# Patient Record
Sex: FEMALE | Race: WHITE | NOT HISPANIC OR LATINO
[De-identification: names, ages, dates, MRNs, and addresses within clinical notes are randomized per-mention and may not be internally consistent; named-entity substitution may affect disease eponyms.]

---

## 2017-05-24 ENCOUNTER — APPOINTMENT (OUTPATIENT)
Dept: CARDIOLOGY | Facility: CLINIC | Age: 82
End: 2017-05-24

## 2017-05-24 VITALS — WEIGHT: 130 LBS | BODY MASS INDEX: 25.52 KG/M2 | HEIGHT: 60 IN

## 2017-05-24 VITALS — HEART RATE: 70 BPM | SYSTOLIC BLOOD PRESSURE: 140 MMHG | DIASTOLIC BLOOD PRESSURE: 80 MMHG

## 2017-06-05 ENCOUNTER — APPOINTMENT (OUTPATIENT)
Dept: CARDIOLOGY | Facility: CLINIC | Age: 82
End: 2017-06-05

## 2017-06-09 ENCOUNTER — APPOINTMENT (OUTPATIENT)
Dept: CARDIOLOGY | Facility: CLINIC | Age: 82
End: 2017-06-09

## 2017-09-14 ENCOUNTER — MEDICATION RENEWAL (OUTPATIENT)
Age: 82
End: 2017-09-14

## 2017-10-12 ENCOUNTER — APPOINTMENT (OUTPATIENT)
Dept: CARDIOLOGY | Facility: CLINIC | Age: 82
End: 2017-10-12

## 2017-10-12 VITALS — HEART RATE: 68 BPM | DIASTOLIC BLOOD PRESSURE: 70 MMHG | SYSTOLIC BLOOD PRESSURE: 124 MMHG

## 2017-10-12 VITALS — BODY MASS INDEX: 25.32 KG/M2 | WEIGHT: 129 LBS | HEIGHT: 60 IN

## 2017-10-12 RX ORDER — PEN NEEDLE, DIABETIC 29 G X1/2"
32G X 4 MM NEEDLE, DISPOSABLE MISCELLANEOUS
Qty: 100 | Refills: 0 | Status: ACTIVE | COMMUNITY
Start: 2017-04-24

## 2017-10-27 ENCOUNTER — OUTPATIENT (OUTPATIENT)
Dept: OUTPATIENT SERVICES | Facility: HOSPITAL | Age: 82
LOS: 1 days | Discharge: HOME | End: 2017-10-27

## 2017-10-27 DIAGNOSIS — E11.9 TYPE 2 DIABETES MELLITUS WITHOUT COMPLICATIONS: ICD-10-CM

## 2017-10-27 DIAGNOSIS — I48.91 UNSPECIFIED ATRIAL FIBRILLATION: ICD-10-CM

## 2017-10-27 DIAGNOSIS — K92.2 GASTROINTESTINAL HEMORRHAGE, UNSPECIFIED: ICD-10-CM

## 2017-10-27 DIAGNOSIS — E78.00 PURE HYPERCHOLESTEROLEMIA, UNSPECIFIED: ICD-10-CM

## 2017-10-27 DIAGNOSIS — J18.9 PNEUMONIA, UNSPECIFIED ORGANISM: ICD-10-CM

## 2017-10-27 DIAGNOSIS — I35.0 NONRHEUMATIC AORTIC (VALVE) STENOSIS: ICD-10-CM

## 2017-10-27 DIAGNOSIS — E78.5 HYPERLIPIDEMIA, UNSPECIFIED: ICD-10-CM

## 2017-10-27 DIAGNOSIS — I50.9 HEART FAILURE, UNSPECIFIED: ICD-10-CM

## 2017-10-27 DIAGNOSIS — I60.9 NONTRAUMATIC SUBARACHNOID HEMORRHAGE, UNSPECIFIED: ICD-10-CM

## 2018-02-22 ENCOUNTER — APPOINTMENT (OUTPATIENT)
Dept: CARDIOLOGY | Facility: CLINIC | Age: 83
End: 2018-02-22

## 2018-02-22 VITALS — DIASTOLIC BLOOD PRESSURE: 80 MMHG | HEART RATE: 76 BPM | SYSTOLIC BLOOD PRESSURE: 130 MMHG

## 2018-02-22 VITALS — HEIGHT: 60 IN | WEIGHT: 130 LBS | BODY MASS INDEX: 25.52 KG/M2

## 2018-07-19 ENCOUNTER — APPOINTMENT (OUTPATIENT)
Dept: CARDIOLOGY | Facility: CLINIC | Age: 83
End: 2018-07-19

## 2018-07-20 ENCOUNTER — APPOINTMENT (OUTPATIENT)
Dept: CARDIOLOGY | Facility: CLINIC | Age: 83
End: 2018-07-20

## 2018-07-20 VITALS
HEIGHT: 60 IN | SYSTOLIC BLOOD PRESSURE: 160 MMHG | HEART RATE: 75 BPM | BODY MASS INDEX: 25.91 KG/M2 | WEIGHT: 132 LBS | DIASTOLIC BLOOD PRESSURE: 70 MMHG

## 2018-07-20 RX ORDER — FUROSEMIDE 20 MG/1
20 TABLET ORAL
Refills: 0 | Status: DISCONTINUED | COMMUNITY
End: 2018-07-20

## 2018-08-17 ENCOUNTER — EMERGENCY (EMERGENCY)
Facility: HOSPITAL | Age: 83
LOS: 0 days | Discharge: HOME | End: 2018-08-17
Attending: EMERGENCY MEDICINE | Admitting: EMERGENCY MEDICINE

## 2018-08-17 ENCOUNTER — APPOINTMENT (OUTPATIENT)
Dept: CARDIOLOGY | Facility: CLINIC | Age: 83
End: 2018-08-17

## 2018-08-17 VITALS
SYSTOLIC BLOOD PRESSURE: 161 MMHG | TEMPERATURE: 98 F | RESPIRATION RATE: 17 BRPM | HEART RATE: 64 BPM | DIASTOLIC BLOOD PRESSURE: 75 MMHG | OXYGEN SATURATION: 97 %

## 2018-08-17 VITALS
HEART RATE: 73 BPM | SYSTOLIC BLOOD PRESSURE: 201 MMHG | DIASTOLIC BLOOD PRESSURE: 81 MMHG | OXYGEN SATURATION: 97 % | RESPIRATION RATE: 16 BRPM | TEMPERATURE: 97 F

## 2018-08-17 DIAGNOSIS — S09.90XA UNSPECIFIED INJURY OF HEAD, INITIAL ENCOUNTER: ICD-10-CM
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
## 2018-08-17 DIAGNOSIS — S00.83XA CONTUSION OF OTHER PART OF HEAD, INITIAL ENCOUNTER: ICD-10-CM

## 2018-08-17 DIAGNOSIS — S80.02XA CONTUSION OF LEFT KNEE, INITIAL ENCOUNTER: ICD-10-CM

## 2018-08-17 DIAGNOSIS — S52.602A UNSPECIFIED FRACTURE OF LOWER END OF LEFT ULNA, INITIAL ENCOUNTER FOR CLOSED FRACTURE: ICD-10-CM

## 2018-08-17 DIAGNOSIS — S60.221A CONTUSION OF RIGHT HAND, INITIAL ENCOUNTER: ICD-10-CM
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
## 2018-08-17 DIAGNOSIS — R07.89 OTHER CHEST PAIN: ICD-10-CM

## 2018-08-17 DIAGNOSIS — Z95.2 PRESENCE OF PROSTHETIC HEART VALVE: Chronic | ICD-10-CM

## 2018-08-17 DIAGNOSIS — S52.592A OTHER FRACTURES OF LOWER END OF LEFT RADIUS, INITIAL ENCOUNTER FOR CLOSED FRACTURE: ICD-10-CM
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
## 2018-08-17 DIAGNOSIS — E11.9 TYPE 2 DIABETES MELLITUS WITHOUT COMPLICATIONS: ICD-10-CM

## 2018-08-17 LAB
ALBUMIN SERPL ELPH-MCNC: 4.3 G/DL — SIGNIFICANT CHANGE UP (ref 3.5–5.2)
ALP SERPL-CCNC: 83 U/L — SIGNIFICANT CHANGE UP (ref 30–115)
ALT FLD-CCNC: 9 U/L — SIGNIFICANT CHANGE UP (ref 0–41)
ANION GAP SERPL CALC-SCNC: 19 MMOL/L — HIGH (ref 7–14)
APTT BLD: 31.9 SEC — SIGNIFICANT CHANGE UP (ref 27–39.2)
AST SERPL-CCNC: 18 U/L — SIGNIFICANT CHANGE UP (ref 0–41)
BASOPHILS # BLD AUTO: 0.04 K/UL — SIGNIFICANT CHANGE UP (ref 0–0.2)
BASOPHILS NFR BLD AUTO: 0.5 % — SIGNIFICANT CHANGE UP (ref 0–1)
BILIRUB SERPL-MCNC: 0.4 MG/DL — SIGNIFICANT CHANGE UP (ref 0.2–1.2)
BUN SERPL-MCNC: 29 MG/DL — HIGH (ref 10–20)
CALCIUM SERPL-MCNC: 9.7 MG/DL — SIGNIFICANT CHANGE UP (ref 8.5–10.1)
CHLORIDE SERPL-SCNC: 98 MMOL/L — SIGNIFICANT CHANGE UP (ref 98–110)
CO2 SERPL-SCNC: 24 MMOL/L — SIGNIFICANT CHANGE UP (ref 17–32)
CREAT SERPL-MCNC: 0.8 MG/DL — SIGNIFICANT CHANGE UP (ref 0.7–1.5)
EOSINOPHIL # BLD AUTO: 0.11 K/UL — SIGNIFICANT CHANGE UP (ref 0–0.7)
EOSINOPHIL NFR BLD AUTO: 1.4 % — SIGNIFICANT CHANGE UP (ref 0–8)
ETHANOL SERPL-MCNC: <10 MG/DL — HIGH
GLUCOSE SERPL-MCNC: 200 MG/DL — HIGH (ref 70–99)
HCT VFR BLD CALC: 37.6 % — SIGNIFICANT CHANGE UP (ref 37–47)
HGB BLD-MCNC: 12.5 G/DL — SIGNIFICANT CHANGE UP (ref 12–16)
IMM GRANULOCYTES NFR BLD AUTO: 0.5 % — HIGH (ref 0.1–0.3)
INR BLD: 1.13 RATIO — SIGNIFICANT CHANGE UP (ref 0.65–1.3)
LIDOCAIN IGE QN: 38 U/L — SIGNIFICANT CHANGE UP (ref 7–60)
LYMPHOCYTES # BLD AUTO: 0.87 K/UL — LOW (ref 1.2–3.4)
LYMPHOCYTES # BLD AUTO: 11.3 % — LOW (ref 20.5–51.1)
MCHC RBC-ENTMCNC: 29.1 PG — SIGNIFICANT CHANGE UP (ref 27–31)
MCHC RBC-ENTMCNC: 33.2 G/DL — SIGNIFICANT CHANGE UP (ref 32–37)
MCV RBC AUTO: 87.6 FL — SIGNIFICANT CHANGE UP (ref 81–99)
MONOCYTES # BLD AUTO: 0.5 K/UL — SIGNIFICANT CHANGE UP (ref 0.1–0.6)
MONOCYTES NFR BLD AUTO: 6.5 % — SIGNIFICANT CHANGE UP (ref 1.7–9.3)
NEUTROPHILS # BLD AUTO: 6.14 K/UL — SIGNIFICANT CHANGE UP (ref 1.4–6.5)
NEUTROPHILS NFR BLD AUTO: 79.8 % — HIGH (ref 42.2–75.2)
PLATELET # BLD AUTO: 220 K/UL — SIGNIFICANT CHANGE UP (ref 130–400)
POTASSIUM SERPL-MCNC: 4.1 MMOL/L — SIGNIFICANT CHANGE UP (ref 3.5–5)
POTASSIUM SERPL-SCNC: 4.1 MMOL/L — SIGNIFICANT CHANGE UP (ref 3.5–5)
PROT SERPL-MCNC: 7.3 G/DL — SIGNIFICANT CHANGE UP (ref 6–8)
PROTHROM AB SERPL-ACNC: 12.2 SEC — SIGNIFICANT CHANGE UP (ref 9.95–12.87)
RBC # BLD: 4.29 M/UL — SIGNIFICANT CHANGE UP (ref 4.2–5.4)
RBC # FLD: 13.4 % — SIGNIFICANT CHANGE UP (ref 11.5–14.5)
SODIUM SERPL-SCNC: 141 MMOL/L — SIGNIFICANT CHANGE UP (ref 135–146)
TROPONIN T SERPL-MCNC: <0.01 NG/ML — SIGNIFICANT CHANGE UP
TYPE + AB SCN PNL BLD: SIGNIFICANT CHANGE UP
WBC # BLD: 7.7 K/UL — SIGNIFICANT CHANGE UP (ref 4.8–10.8)
WBC # FLD AUTO: 7.7 K/UL — SIGNIFICANT CHANGE UP (ref 4.8–10.8)

## 2018-08-17 NOTE — ED PROVIDER NOTE - OBJECTIVE STATEMENT
88yF with PMH CAD s/p stent X 1, TAVR on plavix, HTN, p/w mechanical fall after slipping in shoes at home, hitting L face, wrist, and ribcage. Patient now has pain with deep breathing and with wrist movement, as well as abrasion to L periorbital region. no neck pain, scalp pain, cp, sob, palpitations, ams, vision changes, ha, n/v, difficulty ambulating, urinary sx.

## 2018-08-17 NOTE — ED PROVIDER NOTE - CONSTITUTIONAL, MLM
normal... Well appearing elderly female, awake, alert, oriented to person, place, time/situation and in no apparent distress.

## 2018-08-17 NOTE — ED ADULT NURSE NOTE - NSFALLRSKHRMRISKTYPE_ED_ALL_ED
bones(Osteoporosis,prev fx,steroid use,metastatic bone ca)/age(85 years old or older)/coagulation(Bleeding disorder R/T clinical cond/anti-coags)

## 2018-08-17 NOTE — ED PROVIDER NOTE - PROGRESS NOTE DETAILS
I personally evaluated the patient. I reviewed the Resident’s or Physician Assistant’s note (as assigned above), and agree with the findings and plan except as documented in my note.  87 Y/O F HTN, DYSLIPIDEMIA, DM, CAD, S/P CARDIAC STENT, S/P TAVR, ON PLAVIX S/P FALL FORM STANDING THIS AM. PT REPORTS MECHANICAL FALL. + HEAD TRAUMA. NO LOC. PT C/O L WRIST PAIN AND L CHEST WALL PAIN-PLEURITIC. NO NECK OR BACK PAIN. + R KNEE PAIN. NO SOB. NO ABD PAIN. ALERT OX3 NAD GCS-15. NCAT. + 3 CM HEMATOMA TO L FOREHEAD. PERRL, EOMI. NO MIDLINE C SPINE TENDERNESS. LUNGS CLEAR B/L. L ANTEROLATERAL CHEST WALL TENDERNESS. NO CREPITUS. RRR. ABD- SOFT NONTENDER. PELVIS STABLE NONTENDER. BACK NONTENDER. NO SPINE TENDERNESS. NEURO EXAM NONFOCAL. L WRIST WITH TENDERNESS. + FROM. NO DEFORMITY. L HAND AND L ELBOW NONTENDER, FROM. LUE NVI. 2+ RADIAL PULSES B/L. R HAND WITH ECCHYMOSES OVER DORAL HAND. FORM ALL DIGITS. NONTENDER. L KNEE WITH ECCHYMOSES. NONTENDER. NO EFFUSION. + FROM. B/L HIPS NONTENDER, FROM. 2+ PEDAL PULSES B/L.

## 2018-08-17 NOTE — ED ADULT NURSE NOTE - OBJECTIVE STATEMENT
pt s/p trip and fall on plavix no loc. pt with abrasion to left forehead. c/o left wrsit and left rib pain .

## 2018-08-17 NOTE — ED ADULT NURSE NOTE - PMH
Arthritis    Diabetes    GERD (gastroesophageal reflux disease)    Hypertension Arthritis    CAD (coronary artery disease)    Diabetes    GERD (gastroesophageal reflux disease)    Hypertension

## 2018-08-17 NOTE — ED PROVIDER NOTE - CARE PLAN
Principal Discharge DX:	Other closed fracture of distal end of left radius, initial encounter  Secondary Diagnosis:	Fall, initial encounter  Secondary Diagnosis:	Ulna distal fracture Principal Discharge DX:	Other closed fracture of distal end of left radius, initial encounter  Secondary Diagnosis:	Fall, initial encounter  Secondary Diagnosis:	Traumatic injury of head, initial encounter

## 2018-08-17 NOTE — ED PROVIDER NOTE - MUSCULOSKELETAL, MLM
Spine appears normal, range of motion is not limited, no muscle or joint tenderness. from all extremities. tender with rom of L wrist

## 2018-08-17 NOTE — ED ADULT NURSE NOTE - NSIMPLEMENTINTERV_GEN_ALL_ED
Implemented All Fall with Harm Risk Interventions:  [place] to call system. Call bell, personal items and telephone within reach. Instruct patient to call for assistance. Room bathroom lighting operational. Non-slip footwear when patient is off stretcher. Physically safe environment: no spills, clutter or unnecessary equipment. Stretcher in lowest position, wheels locked, appropriate side rails in place. Provide visual cue, wrist band, yellow gown, etc. Monitor gait and stability. Monitor for mental status changes and reorient to person, place, and time. Review medications for side effects contributing to fall risk. Reinforce activity limits and safety measures with patient and family. Provide visual clues: red socks.

## 2018-08-17 NOTE — ED ADULT NURSE NOTE - CHIEF COMPLAINT QUOTE
Patient has mechanical fall - hitting left side of head on floor no loc no nausea no vomiting on plavix. bruising to right fore head with surrounding abrasions

## 2018-09-06 PROBLEM — K21.9 GASTRO-ESOPHAGEAL REFLUX DISEASE WITHOUT ESOPHAGITIS: Chronic | Status: ACTIVE | Noted: 2018-08-17

## 2018-09-06 PROBLEM — E11.9 TYPE 2 DIABETES MELLITUS WITHOUT COMPLICATIONS: Chronic | Status: ACTIVE | Noted: 2018-08-17

## 2018-09-06 PROBLEM — M19.90 UNSPECIFIED OSTEOARTHRITIS, UNSPECIFIED SITE: Chronic | Status: ACTIVE | Noted: 2018-08-17

## 2018-09-06 PROBLEM — I10 ESSENTIAL (PRIMARY) HYPERTENSION: Chronic | Status: ACTIVE | Noted: 2018-08-17

## 2018-09-06 PROBLEM — I25.10 ATHEROSCLEROTIC HEART DISEASE OF NATIVE CORONARY ARTERY WITHOUT ANGINA PECTORIS: Chronic | Status: ACTIVE | Noted: 2018-08-17

## 2018-09-11 ENCOUNTER — APPOINTMENT (OUTPATIENT)
Dept: CARDIOLOGY | Facility: CLINIC | Age: 83
End: 2018-09-11

## 2018-09-13 ENCOUNTER — INPATIENT (INPATIENT)
Facility: HOSPITAL | Age: 83
LOS: 10 days | Discharge: NURSING, REHAB OR LONG-TERM CARE | End: 2018-09-24
Attending: INTERNAL MEDICINE | Admitting: INTERNAL MEDICINE

## 2018-09-13 ENCOUNTER — APPOINTMENT (OUTPATIENT)
Dept: CARDIOLOGY | Facility: CLINIC | Age: 83
End: 2018-09-13

## 2018-09-13 VITALS
HEART RATE: 75 BPM | WEIGHT: 120 LBS | HEIGHT: 60 IN | DIASTOLIC BLOOD PRESSURE: 56 MMHG | BODY MASS INDEX: 23.56 KG/M2 | SYSTOLIC BLOOD PRESSURE: 110 MMHG

## 2018-09-13 VITALS
SYSTOLIC BLOOD PRESSURE: 134 MMHG | DIASTOLIC BLOOD PRESSURE: 80 MMHG | OXYGEN SATURATION: 99 % | HEART RATE: 78 BPM | TEMPERATURE: 97 F | RESPIRATION RATE: 20 BRPM

## 2018-09-13 DIAGNOSIS — Z95.2 PRESENCE OF PROSTHETIC HEART VALVE: Chronic | ICD-10-CM

## 2018-09-13 LAB
ALBUMIN SERPL ELPH-MCNC: 3.4 G/DL — LOW (ref 3.5–5.2)
ALP SERPL-CCNC: 157 U/L — HIGH (ref 30–115)
ALT FLD-CCNC: 9 U/L — SIGNIFICANT CHANGE UP (ref 0–41)
ANION GAP SERPL CALC-SCNC: 17 MMOL/L — HIGH (ref 7–14)
APPEARANCE UR: CLEAR — SIGNIFICANT CHANGE UP
APTT BLD: 29.9 SEC — SIGNIFICANT CHANGE UP (ref 27–39.2)
AST SERPL-CCNC: 30 U/L — SIGNIFICANT CHANGE UP (ref 0–41)
BASOPHILS # BLD AUTO: 0.04 K/UL — SIGNIFICANT CHANGE UP (ref 0–0.2)
BASOPHILS NFR BLD AUTO: 0.4 % — SIGNIFICANT CHANGE UP (ref 0–1)
BILIRUB SERPL-MCNC: 0.5 MG/DL — SIGNIFICANT CHANGE UP (ref 0.2–1.2)
BILIRUB UR-MCNC: NEGATIVE — SIGNIFICANT CHANGE UP
BUN SERPL-MCNC: 32 MG/DL — HIGH (ref 10–20)
CALCIUM SERPL-MCNC: 9 MG/DL — SIGNIFICANT CHANGE UP (ref 8.5–10.1)
CHLORIDE SERPL-SCNC: 99 MMOL/L — SIGNIFICANT CHANGE UP (ref 98–110)
CO2 SERPL-SCNC: 23 MMOL/L — SIGNIFICANT CHANGE UP (ref 17–32)
COLOR SPEC: YELLOW — SIGNIFICANT CHANGE UP
CREAT SERPL-MCNC: 1 MG/DL — SIGNIFICANT CHANGE UP (ref 0.7–1.5)
DIFF PNL FLD: NEGATIVE — SIGNIFICANT CHANGE UP
EOSINOPHIL # BLD AUTO: 0.22 K/UL — SIGNIFICANT CHANGE UP (ref 0–0.7)
EOSINOPHIL NFR BLD AUTO: 1.9 % — SIGNIFICANT CHANGE UP (ref 0–8)
GLUCOSE SERPL-MCNC: 146 MG/DL — HIGH (ref 70–99)
GLUCOSE UR QL: NEGATIVE MG/DL — SIGNIFICANT CHANGE UP
HCT VFR BLD CALC: 36 % — LOW (ref 37–47)
HGB BLD-MCNC: 11.4 G/DL — LOW (ref 12–16)
IMM GRANULOCYTES NFR BLD AUTO: 1.1 % — HIGH (ref 0.1–0.3)
INR BLD: 1.36 RATIO — HIGH (ref 0.65–1.3)
KETONES UR-MCNC: NEGATIVE — SIGNIFICANT CHANGE UP
LACTATE SERPL-SCNC: 1.8 MMOL/L — SIGNIFICANT CHANGE UP (ref 0.5–2.2)
LEUKOCYTE ESTERASE UR-ACNC: ABNORMAL
LIDOCAIN IGE QN: 41 U/L — SIGNIFICANT CHANGE UP (ref 7–60)
LYMPHOCYTES # BLD AUTO: 1.03 K/UL — LOW (ref 1.2–3.4)
LYMPHOCYTES # BLD AUTO: 9.1 % — LOW (ref 20.5–51.1)
MCHC RBC-ENTMCNC: 28.4 PG — SIGNIFICANT CHANGE UP (ref 27–31)
MCHC RBC-ENTMCNC: 31.7 G/DL — LOW (ref 32–37)
MCV RBC AUTO: 89.6 FL — SIGNIFICANT CHANGE UP (ref 81–99)
MONOCYTES # BLD AUTO: 0.93 K/UL — HIGH (ref 0.1–0.6)
MONOCYTES NFR BLD AUTO: 8.2 % — SIGNIFICANT CHANGE UP (ref 1.7–9.3)
NEUTROPHILS # BLD AUTO: 9 K/UL — HIGH (ref 1.4–6.5)
NEUTROPHILS NFR BLD AUTO: 79.3 % — HIGH (ref 42.2–75.2)
NITRITE UR-MCNC: NEGATIVE — SIGNIFICANT CHANGE UP
NT-PROBNP SERPL-SCNC: 4692 PG/ML — HIGH (ref 0–300)
PH UR: 5.5 — SIGNIFICANT CHANGE UP (ref 5–8)
PLATELET # BLD AUTO: 324 K/UL — SIGNIFICANT CHANGE UP (ref 130–400)
POTASSIUM SERPL-MCNC: 5.1 MMOL/L — HIGH (ref 3.5–5)
POTASSIUM SERPL-SCNC: 5.1 MMOL/L — HIGH (ref 3.5–5)
PROT SERPL-MCNC: 7 G/DL — SIGNIFICANT CHANGE UP (ref 6–8)
PROT UR-MCNC: 30 MG/DL
PROTHROM AB SERPL-ACNC: 14.6 SEC — HIGH (ref 9.95–12.87)
RBC # BLD: 4.02 M/UL — LOW (ref 4.2–5.4)
RBC # FLD: 13.9 % — SIGNIFICANT CHANGE UP (ref 11.5–14.5)
SODIUM SERPL-SCNC: 139 MMOL/L — SIGNIFICANT CHANGE UP (ref 135–146)
SP GR SPEC: 1.02 — SIGNIFICANT CHANGE UP (ref 1.01–1.03)
TROPONIN T SERPL-MCNC: <0.01 NG/ML — SIGNIFICANT CHANGE UP
UROBILINOGEN FLD QL: 0.2 MG/DL — SIGNIFICANT CHANGE UP (ref 0.2–0.2)
WBC # BLD: 11.34 K/UL — HIGH (ref 4.8–10.8)
WBC # FLD AUTO: 11.34 K/UL — HIGH (ref 4.8–10.8)

## 2018-09-13 RX ORDER — FUROSEMIDE 40 MG
40 TABLET ORAL
Qty: 0 | Refills: 0 | Status: DISCONTINUED | OUTPATIENT
Start: 2018-09-13 | End: 2018-09-16

## 2018-09-13 RX ORDER — CLOPIDOGREL BISULFATE 75 MG/1
75 TABLET, FILM COATED ORAL DAILY
Qty: 0 | Refills: 0 | Status: DISCONTINUED | OUTPATIENT
Start: 2018-09-13 | End: 2018-09-24

## 2018-09-13 RX ORDER — FAMOTIDINE 10 MG/ML
0 INJECTION INTRAVENOUS
Qty: 0 | Refills: 0 | COMMUNITY

## 2018-09-13 RX ORDER — HEPARIN SODIUM 5000 [USP'U]/ML
5000 INJECTION INTRAVENOUS; SUBCUTANEOUS EVERY 8 HOURS
Qty: 0 | Refills: 0 | Status: DISCONTINUED | OUTPATIENT
Start: 2018-09-13 | End: 2018-09-24

## 2018-09-13 RX ORDER — CHLORHEXIDINE GLUCONATE 213 G/1000ML
1 SOLUTION TOPICAL
Qty: 0 | Refills: 0 | Status: DISCONTINUED | OUTPATIENT
Start: 2018-09-13 | End: 2018-09-24

## 2018-09-13 RX ORDER — ACETAMINOPHEN 500 MG
650 TABLET ORAL EVERY 6 HOURS
Qty: 0 | Refills: 0 | Status: DISCONTINUED | OUTPATIENT
Start: 2018-09-13 | End: 2018-09-24

## 2018-09-13 RX ORDER — FUROSEMIDE 40 MG
1 TABLET ORAL
Qty: 0 | Refills: 0 | COMMUNITY

## 2018-09-13 RX ORDER — PANTOPRAZOLE SODIUM 20 MG/1
40 TABLET, DELAYED RELEASE ORAL
Qty: 0 | Refills: 0 | Status: DISCONTINUED | OUTPATIENT
Start: 2018-09-13 | End: 2018-09-24

## 2018-09-13 RX ORDER — METOPROLOL TARTRATE 50 MG
25 TABLET ORAL
Qty: 0 | Refills: 0 | Status: DISCONTINUED | OUTPATIENT
Start: 2018-09-13 | End: 2018-09-24

## 2018-09-13 RX ORDER — SIMVASTATIN 20 MG/1
10 TABLET, FILM COATED ORAL AT BEDTIME
Qty: 0 | Refills: 0 | Status: DISCONTINUED | OUTPATIENT
Start: 2018-09-13 | End: 2018-09-24

## 2018-09-13 RX ADMIN — Medication 40 MILLIGRAM(S): at 21:43

## 2018-09-13 RX ADMIN — SIMVASTATIN 10 MILLIGRAM(S): 20 TABLET, FILM COATED ORAL at 21:43

## 2018-09-13 RX ADMIN — HEPARIN SODIUM 5000 UNIT(S): 5000 INJECTION INTRAVENOUS; SUBCUTANEOUS at 22:08

## 2018-09-13 RX ADMIN — Medication 25 MILLIGRAM(S): at 21:43

## 2018-09-13 NOTE — H&P ADULT - NSHPLABSRESULTS_GEN_ALL_CORE
11.4   11.34 )-----------( 324      ( 13 Sep 2018 13:18 )             36.0     PT/INR - ( 13 Sep 2018 13:18 )   PT: 14.60 sec;   INR: 1.36 ratio      PTT - ( 13 Sep 2018 13:18 )  PTT:29.9 sec        139  |  99  |  32<H>  ----------------------------<  146<H>  5.1<H>   |  23  |  1.0    Ca    9.0      13 Sep 2018 13:18    TPro  7.0  /  Alb  3.4<L>  /  TBili  0.5  /  DBili  x   /  AST  30  /  ALT  9   /  AlkPhos  157<H>      Urinalysis Basic - ( 13 Sep 2018 13:19 )    Color: Yellow / Appearance: Clear / S.025 / pH: x  Gluc: x / Ketone: Negative  / Bili: Negative / Urobili: 0.2 mg/dL   Blood: x / Protein: 30 mg/dL / Nitrite: Negative   Leuk Esterase: Small / RBC: x / WBC 6-10 /HPF   Sq Epi: x / Non Sq Epi: x / Bacteria: x    < from: CT Chest w/ IV Cont (18 @ 16:40) >    MPRESSION:   No evidence of pulmonary embolism.    Bilateral pleural effusions, right greater than left, associated with   compressive atelectasis.    Indeterminate bilateral renal lesions measuring 1.6 cm in greatest   dimension. Consider additional evaluation with ultrasound and/or renal   protocol MRI.    < end of copied text >      < from: CT Abdomen and Pelvis w/ IV Cont (18 @ 16:40) >    IMPRESSION:   No evidence of pulmonary embolism.    Bilateral pleural effusions, right greater than left, associated with   compressive atelectasis.    Indeterminate bilateral renal lesions measuring 1.6 cm in greatest   dimension. Consider additional evaluation with ultrasound and/or renal   protocol MRI.    These findings were discussed with Dr. CHUY AGEE at 2018 6:07 PM   by Dr. Lynn with read back confirmation.    < end of copied text >

## 2018-09-13 NOTE — ED ADULT TRIAGE NOTE - CHIEF COMPLAINT QUOTE
"im feeling gas in my stomach, I don't have an appetite, and I feel very tired" Pt also reports SOB. No CP

## 2018-09-13 NOTE — ED PROVIDER NOTE - ATTENDING CONTRIBUTION TO CARE
I personally evaluated the patient. I reviewed the Resident’s or Physician Assistant’s note (as assigned above), and agree with the findings and plan except as documented in my note.  87 yo woman with SOB and recurrent orthopnea.  She was sent in by cardiology for r/o PE.  Workup reveals likely CHF.  IV diuresis and admission.

## 2018-09-13 NOTE — ED PROVIDER NOTE - NS ED ROS FT
Eyes:  No visual changes, eye pain or discharge.  ENMT:  no sore throat or runny nose  Cardiac:  No chest pain, + SOB worsening over the last week. worse with exertion.   Respiratory:  No cough or respiratory distress.   GI:  No nausea, vomiting, diarrhea or abdominal pain.  :  No dysuria, frequency or burning.  MS:  No joint pain or back pain.  Neuro:  No headache or weakness.   Skin:  No skin rash.   Endocrine: No history of thyroid disease or diabetes.

## 2018-09-13 NOTE — ED PROVIDER NOTE - PHYSICAL EXAMINATION
CONSTITUTIONAL: Well-developed; well-nourished; in no acute distress.   SKIN: warm, dry  HEAD: Normocephalic; atraumatic.  EYES: PERRL, no conjunctival erythema  ENT: No nasal discharge; airway clear.  NECK: Supple; non tender.  CARD: S1, S2 normal;  Regular rate and rhythm.   RESP: No wheezes good air movement bilaterally. bl crackles. speaking full sentences.   ABD: soft ntnd  EXT: Normal ROM.  No pedal edema.   LYMPH: No acute cervical adenopathy.  NEURO: Alert, oriented, grossly unremarkable

## 2018-09-13 NOTE — H&P ADULT - RS GEN PE MLT RESP DETAILS PC
no rhonchi/no rales/respirations non-labored/breath sounds equal/good air movement/normal/airway patent

## 2018-09-13 NOTE — ED ADULT NURSE NOTE - NSIMPLEMENTINTERV_GEN_ALL_ED
Implemented All Fall with Harm Risk Interventions:  [place] to call system. Call bell, personal items and telephone within reach. Instruct patient to call for assistance. Room bathroom lighting operational. Non-slip footwear when patient is off stretcher. Physically safe environment: no spills, clutter or unnecessary equipment. Stretcher in lowest position, wheels locked, appropriate side rails in place. Provide visual cue, wrist band, yellow gown, etc. Monitor gait and stability. Monitor for mental status changes and reorient to person, place, and time. Review medications for side effects contributing to fall risk. Reinforce activity limits and safety measures with patient and family. Provide visual clues: red socks.

## 2018-09-13 NOTE — ED ADULT NURSE NOTE - PMH
Arthritis    CAD (coronary artery disease)    Diabetes    GERD (gastroesophageal reflux disease)    Hypertension

## 2018-09-13 NOTE — H&P ADULT - HISTORY OF PRESENT ILLNESS
87 yo F with PMH of Atrial Fibrillation ( not on AC due to previous GI Bleed), HTN, CAD with stent placement, CHF, s/p TAVR, and HLD presented to the hospital for the evaluation of sob x 10days. Patient states that she first noticed SOB ten days ago and it has been progressively worsening. She has RAIN after about 25 ft ambulation. She used to sleep with one pillow and now is using two for symptomatic relief. Patients son is at bedside and notes that these symptoms are similar to symtpoms she had prior to her TAVR. 89 yo F with PMH of Atrial Fibrillation ( not on AC due to previous GI Bleed ), HTN, CAD with stent placement, CHF, s/p TAVR, GERD, DM, and HLD presented to the hospital for the evaluation of sob x 10 days. Patient states that she first noticed SOB ten days ago and it has been progressively worsening. She has RAIN after about 25 ft ambulation. She used to sleep with one pillow and now is using two for symptomatic relief. Patients son is at bedside and notes that these symptoms are similar to symptoms she had prior to her TAVR. Patient also notes increased swelling of bilaterally lower extremities. She recently saw Cardiologist who completed ECHO and increased dosage of Furosemide. She has had improvement of LE edema but not of SOB to baseline. Patient also notes recent feelings of nausea, chills, fatigue and 5 pound weight loss in the last month too. She denies and fevers, vomiting chest pain, palpitations, abdominal pain or back pain. Patient also notes loose stools increased in frequency ( as per son this is normal for her). Patient notes she has had pleural effusions for three years which have been monitored with CXR.

## 2018-09-13 NOTE — H&P ADULT - ASSESSMENT
87 yo F with PMH of Atrial Fibrillation ( not on AC due to previous GI Bleed ), HTN, CAD with stent placement, CHF, s/p TAVR, GERD, DM, and HLD presented to the hospital for the evaluation of sob x 10 days. Found to have b/l Pleural effusions Right is moderate and left is small.       # SOB due to b/l Pleural Effusions: Pleural effusion likely to be secondary to CHF exacerbation   - BNP 4692  - CT scan completed today shows worsening pleural effusions when compared to study completed recently.   - Keep i < 0  - Trend daily weights  - Daily BMP  - Repeat CXR in am   - IV DIuresis: Patient Furosemide recently increased to 40mg PO BID within some improvement. Will begin patient on Furosemide 40mg IVP BID  - Cardiology consulted: Dr. Joyce service, f/u recommendations  - ECHO recently completed, please have Dr. Joyce office fax over a copy of the official ECHO report     # Atrial Fibrillation:  - AC discontinued due to GI Bleed  - Continue clopidegrol  - Continue Metoprolol  - Currently rate controlled on monitor ; HR ~ 80 bpm    # GERD: Patient takes Famotidine at home. Will continue patient here in hospital with Protonix    # HLD: Continue simvastatin    # DM:   - Patient with poor appetite and has held Metformin for about 2 days now. Sugar roughly 140's. Will hold on insulin for now. Will monitor FSG if PO intake improves and patient becomes hyperglycemic would begin weight based insulin regimen with low dose correctional sliding scale.     # dvt ppx: Heparin sc 5000 u q8h  # gi ppx: Protonix 40mg PO qam 89 yo F with PMH of Atrial Fibrillation ( not on AC due to previous GI Bleed ), HTN, CAD with stent placement, CHF, s/p TAVR, GERD, DM, and HLD presented to the hospital for the evaluation of sob x 10 days. Found to have b/l Pleural effusions Right is moderate and left is small.     # SOB due to b/l Pleural Effusions: Pleural effusion likely to be secondary to CHF exacerbation   - BNP 4692  - CT scan completed today shows worsening pleural effusions when compared to study completed recently.   - Keep i < 0  - Trend daily weights  - Daily BMP  - Repeat CXR in am   - IV Diuresis Patient Furosemide recently increased to 40mg PO BID within some improvement. Will begin patient on Furosemide 40mg IVP BID  - Cardiology consulted: Dr. Reeves's service, f/u recommendations  - ECHO recently completed, please have Dr. Reeves's office fax over a copy of the official ECHO report   - Patient with significant PMH of smoking would consider Pulmonary consult for diagnostic thoracentesis.   - WBC elevated but possible reactive. Less likely to infectious. Will hold on ABX for now and continue to monitor.     # Atrial Fibrillation:  - AC discontinued due to GI Bleed  - Continue clopidegrol  - Continue Metoprolol  - Currently rate controlled on monitor ; HR ~ 80 bpm    # GERD: Patient takes Famotidine at home. Will continue patient here in hospital with Protonix    # HLD: Continue simvastatin    # Kidney abnormalities noted on CT Scan: f/u kidney US     # DM:   - Patient with poor appetite and has held Metformin for about 2 days now. Sugar roughly 140's. Will hold on insulin for now. Will monitor FSG if PO intake improves and patient becomes hyperglycemic would begin weight based insulin regimen with low dose correctional sliding scale.     # dvt ppx: Heparin sc 5000 u q8h  # gi ppx: Protonix 40mg PO qam

## 2018-09-13 NOTE — ED PROVIDER NOTE - OBJECTIVE STATEMENT
89 yo F pmh of TAVR, a fib no AC (previous GI bleed), HTN, cad with stent, chf, hld presents with worsening sob for a few days. Was sent by Dr. Reeves's office for rule out PE because her pulmonary pressures were elvated on echo in office. no chest pain. + exertional sob. no cp, no n/v/d, no abdominal pain. Also having some worsened weakness this week. no dizziness, no headache. pmd is Dr. Bone.

## 2018-09-13 NOTE — H&P ADULT - ATTENDING COMMENTS
Patient seen and examined independently. Agree with resident note/ history / physical exam and plan of care with following exceptions. Case discussed with house-staff, nursing and patient    pt is feeling better today    Vital Signs Last 24 Hrs  T(C): 36.6 (14 Sep 2018 11:50), Max: 36.9 (14 Sep 2018 00:26)  T(F): 97.8 (14 Sep 2018 11:50), Max: 98.4 (14 Sep 2018 00:26)  HR: 88 (14 Sep 2018 11:50) (88 - 104)  BP: 147/64 (14 Sep 2018 11:50) (147/64 - 184/80)  BP(mean): --  RR: 18 (14 Sep 2018 11:50) (18 - 19)  SpO2: 94% (14 Sep 2018 11:50) (94% - 98%)                          11.1   10.70 )-----------( 345      ( 14 Sep 2018 06:52 )             34.7     09-14    142  |  97<L>  |  27<H>  ----------------------------<  153<H>  4.0   |  24  |  0.8    Ca    9.0      14 Sep 2018 06:52  Mg     1.6     09-14    TPro  7.0  /  Alb  3.4<L>  /  TBili  0.5  /  DBili  x   /  AST  30  /  ALT  9   /  AlkPhos  157<H>  09-13    Cardio notes appreciated.

## 2018-09-14 LAB
ANION GAP SERPL CALC-SCNC: 21 MMOL/L — HIGH (ref 7–14)
BUN SERPL-MCNC: 27 MG/DL — HIGH (ref 10–20)
CALCIUM SERPL-MCNC: 9 MG/DL — SIGNIFICANT CHANGE UP (ref 8.5–10.1)
CHLORIDE SERPL-SCNC: 97 MMOL/L — LOW (ref 98–110)
CO2 SERPL-SCNC: 24 MMOL/L — SIGNIFICANT CHANGE UP (ref 17–32)
CREAT SERPL-MCNC: 0.8 MG/DL — SIGNIFICANT CHANGE UP (ref 0.7–1.5)
CULTURE RESULTS: SIGNIFICANT CHANGE UP
GLUCOSE BLDC GLUCOMTR-MCNC: 154 MG/DL — HIGH (ref 70–99)
GLUCOSE BLDC GLUCOMTR-MCNC: 212 MG/DL — HIGH (ref 70–99)
GLUCOSE BLDC GLUCOMTR-MCNC: 281 MG/DL — HIGH (ref 70–99)
GLUCOSE SERPL-MCNC: 153 MG/DL — HIGH (ref 70–99)
HCT VFR BLD CALC: 34.7 % — LOW (ref 37–47)
HGB BLD-MCNC: 11.1 G/DL — LOW (ref 12–16)
MAGNESIUM SERPL-MCNC: 1.6 MG/DL — LOW (ref 1.8–2.4)
MCHC RBC-ENTMCNC: 28.5 PG — SIGNIFICANT CHANGE UP (ref 27–31)
MCHC RBC-ENTMCNC: 32 G/DL — SIGNIFICANT CHANGE UP (ref 32–37)
MCV RBC AUTO: 89 FL — SIGNIFICANT CHANGE UP (ref 81–99)
NRBC # BLD: 0 /100 WBCS — SIGNIFICANT CHANGE UP (ref 0–0)
PLATELET # BLD AUTO: 345 K/UL — SIGNIFICANT CHANGE UP (ref 130–400)
POTASSIUM SERPL-MCNC: 4 MMOL/L — SIGNIFICANT CHANGE UP (ref 3.5–5)
POTASSIUM SERPL-SCNC: 4 MMOL/L — SIGNIFICANT CHANGE UP (ref 3.5–5)
RBC # BLD: 3.9 M/UL — LOW (ref 4.2–5.4)
RBC # FLD: 14 % — SIGNIFICANT CHANGE UP (ref 11.5–14.5)
SODIUM SERPL-SCNC: 142 MMOL/L — SIGNIFICANT CHANGE UP (ref 135–146)
SPECIMEN SOURCE: SIGNIFICANT CHANGE UP
WBC # BLD: 10.7 K/UL — SIGNIFICANT CHANGE UP (ref 4.8–10.8)
WBC # FLD AUTO: 10.7 K/UL — SIGNIFICANT CHANGE UP (ref 4.8–10.8)

## 2018-09-14 RX ORDER — MAGNESIUM SULFATE 500 MG/ML
1 VIAL (ML) INJECTION ONCE
Qty: 0 | Refills: 0 | Status: COMPLETED | OUTPATIENT
Start: 2018-09-14 | End: 2018-09-14

## 2018-09-14 RX ADMIN — Medication 100 GRAM(S): at 14:05

## 2018-09-14 RX ADMIN — HEPARIN SODIUM 5000 UNIT(S): 5000 INJECTION INTRAVENOUS; SUBCUTANEOUS at 14:05

## 2018-09-14 RX ADMIN — Medication 25 MILLIGRAM(S): at 06:17

## 2018-09-14 RX ADMIN — CHLORHEXIDINE GLUCONATE 1 APPLICATION(S): 213 SOLUTION TOPICAL at 06:17

## 2018-09-14 RX ADMIN — SIMVASTATIN 10 MILLIGRAM(S): 20 TABLET, FILM COATED ORAL at 21:45

## 2018-09-14 RX ADMIN — Medication 40 MILLIGRAM(S): at 17:14

## 2018-09-14 RX ADMIN — HEPARIN SODIUM 5000 UNIT(S): 5000 INJECTION INTRAVENOUS; SUBCUTANEOUS at 06:17

## 2018-09-14 RX ADMIN — Medication 25 MILLIGRAM(S): at 17:14

## 2018-09-14 RX ADMIN — CLOPIDOGREL BISULFATE 75 MILLIGRAM(S): 75 TABLET, FILM COATED ORAL at 11:15

## 2018-09-14 RX ADMIN — HEPARIN SODIUM 5000 UNIT(S): 5000 INJECTION INTRAVENOUS; SUBCUTANEOUS at 21:44

## 2018-09-14 RX ADMIN — Medication 40 MILLIGRAM(S): at 06:17

## 2018-09-14 RX ADMIN — PANTOPRAZOLE SODIUM 40 MILLIGRAM(S): 20 TABLET, DELAYED RELEASE ORAL at 06:17

## 2018-09-14 NOTE — PROGRESS NOTE ADULT - ASSESSMENT
89 yo F with PMH of Atrial Fibrillation ( not on AC due to previous GI Bleed ), HTN, CAD with stent placement, CHF, s/p TAVR, GERD, DM, and HLD presented to the hospital for the evaluation of sob x 10 days. Found to have b/l Pleural effusions Right is moderate and left is small.     # SOB due to b/l Pleural Effusions: Pleural effusion likely to be secondary to CHF exacerbation   - BNP 4692  - CT scan shows worsening pleural effusions when compared to study completed recently.   - Trend daily weights  - furosemide 40mg bid iv for now   -cardiology: IV LAsix for now, continue Metoprolol, suggest pulmonary evaluation for pulmonary HTN out of proportion to her  HF, suggest repeat CT scan of the head, work up for renal lesion as per PCP, monitor electrolytes and renal function closely      # Atrial Fibrillation:  - AC discontinued due to GI Bleed  - Continue clopidegrol  - Continue Metoprolol    # GERD: Patient takes Famotidine at home. Will continue patient here in hospital with Protonix    # HLD: Continue simvastatin    # Kidney abnormalities noted on CT Scan: f/u kidney US     # DM:   - monitor FSG, if PO intake improves and patient becomes hyperglycemic would begin weight based insulin regimen with low dose correctional sliding scale.     # dvt ppx: Heparin sc 5000 u q8h  # gi ppx: Protonix 40mg PO qam

## 2018-09-15 LAB
ALBUMIN SERPL ELPH-MCNC: 3.1 G/DL — LOW (ref 3.5–5.2)
ALP SERPL-CCNC: 150 U/L — HIGH (ref 30–115)
ALT FLD-CCNC: 7 U/L — SIGNIFICANT CHANGE UP (ref 0–41)
ANION GAP SERPL CALC-SCNC: 16 MMOL/L — HIGH (ref 7–14)
AST SERPL-CCNC: 13 U/L — SIGNIFICANT CHANGE UP (ref 0–41)
BILIRUB SERPL-MCNC: 0.6 MG/DL — SIGNIFICANT CHANGE UP (ref 0.2–1.2)
BUN SERPL-MCNC: 27 MG/DL — HIGH (ref 10–20)
CALCIUM SERPL-MCNC: 8.8 MG/DL — SIGNIFICANT CHANGE UP (ref 8.5–10.1)
CHLORIDE SERPL-SCNC: 98 MMOL/L — SIGNIFICANT CHANGE UP (ref 98–110)
CO2 SERPL-SCNC: 24 MMOL/L — SIGNIFICANT CHANGE UP (ref 17–32)
CREAT SERPL-MCNC: 0.8 MG/DL — SIGNIFICANT CHANGE UP (ref 0.7–1.5)
GLUCOSE BLDC GLUCOMTR-MCNC: 195 MG/DL — HIGH (ref 70–99)
GLUCOSE BLDC GLUCOMTR-MCNC: 246 MG/DL — HIGH (ref 70–99)
GLUCOSE BLDC GLUCOMTR-MCNC: 260 MG/DL — HIGH (ref 70–99)
GLUCOSE BLDC GLUCOMTR-MCNC: 313 MG/DL — HIGH (ref 70–99)
GLUCOSE SERPL-MCNC: 213 MG/DL — HIGH (ref 70–99)
HCT VFR BLD CALC: 31.5 % — LOW (ref 37–47)
HGB BLD-MCNC: 10.1 G/DL — LOW (ref 12–16)
MCHC RBC-ENTMCNC: 28.4 PG — SIGNIFICANT CHANGE UP (ref 27–31)
MCHC RBC-ENTMCNC: 32.1 G/DL — SIGNIFICANT CHANGE UP (ref 32–37)
MCV RBC AUTO: 88.5 FL — SIGNIFICANT CHANGE UP (ref 81–99)
NRBC # BLD: 0 /100 WBCS — SIGNIFICANT CHANGE UP (ref 0–0)
PLATELET # BLD AUTO: 340 K/UL — SIGNIFICANT CHANGE UP (ref 130–400)
POTASSIUM SERPL-MCNC: 3.8 MMOL/L — SIGNIFICANT CHANGE UP (ref 3.5–5)
POTASSIUM SERPL-SCNC: 3.8 MMOL/L — SIGNIFICANT CHANGE UP (ref 3.5–5)
PROT SERPL-MCNC: 6.1 G/DL — SIGNIFICANT CHANGE UP (ref 6–8)
RBC # BLD: 3.56 M/UL — LOW (ref 4.2–5.4)
RBC # FLD: 13.9 % — SIGNIFICANT CHANGE UP (ref 11.5–14.5)
SODIUM SERPL-SCNC: 138 MMOL/L — SIGNIFICANT CHANGE UP (ref 135–146)
WBC # BLD: 11.21 K/UL — HIGH (ref 4.8–10.8)
WBC # FLD AUTO: 11.21 K/UL — HIGH (ref 4.8–10.8)

## 2018-09-15 RX ORDER — INFLUENZA VIRUS VACCINE 15; 15; 15; 15 UG/.5ML; UG/.5ML; UG/.5ML; UG/.5ML
0.5 SUSPENSION INTRAMUSCULAR ONCE
Qty: 0 | Refills: 0 | Status: COMPLETED | OUTPATIENT
Start: 2018-09-15 | End: 2018-09-15

## 2018-09-15 RX ORDER — AMLODIPINE BESYLATE 2.5 MG/1
5 TABLET ORAL ONCE
Qty: 0 | Refills: 0 | Status: COMPLETED | OUTPATIENT
Start: 2018-09-15 | End: 2018-09-15

## 2018-09-15 RX ADMIN — Medication 40 MILLIGRAM(S): at 17:19

## 2018-09-15 RX ADMIN — PANTOPRAZOLE SODIUM 40 MILLIGRAM(S): 20 TABLET, DELAYED RELEASE ORAL at 07:23

## 2018-09-15 RX ADMIN — AMLODIPINE BESYLATE 5 MILLIGRAM(S): 2.5 TABLET ORAL at 04:14

## 2018-09-15 RX ADMIN — Medication 25 MILLIGRAM(S): at 17:18

## 2018-09-15 RX ADMIN — HEPARIN SODIUM 5000 UNIT(S): 5000 INJECTION INTRAVENOUS; SUBCUTANEOUS at 13:14

## 2018-09-15 RX ADMIN — CLOPIDOGREL BISULFATE 75 MILLIGRAM(S): 75 TABLET, FILM COATED ORAL at 11:11

## 2018-09-15 RX ADMIN — HEPARIN SODIUM 5000 UNIT(S): 5000 INJECTION INTRAVENOUS; SUBCUTANEOUS at 21:05

## 2018-09-15 RX ADMIN — SIMVASTATIN 10 MILLIGRAM(S): 20 TABLET, FILM COATED ORAL at 21:07

## 2018-09-15 RX ADMIN — Medication 650 MILLIGRAM(S): at 11:14

## 2018-09-15 RX ADMIN — Medication 40 MILLIGRAM(S): at 05:22

## 2018-09-15 RX ADMIN — Medication 25 MILLIGRAM(S): at 05:22

## 2018-09-15 RX ADMIN — HEPARIN SODIUM 5000 UNIT(S): 5000 INJECTION INTRAVENOUS; SUBCUTANEOUS at 05:22

## 2018-09-15 NOTE — PROGRESS NOTE ADULT - ASSESSMENT
C/w Lasix. Switch to PO in AM.  Repeat Labs in AM, CXR.  pulmonary evaluation  Not on anticoagulation due to high risk of bleeding.

## 2018-09-15 NOTE — PROGRESS NOTE ADULT - ASSESSMENT
89 yo F with PMH of Atrial Fibrillation ( not on AC due to previous GI Bleed ), HTN, CAD with stent placement, CHF, s/p TAVR, GERD, DM, and HLD presented to the hospital for the evaluation of sob x 10 days. Found to have b/l Pleural effusions Right is moderate and left is small.     # SOB due to b/l Pleural Effusions: Pleural effusion likely to be secondary to CHF exacerbation   - BNP 4692  - CT scan shows worsening pleural effusions when compared to study completed recently.   - Trend daily weights  - furosemide 40mg bid iv for now   -cardiology: IV LAsix for now, continue Metoprolol, suggest pulmonary evaluation for pulmonary HTN out of proportion to her  HF, suggest repeat CT scan of the head, work up for renal lesion as per PCP, monitor electrolytes and renal function closely    repeat CXR in AM     # Atrial Fibrillation:  - AC discontinued due to hx of GI Bleed  - Continue clopidegrol  - Continue Metoprolol    # GERD: Patient takes Famotidine at home. Will continue patient here in hospital with Protonix    # HLD: Continue simvastatin      # DM:   - monitor FSG, if PO intake improves and patient becomes hyperglycemic would begin weight based insulin regimen with low dose correctional sliding scale.       # dvt ppx: Heparin sc 5000 u q8h  # gi ppx: Protonix 40mg PO qam      discussed with family at bedside

## 2018-09-16 LAB
ALBUMIN SERPL ELPH-MCNC: 3.1 G/DL — LOW (ref 3.5–5.2)
ALP SERPL-CCNC: 141 U/L — HIGH (ref 30–115)
ALT FLD-CCNC: 6 U/L — SIGNIFICANT CHANGE UP (ref 0–41)
ANION GAP SERPL CALC-SCNC: 16 MMOL/L — HIGH (ref 7–14)
AST SERPL-CCNC: 12 U/L — SIGNIFICANT CHANGE UP (ref 0–41)
BASOPHILS # BLD AUTO: 0.04 K/UL — SIGNIFICANT CHANGE UP (ref 0–0.2)
BASOPHILS NFR BLD AUTO: 0.4 % — SIGNIFICANT CHANGE UP (ref 0–1)
BILIRUB SERPL-MCNC: 0.5 MG/DL — SIGNIFICANT CHANGE UP (ref 0.2–1.2)
BUN SERPL-MCNC: 22 MG/DL — HIGH (ref 10–20)
CALCIUM SERPL-MCNC: 8.9 MG/DL — SIGNIFICANT CHANGE UP (ref 8.5–10.1)
CHLORIDE SERPL-SCNC: 100 MMOL/L — SIGNIFICANT CHANGE UP (ref 98–110)
CO2 SERPL-SCNC: 25 MMOL/L — SIGNIFICANT CHANGE UP (ref 17–32)
CREAT SERPL-MCNC: 0.8 MG/DL — SIGNIFICANT CHANGE UP (ref 0.7–1.5)
EOSINOPHIL # BLD AUTO: 0.22 K/UL — SIGNIFICANT CHANGE UP (ref 0–0.7)
EOSINOPHIL NFR BLD AUTO: 1.9 % — SIGNIFICANT CHANGE UP (ref 0–8)
GLUCOSE BLDC GLUCOMTR-MCNC: 190 MG/DL — HIGH (ref 70–99)
GLUCOSE BLDC GLUCOMTR-MCNC: 257 MG/DL — HIGH (ref 70–99)
GLUCOSE BLDC GLUCOMTR-MCNC: 258 MG/DL — HIGH (ref 70–99)
GLUCOSE BLDC GLUCOMTR-MCNC: 275 MG/DL — HIGH (ref 70–99)
GLUCOSE BLDC GLUCOMTR-MCNC: 303 MG/DL — HIGH (ref 70–99)
GLUCOSE SERPL-MCNC: 194 MG/DL — HIGH (ref 70–99)
HCT VFR BLD CALC: 30.8 % — LOW (ref 37–47)
HGB BLD-MCNC: 9.7 G/DL — LOW (ref 12–16)
IMM GRANULOCYTES NFR BLD AUTO: 1.5 % — HIGH (ref 0.1–0.3)
LYMPHOCYTES # BLD AUTO: 0.65 K/UL — LOW (ref 1.2–3.4)
LYMPHOCYTES # BLD AUTO: 5.8 % — LOW (ref 20.5–51.1)
MAGNESIUM SERPL-MCNC: 1.7 MG/DL — LOW (ref 1.8–2.4)
MCHC RBC-ENTMCNC: 28.2 PG — SIGNIFICANT CHANGE UP (ref 27–31)
MCHC RBC-ENTMCNC: 31.5 G/DL — LOW (ref 32–37)
MCV RBC AUTO: 89.5 FL — SIGNIFICANT CHANGE UP (ref 81–99)
MONOCYTES # BLD AUTO: 0.78 K/UL — HIGH (ref 0.1–0.6)
MONOCYTES NFR BLD AUTO: 6.9 % — SIGNIFICANT CHANGE UP (ref 1.7–9.3)
NEUTROPHILS # BLD AUTO: 9.43 K/UL — HIGH (ref 1.4–6.5)
NEUTROPHILS NFR BLD AUTO: 83.5 % — HIGH (ref 42.2–75.2)
NRBC # BLD: 0 /100 WBCS — SIGNIFICANT CHANGE UP (ref 0–0)
PLATELET # BLD AUTO: 333 K/UL — SIGNIFICANT CHANGE UP (ref 130–400)
POTASSIUM SERPL-MCNC: 3.9 MMOL/L — SIGNIFICANT CHANGE UP (ref 3.5–5)
POTASSIUM SERPL-SCNC: 3.9 MMOL/L — SIGNIFICANT CHANGE UP (ref 3.5–5)
PROT SERPL-MCNC: 6.1 G/DL — SIGNIFICANT CHANGE UP (ref 6–8)
RBC # BLD: 3.44 M/UL — LOW (ref 4.2–5.4)
RBC # FLD: 13.9 % — SIGNIFICANT CHANGE UP (ref 11.5–14.5)
SODIUM SERPL-SCNC: 141 MMOL/L — SIGNIFICANT CHANGE UP (ref 135–146)
WBC # BLD: 11.29 K/UL — HIGH (ref 4.8–10.8)
WBC # FLD AUTO: 11.29 K/UL — HIGH (ref 4.8–10.8)

## 2018-09-16 RX ORDER — FUROSEMIDE 40 MG
40 TABLET ORAL
Qty: 0 | Refills: 0 | Status: DISCONTINUED | OUTPATIENT
Start: 2018-09-16 | End: 2018-09-21

## 2018-09-16 RX ORDER — MAGNESIUM OXIDE 400 MG ORAL TABLET 241.3 MG
400 TABLET ORAL
Qty: 0 | Refills: 0 | Status: COMPLETED | OUTPATIENT
Start: 2018-09-16 | End: 2018-09-18

## 2018-09-16 RX ORDER — BUDESONIDE AND FORMOTEROL FUMARATE DIHYDRATE 160; 4.5 UG/1; UG/1
2 AEROSOL RESPIRATORY (INHALATION)
Qty: 0 | Refills: 0 | Status: DISCONTINUED | OUTPATIENT
Start: 2018-09-16 | End: 2018-09-24

## 2018-09-16 RX ORDER — MAGNESIUM SULFATE 500 MG/ML
2 VIAL (ML) INJECTION ONCE
Qty: 0 | Refills: 0 | Status: COMPLETED | OUTPATIENT
Start: 2018-09-16 | End: 2018-09-16

## 2018-09-16 RX ADMIN — Medication 50 GRAM(S): at 13:51

## 2018-09-16 RX ADMIN — MAGNESIUM OXIDE 400 MG ORAL TABLET 400 MILLIGRAM(S): 241.3 TABLET ORAL at 11:35

## 2018-09-16 RX ADMIN — Medication 25 MILLIGRAM(S): at 17:16

## 2018-09-16 RX ADMIN — Medication 40 MILLIGRAM(S): at 05:30

## 2018-09-16 RX ADMIN — Medication 25 MILLIGRAM(S): at 05:27

## 2018-09-16 RX ADMIN — Medication 40 MILLIGRAM(S): at 17:16

## 2018-09-16 RX ADMIN — HEPARIN SODIUM 5000 UNIT(S): 5000 INJECTION INTRAVENOUS; SUBCUTANEOUS at 13:42

## 2018-09-16 RX ADMIN — SIMVASTATIN 10 MILLIGRAM(S): 20 TABLET, FILM COATED ORAL at 21:13

## 2018-09-16 RX ADMIN — HEPARIN SODIUM 5000 UNIT(S): 5000 INJECTION INTRAVENOUS; SUBCUTANEOUS at 05:30

## 2018-09-16 RX ADMIN — CLOPIDOGREL BISULFATE 75 MILLIGRAM(S): 75 TABLET, FILM COATED ORAL at 11:30

## 2018-09-16 RX ADMIN — CHLORHEXIDINE GLUCONATE 1 APPLICATION(S): 213 SOLUTION TOPICAL at 05:28

## 2018-09-16 RX ADMIN — PANTOPRAZOLE SODIUM 40 MILLIGRAM(S): 20 TABLET, DELAYED RELEASE ORAL at 08:00

## 2018-09-16 RX ADMIN — HEPARIN SODIUM 5000 UNIT(S): 5000 INJECTION INTRAVENOUS; SUBCUTANEOUS at 21:13

## 2018-09-16 RX ADMIN — MAGNESIUM OXIDE 400 MG ORAL TABLET 400 MILLIGRAM(S): 241.3 TABLET ORAL at 17:16

## 2018-09-16 NOTE — PROGRESS NOTE ADULT - ASSESSMENT
87 yo F with PMH of Atrial Fibrillation ( not on AC due to previous GI Bleed ), HTN, CAD with stent placement, CHF, s/p TAVR, GERD, DM, and HLD presented to the hospital for the evaluation of sob x 10 days. Found to have b/l Pleural effusions Right is moderate and left is small.     # SOB due to b/l Pleural Effusions: Pleural effusion likely to be secondary to CHF exacerbation   - BNP 4692  - CT scan shows worsening pleural effusions when compared to study completed recently.   - Trend daily weights  - furosemide 40mg bid iv for now   -cardiology: IV LAsix for now, continue Metoprolol, suggest pulmonary evaluation for pulmonary HTN out of proportion to her  HF, suggest repeat CT scan of the head, work up for renal lesion as per PCP, monitor electrolytes and renal function closely    seen by Pulm this am - bedside US to assess need for thoracentesis     # Atrial Fibrillation:  - AC discontinued due to hx of GI Bleed  - Continue clopidegrol  - Continue Metoprolol    # GERD: Patient takes Famotidine at home. Will continue patient here in hospital with Protonix    # HLD: Continue simvastatin      # DM:   - monitor FSG, if PO intake improves and patient becomes hyperglycemic would begin weight based insulin regimen with low dose correctional sliding scale.       # dvt ppx: Heparin sc 5000 u q8h  # gi ppx: Protonix 40mg PO qam      discussed with family at bedside

## 2018-09-17 ENCOUNTER — RESULT REVIEW (OUTPATIENT)
Age: 83
End: 2018-09-17

## 2018-09-17 LAB
ALBUMIN SERPL ELPH-MCNC: 2.7 G/DL — LOW (ref 3.5–5.2)
ALBUMIN SERPL ELPH-MCNC: 3.2 G/DL — LOW (ref 3.5–5.2)
ALP SERPL-CCNC: 136 U/L — HIGH (ref 30–115)
ALP SERPL-CCNC: 154 U/L — HIGH (ref 30–115)
ALT FLD-CCNC: 7 U/L — SIGNIFICANT CHANGE UP (ref 0–41)
ALT FLD-CCNC: 7 U/L — SIGNIFICANT CHANGE UP (ref 0–41)
ANION GAP SERPL CALC-SCNC: 10 MMOL/L — SIGNIFICANT CHANGE UP (ref 7–14)
ANION GAP SERPL CALC-SCNC: 15 MMOL/L — HIGH (ref 7–14)
AST SERPL-CCNC: 13 U/L — SIGNIFICANT CHANGE UP (ref 0–41)
AST SERPL-CCNC: 14 U/L — SIGNIFICANT CHANGE UP (ref 0–41)
B PERT IGG+IGM PNL SER: ABNORMAL
BILIRUB SERPL-MCNC: 0.3 MG/DL — SIGNIFICANT CHANGE UP (ref 0.2–1.2)
BILIRUB SERPL-MCNC: 0.4 MG/DL — SIGNIFICANT CHANGE UP (ref 0.2–1.2)
BUN SERPL-MCNC: 23 MG/DL — HIGH (ref 10–20)
BUN SERPL-MCNC: 25 MG/DL — HIGH (ref 10–20)
CALCIUM SERPL-MCNC: 8.5 MG/DL — SIGNIFICANT CHANGE UP (ref 8.5–10.1)
CALCIUM SERPL-MCNC: 9 MG/DL — SIGNIFICANT CHANGE UP (ref 8.5–10.1)
CHLORIDE SERPL-SCNC: 97 MMOL/L — LOW (ref 98–110)
CHLORIDE SERPL-SCNC: 99 MMOL/L — SIGNIFICANT CHANGE UP (ref 98–110)
CO2 SERPL-SCNC: 25 MMOL/L — SIGNIFICANT CHANGE UP (ref 17–32)
CO2 SERPL-SCNC: 29 MMOL/L — SIGNIFICANT CHANGE UP (ref 17–32)
COLOR FLD: YELLOW — SIGNIFICANT CHANGE UP
CREAT SERPL-MCNC: 0.7 MG/DL — SIGNIFICANT CHANGE UP (ref 0.7–1.5)
CREAT SERPL-MCNC: 0.7 MG/DL — SIGNIFICANT CHANGE UP (ref 0.7–1.5)
FLUID INTAKE SUBSTANCE CLASS: SIGNIFICANT CHANGE UP
FLUID SEGMENTED GRANULOCYTES: 96 % — SIGNIFICANT CHANGE UP
GLUCOSE BLDC GLUCOMTR-MCNC: 174 MG/DL — HIGH (ref 70–99)
GLUCOSE BLDC GLUCOMTR-MCNC: 214 MG/DL — HIGH (ref 70–99)
GLUCOSE BLDC GLUCOMTR-MCNC: 214 MG/DL — HIGH (ref 70–99)
GLUCOSE BLDC GLUCOMTR-MCNC: 243 MG/DL — HIGH (ref 70–99)
GLUCOSE BLDC GLUCOMTR-MCNC: 250 MG/DL — HIGH (ref 70–99)
GLUCOSE SERPL-MCNC: 205 MG/DL — HIGH (ref 70–99)
GLUCOSE SERPL-MCNC: 242 MG/DL — HIGH (ref 70–99)
GRAM STN FLD: SIGNIFICANT CHANGE UP
HCT VFR BLD CALC: 29.3 % — LOW (ref 37–47)
HCT VFR BLD CALC: 32.4 % — LOW (ref 37–47)
HGB BLD-MCNC: 10.3 G/DL — LOW (ref 12–16)
HGB BLD-MCNC: 9.3 G/DL — LOW (ref 12–16)
LDH SERPL L TO P-CCNC: 198 — SIGNIFICANT CHANGE UP (ref 50–242)
LYMPHOCYTES # FLD: 4 — SIGNIFICANT CHANGE UP
MAGNESIUM SERPL-MCNC: 1.9 MG/DL — SIGNIFICANT CHANGE UP (ref 1.8–2.4)
MCHC RBC-ENTMCNC: 28.4 PG — SIGNIFICANT CHANGE UP (ref 27–31)
MCHC RBC-ENTMCNC: 28.5 PG — SIGNIFICANT CHANGE UP (ref 27–31)
MCHC RBC-ENTMCNC: 31.7 G/DL — LOW (ref 32–37)
MCHC RBC-ENTMCNC: 31.8 G/DL — LOW (ref 32–37)
MCV RBC AUTO: 89.6 FL — SIGNIFICANT CHANGE UP (ref 81–99)
MCV RBC AUTO: 89.8 FL — SIGNIFICANT CHANGE UP (ref 81–99)
NRBC # BLD: 0 /100 WBCS — SIGNIFICANT CHANGE UP (ref 0–0)
NRBC # BLD: 0 /100 WBCS — SIGNIFICANT CHANGE UP (ref 0–0)
PLATELET # BLD AUTO: 332 K/UL — SIGNIFICANT CHANGE UP (ref 130–400)
PLATELET # BLD AUTO: 350 K/UL — SIGNIFICANT CHANGE UP (ref 130–400)
POTASSIUM SERPL-MCNC: 3.8 MMOL/L — SIGNIFICANT CHANGE UP (ref 3.5–5)
POTASSIUM SERPL-MCNC: 4.4 MMOL/L — SIGNIFICANT CHANGE UP (ref 3.5–5)
POTASSIUM SERPL-SCNC: 3.8 MMOL/L — SIGNIFICANT CHANGE UP (ref 3.5–5)
POTASSIUM SERPL-SCNC: 4.4 MMOL/L — SIGNIFICANT CHANGE UP (ref 3.5–5)
PROT SERPL-MCNC: 5.8 G/DL — LOW (ref 6–8)
PROT SERPL-MCNC: 6.5 G/DL — SIGNIFICANT CHANGE UP (ref 6–8)
RBC # BLD: 3.27 M/UL — LOW (ref 4.2–5.4)
RBC # BLD: 3.61 M/UL — LOW (ref 4.2–5.4)
RBC # FLD: 14 % — SIGNIFICANT CHANGE UP (ref 11.5–14.5)
RBC # FLD: 14 % — SIGNIFICANT CHANGE UP (ref 11.5–14.5)
RCV VOL RI: 3000 /UL — HIGH (ref 0–5)
SODIUM SERPL-SCNC: 137 MMOL/L — SIGNIFICANT CHANGE UP (ref 135–146)
SODIUM SERPL-SCNC: 138 MMOL/L — SIGNIFICANT CHANGE UP (ref 135–146)
SPECIMEN SOURCE: SIGNIFICANT CHANGE UP
TOTAL NUCLEATED CELL COUNT, BODY FLUID: 961 /UL — HIGH (ref 0–5)
TUBE TYPE: SIGNIFICANT CHANGE UP
WBC # BLD: 12.92 K/UL — HIGH (ref 4.8–10.8)
WBC # BLD: 13.83 K/UL — HIGH (ref 4.8–10.8)
WBC # FLD AUTO: 12.92 K/UL — HIGH (ref 4.8–10.8)
WBC # FLD AUTO: 13.83 K/UL — HIGH (ref 4.8–10.8)

## 2018-09-17 RX ORDER — INSULIN LISPRO 100/ML
3 VIAL (ML) SUBCUTANEOUS
Qty: 0 | Refills: 0 | Status: DISCONTINUED | OUTPATIENT
Start: 2018-09-17 | End: 2018-09-24

## 2018-09-17 RX ORDER — DEXTROSE 50 % IN WATER 50 %
12.5 SYRINGE (ML) INTRAVENOUS ONCE
Qty: 0 | Refills: 0 | Status: DISCONTINUED | OUTPATIENT
Start: 2018-09-17 | End: 2018-09-24

## 2018-09-17 RX ORDER — DEXTROSE 50 % IN WATER 50 %
25 SYRINGE (ML) INTRAVENOUS ONCE
Qty: 0 | Refills: 0 | Status: DISCONTINUED | OUTPATIENT
Start: 2018-09-17 | End: 2018-09-24

## 2018-09-17 RX ORDER — GLUCAGON INJECTION, SOLUTION 0.5 MG/.1ML
1 INJECTION, SOLUTION SUBCUTANEOUS ONCE
Qty: 0 | Refills: 0 | Status: DISCONTINUED | OUTPATIENT
Start: 2018-09-17 | End: 2018-09-24

## 2018-09-17 RX ORDER — SENNA PLUS 8.6 MG/1
2 TABLET ORAL AT BEDTIME
Qty: 0 | Refills: 0 | Status: DISCONTINUED | OUTPATIENT
Start: 2018-09-17 | End: 2018-09-24

## 2018-09-17 RX ORDER — METRONIDAZOLE 500 MG
500 TABLET ORAL ONCE
Qty: 0 | Refills: 0 | Status: COMPLETED | OUTPATIENT
Start: 2018-09-17 | End: 2018-09-17

## 2018-09-17 RX ORDER — VANCOMYCIN HCL 1 G
750 VIAL (EA) INTRAVENOUS EVERY 12 HOURS
Qty: 0 | Refills: 0 | Status: DISCONTINUED | OUTPATIENT
Start: 2018-09-18 | End: 2018-09-19

## 2018-09-17 RX ORDER — INSULIN GLARGINE 100 [IU]/ML
4 INJECTION, SOLUTION SUBCUTANEOUS AT BEDTIME
Qty: 0 | Refills: 0 | Status: DISCONTINUED | OUTPATIENT
Start: 2018-09-17 | End: 2018-09-24

## 2018-09-17 RX ORDER — VANCOMYCIN HCL 1 G
750 VIAL (EA) INTRAVENOUS ONCE
Qty: 0 | Refills: 0 | Status: COMPLETED | OUTPATIENT
Start: 2018-09-17 | End: 2018-09-17

## 2018-09-17 RX ORDER — CEFEPIME 1 G/1
INJECTION, POWDER, FOR SOLUTION INTRAMUSCULAR; INTRAVENOUS
Qty: 0 | Refills: 0 | Status: DISCONTINUED | OUTPATIENT
Start: 2018-09-17 | End: 2018-09-17

## 2018-09-17 RX ORDER — VANCOMYCIN HCL 1 G
VIAL (EA) INTRAVENOUS
Qty: 0 | Refills: 0 | Status: DISCONTINUED | OUTPATIENT
Start: 2018-09-17 | End: 2018-09-19

## 2018-09-17 RX ORDER — METRONIDAZOLE 500 MG
TABLET ORAL
Qty: 0 | Refills: 0 | Status: DISCONTINUED | OUTPATIENT
Start: 2018-09-17 | End: 2018-09-19

## 2018-09-17 RX ORDER — DOCUSATE SODIUM 100 MG
100 CAPSULE ORAL
Qty: 0 | Refills: 0 | Status: DISCONTINUED | OUTPATIENT
Start: 2018-09-17 | End: 2018-09-24

## 2018-09-17 RX ORDER — INSULIN LISPRO 100/ML
VIAL (ML) SUBCUTANEOUS
Qty: 0 | Refills: 0 | Status: DISCONTINUED | OUTPATIENT
Start: 2018-09-17 | End: 2018-09-24

## 2018-09-17 RX ORDER — DEXTROSE 50 % IN WATER 50 %
15 SYRINGE (ML) INTRAVENOUS ONCE
Qty: 0 | Refills: 0 | Status: DISCONTINUED | OUTPATIENT
Start: 2018-09-17 | End: 2018-09-24

## 2018-09-17 RX ORDER — METRONIDAZOLE 500 MG
500 TABLET ORAL EVERY 8 HOURS
Qty: 0 | Refills: 0 | Status: DISCONTINUED | OUTPATIENT
Start: 2018-09-17 | End: 2018-09-19

## 2018-09-17 RX ORDER — LISINOPRIL 2.5 MG/1
10 TABLET ORAL DAILY
Qty: 0 | Refills: 0 | Status: DISCONTINUED | OUTPATIENT
Start: 2018-09-17 | End: 2018-09-24

## 2018-09-17 RX ADMIN — Medication 100 MILLIGRAM(S): at 15:59

## 2018-09-17 RX ADMIN — Medication 40 MILLIGRAM(S): at 04:55

## 2018-09-17 RX ADMIN — MAGNESIUM OXIDE 400 MG ORAL TABLET 400 MILLIGRAM(S): 241.3 TABLET ORAL at 17:25

## 2018-09-17 RX ADMIN — CLOPIDOGREL BISULFATE 75 MILLIGRAM(S): 75 TABLET, FILM COATED ORAL at 11:58

## 2018-09-17 RX ADMIN — Medication 25 MILLIGRAM(S): at 05:00

## 2018-09-17 RX ADMIN — Medication 40 MILLIGRAM(S): at 17:25

## 2018-09-17 RX ADMIN — SENNA PLUS 2 TABLET(S): 8.6 TABLET ORAL at 21:37

## 2018-09-17 RX ADMIN — Medication 250 MILLIGRAM(S): at 15:59

## 2018-09-17 RX ADMIN — HEPARIN SODIUM 5000 UNIT(S): 5000 INJECTION INTRAVENOUS; SUBCUTANEOUS at 16:00

## 2018-09-17 RX ADMIN — Medication 100 MILLIGRAM(S): at 21:36

## 2018-09-17 RX ADMIN — Medication 100 MILLIGRAM(S): at 21:40

## 2018-09-17 RX ADMIN — Medication 650 MILLIGRAM(S): at 21:40

## 2018-09-17 RX ADMIN — Medication 2: at 17:29

## 2018-09-17 RX ADMIN — LISINOPRIL 10 MILLIGRAM(S): 2.5 TABLET ORAL at 13:14

## 2018-09-17 RX ADMIN — HEPARIN SODIUM 5000 UNIT(S): 5000 INJECTION INTRAVENOUS; SUBCUTANEOUS at 21:36

## 2018-09-17 RX ADMIN — PANTOPRAZOLE SODIUM 40 MILLIGRAM(S): 20 TABLET, DELAYED RELEASE ORAL at 08:14

## 2018-09-17 RX ADMIN — BUDESONIDE AND FORMOTEROL FUMARATE DIHYDRATE 2 PUFF(S): 160; 4.5 AEROSOL RESPIRATORY (INHALATION) at 21:35

## 2018-09-17 RX ADMIN — Medication 3 UNIT(S): at 17:24

## 2018-09-17 RX ADMIN — MAGNESIUM OXIDE 400 MG ORAL TABLET 400 MILLIGRAM(S): 241.3 TABLET ORAL at 11:57

## 2018-09-17 RX ADMIN — Medication 40 MILLIGRAM(S): at 05:01

## 2018-09-17 RX ADMIN — Medication 3 UNIT(S): at 11:57

## 2018-09-17 RX ADMIN — HEPARIN SODIUM 5000 UNIT(S): 5000 INJECTION INTRAVENOUS; SUBCUTANEOUS at 05:01

## 2018-09-17 RX ADMIN — BUDESONIDE AND FORMOTEROL FUMARATE DIHYDRATE 2 PUFF(S): 160; 4.5 AEROSOL RESPIRATORY (INHALATION) at 13:13

## 2018-09-17 RX ADMIN — SIMVASTATIN 10 MILLIGRAM(S): 20 TABLET, FILM COATED ORAL at 21:37

## 2018-09-17 RX ADMIN — INSULIN GLARGINE 4 UNIT(S): 100 INJECTION, SOLUTION SUBCUTANEOUS at 22:54

## 2018-09-17 RX ADMIN — Medication 2: at 11:58

## 2018-09-17 RX ADMIN — Medication 25 MILLIGRAM(S): at 17:25

## 2018-09-17 NOTE — PROGRESS NOTE ADULT - ASSESSMENT
87 yo F with PMH of Atrial Fibrillation ( not on AC due to previous GI Bleed ), HTN, CAD with stent placement, CHF, s/p TAVR, GERD, DM, and HLD presented to the hospital for the evaluation of sob x 10 days. Patient states that she first noticed SOB ten days ago and it has been progressively worsening. She has RAIN after about 25 ft ambulation. She used to sleep with one pillow and now is using two for symptomatic relief. Patients son is at bedside and notes that these symptoms are similar to symptoms she had prior to her TAVR. Patient also notes increased swelling of bilaterally lower extremities. She recently saw Cardiologist who completed ECHO and increased dosage of Furosemide. She has had improvement of LE edema but not of SOB to baseline. Patient also notes recent feelings of nausea, chills, fatigue and 5 pound weight loss in the last month too.    # Acute on chronic diastolic CHF, H/o TAVR  - BNP 4692  - CT scan shows worsening pleural effusions when compared to study completed recently.   -  daily weights, I/o, restrict fluids, low Na diet  - c/w lasix , metoprolol, lisinopril      #B/l pleural effussions  - s/p right thoracentesis- 400 ml drained  - F/u cytology    # COPD  -c/w  Budesonide    # H/o CAD  - c/w Plavix, statin, metoprolol.    # Atrial Fibrillation  - c/w  clopidogrel, Metoprolol  - not on anticoagulants due to H/o GI bleed    # GERD  - c/w Protonix     # Dyslipidemia  - c/w simvastatin    # Kidney abnormalities noted on CT Scan:   - Renal US negative     # DM type 2   - monitor finger sticks   - start lantus, lispro    #B/l renal lesions  - outpt f/u with urology.    #GI/ DVT prophylaxis 87 yo F with PMH of Atrial Fibrillation ( not on AC due to previous GI Bleed ), HTN, CAD with stent placement, CHF, s/p TAVR, GERD, DM, and HLD presented to the hospital for the evaluation of sob x 10 days. Patient states that she first noticed SOB ten days ago and it has been progressively worsening. She has RAIN after about 25 ft ambulation. She used to sleep with one pillow and now is using two for symptomatic relief. Patients son is at bedside and notes that these symptoms are similar to symptoms she had prior to her TAVR. Patient also notes increased swelling of bilaterally lower extremities. She recently saw Cardiologist who completed ECHO and increased dosage of Furosemide. She has had improvement of LE edema but not of SOB to baseline. Patient also notes recent feelings of nausea, chills, fatigue and 5 pound weight loss in the last month too.    # Acute on chronic diastolic CHF, H/o TAVR  - BNP 4692  - CT scan shows worsening pleural effusions when compared to study completed recently.   -  daily weights, I/o, restrict fluids, low Na diet  - c/w lasix , metoprolol, lisinopril      #B/l pleural effussions  - s/p right thoracentesis- 400 ml drained  - F/u cytology    # COPD  -c/w  Budesonide    # H/o CAD  - c/w Plavix, statin, metoprolol.    # Atrial Fibrillation  - c/w  clopidogrel, Metoprolol  - not on anticoagulants due to H/o GI bleed    # GERD  - c/w Protonix     # Dyslipidemia  - c/w simvastatin    # Kidney abnormalities noted on CT Scan:   - Renal US negative     # DM type 2   - monitor finger sticks   - start lantus, lispro    #B/l renal lesions  - outpt f/u with urology.    #GI/ DVT prophylaxis    PT eval

## 2018-09-17 NOTE — PROGRESS NOTE ADULT - ASSESSMENT
SUBJECTIVE:    Patient is a 88y old Female who presents with a chief complaint of SOB -Pleural Effusions b/l (17 Sep 2018 17:11)    Currently admitted to medicine with the primary diagnosis of Shortness of breath     Today is hospital day 4d. This morning she is resting comfortably in bed and reports no new issues or overnight events.     PAST MEDICAL & SURGICAL HISTORY  CAD (coronary artery disease)  Hypertension  GERD (gastroesophageal reflux disease)  Diabetes  Arthritis  S/P TAVR (transcatheter aortic valve replacement)    SOCIAL HISTORY:  Negative for smoking/alcohol/drug use.     ALLERGIES:  penicillins (Other)    MEDICATIONS:  STANDING MEDICATIONS  buDESOnide  80 MICROgram(s)/formoterol 4.5 MICROgram(s) Inhaler 2 Puff(s) Inhalation two times a day  chlorhexidine 4% Liquid 1 Application(s) Topical <User Schedule>  clopidogrel Tablet 75 milliGRAM(s) Oral daily  dextrose 50% Injectable 12.5 Gram(s) IV Push once  dextrose 50% Injectable 25 Gram(s) IV Push once  dextrose 50% Injectable 25 Gram(s) IV Push once  furosemide    Tablet 40 milliGRAM(s) Oral two times a day  heparin  Injectable 5000 Unit(s) SubCutaneous every 8 hours  influenza   Vaccine 0.5 milliLiter(s) IntraMuscular once  insulin glargine Injectable (LANTUS) 4 Unit(s) SubCutaneous at bedtime  insulin lispro (HumaLOG) corrective regimen sliding scale   SubCutaneous three times a day before meals  insulin lispro Injectable (HumaLOG) 3 Unit(s) SubCutaneous three times a day before meals  levoFLOXacin IVPB 750 milliGRAM(s) IV Intermittent every 24 hours  lisinopril 10 milliGRAM(s) Oral daily  magnesium oxide 400 milliGRAM(s) Oral three times a day with meals  metoprolol tartrate 25 milliGRAM(s) Oral two times a day  metroNIDAZOLE  IVPB      metroNIDAZOLE  IVPB 500 milliGRAM(s) IV Intermittent every 8 hours  pantoprazole    Tablet 40 milliGRAM(s) Oral before breakfast  simvastatin 10 milliGRAM(s) Oral at bedtime  vancomycin  IVPB        PRN MEDICATIONS  acetaminophen   Tablet .. 650 milliGRAM(s) Oral every 6 hours PRN  dextrose 40% Gel 15 Gram(s) Oral once PRN  glucagon  Injectable 1 milliGRAM(s) IntraMuscular once PRN    VITALS:   T(F): 97  HR: 114  BP: 141/63  RR: 20  SpO2: 99%    LABS:                        9.3    12.92 )-----------( 332      ( 17 Sep 2018 17:20 )             29.3     09-17    137  |  97<L>  |  25<H>  ----------------------------<  242<H>  3.8   |  25  |  0.7    Ca    8.5      17 Sep 2018 17:20  Mg     1.9     09-17    TPro  5.8<L>  /  Alb  2.7<L>  /  TBili  0.3  /  DBili  x   /  AST  13  /  ALT  7   /  AlkPhos  136<H>  09-17        Cell Count, Body Fluid (09.17.18 @ 11:30)    Fluid Segmented Granulocytes: 96 %    Fluid Type: Pleural    Body Fluid Appearance: Cloudy    BF Lymphocytes: 4    Tube Type: SCREWTOP    Color - Body Fluid: Yellow    Total Nucleated Cell Count, Body Fluid: 961 /uL    Total RBC Count: 3000 /uL            RADIOLOGY:  < from: Xray Chest 1 View- PORTABLE-Urgent (09.17.18 @ 12:51) >    Impression:      Status post right thoracentesis with decrease in size in the previously   identified right-sided pleural effusion with likely hydropneumothoraxon   the current study.    Trace left pleural effusion.    < end of copied text >  < from: US Chest (09.16.18 @ 19:44) >  Findings/  Impression:    There is a moderate size complex right pleural effusion, with echogenic   debris, seen above the diaphragm.      < end of copied text >    < from: US Renal (09.13.18 @ 22:38) >  The right kidney measures 11 cm x 5.4 cm x 5.1 cm. It is normal in size   and in echogenicity. There is no renal mass.    Left kidney measures 10.3 cm x 5.2 cm x 5.3 cm. A prominent. It is seen   without discrete mass. No hydronephrosis is noted.    The urinary bladder is empty.    Impression:    No discrete renal mass identified.    < end of copied text >    PHYSICAL EXAM:  GEN: NAD, sitting comfortably in bed.   Pulmonary: No increased WOB, decreased breath sounds on R, mild bibasilar crackles.   CV: Regular rate and rhythm  GI: BS+, hard to palpation in suprapubic and midepigastric region. non-tender  MSK: Full ROM bilaterally, DP 2+ bilaterally  Neuro: AAOx3  Skin: no rashes or lesions    89 yo F with PMH of Atrial Fibrillation ( not on AC due to previous GI Bleed ), HTN, CAD with stent placement, CHF, s/p TAVR, GERD, DM, and HLD presented to the hospital for the evaluation of sob x 10 days. Found to have b/l Pleural effusions Right is moderate and left is small.     # SOB due to b/l Pleural Effusions likely  secondary to HFpEF vs empyema  - leukocytosis increased to 12.92   -US showed complex loculated effusion R>L. S/p 400 mL thoracentesis. pH 4.0  -f/u cytology, fluid analysis.   -f/u ldh, protein effusion  - BNP 4692 from 1600   -history of severe pulmonary HTN, not in proportion to SOB per cardiology  -Day 1 vancomycin 750 q 12 IV, levofloxacin 750 IV q 24, metronidazole 500 IV q8  -c/w symbicort  -f/u pulm   -f/u ABG  -f/u PFTs,   - Keep i < 0  - Trend daily weights 54.4kg 9/15  -AS S/P TAVR ,  - Daily weight, fluid restriction  - c/wFurosemide 40mg PO BID   -f/u echo. Echo 9/1/2018 showed EF 59%, results in chart      # Atrial Fibrillation:  - AC discontinued due to GI Bleed  - Continue clopidogrel  - Continue Metoprolol  - Currently rate controlled on monitor ; HR ~ 80 bpm    # GERD: Patient takes Famotidine at home. Will continue patient here in hospital with Protonix    # HLD: Continue simvastatin    # DM  - -006, elevated  -pt not onhome insulin.4 lantus, 3/3/3 lispro low correctional scale    # dvt ppx: Heparin sc 5000 u q8h  # gi ppx: Protonix 40mg PO qam  DISPO: Home with family and assistance. SUBJECTIVE:    Patient is a 88y old Female who presents with a chief complaint of SOB -Pleural Effusions b/l (17 Sep 2018 17:11)    Currently admitted to medicine with the primary diagnosis of Shortness of breath     Today is hospital day 4d. This morning she is resting comfortably in bed and reports no new issues or overnight events.     PAST MEDICAL & SURGICAL HISTORY  CAD (coronary artery disease)  Hypertension  GERD (gastroesophageal reflux disease)  Diabetes  Arthritis  S/P TAVR (transcatheter aortic valve replacement)    SOCIAL HISTORY:  Negative for smoking/alcohol/drug use.     ALLERGIES:  penicillins (Other)    MEDICATIONS:  STANDING MEDICATIONS  buDESOnide  80 MICROgram(s)/formoterol 4.5 MICROgram(s) Inhaler 2 Puff(s) Inhalation two times a day  chlorhexidine 4% Liquid 1 Application(s) Topical <User Schedule>  clopidogrel Tablet 75 milliGRAM(s) Oral daily  dextrose 50% Injectable 12.5 Gram(s) IV Push once  dextrose 50% Injectable 25 Gram(s) IV Push once  dextrose 50% Injectable 25 Gram(s) IV Push once  furosemide    Tablet 40 milliGRAM(s) Oral two times a day  heparin  Injectable 5000 Unit(s) SubCutaneous every 8 hours  influenza   Vaccine 0.5 milliLiter(s) IntraMuscular once  insulin glargine Injectable (LANTUS) 4 Unit(s) SubCutaneous at bedtime  insulin lispro (HumaLOG) corrective regimen sliding scale   SubCutaneous three times a day before meals  insulin lispro Injectable (HumaLOG) 3 Unit(s) SubCutaneous three times a day before meals  levoFLOXacin IVPB 750 milliGRAM(s) IV Intermittent every 24 hours  lisinopril 10 milliGRAM(s) Oral daily  magnesium oxide 400 milliGRAM(s) Oral three times a day with meals  metoprolol tartrate 25 milliGRAM(s) Oral two times a day  metroNIDAZOLE  IVPB      metroNIDAZOLE  IVPB 500 milliGRAM(s) IV Intermittent every 8 hours  pantoprazole    Tablet 40 milliGRAM(s) Oral before breakfast  simvastatin 10 milliGRAM(s) Oral at bedtime  vancomycin  IVPB        PRN MEDICATIONS  acetaminophen   Tablet .. 650 milliGRAM(s) Oral every 6 hours PRN  dextrose 40% Gel 15 Gram(s) Oral once PRN  glucagon  Injectable 1 milliGRAM(s) IntraMuscular once PRN    VITALS:   T(F): 97  HR: 114  BP: 141/63  RR: 20  SpO2: 99%    LABS:                        9.3    12.92 )-----------( 332      ( 17 Sep 2018 17:20 )             29.3     09-17    137  |  97<L>  |  25<H>  ----------------------------<  242<H>  3.8   |  25  |  0.7    Ca    8.5      17 Sep 2018 17:20  Mg     1.9     09-17    TPro  5.8<L>  /  Alb  2.7<L>  /  TBili  0.3  /  DBili  x   /  AST  13  /  ALT  7   /  AlkPhos  136<H>  09-17        Cell Count, Body Fluid (09.17.18 @ 11:30)    Fluid Segmented Granulocytes: 96 %    Fluid Type: Pleural    Body Fluid Appearance: Cloudy    BF Lymphocytes: 4    Tube Type: SCREWTOP    Color - Body Fluid: Yellow    Total Nucleated Cell Count, Body Fluid: 961 /uL    Total RBC Count: 3000 /uL            RADIOLOGY:  < from: Xray Chest 1 View- PORTABLE-Urgent (09.17.18 @ 12:51) >    Impression:      Status post right thoracentesis with decrease in size in the previously   identified right-sided pleural effusion with likely hydropneumothoraxon   the current study.    Trace left pleural effusion.    < end of copied text >  < from: US Chest (09.16.18 @ 19:44) >  Findings/  Impression:    There is a moderate size complex right pleural effusion, with echogenic   debris, seen above the diaphragm.      < end of copied text >    < from: US Renal (09.13.18 @ 22:38) >  The right kidney measures 11 cm x 5.4 cm x 5.1 cm. It is normal in size   and in echogenicity. There is no renal mass.    Left kidney measures 10.3 cm x 5.2 cm x 5.3 cm. A prominent. It is seen   without discrete mass. No hydronephrosis is noted.    The urinary bladder is empty.    Impression:    No discrete renal mass identified.    < end of copied text >    PHYSICAL EXAM:  GEN: NAD, sitting comfortably in bed.   Pulmonary: No increased WOB, decreased breath sounds on R, mild bibasilar crackles.   CV: Regular rate and rhythm  GI: BS+, hard to palpation in suprapubic and midepigastric region. non-tender  MSK: Full ROM bilaterally, DP 2+ bilaterally  Neuro: AAOx3  Skin: no rashes or lesions    89 yo F with PMH of Atrial Fibrillation ( not on AC due to previous GI Bleed ), HTN, CAD with stent placement, CHF, s/p TAVR, GERD, DM, and HLD presented to the hospital for the evaluation of sob x 10 days. Found to have b/l Pleural effusions Right is moderate and left is small.     # SOB due to b/l Pleural Effusions likely  secondary to HFpEF vs empyema  - leukocytosis increased to 12.92   -US showed complex loculated effusion R>L. S/p 400 mL thoracentesis. pH 7.0  -f/u cytology, fluid analysis.   -f/u ldh, protein effusion  - BNP 4692 from 1600   -history of severe pulmonary HTN, not in proportion to SOB per cardiology  -Day 1 vancomycin 750 q 12 IV, levofloxacin 750 IV q 24, metronidazole 500 IV q8  -c/w symbicort  -f/u pulm   -f/u ABG  -f/u PFTs,   - Keep i < 0  - Trend daily weights 54.4kg 9/15  -AS S/P TAVR ,  - Daily weight, fluid restriction  - c/wFurosemide 40mg PO BID   -f/u echo. Echo 9/1/2018 showed EF 59%, results in chart      # Atrial Fibrillation:  - AC discontinued due to GI Bleed  - Continue clopidogrel  - Continue Metoprolol  - Currently rate controlled on monitor ; HR ~ 80 bpm    # GERD: Patient takes Famotidine at home. Will continue patient here in hospital with Protonix    # HLD: Continue simvastatin    # DM  - -532, elevated  -pt not onhome insulin.4 lantus, 3/3/3 lispro low correctional scale    # dvt ppx: Heparin sc 5000 u q8h  # gi ppx: Protonix 40mg PO qam  DISPO: Home with family and assistance.

## 2018-09-17 NOTE — PROGRESS NOTE ADULT - ASSESSMENT
SOB/ PUL HTN/ S/P TAVR/ A FIB / HIGHLY COMPONENT OF COPD (SEEN ALSO ON CHEST CT 8/18), PUL HTN MULTIFACTORIAL     - KEEP LASIX  - BED SIDE US EVALUATE R EFFUSION, NEED THERAPEUTIC THORA  - MIGHT NEED HOME OXYGEN/ NEEDS PFT  - SYMBICORT 2 PUFFS Q 12H  - POOR PROGNOSIS

## 2018-09-17 NOTE — PROGRESS NOTE ADULT - ASSESSMENT
-CHF suspect secondary to diastolic dysfunction.  -Pulmonary HTN -thought to be out of proportion to her HF . Has component on COPD . Agree with pulmonary thought to be multifactorial.  Has bilateral pleural effusions right >left .   -AS S/P TAVR with adequate function on recent echo  -AF permanent   -CAD stable No angina or increase in cardiac biomarkers   -DM  -Kidney lesion Needs work up eventually , work up as per PCP     Plan:  Continue po Lasix and Metoprolol  Pulmonary follow up for possible therapeutic Thoracentesis

## 2018-09-18 LAB
ALBUMIN FLD-MCNC: 2 G/DL — SIGNIFICANT CHANGE UP
ALBUMIN SERPL ELPH-MCNC: 2.9 G/DL — LOW (ref 3.5–5.2)
ALP SERPL-CCNC: 135 U/L — HIGH (ref 30–115)
ALT FLD-CCNC: 7 U/L — SIGNIFICANT CHANGE UP (ref 0–41)
ANION GAP SERPL CALC-SCNC: 14 MMOL/L — SIGNIFICANT CHANGE UP (ref 7–14)
AST SERPL-CCNC: 14 U/L — SIGNIFICANT CHANGE UP (ref 0–41)
BILIRUB SERPL-MCNC: 0.4 MG/DL — SIGNIFICANT CHANGE UP (ref 0.2–1.2)
BUN SERPL-MCNC: 24 MG/DL — HIGH (ref 10–20)
CALCIUM SERPL-MCNC: 8.7 MG/DL — SIGNIFICANT CHANGE UP (ref 8.5–10.1)
CHLORIDE SERPL-SCNC: 101 MMOL/L — SIGNIFICANT CHANGE UP (ref 98–110)
CO2 SERPL-SCNC: 26 MMOL/L — SIGNIFICANT CHANGE UP (ref 17–32)
CREAT SERPL-MCNC: 0.9 MG/DL — SIGNIFICANT CHANGE UP (ref 0.7–1.5)
GLUCOSE BLDC GLUCOMTR-MCNC: 134 MG/DL — HIGH (ref 70–99)
GLUCOSE BLDC GLUCOMTR-MCNC: 168 MG/DL — HIGH (ref 70–99)
GLUCOSE BLDC GLUCOMTR-MCNC: 176 MG/DL — HIGH (ref 70–99)
GLUCOSE BLDC GLUCOMTR-MCNC: 178 MG/DL — HIGH (ref 70–99)
GLUCOSE FLD-MCNC: 140 MG/DL — SIGNIFICANT CHANGE UP
GLUCOSE SERPL-MCNC: 159 MG/DL — HIGH (ref 70–99)
HCT VFR BLD CALC: 30.8 % — LOW (ref 37–47)
HGB BLD-MCNC: 9.5 G/DL — LOW (ref 12–16)
LDH SERPL L TO P-CCNC: 1588 U/L — SIGNIFICANT CHANGE UP
MCHC RBC-ENTMCNC: 28 PG — SIGNIFICANT CHANGE UP (ref 27–31)
MCHC RBC-ENTMCNC: 30.8 G/DL — LOW (ref 32–37)
MCV RBC AUTO: 90.9 FL — SIGNIFICANT CHANGE UP (ref 81–99)
NIGHT BLUE STAIN TISS: SIGNIFICANT CHANGE UP
NRBC # BLD: 0 /100 WBCS — SIGNIFICANT CHANGE UP (ref 0–0)
PLATELET # BLD AUTO: 339 K/UL — SIGNIFICANT CHANGE UP (ref 130–400)
POTASSIUM SERPL-MCNC: 3.9 MMOL/L — SIGNIFICANT CHANGE UP (ref 3.5–5)
POTASSIUM SERPL-SCNC: 3.9 MMOL/L — SIGNIFICANT CHANGE UP (ref 3.5–5)
PROT FLD-MCNC: 3.5 G/DL — SIGNIFICANT CHANGE UP
PROT SERPL-MCNC: 6.1 G/DL — SIGNIFICANT CHANGE UP (ref 6–8)
RBC # BLD: 3.39 M/UL — LOW (ref 4.2–5.4)
RBC # FLD: 14.2 % — SIGNIFICANT CHANGE UP (ref 11.5–14.5)
SODIUM SERPL-SCNC: 141 MMOL/L — SIGNIFICANT CHANGE UP (ref 135–146)
SPECIMEN SOURCE: SIGNIFICANT CHANGE UP
WBC # BLD: 13.33 K/UL — HIGH (ref 4.8–10.8)
WBC # FLD AUTO: 13.33 K/UL — HIGH (ref 4.8–10.8)

## 2018-09-18 RX ORDER — LACTULOSE 10 G/15ML
10 SOLUTION ORAL THREE TIMES A DAY
Qty: 0 | Refills: 0 | Status: COMPLETED | OUTPATIENT
Start: 2018-09-18 | End: 2018-09-19

## 2018-09-18 RX ORDER — SIMETHICONE 80 MG/1
80 TABLET, CHEWABLE ORAL THREE TIMES A DAY
Qty: 0 | Refills: 0 | Status: DISCONTINUED | OUTPATIENT
Start: 2018-09-18 | End: 2018-09-24

## 2018-09-18 RX ADMIN — BUDESONIDE AND FORMOTEROL FUMARATE DIHYDRATE 2 PUFF(S): 160; 4.5 AEROSOL RESPIRATORY (INHALATION) at 08:24

## 2018-09-18 RX ADMIN — Medication 100 MILLIGRAM(S): at 17:05

## 2018-09-18 RX ADMIN — Medication 25 MILLIGRAM(S): at 17:05

## 2018-09-18 RX ADMIN — Medication 3 UNIT(S): at 08:23

## 2018-09-18 RX ADMIN — SIMETHICONE 80 MILLIGRAM(S): 80 TABLET, CHEWABLE ORAL at 21:23

## 2018-09-18 RX ADMIN — Medication 1: at 16:52

## 2018-09-18 RX ADMIN — Medication 40 MILLIGRAM(S): at 06:27

## 2018-09-18 RX ADMIN — Medication 3 UNIT(S): at 16:52

## 2018-09-18 RX ADMIN — Medication 650 MILLIGRAM(S): at 16:51

## 2018-09-18 RX ADMIN — CLOPIDOGREL BISULFATE 75 MILLIGRAM(S): 75 TABLET, FILM COATED ORAL at 11:04

## 2018-09-18 RX ADMIN — Medication 1: at 08:22

## 2018-09-18 RX ADMIN — SENNA PLUS 2 TABLET(S): 8.6 TABLET ORAL at 21:23

## 2018-09-18 RX ADMIN — HEPARIN SODIUM 5000 UNIT(S): 5000 INJECTION INTRAVENOUS; SUBCUTANEOUS at 06:27

## 2018-09-18 RX ADMIN — PANTOPRAZOLE SODIUM 40 MILLIGRAM(S): 20 TABLET, DELAYED RELEASE ORAL at 06:26

## 2018-09-18 RX ADMIN — Medication 100 MILLIGRAM(S): at 06:24

## 2018-09-18 RX ADMIN — LACTULOSE 10 GRAM(S): 10 SOLUTION ORAL at 21:23

## 2018-09-18 RX ADMIN — Medication 100 MILLIGRAM(S): at 06:27

## 2018-09-18 RX ADMIN — Medication 250 MILLIGRAM(S): at 17:04

## 2018-09-18 RX ADMIN — Medication 100 MILLIGRAM(S): at 13:09

## 2018-09-18 RX ADMIN — LISINOPRIL 10 MILLIGRAM(S): 2.5 TABLET ORAL at 06:26

## 2018-09-18 RX ADMIN — Medication 3 UNIT(S): at 11:19

## 2018-09-18 RX ADMIN — SIMETHICONE 80 MILLIGRAM(S): 80 TABLET, CHEWABLE ORAL at 13:09

## 2018-09-18 RX ADMIN — Medication 250 MILLIGRAM(S): at 06:28

## 2018-09-18 RX ADMIN — MAGNESIUM OXIDE 400 MG ORAL TABLET 400 MILLIGRAM(S): 241.3 TABLET ORAL at 08:22

## 2018-09-18 RX ADMIN — Medication 650 MILLIGRAM(S): at 17:03

## 2018-09-18 RX ADMIN — Medication 25 MILLIGRAM(S): at 06:26

## 2018-09-18 RX ADMIN — BUDESONIDE AND FORMOTEROL FUMARATE DIHYDRATE 2 PUFF(S): 160; 4.5 AEROSOL RESPIRATORY (INHALATION) at 20:11

## 2018-09-18 RX ADMIN — Medication 1: at 11:19

## 2018-09-18 RX ADMIN — INSULIN GLARGINE 4 UNIT(S): 100 INJECTION, SOLUTION SUBCUTANEOUS at 21:25

## 2018-09-18 RX ADMIN — SIMVASTATIN 10 MILLIGRAM(S): 20 TABLET, FILM COATED ORAL at 21:23

## 2018-09-18 RX ADMIN — Medication 40 MILLIGRAM(S): at 17:05

## 2018-09-18 RX ADMIN — HEPARIN SODIUM 5000 UNIT(S): 5000 INJECTION INTRAVENOUS; SUBCUTANEOUS at 21:24

## 2018-09-18 RX ADMIN — Medication 100 MILLIGRAM(S): at 21:23

## 2018-09-18 NOTE — PROGRESS NOTE ADULT - ASSESSMENT
IMPRESSION:    Empyema, GNR on gram stain, pH 7.03  Emphysema on CT chest  Pulmonary HTN likely multifactorial  h/o HFpEF stable  h/o TAVR      PLAN:    1. patient will need tube thoracostomy, either at bedside or CT guided, will speak to IR  2. c/w vanco, flagyl and levaquin, will wait for culture before narrowing abx  3. continue with ICS/LABA  4. continue with lasix, keep I=O  5. dvt ppx  6. physical therapy

## 2018-09-18 NOTE — PROGRESS NOTE ADULT - ASSESSMENT
SOB/ PUL HTN/ S/P TAVR/ A FIB / HIGHLY COMPONENT OF COPD (SEEN ALSO ON CHEST CT 8/18), PUL HTN MULTIFACTORIAL / Neutrophilic exudative effusion    - REPEAT CHEST CT  - IV ABX  - ID/ CT SX EVAL  - HIGH RISK FOR ANY INVASIVE PROCEDURE  WILL FOLLOW

## 2018-09-18 NOTE — PROGRESS NOTE ADULT - ASSESSMENT
87 yo F with PMH of Atrial Fibrillation ( not on AC due to previous GI Bleed ), HTN, CAD with stent placement, CHF, s/p TAVR, GERD, DM, and HLD presented to the hospital for the evaluation of sob x 10 days. Patient states that she first noticed SOB ten days ago and it has been progressively worsening. She has RAIN after about 25 ft ambulation. She used to sleep with one pillow and now is using two for symptomatic relief. Patients son is at bedside and notes that these symptoms are similar to symptoms she had prior to her TAVR. Patient also notes increased swelling of bilaterally lower extremities. She recently saw Cardiologist who completed ECHO and increased dosage of Furosemide. She has had improvement of LE edema but not of SOB to baseline. Patient also notes recent feelings of nausea, chills, fatigue and 5 pound weight loss in the last month too.    #Right sided empyema  - s/p right thoracentesis- 400 ml drained  - F/u culture- GNR  -  pleural fluid pH 7.0  - Repeat CT chest done today 9/18 showing Complex right-sided pleural effusion with incomplete reexpansion.  - Pt evaluated by CT surgery-  Large bore Rt chest tube placement for drainage w/ TPA.  - C/w antibiotics-vanco, levaquin, flagyl    # Acute on chronic diastolic CHF, H/o TAVR, pulmonary HTN  - BNP 4692  - CT scan shows worsening pleural effusions when compared to study completed recently.   -  daily weights, I/o, restrict fluids, low Na diet  - c/w lasix , metoprolol, lisinopril    # COPD  -c/w  Budesonide    # H/o CAD  - c/w Plavix, statin, metoprolol.    # Atrial Fibrillation  - c/w  clopidogrel, Metoprolol  - not on anticoagulants due to H/o GI bleed    # GERD  - c/w Protonix     # Dyslipidemia  - c/w simvastatin    # Kidney abnormalities noted on CT Scan:   - Renal US negative     # DM type 2   - monitor finger sticks   - c/w lantus, lispro    #GI/ DVT prophylaxis    PT eval: STR vs home with PT

## 2018-09-18 NOTE — PROGRESS NOTE ADULT - ASSESSMENT
SUBJECTIVE:    Patient is a 88y old Female who presents with a chief complaint of SOB -Pleural Effusions b/l (18 Sep 2018 08:04)  Currently admitted to medicine with the primary diagnosis of Shortness of breath  Today is hospital day 5d. This morning she is sitting comfortably in bed doing a crossword puzzle and reports no new issues or overnight events. Notes her breathing is much improved . No BM for past 2 days, and she notes feeling bloated. Interesting in getting out of bed,  walking.      PAST MEDICAL & SURGICAL HISTORY  CAD (coronary artery disease)  Hypertension  GERD (gastroesophageal reflux disease)  Diabetes  Arthritis  S/P TAVR (transcatheter aortic valve replacement)    SOCIAL HISTORY:  Negative for smoking/alcohol/drug use.     ALLERGIES:  penicillins (Other)    MEDICATIONS:  STANDING MEDICATIONS  buDESOnide  80 MICROgram(s)/formoterol 4.5 MICROgram(s) Inhaler 2 Puff(s) Inhalation two times a day  chlorhexidine 4% Liquid 1 Application(s) Topical <User Schedule>  clopidogrel Tablet 75 milliGRAM(s) Oral daily  dextrose 50% Injectable 12.5 Gram(s) IV Push once  dextrose 50% Injectable 25 Gram(s) IV Push once  dextrose 50% Injectable 25 Gram(s) IV Push once  docusate sodium 100 milliGRAM(s) Oral two times a day  furosemide    Tablet 40 milliGRAM(s) Oral two times a day  heparin  Injectable 5000 Unit(s) SubCutaneous every 8 hours  influenza   Vaccine 0.5 milliLiter(s) IntraMuscular once  insulin glargine Injectable (LANTUS) 4 Unit(s) SubCutaneous at bedtime  insulin lispro (HumaLOG) corrective regimen sliding scale   SubCutaneous three times a day before meals  insulin lispro Injectable (HumaLOG) 3 Unit(s) SubCutaneous three times a day before meals  levoFLOXacin IVPB 750 milliGRAM(s) IV Intermittent every 24 hours  lisinopril 10 milliGRAM(s) Oral daily  magnesium oxide 400 milliGRAM(s) Oral three times a day with meals  metoprolol tartrate 25 milliGRAM(s) Oral two times a day  metroNIDAZOLE  IVPB      metroNIDAZOLE  IVPB 500 milliGRAM(s) IV Intermittent every 8 hours  pantoprazole    Tablet 40 milliGRAM(s) Oral before breakfast  senna 2 Tablet(s) Oral at bedtime  simvastatin 10 milliGRAM(s) Oral at bedtime  vancomycin  IVPB      vancomycin  IVPB 750 milliGRAM(s) IV Intermittent every 12 hours    PRN MEDICATIONS  acetaminophen   Tablet .. 650 milliGRAM(s) Oral every 6 hours PRN  dextrose 40% Gel 15 Gram(s) Oral once PRN  glucagon  Injectable 1 milliGRAM(s) IntraMuscular once PRN    VITALS:   T(F): 96.6  HR: 82  BP: 124/58  RR: 18  SpO2: 95%    LABS:                        9.3    12.92 )-----------( 332      ( 17 Sep 2018 17:20 )             29.3     09-17    137  |  97<L>  |  25<H>  ----------------------------<  242<H>  3.8   |  25  |  0.7    Ca    8.5      17 Sep 2018 17:20  Mg     1.9     09-17    TPro  5.8<L>  /  Alb  2.7<L>  /  TBili  0.3  /  DBili  x   /  AST  13  /  ALT  7   /  AlkPhos  136<H>  09-17        Protein Total, Fluid (09.17.18 @ 11:30)    Protein Total, Fluid: 3.5: Reference Ranges have NOT been established for analytes in body fluids  because of variability in body fluid composition.  The  has not determined the efficacy of this test when  performed on fluid specimens. The performance characteristics of this  test were determined by [facility]. g/dL    Comprehensive Metabolic Panel (09.17.18 @ 17:20)    Protein Total, Serum: 5.8 g/dL          Culture - Fungal, Body Fluid (collected 17 Sep 2018 11:41)  Source: Pleural Fl Pleural Fluid  Lactate Dehydrogenase, Fluid (09.17.18 @ 11:30)    Lactate Dehydrogenase, Fluid: 1588: Test Repeated.    Lactate Dehydrogenase, Serum (09.17.18 @ 17:20)    Lactate Dehydrogenase, Serum: 198      Reference Ranges have NOT been established for analytes in body fluids  because of variability in body fluid composition.  The  has not determined the efficacy of this test when  performed on fluid specimens. The performance characteristics of this  test were determined by [facility]. U/L    Preliminary Report (18 Sep 2018 07:12):    Testing in progress    Culture - Body Fluid with Gram Stain (collected 17 Sep 2018 11:41)  Source: Pleural Fl Pleural Fluid  Gram Stain (17 Sep 2018 20:40):    polymorphonuclear leukocytes seen    Gram Negative Rods seen by cytocentrifuge            RADIOLOGY:    PHYSICAL EXAM:  GEN: NAD, sitting comfortably in bed.   Pulmonary: No increased WOB, clear to auscultation bilaterally  CV: Regular rate and rhythm  GI: Soft, non-tender  MSK: Full ROM bilaterally, DP 2+ bilaterally  Neuro: AAOx3  Skin: no rashes or lesions    89 yo F with PMH extensive cardiac history presented to the hospital for the evaluation of sob x 10 days secondary to  b/l Pleural effusions   # SOB due to b/l Pleural Effusions likely  secondary to HFpEF vs empyema  - leukocytosis increased to 12.92  9/17  -US showed complex loculated effusion R>L. S/p 400 mL thoracentesis. pH 7.0, polymorphonuclear leukocytes and GNR, 96% granulocytes, Cell count 961  -f/u cytology, fluid analysis.   -  LDH fluid 1588 , LDH serum 198 Ratio 8.0  - fluid protein 3.5 , Serum Total  Protein 5.8 Ratio: 0.60  -exudate  - BNP 4692 from 1600   -history of severe pulmonary HTN, not in proportion to SOB per cardiology  -Day 1 vancomycin 750 q 12 IV, levofloxacin 750 IV q 24, metronidazole 500 IV q8  -c/w symbicort  -f/u ABG  -f/u PFTs, per pulm, might need home O2. Check Sat.   - fluid restriction   - Trend daily weights 54.4kg 9/15  -AS S/P TAVR ,  - c/wFurosemide 40mg PO BID   -Echo 9/17 showedEF 60% and severe pulmonary htn, unchanged from  Echo 9/1/2018 : EF 59%, results in chart  -f/u pulm  and CT surg, IR for thoracotomy tube placement.   #HTN  -lisinopril 10 mg daily started.   -f/u K and Cr as outpatioent, within 1 week.     # Atrial Fibrillation rate controlled  - AC discontinued due to GI Bleed  - Continue clopidogrel  - Continue Metoprolol    # GERD: Patient takes Famotidine at home.   -c/w protonix    # HLD: Continue simvastatin    # DM  - -810, elevated  -pt not onhome insulin.4 lantus, 3/3/3 lispro low correctional scale    # dvt ppx: Heparin sc 5000 u q8h  # gi ppx: Protonix 40mg PO qam  DISPO: Home with family and assistance. SUBJECTIVE:    Patient is a 88y old Female who presents with a chief complaint of SOB -Pleural Effusions b/l (18 Sep 2018 08:04)  Currently admitted to medicine with the primary diagnosis of Shortness of breath  Today is hospital day 5d. This morning she is sitting comfortably in bed doing a crossword puzzle and reports no new issues or overnight events. Notes her breathing is much improved . No BM for past 2 days, and she notes feeling bloated. Interesting in getting out of bed,  walking.      PAST MEDICAL & SURGICAL HISTORY  CAD (coronary artery disease)  Hypertension  GERD (gastroesophageal reflux disease)  Diabetes  Arthritis  S/P TAVR (transcatheter aortic valve replacement)    SOCIAL HISTORY:  Negative for smoking/alcohol/drug use.     ALLERGIES:  penicillins (Other)    MEDICATIONS:  STANDING MEDICATIONS  buDESOnide  80 MICROgram(s)/formoterol 4.5 MICROgram(s) Inhaler 2 Puff(s) Inhalation two times a day  chlorhexidine 4% Liquid 1 Application(s) Topical <User Schedule>  clopidogrel Tablet 75 milliGRAM(s) Oral daily  dextrose 50% Injectable 12.5 Gram(s) IV Push once  dextrose 50% Injectable 25 Gram(s) IV Push once  dextrose 50% Injectable 25 Gram(s) IV Push once  docusate sodium 100 milliGRAM(s) Oral two times a day  furosemide    Tablet 40 milliGRAM(s) Oral two times a day  heparin  Injectable 5000 Unit(s) SubCutaneous every 8 hours  influenza   Vaccine 0.5 milliLiter(s) IntraMuscular once  insulin glargine Injectable (LANTUS) 4 Unit(s) SubCutaneous at bedtime  insulin lispro (HumaLOG) corrective regimen sliding scale   SubCutaneous three times a day before meals  insulin lispro Injectable (HumaLOG) 3 Unit(s) SubCutaneous three times a day before meals  levoFLOXacin IVPB 750 milliGRAM(s) IV Intermittent every 24 hours  lisinopril 10 milliGRAM(s) Oral daily  magnesium oxide 400 milliGRAM(s) Oral three times a day with meals  metoprolol tartrate 25 milliGRAM(s) Oral two times a day  metroNIDAZOLE  IVPB      metroNIDAZOLE  IVPB 500 milliGRAM(s) IV Intermittent every 8 hours  pantoprazole    Tablet 40 milliGRAM(s) Oral before breakfast  senna 2 Tablet(s) Oral at bedtime  simvastatin 10 milliGRAM(s) Oral at bedtime  vancomycin  IVPB      vancomycin  IVPB 750 milliGRAM(s) IV Intermittent every 12 hours    PRN MEDICATIONS  acetaminophen   Tablet .. 650 milliGRAM(s) Oral every 6 hours PRN  dextrose 40% Gel 15 Gram(s) Oral once PRN  glucagon  Injectable 1 milliGRAM(s) IntraMuscular once PRN    VITALS:   T(F): 96.6  HR: 82  BP: 124/58  RR: 18  SpO2: 95%    LABS:                        9.3    12.92 )-----------( 332      ( 17 Sep 2018 17:20 )             29.3     09-17    137  |  97<L>  |  25<H>  ----------------------------<  242<H>  3.8   |  25  |  0.7    Ca    8.5      17 Sep 2018 17:20  Mg     1.9     09-17    TPro  5.8<L>  /  Alb  2.7<L>  /  TBili  0.3  /  DBili  x   /  AST  13  /  ALT  7   /  AlkPhos  136<H>  09-17        Protein Total, Fluid (09.17.18 @ 11:30)    Protein Total, Fluid: 3.5: Reference Ranges have NOT been established for analytes in body fluids  because of variability in body fluid composition.  The  has not determined the efficacy of this test when  performed on fluid specimens. The performance characteristics of this  test were determined by [facility]. g/dL    Comprehensive Metabolic Panel (09.17.18 @ 17:20)    Protein Total, Serum: 5.8 g/dL          Culture - Fungal, Body Fluid (collected 17 Sep 2018 11:41)  Source: Pleural Fl Pleural Fluid  Lactate Dehydrogenase, Fluid (09.17.18 @ 11:30)    Lactate Dehydrogenase, Fluid: 1588: Test Repeated.    Lactate Dehydrogenase, Serum (09.17.18 @ 17:20)    Lactate Dehydrogenase, Serum: 198      Reference Ranges have NOT been established for analytes in body fluids  because of variability in body fluid composition.  The  has not determined the efficacy of this test when  performed on fluid specimens. The performance characteristics of this  test were determined by [facility]. U/L    Preliminary Report (18 Sep 2018 07:12):    Testing in progress    Culture - Body Fluid with Gram Stain (collected 17 Sep 2018 11:41)  Source: Pleural Fl Pleural Fluid  Gram Stain (17 Sep 2018 20:40):    polymorphonuclear leukocytes seen    Gram Negative Rods seen by cytocentrifuge            RADIOLOGY:    PHYSICAL EXAM:  GEN: NAD, sitting comfortably in bed.   Pulmonary: Dressing R chest clean, dry intact. Decreased breath sounds base R lung.   CV: Regular rate and rhythm  GI: Tympanic abdomen, BS+, hard to palpation  MSK: Full ROM bilaterally, DP 2+ bilaterally, no edema. L wrist splint.   Neuro: AAOx3  Skin: no rashes or lesions    89 yo F with PMH extensive cardiac history presented to the hospital for the evaluation of sob x 10 days secondary to  b/l Pleural effusions   # SOB due to b/l Pleural Effusions likely  secondary to HFpEF vs empyema  - leukocytosis increased to 12.92  9/17  -US showed complex loculated effusion R>L. S/p 400 mL thoracentesis. pH 7.0, polymorphonuclear leukocytes and GNR, 96% granulocytes, Cell count 961  -f/u cytology, fluid analysis.   -  LDH fluid 1588 , LDH serum 198 Ratio 8.0  - fluid protein 3.5 , Serum Total  Protein 5.8 Ratio: 0.60  -exudate  - BNP 4692 from 1600   -history of severe pulmonary HTN, not in proportion to SOB per cardiology  -Day 2 vancomycin 750 q 12 IV, levofloxacin 750 IV q 24, metronidazole 500 IV q8  -f/u ID consult  -f/u pulm  and CT surg vs IR for thoracotomy tube placement.    -c/w symbicort  -f/u ABG  -f/u PFTs, per pulm, might need home O2. Check Sat.   - fluid restriction   - Trend daily weights 54.4kg 9/15  -AS S/P TAVR ,  - c/w Furosemide 40mg PO BID   -Echo 9/17 showed EF 60% and severe pulmonary htn, unchanged from  Echo 9/1/2018 : EF 59%, results in chart    #HTN  -lisinopril 10 mg daily started.   -BP 97/52, 124/58, wnl  -f/u K and Cr as outpatient, within 1 week.     # Atrial Fibrillation rate controlled  - AC discontinued due to GI Bleed  - Continue clopidogrel  - Continue Metoprolol    # GERD: Patient takes Famotidine at home.   -c/w protonix    # HLD: Continue simvastatin    # DM  - -319, elevated  -pt not onhome insulin.4 lantus, 3/3/3 lispro low correctional scale    # dvt ppx: Heparin sc 5000 u q8h  # gi ppx: Protonix 40mg PO qam  DISPO: Home with family and assistance. SUBJECTIVE:    Patient is a 88y old Female who presents with a chief complaint of SOB -Pleural Effusions b/l (18 Sep 2018 08:04)  Currently admitted to medicine with the primary diagnosis of Shortness of breath  Today is hospital day 5d. This morning she is sitting comfortably in bed doing a crossword puzzle and reports no new issues or overnight events. Notes her breathing is much improved . No BM for past 2 days, and she notes feeling bloated. Interesting in getting out of bed,  walking.      PAST MEDICAL & SURGICAL HISTORY  CAD (coronary artery disease)  Hypertension  GERD (gastroesophageal reflux disease)  Diabetes  Arthritis  S/P TAVR (transcatheter aortic valve replacement)    SOCIAL HISTORY:  Negative for smoking/alcohol/drug use.     ALLERGIES:  penicillins (Other)    MEDICATIONS:  STANDING MEDICATIONS  buDESOnide  80 MICROgram(s)/formoterol 4.5 MICROgram(s) Inhaler 2 Puff(s) Inhalation two times a day  chlorhexidine 4% Liquid 1 Application(s) Topical <User Schedule>  clopidogrel Tablet 75 milliGRAM(s) Oral daily  dextrose 50% Injectable 12.5 Gram(s) IV Push once  dextrose 50% Injectable 25 Gram(s) IV Push once  dextrose 50% Injectable 25 Gram(s) IV Push once  docusate sodium 100 milliGRAM(s) Oral two times a day  furosemide    Tablet 40 milliGRAM(s) Oral two times a day  heparin  Injectable 5000 Unit(s) SubCutaneous every 8 hours  influenza   Vaccine 0.5 milliLiter(s) IntraMuscular once  insulin glargine Injectable (LANTUS) 4 Unit(s) SubCutaneous at bedtime  insulin lispro (HumaLOG) corrective regimen sliding scale   SubCutaneous three times a day before meals  insulin lispro Injectable (HumaLOG) 3 Unit(s) SubCutaneous three times a day before meals  levoFLOXacin IVPB 750 milliGRAM(s) IV Intermittent every 24 hours  lisinopril 10 milliGRAM(s) Oral daily  magnesium oxide 400 milliGRAM(s) Oral three times a day with meals  metoprolol tartrate 25 milliGRAM(s) Oral two times a day  metroNIDAZOLE  IVPB      metroNIDAZOLE  IVPB 500 milliGRAM(s) IV Intermittent every 8 hours  pantoprazole    Tablet 40 milliGRAM(s) Oral before breakfast  senna 2 Tablet(s) Oral at bedtime  simvastatin 10 milliGRAM(s) Oral at bedtime  vancomycin  IVPB      vancomycin  IVPB 750 milliGRAM(s) IV Intermittent every 12 hours    PRN MEDICATIONS  acetaminophen   Tablet .. 650 milliGRAM(s) Oral every 6 hours PRN  dextrose 40% Gel 15 Gram(s) Oral once PRN  glucagon  Injectable 1 milliGRAM(s) IntraMuscular once PRN    VITALS:   T(F): 96.6  HR: 82  BP: 124/58  RR: 18  SpO2: 95% RA    LABS:                        9.3    12.92 )-----------( 332      ( 17 Sep 2018 17:20 )             29.3     09-17    137  |  97<L>  |  25<H>  ----------------------------<  242<H>  3.8   |  25  |  0.7    Ca    8.5      17 Sep 2018 17:20  Mg     1.9     09-17    TPro  5.8<L>  /  Alb  2.7<L>  /  TBili  0.3  /  DBili  x   /  AST  13  /  ALT  7   /  AlkPhos  136<H>  09-17        Protein Total, Fluid (09.17.18 @ 11:30)    Protein Total, Fluid: 3.5: Reference Ranges have NOT been established for analytes in body fluids  because of variability in body fluid composition.  The  has not determined the efficacy of this test when  performed on fluid specimens. The performance characteristics of this  test were determined by [facility]. g/dL    Comprehensive Metabolic Panel (09.17.18 @ 17:20)    Protein Total, Serum: 5.8 g/dL          Culture - Fungal, Body Fluid (collected 17 Sep 2018 11:41)  Source: Pleural Fl Pleural Fluid  Lactate Dehydrogenase, Fluid (09.17.18 @ 11:30)    Lactate Dehydrogenase, Fluid: 1588: Test Repeated.    Lactate Dehydrogenase, Serum (09.17.18 @ 17:20)    Lactate Dehydrogenase, Serum: 198      Reference Ranges have NOT been established for analytes in body fluids  because of variability in body fluid composition.  The  has not determined the efficacy of this test when  performed on fluid specimens. The performance characteristics of this  test were determined by [facility] [employer]. U/L    Preliminary Report (18 Sep 2018 07:12):    Testing in progress    Culture - Body Fluid with Gram Stain (collected 17 Sep 2018 11:41)  Source: Pleural Fl Pleural Fluid  Gram Stain (17 Sep 2018 20:40):    polymorphonuclear leukocytes seen    Gram Negative Rods seen by cytocentrifuge      WBC Count: 13.33 K/uL (09.18.18 @ 09:26)          RADIOLOGY:  < from: Xray Chest 1 View- PORTABLE-Urgent (09.18.18 @ 14:51) >  Impression:      New right basilar chest tube.    Unchanged right hydropneumothorax and adjacent opacity.     < end of copied text >    PHYSICAL EXAM:  GEN: NAD, sitting comfortably in bed.   Pulmonary: Dressing R chest clean, dry intact. Decreased breath sounds base R lung.   CV: Regular rate and rhythm  GI: Tympanic abdomen, BS+, hard to palpation  MSK: Full ROM bilaterally, DP 2+ bilaterally, no edema. L wrist splint.   Neuro: AAOx3  Skin: no rashes or lesions    87 yo F with PMH extensive cardiac history presented to the hospital for the evaluation of sob x 10 days secondary to  b/l Pleural effusions     # SOB due to b/l Pleural Effusions , empyema. less likely  secondary to HFpEF   -s/p R large bore chest tube placement to suction. patient tolerated procedure well, plan for TPA tomorrow.   - leukocytosis increased to 13.33  9/18 uptrending  -US showed complex loculated effusion R>L.  - S/p 400 mL thoracentesis. pH 7.0, polymorphonuclear leukocytes and GNR, 96% granulocytes, Cell count 961  -f/u cytology, fluid analysis.   -  LDH fluid 1588 , LDH serum 198 Ratio 8.0  - fluid protein 3.5 , Serum Total  Protein 5.8 Ratio: 0.60  -exudate  - BNP 4692 from 1600   -history of severe pulmonary HTN, not in proportion to SOB per cardiology.  -Day 2 vancomycin 750 q 12 IV, levofloxacin 750 IV q 24, metronidazole 500 IV q8  -f/u ID consult  -c/w symbicort  -f/u ABG  -f/u PFTs, per pulm, might need home O2. Check Sat.   - fluid restriction   - Trend daily weights 54.4kg 9/15  -AS S/P TAVR ,  - c/w Furosemide 40mg PO BID per cardiology.   -Echo 9/17 showed EF 60% and severe pulmonary htn, unchanged from  Echo 9/1/2018 : EF 59%, results in chart    #HTN  -lisinopril 10 mg daily started 9/17  -BP 97/52, 124/58, wnl  -f/u K and Cr as outpatient, within 1 week.     # Atrial Fibrillation rate controlled  - AC discontinued due to GI Bleed  - Continue clopidogrel  - Continue Metoprolol    # GERD: Patient takes Famotidine at home.   -c/w protonix    # HLD: Continue simvastatin    # DM  - , 178, 176 wnl  -pt not onhome insulin.4 lantus, 3/3/3 lispro low correctional scale    # CT abnomalities in kidney  -Renal US negative    # dvt ppx: Heparin sc 5000 u q8h  # gi ppx: Protonix 40mg PO qam  DISPO: Home with family and assistance.

## 2018-09-18 NOTE — CONSULT NOTE ADULT - ATTENDING COMMENTS
General Thoracic Surgery Attestation    I have seen and examined the patient.  Where appropriate I have updated, edited, or corrected the resident's or PA's note with regard to findings, values, and plan.    exudative pleural effusion consistent with empyema.  would favor large bore pigtail for initial drainage, +/- TPA as indicated, attempt at conservative management.

## 2018-09-19 LAB
ALBUMIN SERPL ELPH-MCNC: 2.6 G/DL — LOW (ref 3.5–5.2)
ALP SERPL-CCNC: 116 U/L — HIGH (ref 30–115)
ALT FLD-CCNC: 6 U/L — SIGNIFICANT CHANGE UP (ref 0–41)
ANION GAP SERPL CALC-SCNC: 15 MMOL/L — HIGH (ref 7–14)
AST SERPL-CCNC: 14 U/L — SIGNIFICANT CHANGE UP (ref 0–41)
BILIRUB SERPL-MCNC: 0.3 MG/DL — SIGNIFICANT CHANGE UP (ref 0.2–1.2)
BUN SERPL-MCNC: 24 MG/DL — HIGH (ref 10–20)
CALCIUM SERPL-MCNC: 8.4 MG/DL — LOW (ref 8.5–10.1)
CHLORIDE SERPL-SCNC: 106 MMOL/L — SIGNIFICANT CHANGE UP (ref 98–110)
CO2 SERPL-SCNC: 23 MMOL/L — SIGNIFICANT CHANGE UP (ref 17–32)
CREAT SERPL-MCNC: 1 MG/DL — SIGNIFICANT CHANGE UP (ref 0.7–1.5)
GLUCOSE BLDC GLUCOMTR-MCNC: 110 MG/DL — HIGH (ref 70–99)
GLUCOSE BLDC GLUCOMTR-MCNC: 120 MG/DL — HIGH (ref 70–99)
GLUCOSE BLDC GLUCOMTR-MCNC: 131 MG/DL — HIGH (ref 70–99)
GLUCOSE BLDC GLUCOMTR-MCNC: 214 MG/DL — HIGH (ref 70–99)
GLUCOSE SERPL-MCNC: 109 MG/DL — HIGH (ref 70–99)
HCT VFR BLD CALC: 30.4 % — LOW (ref 37–47)
HGB BLD-MCNC: 9.7 G/DL — LOW (ref 12–16)
MCHC RBC-ENTMCNC: 28.7 PG — SIGNIFICANT CHANGE UP (ref 27–31)
MCHC RBC-ENTMCNC: 31.9 G/DL — LOW (ref 32–37)
MCV RBC AUTO: 89.9 FL — SIGNIFICANT CHANGE UP (ref 81–99)
NRBC # BLD: 0 /100 WBCS — SIGNIFICANT CHANGE UP (ref 0–0)
PLATELET # BLD AUTO: 339 K/UL — SIGNIFICANT CHANGE UP (ref 130–400)
POTASSIUM SERPL-MCNC: 4.1 MMOL/L — SIGNIFICANT CHANGE UP (ref 3.5–5)
POTASSIUM SERPL-SCNC: 4.1 MMOL/L — SIGNIFICANT CHANGE UP (ref 3.5–5)
PROT SERPL-MCNC: 5.7 G/DL — LOW (ref 6–8)
RBC # BLD: 3.38 M/UL — LOW (ref 4.2–5.4)
RBC # FLD: 14.1 % — SIGNIFICANT CHANGE UP (ref 11.5–14.5)
SODIUM SERPL-SCNC: 144 MMOL/L — SIGNIFICANT CHANGE UP (ref 135–146)
WBC # BLD: 13.37 K/UL — HIGH (ref 4.8–10.8)
WBC # FLD AUTO: 13.37 K/UL — HIGH (ref 4.8–10.8)

## 2018-09-19 RX ORDER — ALTEPLASE 100 MG
10 KIT INTRAVENOUS ONCE
Qty: 0 | Refills: 0 | Status: COMPLETED | OUTPATIENT
Start: 2018-09-19 | End: 2018-09-19

## 2018-09-19 RX ORDER — MEROPENEM 1 G/30ML
500 INJECTION INTRAVENOUS EVERY 6 HOURS
Qty: 0 | Refills: 0 | Status: DISCONTINUED | OUTPATIENT
Start: 2018-09-19 | End: 2018-09-19

## 2018-09-19 RX ORDER — ALTEPLASE 100 MG
2 KIT INTRAVENOUS ONCE
Qty: 0 | Refills: 0 | Status: DISCONTINUED | OUTPATIENT
Start: 2018-09-19 | End: 2018-09-19

## 2018-09-19 RX ORDER — MEROPENEM 1 G/30ML
500 INJECTION INTRAVENOUS EVERY 12 HOURS
Qty: 0 | Refills: 0 | Status: DISCONTINUED | OUTPATIENT
Start: 2018-09-19 | End: 2018-09-20

## 2018-09-19 RX ORDER — MEROPENEM 1 G/30ML
1000 INJECTION INTRAVENOUS EVERY 8 HOURS
Qty: 0 | Refills: 0 | Status: DISCONTINUED | OUTPATIENT
Start: 2018-09-19 | End: 2018-09-19

## 2018-09-19 RX ADMIN — Medication 3 UNIT(S): at 17:25

## 2018-09-19 RX ADMIN — HEPARIN SODIUM 5000 UNIT(S): 5000 INJECTION INTRAVENOUS; SUBCUTANEOUS at 21:57

## 2018-09-19 RX ADMIN — SIMETHICONE 80 MILLIGRAM(S): 80 TABLET, CHEWABLE ORAL at 14:31

## 2018-09-19 RX ADMIN — PANTOPRAZOLE SODIUM 40 MILLIGRAM(S): 20 TABLET, DELAYED RELEASE ORAL at 05:36

## 2018-09-19 RX ADMIN — SIMETHICONE 80 MILLIGRAM(S): 80 TABLET, CHEWABLE ORAL at 21:58

## 2018-09-19 RX ADMIN — Medication 650 MILLIGRAM(S): at 15:51

## 2018-09-19 RX ADMIN — Medication 250 MILLIGRAM(S): at 06:36

## 2018-09-19 RX ADMIN — Medication 650 MILLIGRAM(S): at 23:10

## 2018-09-19 RX ADMIN — LACTULOSE 10 GRAM(S): 10 SOLUTION ORAL at 05:36

## 2018-09-19 RX ADMIN — HEPARIN SODIUM 5000 UNIT(S): 5000 INJECTION INTRAVENOUS; SUBCUTANEOUS at 14:31

## 2018-09-19 RX ADMIN — INSULIN GLARGINE 4 UNIT(S): 100 INJECTION, SOLUTION SUBCUTANEOUS at 21:56

## 2018-09-19 RX ADMIN — Medication 40 MILLIGRAM(S): at 05:36

## 2018-09-19 RX ADMIN — LISINOPRIL 10 MILLIGRAM(S): 2.5 TABLET ORAL at 05:35

## 2018-09-19 RX ADMIN — Medication 25 MILLIGRAM(S): at 05:35

## 2018-09-19 RX ADMIN — Medication 25 MILLIGRAM(S): at 17:26

## 2018-09-19 RX ADMIN — Medication 100 MILLIGRAM(S): at 17:24

## 2018-09-19 RX ADMIN — Medication 3 UNIT(S): at 11:15

## 2018-09-19 RX ADMIN — Medication 40 MILLIGRAM(S): at 17:24

## 2018-09-19 RX ADMIN — SIMETHICONE 80 MILLIGRAM(S): 80 TABLET, CHEWABLE ORAL at 05:35

## 2018-09-19 RX ADMIN — BUDESONIDE AND FORMOTEROL FUMARATE DIHYDRATE 2 PUFF(S): 160; 4.5 AEROSOL RESPIRATORY (INHALATION) at 21:57

## 2018-09-19 RX ADMIN — Medication 100 MILLIGRAM(S): at 05:35

## 2018-09-19 RX ADMIN — HEPARIN SODIUM 5000 UNIT(S): 5000 INJECTION INTRAVENOUS; SUBCUTANEOUS at 05:35

## 2018-09-19 RX ADMIN — ALTEPLASE 10 MILLIGRAM(S): KIT at 12:39

## 2018-09-19 RX ADMIN — CHLORHEXIDINE GLUCONATE 1 APPLICATION(S): 213 SOLUTION TOPICAL at 05:35

## 2018-09-19 RX ADMIN — Medication 3 UNIT(S): at 07:46

## 2018-09-19 RX ADMIN — MEROPENEM 100 MILLIGRAM(S): 1 INJECTION INTRAVENOUS at 21:58

## 2018-09-19 RX ADMIN — CLOPIDOGREL BISULFATE 75 MILLIGRAM(S): 75 TABLET, FILM COATED ORAL at 11:14

## 2018-09-19 RX ADMIN — SENNA PLUS 2 TABLET(S): 8.6 TABLET ORAL at 21:56

## 2018-09-19 RX ADMIN — BUDESONIDE AND FORMOTEROL FUMARATE DIHYDRATE 2 PUFF(S): 160; 4.5 AEROSOL RESPIRATORY (INHALATION) at 07:45

## 2018-09-19 RX ADMIN — Medication 100 MILLIGRAM(S): at 05:34

## 2018-09-19 RX ADMIN — SIMVASTATIN 10 MILLIGRAM(S): 20 TABLET, FILM COATED ORAL at 21:56

## 2018-09-19 NOTE — PROGRESS NOTE ADULT - ASSESSMENT
89 yo F with PMH of Atrial Fibrillation ( not on AC due to previous GI Bleed ), HTN, CAD with stent placement, CHF, s/p TAVR, GERD, DM, and HLD presented to the hospital for the evaluation of sob x 10 days. Patient states that she first noticed SOB ten days ago and it has been progressively worsening. She has RAIN after about 25 ft ambulation. She used to sleep with one pillow and now is using two for symptomatic relief. Patients son is at bedside and notes that these symptoms are similar to symptoms she had prior to her TAVR. Patient also notes increased swelling of bilaterally lower extremities. She recently saw Cardiologist who completed ECHO and increased dosage of Furosemide. She has had improvement of LE edema but not of SOB to baseline. Patient also notes recent feelings of nausea, chills, fatigue and 5 pound weight loss in the last month too.    #Right sided empyema  - s/p right thoracentesis- 400 ml drained  - F/u Pleural fluid  culture- GNR  -  pleural fluid pH 7.0  - Repeat CT chest done  9/18 showing Complex right-sided pleural effusion with incomplete reexpansion.  - Pt evaluated by CT surgery- S/p  Large bore Rt chest tube placement for drainage  ysterday , planned for TPA today  - C/w antibiotics-meropenem.    # Acute on chronic diastolic CHF, H/o TAVR, pulmonary HTN  - BNP 4692  - CT scan shows worsening pleural effusions when compared to study completed recently.   -  daily weights, I/o, restrict fluids, low Na diet  - c/w lasix , metoprolol, lisinopril    # COPD  -c/w  Budesonide    # H/o CAD  - c/w Plavix, statin, metoprolol.    # Atrial Fibrillation  - c/w  clopidogrel, Metoprolol  - not on anticoagulants due to H/o GI bleed    # GERD  - c/w Protonix     # Dyslipidemia  - c/w simvastatin    # Kidney abnormalities noted on CT Scan:   - Renal US negative     # DM type 2   - monitor finger sticks   - c/w lantus, lispro    #GI/ DVT prophylaxis    PT eval: STR vs home with PT

## 2018-09-19 NOTE — PROGRESS NOTE ADULT - ASSESSMENT
SOB/ PUL HTN/ S/P TAVR/ A FIB / HIGHLY COMPONENT OF COPD (SEEN ALSO ON CHEST CT 8/18), PUL HTN MULTIFACTORIAL / Neutrophilic exudative effusion GR in fluid      - IV ABX (meropneme? till final cx  - TPA  - HIGH RISK FOR ANY INVASIVE PROCEDURE   WILL HIGHLY NEED LONG TERM ABX

## 2018-09-19 NOTE — CONSULT NOTE ADULT - CONSULT REQUESTED DATE/TIME
16-Sep-2018 07:32
17-Sep-2018 09:36
17-Sep-2018 17:11
18-Sep-2018 10:17
19-Sep-2018 14:05
14-Sep-2018 11:46

## 2018-09-19 NOTE — CONSULT NOTE ADULT - SUBJECTIVE AND OBJECTIVE BOX
Patient is a 88y old  Female who presents with a chief complaint of SOB -Pleural Effusions b/l (13 Sep 2018 20:52)      HPI:  89 yo F with PMH of Atrial Fibrillation ( not on AC due to previous GI Bleed ), HTN, CAD with stent placement, CHF, s/p TAVR, GERD, DM, and HLD presented to the hospital for the evaluation of sob x 10 days. Patient states that she first noticed SOB ten days ago and it has been progressively worsening. She has RAIN after about 25 ft ambulation. She used to sleep with one pillow and now is using two for symptomatic relief. Patients son is at bedside and notes that these symptoms are similar to symptoms she had prior to her TAVR. Patient also notes increased swelling of bilaterally lower extremities. She recently saw Cardiologist who completed ECHO and increased dosage of Furosemide. She has had improvement of LE edema but not of SOB to baseline. Patient also notes recent feelings of nausea, chills, fatigue and 5 pound weight loss in the last month too. She denies and fevers, vomiting chest pain, palpitations, abdominal pain or back pain. Patient also notes loose stools increased in frequency ( as per son this is normal for her). Patient notes she has had pleural effusions for three years which have been monitored with CXR. (13 Sep 2018 20:52) Echo done in office earlier this weeks showed the AVR function adequatre and unchanged . There was severe pulmonary HTN with RVSP up to 85 mmhg ;.       PAST MEDICAL & SURGICAL HISTORY:  CAD (coronary artery disease)  Hypertension  GERD (gastroesophageal reflux disease)  Diabetes  Arthritis  S/P TAVR (transcatheter aortic valve replacement)      PREVIOUS DIAGNOSTIC TESTING:      ECHO  FINDINGS:18 Normal LV systolic function TAVR with peak and mean gradient of 17 mmhg and 7 mmhg KELECHI 1.5 cm2   Mild paravalvular AI Mild MR and TR . RVSP was up to 85 mmhg     STRESS  FINDINGS:    CATHETERIZATION  FINDINGS:BMS in RI RCA occluded LCX and LAD okl    MEDICATIONS  (STANDING):  chlorhexidine 4% Liquid 1 Application(s) Topical <User Schedule>  clopidogrel Tablet 75 milliGRAM(s) Oral daily  furosemide   Injectable 40 milliGRAM(s) IV Push two times a day  heparin  Injectable 5000 Unit(s) SubCutaneous every 8 hours  metoprolol tartrate 25 milliGRAM(s) Oral two times a day  pantoprazole    Tablet 40 milliGRAM(s) Oral before breakfast  simvastatin 10 milliGRAM(s) Oral at bedtime    MEDICATIONS  (PRN):  acetaminophen   Tablet .. 650 milliGRAM(s) Oral every 6 hours PRN Mild Pain (1 - 3)      FAMILY HISTORY:      SOCIAL HISTORY:  CIGARETTES:    ALCOHOL:    Allergies    penicillins (Other)    Intolerances        REVIEW OF SYSTEMS:  CONSTITUTIONAL:  fatigue  EYES: No eye pain, visual disturbances, or discharge  ENMT:  No difficulty hearing, tinnitus, vertigo; No sinus or throat pain  NECK: No pain or stiffness  BREASTS: No pain, masses, or nipple discharge  RESPIRATORY: SOB   CARDIOVASCULAR: No chest pain,   GASTROINTESTINAL: No abdominal or epigastric pain. No nausea, vomiting, or hematemesis; No diarrhea or constipation. No melena or hematochezia.  GENITOURINARY: No dysuria, frequency, hematuria, or incontinence  NEUROLOGICAL: No headaches, confusion   SKIN: No itching, burning, rashes, or lesions   LYMPH NODES: No enlarged glands  ENDOCRINE: No heat or cold intolerance; No hair loss  MUSCULOSKELETAL: No joint pain or swelling; No muscle, back, or extremity pain  PSYCHIATRIC: No depression, anxiety, mood swings, or difficulty sleeping  HEME/LYMPH: No easy bruising, or bleeding gums  ALLERY AND IMMUNOLOGIC: No hives or eczema          Vital Signs Last 24 Hrs  T(C): 36.6 (14 Sep 2018 07:58), Max: 36.9 (14 Sep 2018 00:26)  T(F): 97.8 (14 Sep 2018 07:58), Max: 98.4 (14 Sep 2018 00:26)  HR: 89 (14 Sep 2018 07:58) (78 - 104)  BP: 174/74 (14 Sep 2018 07:58) (134/80 - 184/80)  BP(mean): --  RR: 18 (14 Sep 2018 07:58) (18 - 20)  SpO2: 97% (14 Sep 2018 07:58) (95% - 99%)        PHYSICAL EXAM:  GENERAL: NAD, well-groomed, well-developed  HEAD:  Atraumatic, Normocephalic  EYES: EOMI, PERRLA, conjunctiva and sclera clear  ENMT: No tonsillar erythema, exudates, or enlargement; Moist mucous membranes, Good dentition, No lesions  NECK: Supple, No JVD, Normal thyroid  NERVOUS SYSTEM:  Alert & Oriented X3, Good concentration; Motor Strength 5/5 B/L upper and lower extremities; DTRs 2+ intact and symmetric  CHEST/LUNG: Clear to percussion bilaterally; No rales, rhonchi, wheezing, or rubs  HEART: s1 S2 irreg . II/VI SM at base   ABDOMEN: Soft, Nontender, Nondistended; Bowel sounds present  EXTREMITIES:  trace edema  LYMPH: No lymphadenopathy noted  SKIN: No rashes or lesions    INTERPRETATION OF TELEMETRY:    ECG:    I&O's Detail      LABS:                        11.1   10.70 )-----------( 345      ( 14 Sep 2018 06:52 )             34.7         142  |  97<L>  |  27<H>  ----------------------------<  153<H>  4.0   |  24  |  0.8    Ca    9.0      14 Sep 2018 06:52  Mg     1.6         TPro  7.0  /  Alb  3.4<L>  /  TBili  0.5  /  DBili  x   /  AST  30  /  ALT  9   /  AlkPhos  157<H>      CARDIAC MARKERS ( 13 Sep 2018 13:18 )  x     / <0.01 ng/mL / x     / x     / x          PT/INR - ( 13 Sep 2018 13:18 )   PT: 14.60 sec;   INR: 1.36 ratio         PTT - ( 13 Sep 2018 13:18 )  PTT:29.9 sec  Urinalysis Basic - ( 13 Sep 2018 13:19 )    Color: Yellow / Appearance: Clear / S.025 / pH: x  Gluc: x / Ketone: Negative  / Bili: Negative / Urobili: 0.2 mg/dL   Blood: x / Protein: 30 mg/dL / Nitrite: Negative   Leuk Esterase: Small / RBC: x / WBC 6-10 /HPF   Sq Epi: x / Non Sq Epi: x / Bacteria: x      I&O's Summary      RADIOLOGY & ADDITIONAL STUDIES:  < from: CT Chest w/ IV Cont (18 @ 16:40) >  IMPRESSION:   No evidence of pulmonary embolism.    Bilateral pleural effusions, right greater than left, associated with   compressive atelectasis.    Indeterminate bilateral renal lesions measuring 1.6 cm in greatest   dimension. Consider additional evaluation with ultrasound and/or renal   protocol MRI.    These findings were discussed with Dr. CHUY AGEE at 2018 6:07 PM   by Dr. Lynn with read back confirmation.    < end of copied text >
CT SURGERY CONSULT NOTE    Patient: MUKESH POLLACK , 88y (29)Female   MRN: [id number]  [address]  Visit: 18 Inpatient  Date: 18 @ 10:18    HPI:  89 yo F with PMH of Atrial Fibrillation ( not on AC due to previous GI Bleed ), HTN, CAD with stent placement, CHF, s/p TAVR, GERD, DM, and HLD presented to the hospital for the evaluation of sob x 10 days. Patient states that she first noticed SOB ten days ago and it has been progressively worsening. She has RAIN after about 25 ft ambulation. She used to sleep with one pillow and now is using two for symptomatic relief. Patients son is at bedside and notes that these symptoms are similar to symptoms she had prior to her TAVR. Patient also notes increased swelling of bilaterally lower extremities. She recently saw Cardiologist who completed ECHO and increased dosage of Furosemide. She has had improvement of LE edema but not of SOB to baseline. Patient also notes recent feelings of nausea, chills, fatigue and 5 pound weight loss in the last month too. She denies and fevers, vomiting chest pain, palpitations, abdominal pain or back pain. Patient also notes loose stools increased in frequency ( as per son this is normal for her). Patient notes she has had pleural effusions for three years which have been monitored with CXR.     S/P Rt  thoracentesis.400 mL yesterday  by pulmonary, pH 7.0,   Repeat CT chest done today  showing Complex right-sided pleural effusion with incomplete reexpansion.     PMH:  CAD (coronary artery disease)  Hypertension  GERD (gastroesophageal reflux disease)  Diabetes  Arthritis    PSH:    S/P TAVR (transcatheter aortic valve replacement)    Home Medications:  clopidogrel 75 mg oral tablet: 1 tab(s) orally once a day  famotidine 20 mg oral tablet, disintegratin  orally once a day  furosemide 40 mg oral tablet: 1 tab(s) orally 2 times a day  glimepiride 2 mg oral tablet: 6 milligram(s) orally once a day  metFORMIN 1000 mg oral tablet: 1 tab(s) orally 2 times a day  metoprolol tartrate 25 mg oral tablet: 1 tab(s) orally 2 times a day  simvastatin 10 mg oral tablet: 1 tab(s) orally once a day (at bedtime)  Tylenol 325 mg oral tablet: 2 tab(s) orally every 4 hours, As Needed for pain    Hospital Medications:  levoFLOXacin IVPB 750 milliGRAM(s) IV Intermittent every 24 hours  metroNIDAZOLE  IVPB      metroNIDAZOLE  IVPB 500 milliGRAM(s) IV Intermittent every 8 hours  vancomycin  IVPB      vancomycin  IVPB 750 milliGRAM(s) IV Intermittent every 12 hours    Allergies:  penicillins (Other)    Vital Signs Last 24 Hrs  T(C): 35.9 (18 Sep 2018 05:50), Max: 36.9 (17 Sep 2018 21:44)  T(F): 96.6 (18 Sep 2018 05:50), Max: 98.4 (17 Sep 2018 21:44)  HR: 82 (18 Sep 2018 05:50) (82 - 114)  BP: 124/58 (18 Sep 2018 05:50) (97/52 - 156/67)  BP(mean): 90 (17 Sep 2018 17:23) (90 - 97)  RR: 18 (18 Sep 2018 05:50) (18 - 20)  SpO2: 95% (18 Sep 2018 05:50) (94% - 99%)    PHYSICAL EXAM:  General: NAD, AAOx3, calm and cooperative  Cardiac: RRR S1, S2, no Murmurs, rubs or gallops  Respiratory: CTAB, normal respiratory effort, breath sounds equal BL, no wheeze, rhonchi or crackles      MEDICATIONS  (STANDING):  buDESOnide  80 MICROgram(s)/formoterol 4.5 MICROgram(s) Inhaler 2 Puff(s) Inhalation two times a day  chlorhexidine 4% Liquid 1 Application(s) Topical <User Schedule>  clopidogrel Tablet 75 milliGRAM(s) Oral daily  dextrose 50% Injectable 12.5 Gram(s) IV Push once  dextrose 50% Injectable 25 Gram(s) IV Push once  dextrose 50% Injectable 25 Gram(s) IV Push once  docusate sodium 100 milliGRAM(s) Oral two times a day  furosemide    Tablet 40 milliGRAM(s) Oral two times a day  heparin  Injectable 5000 Unit(s) SubCutaneous every 8 hours  influenza   Vaccine 0.5 milliLiter(s) IntraMuscular once  insulin glargine Injectable (LANTUS) 4 Unit(s) SubCutaneous at bedtime  insulin lispro (HumaLOG) corrective regimen sliding scale   SubCutaneous three times a day before meals  insulin lispro Injectable (HumaLOG) 3 Unit(s) SubCutaneous three times a day before meals  lactulose Syrup 10 Gram(s) Oral three times a day  levoFLOXacin IVPB 750 milliGRAM(s) IV Intermittent every 24 hours  lisinopril 10 milliGRAM(s) Oral daily  metoprolol tartrate 25 milliGRAM(s) Oral two times a day  metroNIDAZOLE  IVPB      metroNIDAZOLE  IVPB 500 milliGRAM(s) IV Intermittent every 8 hours  pantoprazole    Tablet 40 milliGRAM(s) Oral before breakfast  senna 2 Tablet(s) Oral at bedtime  simethicone 80 milliGRAM(s) Chew three times a day  simvastatin 10 milliGRAM(s) Oral at bedtime  vancomycin  IVPB      vancomycin  IVPB 750 milliGRAM(s) IV Intermittent every 12 hours    MEDICATIONS  (PRN):  acetaminophen   Tablet .. 650 milliGRAM(s) Oral every 6 hours PRN Mild Pain (1 - 3)  dextrose 40% Gel 15 Gram(s) Oral once PRN Blood Glucose LESS THAN 70 milliGRAM(s)/deciliter  glucagon  Injectable 1 milliGRAM(s) IntraMuscular once PRN Glucose LESS THAN 70 milligrams/deciliter    LAB/STUDIES:                        9.5    13.33 )-----------( 339      ( 18 Sep 2018 09:26 )             30.8         137  |  97<L>  |  25<H>  ----------------------------<  242<H>  3.8   |  25  |  0.7    Ca    8.5      17 Sep 2018 17:20  Mg     1.9         TPro  5.8<L>  /  Alb  2.7<L>  /  TBili  0.3  /  DBili  x   /  AST  13  /  ALT  7   /  AlkPhos  136<H>        LIVER FUNCTIONS - ( 17 Sep 2018 17:20 )  Alb: 2.7 g/dL / Pro: 5.8 g/dL / ALK PHOS: 136 U/L / ALT: 7 U/L / AST: 13 U/L / GGT: x           Culture - Fungal, Body Fluid (collected 17 Sep 2018 11:41)  Source: Pleural Fl Pleural Fluid  Preliminary Report (18 Sep 2018 07:12):    Testing in progress    Culture - Body Fluid with Gram Stain (collected 17 Sep 2018 11:41)  Source: Pleural Fl Pleural Fluid  Gram Stain (17 Sep 2018 20:40):    polymorphonuclear leukocytes seen    Gram Negative Rods seen by cytocentrifuge    IMAGING:      ASSESSMENT:  88F with PMH extensive cardiac history presented to the hospital for the evaluation of sob x 10 days secondary to  b/l Pleural effusions, S/P Rt  thoracentesis.400cc out  yesterday  by pulmonary, pH 7.0,   Repeat CT chest done today  showing Complex right-sided pleural effusion with incomplete reexpansion.  Right sided empyema,     PLAN:  - Right sided empyema- Large bore Rt chest tube placement for drainage w/ TPA.    Case discussed with attending physician.
MUKESH POLLACK  89y, Female  Allergy: penicillins (Other)      HPI:  87 yo F with PMH of Atrial Fibrillation ( not on AC due to previous GI Bleed ), HTN, CAD with stent placement, CHF, s/p TAVR, GERD, DM, and HLD presented to the hospital for the evaluation of sob x 10 days. Patient states that she first noticed SOB ten days ago and it has been progressively worsening. She has RAIN after about 25 ft ambulation. She used to sleep with one pillow and now is using two for symptomatic relief. Patients son is at bedside and notes that these symptoms are similar to symptoms she had prior to her TAVR. Patient also notes increased swelling of bilaterally lower extremities. She recently saw Cardiologist who completed ECHO and increased dosage of Furosemide. She has had improvement of LE edema but not of SOB to baseline. Patient also notes recent feelings of nausea, chills, fatigue and 5 pound weight loss in the last month too. She denies and fevers, vomiting chest pain, palpitations, abdominal pain or back pain. Patient also notes loose stools increased in frequency ( as per son this is normal for her). Patient notes she has had pleural effusions for three years which have been monitored with CXR. (13 Sep 2018 20:52)    FAMILY HISTORY:    PAST MEDICAL & SURGICAL HISTORY:  CAD (coronary artery disease)  Hypertension  GERD (gastroesophageal reflux disease)  Diabetes  Arthritis  S/P TAVR (transcatheter aortic valve replacement)        VITALS:  T(F): 98.7, Max: 98.7 (09-19-18 @ 13:45)  HR: 94  BP: 144/62  RR: 20Vital Signs Last 24 Hrs  T(C): 37.1 (19 Sep 2018 13:45), Max: 37.1 (19 Sep 2018 13:45)  T(F): 98.7 (19 Sep 2018 13:45), Max: 98.7 (19 Sep 2018 13:45)  HR: 94 (19 Sep 2018 13:45) (81 - 94)  BP: 144/62 (19 Sep 2018 13:45) (111/52 - 146/65)  BP(mean): --  RR: 20 (19 Sep 2018 13:45) (18 - 20)  SpO2: 96% (19 Sep 2018 13:45) (96% - 97%)    TESTS & MEASUREMENTS:                        9.7    13.37 )-----------( 339      ( 19 Sep 2018 08:35 )             30.4     09-19    144  |  106  |  24<H>  ----------------------------<  109<H>  4.1   |  23  |  1.0    Ca    8.4<L>      19 Sep 2018 08:35    TPro  5.7<L>  /  Alb  2.6<L>  /  TBili  0.3  /  DBili  x   /  AST  14  /  ALT  6   /  AlkPhos  116<H>  09-19    LIVER FUNCTIONS - ( 19 Sep 2018 08:35 )  Alb: 2.6 g/dL / Pro: 5.7 g/dL / ALK PHOS: 116 U/L / ALT: 6 U/L / AST: 14 U/L / GGT: x             Culture - Fungal, Body Fluid (collected 09-17-18 @ 11:41)  Source: Pleural Fl Pleural Fluid  Preliminary Report (09-18-18 @ 07:12):    Testing in progress    Culture - Body Fluid with Gram Stain (collected 09-17-18 @ 11:41)  Source: Pleural Fl Pleural Fluid  Gram Stain (09-17-18 @ 20:40):    polymorphonuclear leukocytes seen    Gram Negative Rods seen by cytocentrifuge  Preliminary Report (09-18-18 @ 19:22):    Few Gram Negative Rods    Culture - Acid Fast - Body Fluid w/Smear (collected 09-17-18 @ 11:41)  Source: Pleural Fl Pleural Fluid    Culture - Urine (collected 09-13-18 @ 13:19)  Source: .Urine Clean Catch (Midstream)  Final Report (09-14-18 @ 23:29):    <10,000 CFU/ml Normal Urogenital simran present            RADIOLOGY & ADDITIONAL TESTS:    ANTIBIOTICS:  meropenem  IVPB 1000 milliGRAM(s) IV Intermittent every 8 hours
Patient is a 88y old  Female who presents with a chief complaint of SOB -Pleural Effusions b/l (16 Sep 2018 05:50)      HPI:  87 yo F with PMH of Atrial Fibrillation ( not on AC due to previous GI Bleed ), HTN, CAD with stent placement, CHF, s/p TAVR, GERD, DM, and HLD presented to the hospital for the evaluation of sob x 10 days. Patient states that she first noticed SOB ten days ago and it has been progressively worsening. She has RAIN after about 25 ft ambulation. She used to sleep with one pillow and now is using two for symptomatic relief. Patients son is at bedside and notes that these symptoms are similar to symptoms she had prior to her TAVR. Patient also notes increased swelling of bilaterally lower extremities. She recently saw Cardiologist who completed ECHO and increased dosage of Furosemide. She has had improvement of LE edema but not of SOB to baseline. Patient also notes recent feelings of nausea, chills, fatigue and 5 pound weight loss in the last month too. She denies and fevers, vomiting chest pain, palpitations, abdominal pain or back pain. Patient also notes loose stools increased in frequency ( as per son this is normal for her). Patient notes she has had pleural effusions for three years which have been monitored with CXR. (13 Sep 2018 20:52), ALL OVER FEELS BETTER, SMOKED FOR 50 YEARS STOPEED 1980 COUGH PRODUCTIVE OF CLEAR PHLEGM      PAST MEDICAL & SURGICAL HISTORY:  CAD (coronary artery disease)  Hypertension  GERD (gastroesophageal reflux disease)  Diabetes  Arthritis  S/P TAVR (transcatheter aortic valve replacement)      SOCIAL HX:   Smoking EX    FAMILY HISTORY: COPD      REVIEW OF SYSTEMS HPY    Allergies    penicillins (Other)    Intolerances        acetaminophen   Tablet .. 650 milliGRAM(s) Oral every 6 hours PRN  chlorhexidine 4% Liquid 1 Application(s) Topical <User Schedule>  clopidogrel Tablet 75 milliGRAM(s) Oral daily  furosemide    Tablet 40 milliGRAM(s) Oral two times a day  heparin  Injectable 5000 Unit(s) SubCutaneous every 8 hours  influenza   Vaccine 0.5 milliLiter(s) IntraMuscular once  metoprolol tartrate 25 milliGRAM(s) Oral two times a day  pantoprazole    Tablet 40 milliGRAM(s) Oral before breakfast  simvastatin 10 milliGRAM(s) Oral at bedtime  : Home Meds:      PHYSICAL EXAM    ICU Vital Signs Last 24 Hrs  T(C): 37.4 (16 Sep 2018 05:48), Max: 37.4 (15 Sep 2018 22:06)  T(F): 99.3 (16 Sep 2018 05:48), Max: 99.4 (15 Sep 2018 22:06)  HR: 107 (16 Sep 2018 05:48) (92 - 107)  BP: 188/81 (16 Sep 2018 05:48) (141/62 - 188/81)  RR: 18 (16 Sep 2018 05:48) (18 - 20)  SpO2: 95% (16 Sep 2018 05:48) (95% - 100%)      General:  HEENT:  BRYAN              Lymph Nodes: No cervical LN   Lungs: Bilateral BS, DEC BS R SIDE  Cardiovascular: Regular  Abdomen: Soft, Positive BS  Extremities: SWELLING +  Skin: Warm  Neurological: Non focal         LABS:                          10.1   11.21 )-----------( 340      ( 15 Sep 2018 06:44 )             31.5                                               09-15    138  |  98  |  27<H>  ----------------------------<  213<H>  3.8   |  24  |  0.8    Ca    8.8      15 Sep 2018 06:44    TPro  6.1  /  Alb  3.1<L>  /  TBili  0.6  /  DBili  x   /  AST  13  /  ALT  7   /  AlkPhos  150<H>  09-15                                                                                           LIVER FUNCTIONS - ( 15 Sep 2018 06:44 )  Alb: 3.1 g/dL / Pro: 6.1 g/dL / ALK PHOS: 150 U/L / ALT: 7 U/L / AST: 13 U/L / GGT: x                                                  Culture - Urine (collected 13 Sep 2018 13:19)  Source: .Urine Clean Catch (Midstream)  Final Report (14 Sep 2018 23:29):    <10,000 CFU/ml Normal Urogenital simran present                                                                                           X-Ray B/L EFFUSION R>L    MEDICATIONS  (STANDING):  chlorhexidine 4% Liquid 1 Application(s) Topical <User Schedule>  clopidogrel Tablet 75 milliGRAM(s) Oral daily  furosemide    Tablet 40 milliGRAM(s) Oral two times a day  heparin  Injectable 5000 Unit(s) SubCutaneous every 8 hours  influenza   Vaccine 0.5 milliLiter(s) IntraMuscular once  metoprolol tartrate 25 milliGRAM(s) Oral two times a day  pantoprazole    Tablet 40 milliGRAM(s) Oral before breakfast  simvastatin 10 milliGRAM(s) Oral at bedtime    MEDICATIONS  (PRN):  acetaminophen   Tablet .. 650 milliGRAM(s) Oral every 6 hours PRN Mild Pain (1 - 3)
Patient is a 88y old  Female who presents with a chief complaint of SOB -Pleural Effusions b/l (17 Sep 2018 08:19)        SUBJECTIVE:      REVIEW OF SYSTEMS:  See HPI    PHYSICAL EXAM  Vital Signs Last 24 Hrs  T(C): 36.4 (17 Sep 2018 06:06), Max: 36.9 (16 Sep 2018 14:50)  T(F): 97.6 (17 Sep 2018 06:06), Max: 98.4 (16 Sep 2018 14:50)  HR: 80 (17 Sep 2018 06:06) (80 - 116)  BP: 152/63 (17 Sep 2018 06:06) (152/63 - 194/75)  BP(mean): --  RR: 18 (17 Sep 2018 06:06) (18 - 18)  SpO2: 100% (17 Sep 2018 06:06) (97% - 100%)    General: In NAD  HEENT: BRYAN               Lymphatic system: No Cervical LN    Respiratory: Lisandro BS  Cardiovascular: Regular  Gastrointestinal: Soft. + BS  Musculoskeletal: No clubbing.  moves all extremities.  Full range of motion    Skin: Warm.  Intact  Neurological: No motor or sensory deficit        LABS:                          9.7    11.29 )-----------( 333      ( 16 Sep 2018 06:10 )             30.8                                               09-16    141  |  100  |  22<H>  ----------------------------<  194<H>  3.9   |  25  |  0.8    Ca    8.9      16 Sep 2018 06:10  Mg     1.7     09-16    TPro  6.1  /  Alb  3.1<L>  /  TBili  0.5  /  DBili  x   /  AST  12  /  ALT  6   /  AlkPhos  141<H>  09-16                                                                                           LIVER FUNCTIONS - ( 16 Sep 2018 06:10 )  Alb: 3.1 g/dL / Pro: 6.1 g/dL / ALK PHOS: 141 U/L / ALT: 6 U/L / AST: 12 U/L / GGT: x                                                                                                MEDICATIONS  (STANDING):  buDESOnide  80 MICROgram(s)/formoterol 4.5 MICROgram(s) Inhaler 2 Puff(s) Inhalation two times a day  chlorhexidine 4% Liquid 1 Application(s) Topical <User Schedule>  clopidogrel Tablet 75 milliGRAM(s) Oral daily  furosemide    Tablet 40 milliGRAM(s) Oral two times a day  heparin  Injectable 5000 Unit(s) SubCutaneous every 8 hours  influenza   Vaccine 0.5 milliLiter(s) IntraMuscular once  magnesium oxide 400 milliGRAM(s) Oral three times a day with meals  metoprolol tartrate 25 milliGRAM(s) Oral two times a day  pantoprazole    Tablet 40 milliGRAM(s) Oral before breakfast  simvastatin 10 milliGRAM(s) Oral at bedtime    MEDICATIONS  (PRN):  acetaminophen   Tablet .. 650 milliGRAM(s) Oral every 6 hours PRN Mild Pain (1 - 3)
Patient is a 88y old  Female who presents with a chief complaint of SOB -Pleural Effusions b/l (17 Sep 2018 13:04)    HPI:  87 yo F with PMH of Atrial Fibrillation ( not on AC due to previous GI Bleed ), HTN, CAD with stent placement, CHF, s/p TAVR, GERD, DM, and HLD presented to the hospital for the evaluation of sob x 10 days. Patient states that she first noticed SOB ten days ago and it has been progressively worsening. She has RAIN after about 25 ft ambulation. She used to sleep with one pillow and now is using two for symptomatic relief. Patients son is at bedside and notes that these symptoms are similar to symptoms she had prior to her TAVR. Patient also notes increased swelling of bilaterally lower extremities. She recently saw Cardiologist who completed ECHO and increased dosage of Furosemide. She has had improvement of LE edema but not of SOB to baseline. Patient also notes recent feelings of nausea, chills, fatigue and 5 pound weight loss in the last month too. She denies and fevers, vomiting chest pain, palpitations, abdominal pain or back pain. Patient also notes loose stools increased in frequency ( as per son this is normal for her). Patient notes she has had pleural effusions for three years which have been monitored with CXR. 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  On Antibiotics suspected empyema on Thoracentesis    TODAY'S SUBJECTIVE & REVIEW OF SYMPTOMS:     Constitutional WNL   Cardio WNL   Resp sob   GI WNL  Heme WNL  Endo WNL  Skin WNL  MSK WNL  Neuro WNL  Cognitive WNL  Psych WNL      MEDICATIONS  (STANDING):  buDESOnide  80 MICROgram(s)/formoterol 4.5 MICROgram(s) Inhaler 2 Puff(s) Inhalation two times a day  chlorhexidine 4% Liquid 1 Application(s) Topical <User Schedule>  clopidogrel Tablet 75 milliGRAM(s) Oral daily  dextrose 50% Injectable 12.5 Gram(s) IV Push once  dextrose 50% Injectable 25 Gram(s) IV Push once  dextrose 50% Injectable 25 Gram(s) IV Push once  furosemide    Tablet 40 milliGRAM(s) Oral two times a day  heparin  Injectable 5000 Unit(s) SubCutaneous every 8 hours  influenza   Vaccine 0.5 milliLiter(s) IntraMuscular once  insulin glargine Injectable (LANTUS) 4 Unit(s) SubCutaneous at bedtime  insulin lispro (HumaLOG) corrective regimen sliding scale   SubCutaneous three times a day before meals  insulin lispro Injectable (HumaLOG) 3 Unit(s) SubCutaneous three times a day before meals  levoFLOXacin IVPB 750 milliGRAM(s) IV Intermittent every 24 hours  lisinopril 10 milliGRAM(s) Oral daily  magnesium oxide 400 milliGRAM(s) Oral three times a day with meals  metoprolol tartrate 25 milliGRAM(s) Oral two times a day  metroNIDAZOLE  IVPB      metroNIDAZOLE  IVPB 500 milliGRAM(s) IV Intermittent every 8 hours  pantoprazole    Tablet 40 milliGRAM(s) Oral before breakfast  simvastatin 10 milliGRAM(s) Oral at bedtime  vancomycin  IVPB        MEDICATIONS  (PRN):  acetaminophen   Tablet .. 650 milliGRAM(s) Oral every 6 hours PRN Mild Pain (1 - 3)  dextrose 40% Gel 15 Gram(s) Oral once PRN Blood Glucose LESS THAN 70 milliGRAM(s)/deciliter  glucagon  Injectable 1 milliGRAM(s) IntraMuscular once PRN Glucose LESS THAN 70 milligrams/deciliter      FAMILY HISTORY:      Allergies    penicillins (Other)    Intolerances        SOCIAL HISTORY:    [    ] Etoh  [ x   ] Smoking ex  [    ] Substance abuse     Home Environment:  [    ] Home Alone  [  x  ] Lives with Family  [    ] Home Health Aid    Dwelling:  [    ] Apartment  [   x ] Private House  [    ] Adult Home  [    ] Skilled Nursing Facility      [    ] Short Term  [    ] Long Term  [  x  ] Stairs     None                      [    ] Elevator     FUNCTIONAL STATUS PTA: (Check all that apply)  Ambulation: [   x  ]Independent    [    ] Dependent     [    ] Non-Ambulatory  Assistive Device: [    ] SA Cane  [    ]  Q Cane  [   x ] Walker  [    ]  Wheelchair  ADL : [   x ] Independent  [    ]  Dependent   No HHA - FAMILY ASSISTS WITH CARE    Vital Signs Last 24 Hrs  T(C): 36.1 (17 Sep 2018 14:32), Max: 36.7 (16 Sep 2018 22:40)  T(F): 97 (17 Sep 2018 14:32), Max: 98 (16 Sep 2018 22:40)  HR: 103 (17 Sep 2018 14:32) (80 - 103)  BP: 156/67 (17 Sep 2018 14:32) (152/63 - 194/75)  BP(mean): 97 (17 Sep 2018 14:32) (97 - 97)  RR: 20 (17 Sep 2018 14:32) (18 - 20)  SpO2: 99% (17 Sep 2018 14:32) (99% - 100%)      PHYSICAL EXAM: Alert & Oriented X3  GENERAL: NAD, well-groomed, well-developed  HEAD:  Atraumatic, Normocephalic  EYES: EOMI, PERRLA, conjunctiva and sclera clear  NECK: Supple, No JVD, Normal thyroid  CHEST/LUNG: diminished BS Bases  HEART: S1S2 irregirreg  ABDOMEN: Soft, Nontender, Nondistended; Bowel sounds present  EXTREMITIES:  2+ Peripheral Pulses, No clubbing, cyanosis, or edema    NERVOUS SYSTEM:  Cranial Nerves 2-12 intact [  x  ] Abnormal  [    ]  ROM: WFL all extremities [  x  ]  Abnormal [     ]  Motor Strength: WFL all extremities  [x    ]  Abnormal [    ]  Sensation: intact to light touch [   x ] Abnormal [    ]      FUNCTIONAL STATUS:  Bed Mobility: [   ]  Independent [    ]  Supervision [x    ]  Needs Assistance [  ]  N/A  Transfers: [    ]  Independent [    ]  Supervision [ x   ]  Needs Assistance [    ]  N/A    Ambulation:  [    ]  Independent [    ]  Supervision [  x  ]  Needs Assistance [    ]  N/A   ADL:  [    ]   Independent [  x  ] Requires Assistance [    ] N/A       LABS:                        10.3   13.83 )-----------( 350      ( 17 Sep 2018 12:18 )             32.4     09-17    138  |  99  |  23<H>  ----------------------------<  205<H>  4.4   |  29  |  0.7    Ca    9.0      17 Sep 2018 12:18  Mg     1.9     09-17    TPro  6.5  /  Alb  3.2<L>  /  TBili  0.4  /  DBili  x   /  AST  14  /  ALT  7   /  AlkPhos  154<H>  09-17          RADIOLOGY & ADDITIONAL STUDIES:

## 2018-09-19 NOTE — PROGRESS NOTE ADULT - ASSESSMENT
SUBJECTIVE:    Patient is a 89y old Female who presents with a chief complaint of SOB -Pleural Effusions b/l (19 Sep 2018 05:49)    Currently admitted to medicine with the primary diagnosis of Shortness of breath     Today is hospital day 6d. This morning she is resting comfortably in bed and reports no new issues or overnight events.     PAST MEDICAL & SURGICAL HISTORY  CAD (coronary artery disease)  Hypertension  GERD (gastroesophageal reflux disease)  Diabetes  Arthritis  S/P TAVR (transcatheter aortic valve replacement)    SOCIAL HISTORY:  Negative for smoking/alcohol/drug use.     ALLERGIES:  penicillins (Other)    MEDICATIONS:  STANDING MEDICATIONS  buDESOnide  80 MICROgram(s)/formoterol 4.5 MICROgram(s) Inhaler 2 Puff(s) Inhalation two times a day  chlorhexidine 4% Liquid 1 Application(s) Topical <User Schedule>  clopidogrel Tablet 75 milliGRAM(s) Oral daily  dextrose 50% Injectable 12.5 Gram(s) IV Push once  dextrose 50% Injectable 25 Gram(s) IV Push once  dextrose 50% Injectable 25 Gram(s) IV Push once  docusate sodium 100 milliGRAM(s) Oral two times a day  furosemide    Tablet 40 milliGRAM(s) Oral two times a day  heparin  Injectable 5000 Unit(s) SubCutaneous every 8 hours  influenza   Vaccine 0.5 milliLiter(s) IntraMuscular once  insulin glargine Injectable (LANTUS) 4 Unit(s) SubCutaneous at bedtime  insulin lispro (HumaLOG) corrective regimen sliding scale   SubCutaneous three times a day before meals  insulin lispro Injectable (HumaLOG) 3 Unit(s) SubCutaneous three times a day before meals  levoFLOXacin IVPB 750 milliGRAM(s) IV Intermittent every 24 hours  lisinopril 10 milliGRAM(s) Oral daily  metoprolol tartrate 25 milliGRAM(s) Oral two times a day  metroNIDAZOLE  IVPB      metroNIDAZOLE  IVPB 500 milliGRAM(s) IV Intermittent every 8 hours  pantoprazole    Tablet 40 milliGRAM(s) Oral before breakfast  senna 2 Tablet(s) Oral at bedtime  simethicone 80 milliGRAM(s) Chew three times a day  simvastatin 10 milliGRAM(s) Oral at bedtime  vancomycin  IVPB      vancomycin  IVPB 750 milliGRAM(s) IV Intermittent every 12 hours    PRN MEDICATIONS  acetaminophen   Tablet .. 650 milliGRAM(s) Oral every 6 hours PRN  dextrose 40% Gel 15 Gram(s) Oral once PRN  glucagon  Injectable 1 milliGRAM(s) IntraMuscular once PRN    VITALS:   T(F): 96.3  HR: 91  BP: 146/65  RR: 20  SpO2: 93%    LABS:                        9.5    13.33 )-----------( 339      ( 18 Sep 2018 09:26 )             30.8     09-18    141  |  101  |  24<H>  ----------------------------<  159<H>  3.9   |  26  |  0.9    Ca    8.7      18 Sep 2018 09:26  Mg     1.9     09-17    TPro  6.1  /  Alb  2.9<L>  /  TBili  0.4  /  DBili  x   /  AST  14  /  ALT  7   /  AlkPhos  135<H>  09-18              Culture - Fungal, Body Fluid (collected 17 Sep 2018 11:41)  Source: Pleural Fl Pleural Fluid  Preliminary Report (18 Sep 2018 07:12):    Testing in progress    Culture - Body Fluid with Gram Stain (collected 17 Sep 2018 11:41)  Source: Pleural Fl Pleural Fluid  Gram Stain (17 Sep 2018 20:40):    polymorphonuclear leukocytes seen    Gram Negative Rods seen by cytocentrifuge  Preliminary Report (18 Sep 2018 19:22):    Few Gram Negative Rods    Culture - Acid Fast - Body Fluid w/Smear (collected 17 Sep 2018 11:41)  Source: Pleural Fl Pleural Fluid          RADIOLOGY:  < from: Xray Chest 1 View- PORTABLE-Urgent (09.18.18 @ 14:51) >  Impression:      New right basilar chest tube.    Unchanged right hydropneumothorax and adjacent opacity.     < end of copied text >  < from: CT Chest No Cont (09.18.18 @ 07:40) >  IMPRESSION:    Complex right-sided pleural effusion with incomplete reexpansion.    Smaller left-sided effusion.    Mediastinal lymphadenopathy.    < end of copied text >    PHYSICAL EXAM:  GEN: NAD  Pulmonary: No increased WOB, clear to auscultation bilaterally  CV: Regular rate and rhythm  GI: Soft, non-tender  MSK: Full ROM bilaterally, DP 2+ bilaterally  Neuro: AAOx3  Skin: no rashes or lesions    87 yo F with PMH extensive cardiac history presented to the hospital for the evaluation of sob x 10 days secondary to  b/l Pleural effusions     # SOB due to b/l Pleural Effusions , empyema. less likely  secondary to HFpEF   -s/p R large bore chest tube placement to suction. patient tolerated procedure well, TPA tomorrow.today   - leukocytosis increased to 13.33  9/18 uptrending  -US showed complex loculated effusion R>L.  - S/p 400 mL thoracentesis. pH 7.0, polymorphonuclear leukocytes and GNR, 96% granulocytes, Cell count 961  -f/u cytology   -  LDH fluid 1588 , LDH serum 198 Ratio 8.0  - fluid protein 3.5 , Serum Total  Protein 5.8 Ratio: 0.60  -exudate  - BNP 4692 from 1600   -history of severe pulmonary HTN, not in proportion to SOB per cardiology.  -Day 3 vancomycin 750 q 12 IV, levofloxacin 750 IV q 24, metronidazole 500 IV q8  -f/u ID consult  -c/w symbicort  -f/u ABG  -f/u PFTs, per pulm, might need home O2. Check Sat.   - fluid restriction   - Trend daily weights 54.4kg 9/15  -AS S/P TAVR ,  - c/w Furosemide 40mg PO BID per cardiology.   -Echo 9/17 showed EF 60% and severe pulmonary htn, unchanged from  Echo 9/1/2018 : EF 59%, results in chart    #HTN  -lisinopril 10 mg daily started 9/17  -BP wnl  -f/u K and Cr as outpatient, within 1 week.     # Atrial Fibrillation rate controlled  - AC discontinued due to GI Bleed  - Continue clopidogrel  - Continue Metoprolol    # GERD: Patient takes Famotidine at home.   -c/w protonix    # HLD: Continue simvastatin    # DM  - , 178, 176 wnl  -pt not onhome insulin.4 lantus, 3/3/3 lispro low correctional scale    # CT abnomalities in kidney  -Renal US negative    # dvt ppx: Heparin sc 5000 u q8h  # gi ppx: Protonix 40mg PO qam  DISPO: Home with family and assistance. SUBJECTIVE:    Patient is a 89y old Female who presents with a chief complaint of SOB -Pleural Effusions b/l (19 Sep 2018 05:49)  Currently admitted to medicine with the primary diagnosis of Shortness of breath  Today is hospital day 6d. This morning she is resting comfortably in bed and reports no new issues or overnight events. She tolerated chest tube well. Chest tube was on to suction ON. No complaints of pain. Notes leg edema has improved. s/p TPA in chest tube.     PAST MEDICAL & SURGICAL HISTORY  CAD (coronary artery disease)  Hypertension  GERD (gastroesophageal reflux disease)  Diabetes  Arthritis  S/P TAVR (transcatheter aortic valve replacement)    SOCIAL HISTORY:  Negative for smoking/alcohol/drug use.     ALLERGIES:  penicillins (Other)    MEDICATIONS:  STANDING MEDICATIONS  buDESOnide  80 MICROgram(s)/formoterol 4.5 MICROgram(s) Inhaler 2 Puff(s) Inhalation two times a day  chlorhexidine 4% Liquid 1 Application(s) Topical <User Schedule>  clopidogrel Tablet 75 milliGRAM(s) Oral daily  dextrose 50% Injectable 12.5 Gram(s) IV Push once  dextrose 50% Injectable 25 Gram(s) IV Push once  dextrose 50% Injectable 25 Gram(s) IV Push once  docusate sodium 100 milliGRAM(s) Oral two times a day  furosemide    Tablet 40 milliGRAM(s) Oral two times a day  heparin  Injectable 5000 Unit(s) SubCutaneous every 8 hours  influenza   Vaccine 0.5 milliLiter(s) IntraMuscular once  insulin glargine Injectable (LANTUS) 4 Unit(s) SubCutaneous at bedtime  insulin lispro (HumaLOG) corrective regimen sliding scale   SubCutaneous three times a day before meals  insulin lispro Injectable (HumaLOG) 3 Unit(s) SubCutaneous three times a day before meals  levoFLOXacin IVPB 750 milliGRAM(s) IV Intermittent every 24 hours  lisinopril 10 milliGRAM(s) Oral daily  metoprolol tartrate 25 milliGRAM(s) Oral two times a day  metroNIDAZOLE  IVPB      metroNIDAZOLE  IVPB 500 milliGRAM(s) IV Intermittent every 8 hours  pantoprazole    Tablet 40 milliGRAM(s) Oral before breakfast  senna 2 Tablet(s) Oral at bedtime  simethicone 80 milliGRAM(s) Chew three times a day  simvastatin 10 milliGRAM(s) Oral at bedtime  vancomycin  IVPB      vancomycin  IVPB 750 milliGRAM(s) IV Intermittent every 12 hours    PRN MEDICATIONS  acetaminophen   Tablet .. 650 milliGRAM(s) Oral every 6 hours PRN  dextrose 40% Gel 15 Gram(s) Oral once PRN  glucagon  Injectable 1 milliGRAM(s) IntraMuscular once PRN    VITALS:   T(F): 96.3  HR: 91  BP: 146/65  RR: 20  SpO2: 93%    LABS:                        9.5    13.33 )-----------( 339      ( 18 Sep 2018 09:26 )             30.8     09-18    141  |  101  |  24<H>  ----------------------------<  159<H>  3.9   |  26  |  0.9    Ca    8.7      18 Sep 2018 09:26  Mg     1.9     09-17    TPro  6.1  /  Alb  2.9<L>  /  TBili  0.4  /  DBili  x   /  AST  14  /  ALT  7   /  AlkPhos  135<H>  09-18                            9.7    13.37 )-----------( 339      ( 19 Sep 2018 08:35 )             30.4     09-19    144  |  106  |  24<H>  ----------------------------<  109<H>  4.1   |  23  |  1.0    Ca    8.4<L>      19 Sep 2018 08:35    TPro  5.7<L>  /  Alb  2.6<L>  /  TBili  0.3  /  DBili  x   /  AST  14  /  ALT  6   /  AlkPhos  116<H>  09-19        Culture - Fungal, Body Fluid (collected 17 Sep 2018 11:41)  Source: Pleural Fl Pleural Fluid  Preliminary Report (18 Sep 2018 07:12):    Testing in progress    Culture - Body Fluid with Gram Stain (collected 17 Sep 2018 11:41)  Source: Pleural Fl Pleural Fluid  Gram Stain (17 Sep 2018 20:40):    polymorphonuclear leukocytes seen    Gram Negative Rods seen by cytocentrifuge  Preliminary Report (18 Sep 2018 19:22):    Few Gram Negative Rods    Culture - Acid Fast - Body Fluid w/Smear (collected 17 Sep 2018 11:41)  Source: Pleural Fl Pleural Fluid          RADIOLOGY:  < from: Xray Chest 1 View- PORTABLE-Urgent (09.18.18 @ 14:51) >  Impression:      New right basilar chest tube.    Unchanged right hydropneumothorax and adjacent opacity.     < end of copied text >  < from: CT Chest No Cont (09.18.18 @ 07:40) >  IMPRESSION:    Complex right-sided pleural effusion with incomplete reexpansion.    Smaller left-sided effusion.    Mediastinal lymphadenopathy.    < end of copied text >    PHYSICAL EXAM:  GEN: NAD, sitting comfortably in bed. Chest tube + to suction. 900 mL fluid  Pulmonary: No increased WOB, clear to auscultation bilaterally, chest dressing clean, dry intact. R chest tube draining serosanguinous dressing.   CV: Regular rate and rhythm  GI: BS+, Soft, non-tender  MSK: Full ROM bilaterally, DP 2+ bilaterally, no edema legs.   Neuro: AAOx3  Skin: no rashes or lesions    89 yo F with PMH extensive cardiac history presented to the hospital for the evaluation of sob x 10 days secondary to  b/l Pleural effusions     # SOB due to b/l Pleural Effusions , empyema. less likely  secondary to HFpEF   -s/p R large bore chest tube placement to suction. patient tolerated procedure well, TPA today   - leukocytosis increased to 13.37  9/19 uptrending  -Meropenem 1gm q8h day 1 till ID f/u, willl likely need long term Abx. PICC placement possible.   -s/p Day 3 vancomycin 750 q 12 IV, levofloxacin 750 IV q 24, metronidazole 500 IV q8  -US showed complex loculated effusion R>L.  - S/p 400 mL thoracentesis. pH 7.0, polymorphonuclear leukocytes and GNR, 96% granulocytes, Cell count 961  -f/u cytology   -  LDH fluid 1588 , LDH serum 198 Ratio 8.0  - fluid protein 3.5 , Serum Total  Protein 5.8 Ratio: 0.60  -exudate  - BNP 4692 from 1600   -history of severe pulmonary HTN, not in proportion to SOB per cardiology.  -f/u ID consult  -c/w symbicort  -f/u PFTs as OP, per pulm, might need home O2. Check Sat.   - fluid restriction   - Trend daily weights 54.4kg 9/15  -AS S/P TAVR ,  - c/w Furosemide 40mg PO BID per cardiology.   -Echo 9/17 showed EF 60% and severe pulmonary htn, unchanged from  Echo 9/1/2018 : EF 59%, results in chart    #HTN  -lisinopril 10 mg daily started 9/17  -BP wnl  -f/u K and Cr as outpatient, within 1 week.     # Atrial Fibrillation rate controlled  - AC discontinued due to GI Bleed  - Continue clopidogrel  - Continue Metoprolol    # GERD: Patient takes Famotidine at home.   -c/w protonix    # HLD: Continue simvastatin    # DM  - , 120, 134 wnl   -pt not on home insulin.4 lantus, 3/3/3 lispro low correctional scale    # CT abnomalities in kidney  -Renal US negative  -f/u radiologist Dr. Castañeda for CT comparison.    # dvt ppx: Heparin sc 5000 u q8h  # gi ppx: Protonix 40mg PO qam  DISPO: Home with family and assistance. SUBJECTIVE:    Patient is a 89y old Female who presents with a chief complaint of SOB -Pleural Effusions b/l (19 Sep 2018 05:49)  Currently admitted to medicine with the primary diagnosis of Shortness of breath  Today is hospital day 6d. This morning she is resting comfortably in bed and reports no new issues or overnight events. She tolerated chest tube well. Chest tube was on to suction ON. No complaints of pain. Notes leg edema has improved. s/p TPA in chest tube.     PAST MEDICAL & SURGICAL HISTORY  CAD (coronary artery disease)  Hypertension  GERD (gastroesophageal reflux disease)  Diabetes  Arthritis  S/P TAVR (transcatheter aortic valve replacement)    SOCIAL HISTORY:  Negative for smoking/alcohol/drug use.     ALLERGIES:  penicillins (Other)    MEDICATIONS:  STANDING MEDICATIONS  buDESOnide  80 MICROgram(s)/formoterol 4.5 MICROgram(s) Inhaler 2 Puff(s) Inhalation two times a day  chlorhexidine 4% Liquid 1 Application(s) Topical <User Schedule>  clopidogrel Tablet 75 milliGRAM(s) Oral daily  dextrose 50% Injectable 12.5 Gram(s) IV Push once  dextrose 50% Injectable 25 Gram(s) IV Push once  dextrose 50% Injectable 25 Gram(s) IV Push once  docusate sodium 100 milliGRAM(s) Oral two times a day  furosemide    Tablet 40 milliGRAM(s) Oral two times a day  heparin  Injectable 5000 Unit(s) SubCutaneous every 8 hours  influenza   Vaccine 0.5 milliLiter(s) IntraMuscular once  insulin glargine Injectable (LANTUS) 4 Unit(s) SubCutaneous at bedtime  insulin lispro (HumaLOG) corrective regimen sliding scale   SubCutaneous three times a day before meals  insulin lispro Injectable (HumaLOG) 3 Unit(s) SubCutaneous three times a day before meals  levoFLOXacin IVPB 750 milliGRAM(s) IV Intermittent every 24 hours  lisinopril 10 milliGRAM(s) Oral daily  metoprolol tartrate 25 milliGRAM(s) Oral two times a day  metroNIDAZOLE  IVPB      metroNIDAZOLE  IVPB 500 milliGRAM(s) IV Intermittent every 8 hours  pantoprazole    Tablet 40 milliGRAM(s) Oral before breakfast  senna 2 Tablet(s) Oral at bedtime  simethicone 80 milliGRAM(s) Chew three times a day  simvastatin 10 milliGRAM(s) Oral at bedtime  vancomycin  IVPB      vancomycin  IVPB 750 milliGRAM(s) IV Intermittent every 12 hours    PRN MEDICATIONS  acetaminophen   Tablet .. 650 milliGRAM(s) Oral every 6 hours PRN  dextrose 40% Gel 15 Gram(s) Oral once PRN  glucagon  Injectable 1 milliGRAM(s) IntraMuscular once PRN    VITALS:   T(F): 96.3  HR: 91  BP: 146/65  RR: 20  SpO2: 93%    LABS:                        9.5    13.33 )-----------( 339      ( 18 Sep 2018 09:26 )             30.8     09-18    141  |  101  |  24<H>  ----------------------------<  159<H>  3.9   |  26  |  0.9    Ca    8.7      18 Sep 2018 09:26  Mg     1.9     09-17    TPro  6.1  /  Alb  2.9<L>  /  TBili  0.4  /  DBili  x   /  AST  14  /  ALT  7   /  AlkPhos  135<H>  09-18                            9.7    13.37 )-----------( 339      ( 19 Sep 2018 08:35 )             30.4     09-19    144  |  106  |  24<H>  ----------------------------<  109<H>  4.1   |  23  |  1.0    Ca    8.4<L>      19 Sep 2018 08:35    TPro  5.7<L>  /  Alb  2.6<L>  /  TBili  0.3  /  DBili  x   /  AST  14  /  ALT  6   /  AlkPhos  116<H>  09-19        Culture - Fungal, Body Fluid (collected 17 Sep 2018 11:41)  Source: Pleural Fl Pleural Fluid  Preliminary Report (18 Sep 2018 07:12):    Testing in progress    Culture - Body Fluid with Gram Stain (collected 17 Sep 2018 11:41)  Source: Pleural Fl Pleural Fluid  Gram Stain (17 Sep 2018 20:40):    polymorphonuclear leukocytes seen    Gram Negative Rods seen by cytocentrifuge  Preliminary Report (18 Sep 2018 19:22):    Few Gram Negative Rods    Culture - Acid Fast - Body Fluid w/Smear (collected 17 Sep 2018 11:41)  Source: Pleural Fl Pleural Fluid          RADIOLOGY:  < from: Xray Chest 1 View- PORTABLE-Urgent (09.18.18 @ 14:51) >  Impression:      New right basilar chest tube.    Unchanged right hydropneumothorax and adjacent opacity.     < end of copied text >  < from: CT Chest No Cont (09.18.18 @ 07:40) >  IMPRESSION:    Complex right-sided pleural effusion with incomplete reexpansion.    Smaller left-sided effusion.    Mediastinal lymphadenopathy.    < end of copied text >    PHYSICAL EXAM:  GEN: NAD, sitting comfortably in bed. Chest tube + to suction. 900 mL fluid  Pulmonary: No increased WOB, clear to auscultation bilaterally, chest dressing clean, dry intact. R chest tube draining serosanguinous dressing.   CV: Regular rate and rhythm  GI: BS+, Soft, non-tender  MSK: Full ROM bilaterally, DP 2+ bilaterally, no edema legs.   Neuro: AAOx3  Skin: no rashes or lesions    89 yo F with PMH extensive cardiac history presented to the hospital for the evaluation of sob x 10 days secondary to  b/l Pleural effusions     # SOB due to b/l Pleural Effusions , empyema. less likely  secondary to HFpEF   -s/p R large bore chest tube placement to suction. patient tolerated procedure well, TPA today   - leukocytosis increased to 13.37  9/19 uptrending  -Meropenem 1gm q8h day 1 till ID f/u, willl likely need long term Abx. PICC placement possible.   -s/p Day 3 vancomycin 750 q 12 IV, levofloxacin 750 IV q 24, metronidazole 500 IV q8  -US showed complex loculated effusion R>L.  - S/p 400 mL thoracentesis. pH 7.0, polymorphonuclear leukocytes and GNR, 96% granulocytes, Cell count 961  -f/u cytology   -  LDH fluid 1588 , LDH serum 198 Ratio 8.0  - fluid protein 3.5 , Serum Total  Protein 5.8 Ratio: 0.60  -exudate  - BNP 4692 from 1600   -history of severe pulmonary HTN, not in proportion to SOB per cardiology.  -f/u ID consult (meropenem at 500 q 12 instead of q6, as pharmacy did not approve q6 for renal dosing. )  -c/w symbicort  -f/u PFTs as OP, per pulm, might need home O2. Check Sat.   - fluid restriction   - Trend daily weights 54.4kg 9/15  -AS S/P TAVR ,  - c/w Furosemide 40mg PO BID per cardiology.   -Echo 9/17 showed EF 60% and severe pulmonary htn, unchanged from  Echo 9/1/2018 : EF 59%, results in chart    #HTN  -lisinopril 10 mg daily started 9/17  -BP wnl  -f/u K and Cr as outpatient, within 1 week.     # Atrial Fibrillation rate controlled  - AC discontinued due to GI Bleed  - Continue clopidogrel  - Continue Metoprolol    # GERD: Patient takes Famotidine at home.   -c/w protonix    # HLD: Continue simvastatin    # DM  - , 120, 134 wnl   -pt not on home insulin.4 lantus, 3/3/3 lispro low correctional scale    # CT abnomalities in kidney  -Renal US negative  -f/u radiologist Dr. Castañeda for CT comparison.    # dvt ppx: Heparin sc 5000 u q8h  # gi ppx: Protonix 40mg PO qam  DISPO: Home with family and assistance.

## 2018-09-19 NOTE — CONSULT NOTE ADULT - ASSESSMENT
-CHF suspect secondary to diastolic dysfunction . pro BNP went from 1600 to 4600 with increase in pleural effusions. SHe has severe pulmonry HTN although that is out of proportion to her HF  No PE on CT scan . Needs pulmonary evaluation as well.  -AS S/P TAVR with adequate function on recent echo  -AF permanent   -CAD stable No angina or increase in cardiac biomarkers   -DM  -Confusion Had recent CT scan of the head in 8-18. Would consider repeat   -Kidney lesion Needs work up eventually     Plan:  IV LAsix for now   Continue Metoprolol   Suggest pulmonary evaluation for pulmonary HTN out of proportion to her  HF   Suggest repeat CT scan of the head   Work up for renal lesion as per PCP   Monitor electrolytes and renal function closely  Discussed with housestaff
IMPRESSION:        PLAN:
IMPRESSION: Rehab of Debilitation     PRECAUTIONS: [  x  ] Cardiac  [ x   ] Respiratory  [    ] Seizures [    ] Contact Isolation  [    ] Droplet Isolation  [    ] Other    Weight Bearing Status:     RECOMMENDATION:    Out of Bed to Chair     DVT/Decubiti Prophylaxis    REHAB PLAN:     [  x   ] Bedside P/T 3-5 times a week   [     ] Bedside O/T  2-3 times a week   [     ] No Rehab Therapy Indicated   [     ]  Speech Therapy   Conditioning/ROM                                 ADL  Bed Mobility                                            Conditioning/ROM  Transfers                                                  Bed Mobility  Sitting /Standing Balance                      Transfers                                        Gait Training                                            Sitting/Standing Balance  Stair Training [   ]Applicable                 Home equipment Eval                                                                     Splinting  [   ] Only      GOALS:   ADL   [ x   ]   Independent         Transfers  [ x   ] Independent            Ambulation  [   x  ] Independent     [   x  ] With device                            [    ]  CG                                               [    ]  CG                                                    [     ] CG                            [    ] Min A                                          [    ] Min A                                                [     ] Min  A          DISCHARGE PLAN:   [     ]  Good candidate for Intensive Rehabilitation/Hospital based-[facility]A [facility]                                             Will tolerate 3hrs Intensive Rehab Daily                                       [    x  ]  Short Term Rehab in Skilled Nursing Facility?                               VS                                     [    x  ]  Home with Outpatient or VN services PATIENT REFUSES SNF PLACEMENT- CLAIMS TO GO HOME WITH FAMILY TO ASSIST                                         [      ]  Possible Candidate for Intensive Hospital based Rehab
SOB/ PUL HTN/ S/P TAVR/ A FIB / HIGHLY COMPONENT OF COPD (SEEN ALSO ON CHEST CT 8/18), PUL HTN MULTIFACTORIAL     - KEEP LASIX  - BED SIDE US EVALUATE R EFFUSION MIGHT NEED THERAPEUTIC THORA  - ABG EVALUATE PACO2  - MIGHT NEED HOME OXYGEN/ NEEDS PFT  - SYMBICORT 2 PUFFS Q 12H  - POOR PROGNOSIS
89 yo F with PMH extensive cardiac history presented to the hospital for the evaluation of sob x 10 days secondary to  b/l Pleural effusions     IMPRESSION:  RLL GNR PNA with loculated empyema on the right.    RECOMMENDATIONS:  IVR evaluation of CT to see if pt needs another chest tube.  Meropenem 500 mg iv q6h  D/C other antibiotics.  F/U empyema cultures.  Blood cultures.

## 2018-09-19 NOTE — PROGRESS NOTE ADULT - ASSESSMENT
-Suspected right sided infected pleural effusion/empyema .S/P Chest tube and she is on antibiotics   -CHF suspect secondary to diastolic dysfunction. stable   -Pulmonary HTN -thought to be out of proportion to her HF . Has component on COPD . Agree with pulmonary thought to be multifactorial.   -AS S/P TAVR with adequate function on recent echo  -AF permanent Not on A/C secondary to bleeding risk   -CAD stable No angina or increase in cardiac biomarkers   -DM  -Kidney lesion Needs work up eventually , work up as per PCP     Plan:  Antibiotics as per pulmonary   Consider ID consult  Maintain chest tube and antibiotics    pulmonary follow up   Maintain current Lasix dose

## 2018-09-19 NOTE — CONSULT NOTE ADULT - REASON FOR ADMISSION
SOB -Pleural Effusions b/l

## 2018-09-20 LAB
-  AMIKACIN: SIGNIFICANT CHANGE UP
-  AZTREONAM: SIGNIFICANT CHANGE UP
-  CEFEPIME: SIGNIFICANT CHANGE UP
-  CEFTAZIDIME: SIGNIFICANT CHANGE UP
-  CIPROFLOXACIN: SIGNIFICANT CHANGE UP
-  GENTAMICIN: SIGNIFICANT CHANGE UP
-  IMIPENEM: SIGNIFICANT CHANGE UP
-  LEVOFLOXACIN: SIGNIFICANT CHANGE UP
-  MEROPENEM: SIGNIFICANT CHANGE UP
-  PIPERACILLIN/TAZOBACTAM: SIGNIFICANT CHANGE UP
-  TOBRAMYCIN: SIGNIFICANT CHANGE UP
ALBUMIN SERPL ELPH-MCNC: 2.6 G/DL — LOW (ref 3.5–5.2)
ALP SERPL-CCNC: 109 U/L — SIGNIFICANT CHANGE UP (ref 30–115)
ALT FLD-CCNC: 6 U/L — SIGNIFICANT CHANGE UP (ref 0–41)
ANION GAP SERPL CALC-SCNC: 14 MMOL/L — SIGNIFICANT CHANGE UP (ref 7–14)
AST SERPL-CCNC: 13 U/L — SIGNIFICANT CHANGE UP (ref 0–41)
BILIRUB SERPL-MCNC: 0.3 MG/DL — SIGNIFICANT CHANGE UP (ref 0.2–1.2)
BUN SERPL-MCNC: 27 MG/DL — HIGH (ref 10–20)
CALCIUM SERPL-MCNC: 8.3 MG/DL — LOW (ref 8.5–10.1)
CHLORIDE SERPL-SCNC: 104 MMOL/L — SIGNIFICANT CHANGE UP (ref 98–110)
CO2 SERPL-SCNC: 24 MMOL/L — SIGNIFICANT CHANGE UP (ref 17–32)
CREAT SERPL-MCNC: 1.1 MG/DL — SIGNIFICANT CHANGE UP (ref 0.7–1.5)
GLUCOSE BLDC GLUCOMTR-MCNC: 152 MG/DL — HIGH (ref 70–99)
GLUCOSE BLDC GLUCOMTR-MCNC: 170 MG/DL — HIGH (ref 70–99)
GLUCOSE BLDC GLUCOMTR-MCNC: 202 MG/DL — HIGH (ref 70–99)
GLUCOSE BLDC GLUCOMTR-MCNC: 259 MG/DL — HIGH (ref 70–99)
GLUCOSE SERPL-MCNC: 156 MG/DL — HIGH (ref 70–99)
HCT VFR BLD CALC: 32 % — LOW (ref 37–47)
HGB BLD-MCNC: 10.2 G/DL — LOW (ref 12–16)
MCHC RBC-ENTMCNC: 28.4 PG — SIGNIFICANT CHANGE UP (ref 27–31)
MCHC RBC-ENTMCNC: 31.9 G/DL — LOW (ref 32–37)
MCV RBC AUTO: 89.1 FL — SIGNIFICANT CHANGE UP (ref 81–99)
METHOD TYPE: SIGNIFICANT CHANGE UP
NRBC # BLD: 0 /100 WBCS — SIGNIFICANT CHANGE UP (ref 0–0)
PLATELET # BLD AUTO: 338 K/UL — SIGNIFICANT CHANGE UP (ref 130–400)
POTASSIUM SERPL-MCNC: 3.9 MMOL/L — SIGNIFICANT CHANGE UP (ref 3.5–5)
POTASSIUM SERPL-SCNC: 3.9 MMOL/L — SIGNIFICANT CHANGE UP (ref 3.5–5)
PROT SERPL-MCNC: 5.3 G/DL — LOW (ref 6–8)
RBC # BLD: 3.59 M/UL — LOW (ref 4.2–5.4)
RBC # FLD: 14.2 % — SIGNIFICANT CHANGE UP (ref 11.5–14.5)
SODIUM SERPL-SCNC: 142 MMOL/L — SIGNIFICANT CHANGE UP (ref 135–146)
WBC # BLD: 14.53 K/UL — HIGH (ref 4.8–10.8)
WBC # FLD AUTO: 14.53 K/UL — HIGH (ref 4.8–10.8)

## 2018-09-20 RX ORDER — MEROPENEM 1 G/30ML
500 INJECTION INTRAVENOUS EVERY 6 HOURS
Qty: 0 | Refills: 0 | Status: DISCONTINUED | OUTPATIENT
Start: 2018-09-20 | End: 2018-09-24

## 2018-09-20 RX ADMIN — CLOPIDOGREL BISULFATE 75 MILLIGRAM(S): 75 TABLET, FILM COATED ORAL at 11:26

## 2018-09-20 RX ADMIN — BUDESONIDE AND FORMOTEROL FUMARATE DIHYDRATE 2 PUFF(S): 160; 4.5 AEROSOL RESPIRATORY (INHALATION) at 08:11

## 2018-09-20 RX ADMIN — Medication 1: at 08:09

## 2018-09-20 RX ADMIN — MEROPENEM 100 MILLIGRAM(S): 1 INJECTION INTRAVENOUS at 13:55

## 2018-09-20 RX ADMIN — INSULIN GLARGINE 4 UNIT(S): 100 INJECTION, SOLUTION SUBCUTANEOUS at 21:07

## 2018-09-20 RX ADMIN — HEPARIN SODIUM 5000 UNIT(S): 5000 INJECTION INTRAVENOUS; SUBCUTANEOUS at 05:32

## 2018-09-20 RX ADMIN — LISINOPRIL 10 MILLIGRAM(S): 2.5 TABLET ORAL at 05:32

## 2018-09-20 RX ADMIN — Medication 3 UNIT(S): at 11:52

## 2018-09-20 RX ADMIN — Medication 650 MILLIGRAM(S): at 18:06

## 2018-09-20 RX ADMIN — BUDESONIDE AND FORMOTEROL FUMARATE DIHYDRATE 2 PUFF(S): 160; 4.5 AEROSOL RESPIRATORY (INHALATION) at 21:08

## 2018-09-20 RX ADMIN — Medication 25 MILLIGRAM(S): at 05:32

## 2018-09-20 RX ADMIN — SIMETHICONE 80 MILLIGRAM(S): 80 TABLET, CHEWABLE ORAL at 13:52

## 2018-09-20 RX ADMIN — SIMVASTATIN 10 MILLIGRAM(S): 20 TABLET, FILM COATED ORAL at 21:08

## 2018-09-20 RX ADMIN — Medication 3 UNIT(S): at 08:10

## 2018-09-20 RX ADMIN — Medication 40 MILLIGRAM(S): at 17:22

## 2018-09-20 RX ADMIN — Medication 100 MILLIGRAM(S): at 05:32

## 2018-09-20 RX ADMIN — PANTOPRAZOLE SODIUM 40 MILLIGRAM(S): 20 TABLET, DELAYED RELEASE ORAL at 08:11

## 2018-09-20 RX ADMIN — MEROPENEM 100 MILLIGRAM(S): 1 INJECTION INTRAVENOUS at 21:07

## 2018-09-20 RX ADMIN — CHLORHEXIDINE GLUCONATE 1 APPLICATION(S): 213 SOLUTION TOPICAL at 05:33

## 2018-09-20 RX ADMIN — Medication 650 MILLIGRAM(S): at 02:08

## 2018-09-20 RX ADMIN — Medication 1: at 11:52

## 2018-09-20 RX ADMIN — SENNA PLUS 2 TABLET(S): 8.6 TABLET ORAL at 21:08

## 2018-09-20 RX ADMIN — Medication 40 MILLIGRAM(S): at 05:32

## 2018-09-20 RX ADMIN — HEPARIN SODIUM 5000 UNIT(S): 5000 INJECTION INTRAVENOUS; SUBCUTANEOUS at 13:51

## 2018-09-20 RX ADMIN — Medication 3 UNIT(S): at 17:23

## 2018-09-20 RX ADMIN — HEPARIN SODIUM 5000 UNIT(S): 5000 INJECTION INTRAVENOUS; SUBCUTANEOUS at 21:08

## 2018-09-20 RX ADMIN — Medication 100 MILLIGRAM(S): at 17:22

## 2018-09-20 RX ADMIN — Medication 3: at 17:23

## 2018-09-20 RX ADMIN — SIMETHICONE 80 MILLIGRAM(S): 80 TABLET, CHEWABLE ORAL at 05:34

## 2018-09-20 RX ADMIN — MEROPENEM 100 MILLIGRAM(S): 1 INJECTION INTRAVENOUS at 06:37

## 2018-09-20 NOTE — PROGRESS NOTE ADULT - ASSESSMENT
IMPRESSION:  RLL PNA secondary to Pseudomonas aeruginosa with loculated empyema on the right which has resolved significantly on CT from today  Right sided pneumothorax  Blood cultures negative to date    RECOMMENDATIONS:  Meropenem 500 mg iv q6h  PICC line for long term iv antibiotics

## 2018-09-20 NOTE — DIETITIAN INITIAL EVALUATION ADULT. - DIET TYPE
not warranted, rec liberalizing/low sodium/consistent carbohydrate (no snacks)/renal replacement pts:no protein restr,no conc K & phos, low sodium

## 2018-09-20 NOTE — PROGRESS NOTE ADULT - ASSESSMENT
87 yo F with PMH of Atrial Fibrillation ( not on AC due to previous GI Bleed ), HTN, CAD with stent placement, CHF, s/p TAVR, GERD, DM, and HLD presented to the hospital for the evaluation of sob x 10 days. Patient states that she first noticed SOB ten days ago and it has been progressively worsening. She has RAIN after about 25 ft ambulation. She used to sleep with one pillow and now is using two for symptomatic relief. Patients son is at bedside and notes that these symptoms are similar to symptoms she had prior to her TAVR. Patient also notes increased swelling of bilaterally lower extremities. She recently saw Cardiologist who completed ECHO and increased dosage of Furosemide. She has had improvement of LE edema but not of SOB to baseline. Patient also notes recent feelings of nausea, chills, fatigue and 5 pound weight loss in the last month too.    #RLL pneumonia with Right sided empyema  - s/p right thoracentesis- 400 ml drained  - F/u Pleural fluid  culture- pseudomonas  -  pleural fluid pH 7.0  - Pt evaluated by CT surgery- S/p  Large bore Rt chest tube placement for drainage  and TPA.  -  CT Chest No Cont (09.20.18 @ 09:51) >Since September 18, 2018, interval placement of a right-sided pleural catheter with decreased small right pleural effusion and increased small to moderate right pneumothorax. Stable small left pleural effusion  - C/w antibiotics-meropenem.  - PICC line for antibiotics    # Acute on chronic diastolic CHF, H/o TAVR, pulmonary HTN  - BNP 4692   -  daily weights, I/o, restrict fluids, low Na diet  - c/w lasix , metoprolol, lisinopril    # COPD  -c/w  Budesonide    # H/o CAD  - c/w Plavix, statin, metoprolol.    # Atrial Fibrillation  - c/w  clopidogrel, Metoprolol  - not on anticoagulants due to H/o GI bleed    # GERD  - c/w Protonix     # Dyslipidemia  - c/w simvastatin    # Kidney abnormalities noted on CT Scan:   - Renal US negative     # DM type 2   - monitor finger sticks   - c/w lantus, lispro    #GI/ DVT prophylaxis    PT eval: STR vs home with PT

## 2018-09-20 NOTE — PROGRESS NOTE ADULT - ATTENDING COMMENTS
89y Female patient admitted right CT placement for right empyema    s/p pigtail placement with instillation of TPA, great response with improvement of right pleural effusion and drainage of 1400cc of fluid overnight  CT stays in for now until drainage subsides  pt doesn't need any intervention at this time  agree with ABX and PICC  OOB ambulate  case d/w CEU staff  will follow

## 2018-09-20 NOTE — PROGRESS NOTE ADULT - ASSESSMENT
SOB/ PUL HTN/ S/P TAVR/ A FIB / HIGHLY COMPONENT OF COPD (SEEN ALSO ON CHEST CT 8/18), PUL HTN MULTIFACTORIAL / Neutrophilic exudative effusion GR in fluid/ pseudomonas s/p pig tail/ tpa/       - IV ABX (meropenem) till sensitivity  - REPEAT CHEST CT NC  - HIGH RISK FOR ANY INVASIVE PROCEDURE   WILL HIGHLY NEED LONG TERM ABX  SPOKE AT LENGTH WITH SON / PATIENT AT BEDSIDE

## 2018-09-20 NOTE — DIETITIAN INITIAL EVALUATION ADULT. - ENERGY NEEDS
Calories: 8693-1236 kcals/day (MSJ x 1.2-1.3)  Protein 54-65 g/day (1-1.2 g/kg ABW)  Fluids: 1ml/kcal or per LIP

## 2018-09-20 NOTE — PROGRESS NOTE ADULT - ASSESSMENT
SUBJECTIVE:    Patient is a 89y old Female who presents with a chief complaint of SOB -Pleural Effusions b/l (20 Sep 2018 06:06)  Currently admitted to medicine with the primary diagnosis of Shortness of breath   Today is hospital day 7d. This morning she is sitting comfortably in bed. She notes occasional sharp stabbing shooting pains that last a couple of seconds in her R costovertebral area that is not associated with movement or deep breaths. Patient attributes symptoms to musculoskeletal pain.     PAST MEDICAL & SURGICAL HISTORY  CAD (coronary artery disease)  Hypertension  GERD (gastroesophageal reflux disease)  Diabetes  Arthritis  S/P TAVR (transcatheter aortic valve replacement)    SOCIAL HISTORY:  Negative for smoking/alcohol/drug use.     ALLERGIES:  penicillins (Other)    MEDICATIONS:  STANDING MEDICATIONS  buDESOnide  80 MICROgram(s)/formoterol 4.5 MICROgram(s) Inhaler 2 Puff(s) Inhalation two times a day  chlorhexidine 4% Liquid 1 Application(s) Topical <User Schedule>  clopidogrel Tablet 75 milliGRAM(s) Oral daily  dextrose 50% Injectable 12.5 Gram(s) IV Push once  dextrose 50% Injectable 25 Gram(s) IV Push once  dextrose 50% Injectable 25 Gram(s) IV Push once  docusate sodium 100 milliGRAM(s) Oral two times a day  furosemide    Tablet 40 milliGRAM(s) Oral two times a day  heparin  Injectable 5000 Unit(s) SubCutaneous every 8 hours  influenza   Vaccine 0.5 milliLiter(s) IntraMuscular once  insulin glargine Injectable (LANTUS) 4 Unit(s) SubCutaneous at bedtime  insulin lispro (HumaLOG) corrective regimen sliding scale   SubCutaneous three times a day before meals  insulin lispro Injectable (HumaLOG) 3 Unit(s) SubCutaneous three times a day before meals  lisinopril 10 milliGRAM(s) Oral daily  meropenem  IVPB 500 milliGRAM(s) IV Intermittent every 6 hours  metoprolol tartrate 25 milliGRAM(s) Oral two times a day  pantoprazole    Tablet 40 milliGRAM(s) Oral before breakfast  senna 2 Tablet(s) Oral at bedtime  simethicone 80 milliGRAM(s) Chew three times a day  simvastatin 10 milliGRAM(s) Oral at bedtime    PRN MEDICATIONS  acetaminophen   Tablet .. 650 milliGRAM(s) Oral every 6 hours PRN  dextrose 40% Gel 15 Gram(s) Oral once PRN  glucagon  Injectable 1 milliGRAM(s) IntraMuscular once PRN    VITALS:   T(F): 98.2  HR: 90  BP: 133/58  RR: 20  SpO2: 94%    LABS:                        10.2   14.53 )-----------( 338      ( 20 Sep 2018 06:43 )             32.0     09-20    142  |  104  |  27<H>  ----------------------------<  156<H>  3.9   |  24  |  1.1    Ca    8.3<L>      20 Sep 2018 06:43    TPro  5.3<L>  /  Alb  2.6<L>  /  TBili  0.3  /  DBili  x   /  AST  13  /  ALT  6   /  AlkPhos  109  09-20              Culture - Blood (collected 18 Sep 2018 09:26)  Source: .Blood None  Preliminary Report (19 Sep 2018 17:02):    No growth to date.    Culture - Fungal, Body Fluid (collected 17 Sep 2018 11:41)  Source: Pleural Fl Pleural Fluid  Preliminary Report (18 Sep 2018 07:12):    Testing in progress    Culture - Body Fluid with Gram Stain (collected 17 Sep 2018 11:41)  Source: Pleural Fl Pleural Fluid  Gram Stain (17 Sep 2018 20:40):    polymorphonuclear leukocytes seen    Gram Negative Rods seen by cytocentrifuge  Cytopathology - Non Gyn Report:   ACCESSION No:  [id number]  MATYMELINDA MUKESH                     1  Cytopathology Report  Specimen(s) Submitted  1     PLEURAL FLUID  Clinical History  Pleural fluid  Gross Description  The specimen is received fresh labeled with the patient's name  and consists of 4 ml of yellow fluid. One monolayer slide (Thin  Prep) is prepared and stained with Papanicolaou stain. One cell  block is made.  Final Diagnosis  PLEURAL FLUID, THORACENTESIS:  - NO MALIGNANT CELLS IDENTIFIED.  - FEW MESOTHELIAL CELLS AND HISTIOCYTES.  - CELL BLOCK PENDING.    Screened by: Yanique Lackey M.D.  Verified by: Yanique Lackey M.D.  (Electronic Signature)  Reported on: 09/18/18 15:45 EDT, [address] [place] [address]  Cytologytechnical processing performed at [facility],  [address] Floor, [place] [address]  _________________________________________________________________ (09.17.18 @ 09:13)    Preliminary Report (19 Sep 2018 18:47):    Few Pseudomonas aeruginosa mucoid strain    Culture - Acid Fast - Body Fluid w/Smear (collected 17 Sep 2018 11:41)  Source: Pleural Fl Pleural Fluid          RADIOLOGY:  < from: Xray Chest 1 View- PORTABLE-Routine (09.19.18 @ 08:16) >  Findings:    Support devices: Stable placement of right basilar chest tube.    Cardiac/mediastinum/hilum: Stable. Prior TAVR.    Lung parenchyma/Pleura: Unchanged right hydropneumothorax and adjacent   opacity. Stable left pleural effusion.    Skeleton/soft tissues: Stable.    Impression:      Unchanged right hydropneumothorax and adjacent opacity..    < end of copied text >    PHYSICAL EXAM:  GEN: No acute distress, awake, alert.   Pulmonary: Decreased lung sounds bilaterally, no increased WOB. Chest tube + , unclamped, 500 mL serosanguinous fluid drainage since this AM. Dressing clean, dry, intact.   CV: Regular rate and rhythm  GI: BS+, abdomen is soft, non-tender, non-distended.  MSK: Full ROM bilaterally, DP 2+ bilaterally, no edema legs  Neuro: AAOx3  Skin: no rashes or lesions    89 yo F with PMH extensive cardiac history presented to the hospital for the evaluation of sob x 10 days secondary to  b/l Pleural effusions     # SOB due to b/l Pleural Effusions , empyema, secondary to psudomonas  -s/p R large bore chest tube placement to suction. Increased drainage s/p TPA  - leukocytosis increased to 14.53 9/20 uptrending  -Meropenem 500 mg IV q6, per ID. Patient willl likely need long term Abx. PICC placement possible.   -f/u Chest CT. Possible chest tube placement.   -US showed complex loculated effusion R>L.  -  s/p thoracentesis. pH 7.0, polymorphonuclear leukocytes and GNR, 96% granulocytes, Cell count 961.  LDH fluid 1588 , LDH serum 198 Ratio 8.0  - fluid protein 3.5 , Serum Total  Protein 5.8 Ratio: 0.60-->exudate  -cytology shows no malignant cells  -f/u cell block.   -history of severe pulmonary HTN, not in proportion to SOB per cardiology.    #HFpEF  -c/w symbicort  -f/u PFTs as OP, per pulm, might need home O2. Check Sat.   - fluid restriction   - Trend daily weights 54.4kg 9/15, 9/20 61.6 kg.   -no swelling legs.   -AS S/P TAVR ,  - c/w Furosemide 40mg PO BID per cardiology.   -Echo 9/17 showed EF 60% and severe pulmonary htn, unchanged from  Echo 9/1/2018 : EF 59%, results in chart    #HTN  -lisinopril 10 mg daily started 9/17  -BP wnl 123//62  -f/u K and Cr as outpatient, within 1 week.   -Cr increased from 0.8 to 1.1  -K 3.9    # Atrial Fibrillation rate controlled  - AC discontinued due to GI Bleed  - Continue clopidogrel  - Continue Metoprolol    # GERD: Patient takes Famotidine at home.   -c/w protonix    # HLD: Continue simvastatin    # DM  - -214, yesterday FS were wnl  -pt not on home insulin.4 lantus, 3/3/3 lispro low correctional scale    # CT abnomalities in kidney  -Renal US negative  -f/u radiologist Dr. Castañeda for CT comparison.    # dvt ppx: Heparin sc 5000 u q8h  # gi ppx: Protonix 40mg PO qam  DISPO: Home with family and assistance. SUBJECTIVE:    Patient is a 89y old Female who presents with a chief complaint of SOB -Pleural Effusions b/l (20 Sep 2018 06:06)  Currently admitted to medicine with the primary diagnosis of Shortness of breath   Today is hospital day 7d. This morning she is sitting comfortably in bed. She notes occasional sharp stabbing shooting pains that last a couple of seconds in her R costovertebral area that is not associated with movement or deep breaths. Patient attributes symptoms to musculoskeletal pain.     PAST MEDICAL & SURGICAL HISTORY  CAD (coronary artery disease)  Hypertension  GERD (gastroesophageal reflux disease)  Diabetes  Arthritis  S/P TAVR (transcatheter aortic valve replacement)    SOCIAL HISTORY:  Negative for smoking/alcohol/drug use.     ALLERGIES:  penicillins (Other)    MEDICATIONS:  STANDING MEDICATIONS  buDESOnide  80 MICROgram(s)/formoterol 4.5 MICROgram(s) Inhaler 2 Puff(s) Inhalation two times a day  chlorhexidine 4% Liquid 1 Application(s) Topical <User Schedule>  clopidogrel Tablet 75 milliGRAM(s) Oral daily  dextrose 50% Injectable 12.5 Gram(s) IV Push once  dextrose 50% Injectable 25 Gram(s) IV Push once  dextrose 50% Injectable 25 Gram(s) IV Push once  docusate sodium 100 milliGRAM(s) Oral two times a day  furosemide    Tablet 40 milliGRAM(s) Oral two times a day  heparin  Injectable 5000 Unit(s) SubCutaneous every 8 hours  influenza   Vaccine 0.5 milliLiter(s) IntraMuscular once  insulin glargine Injectable (LANTUS) 4 Unit(s) SubCutaneous at bedtime  insulin lispro (HumaLOG) corrective regimen sliding scale   SubCutaneous three times a day before meals  insulin lispro Injectable (HumaLOG) 3 Unit(s) SubCutaneous three times a day before meals  lisinopril 10 milliGRAM(s) Oral daily  meropenem  IVPB 500 milliGRAM(s) IV Intermittent every 6 hours  metoprolol tartrate 25 milliGRAM(s) Oral two times a day  pantoprazole    Tablet 40 milliGRAM(s) Oral before breakfast  senna 2 Tablet(s) Oral at bedtime  simethicone 80 milliGRAM(s) Chew three times a day  simvastatin 10 milliGRAM(s) Oral at bedtime    PRN MEDICATIONS  acetaminophen   Tablet .. 650 milliGRAM(s) Oral every 6 hours PRN  dextrose 40% Gel 15 Gram(s) Oral once PRN  glucagon  Injectable 1 milliGRAM(s) IntraMuscular once PRN    VITALS:   T(F): 98.2  HR: 90  BP: 133/58  RR: 20  SpO2: 94% RA    LABS:                        10.2   14.53 )-----------( 338      ( 20 Sep 2018 06:43 )             32.0     09-20    142  |  104  |  27<H>  ----------------------------<  156<H>  3.9   |  24  |  1.1    Ca    8.3<L>      20 Sep 2018 06:43    TPro  5.3<L>  /  Alb  2.6<L>  /  TBili  0.3  /  DBili  x   /  AST  13  /  ALT  6   /  AlkPhos  109  09-20              Culture - Blood (collected 18 Sep 2018 09:26)  Source: .Blood None  Preliminary Report (19 Sep 2018 17:02):    No growth to date.    Culture - Fungal, Body Fluid (collected 17 Sep 2018 11:41)  Source: Pleural Fl Pleural Fluid  Preliminary Report (18 Sep 2018 07:12):    Testing in progress    Culture - Body Fluid with Gram Stain (collected 17 Sep 2018 11:41)  Source: Pleural Fl Pleural Fluid  Gram Stain (17 Sep 2018 20:40):    polymorphonuclear leukocytes seen    Gram Negative Rods seen by cytocentrifuge  Cytopathology - Non Gyn Report:   ACCESSION No:  [id number]  MATYMELINDA MUKESH                     1  Cytopathology Report  Specimen(s) Submitted  1     PLEURAL FLUID  Clinical History  Pleural fluid  Gross Description  The specimen is received fresh labeled with the patient's name  and consists of 4 ml of yellow fluid. One monolayer slide (Thin  Prep) is prepared and stained with Papanicolaou stain. One cell  block is made.  Final Diagnosis  PLEURAL FLUID, THORACENTESIS:  - NO MALIGNANT CELLS IDENTIFIED.  - FEW MESOTHELIAL CELLS AND HISTIOCYTES.  - CELL BLOCK PENDING.    Screened by: Yanique Lackey M.D.  Verified by: Yanique Lackey M.D.  (Electronic Signature)  Reported on: 09/18/18 15:45 EDT, [address] [place] [address]  Cytologytechnical processing performed at [facility],  [address] Floor, [place] [address]  _________________________________________________________________ (09.17.18 @ 09:13)    Preliminary Report (19 Sep 2018 18:47):    Few Pseudomonas aeruginosa mucoid strain    Culture - Acid Fast - Body Fluid w/Smear (collected 17 Sep 2018 11:41)  Source: Pleural Fl Pleural Fluid          RADIOLOGY:  < from: CT Chest No Cont (09.20.18 @ 09:51) >  Findings:     Tubes/Lines:  Interval placement of a right-sided pleural catheter.      Lungs, Pleura, and Airways: Decreased size small right pleural effusion   and increased small to moderate right pneumothorax.  Stable small left   pleural effusion.  Unchanged moderate centrilobular emphysema.      Mediastinum/Lymph Nodes: Unchanged.    Heart/Great Vessels: Unchanged.     Bones and soft tissues: Unchanged.    IMPRESSION:    1.  Since September 18, 2018, interval placement of a right-sided pleural   catheter with decreased small right pleural effusion and increased small   to moderate right pneumothorax.    2.  Stable small left pleural effusion.    3.  Remainder of findings are overall unchanged on this short-term   follow-up exam.    < end of copied text >    < from: Xray Chest 1 View- PORTABLE-Routine (09.19.18 @ 08:16) >  Findings:    Support devices: Stable placement of right basilar chest tube.    Cardiac/mediastinum/hilum: Stable. Prior TAVR.    Lung parenchyma/Pleura: Unchanged right hydropneumothorax and adjacent   opacity. Stable left pleural effusion.    Skeleton/soft tissues: Stable.    Impression:      Unchanged right hydropneumothorax and adjacent opacity..    < end of copied text >    PHYSICAL EXAM:  GEN: No acute distress, awake, alert.   Pulmonary: Decreased lung sounds bilaterally, no increased WOB. Chest tube + , unclamped, 500 mL serosanguinous fluid drainage since this AM. Dressing clean, dry, intact.   CV: Regular rate and rhythm  GI: BS+, abdomen is soft, non-tender, non-distended.  MSK: Full ROM bilaterally, DP 2+ bilaterally, no edema legs  Neuro: AAOx3  Skin: no rashes or lesions    87 yo F with PMH extensive cardiac history presented to the hospital for the evaluation of sob x 10 days secondary to  empyema.     # SOB due to b/l Pleural Effusions , empyema, secondary to psudomonas  -s/p R large bore chest tube placement to suction. Increased drainage s/p TPA  - leukocytosis increased to 14.53 9/20 uptrending  -Meropenem 500 mg IV q6, per ID. Patient willl likely need long term Abx. PICC placement likely , per ID  -Chest CT repeated. Per pulm and CT surgery, no further intervention at this time.   -US showed complex loculated effusion R>L.  -  s/p thoracentesis. pH 7.0, polymorphonuclear leukocytes and GNR, 96% granulocytes, Cell count 961.  LDH fluid 1588 , LDH serum 198 Ratio 8.0  - fluid protein 3.5 , Serum Total  Protein 5.8 Ratio: 0.60-->exudate  -cytology shows no malignant cells  -f/u cell block.   -history of severe pulmonary HTN, not in proportion to SOB per cardiology.    #HFpEF  -c/w symbicort  -f/u PFTs as OP, per pulm, might need home O2. Check Sat.   - fluid restriction   - Trend daily weights 54.4kg 9/15, 9/20 61.6 kg.   -no swelling legs.   -AS S/P TAVR ,  - c/w Furosemide 40mg PO BID per cardiology.   -Echo 9/17 showed EF 60% and severe pulmonary htn, unchanged from  Echo 9/1/2018 : EF 59%, results in chart    #HTN  -lisinopril 10 mg daily started 9/17  -BP wnl 123//62  -f/u K and Cr as outpatient, within 1 week.   -Cr increased from 0.8 to 1.1  -K 3.9    # Atrial Fibrillation rate controlled  - AC discontinued due to GI Bleed  - Continue clopidogrel  - Continue Metoprolol    # GERD: Patient takes Famotidine at home.   -c/w protonix    # HLD: Continue simvastatin    # DM  - -214, yesterday FS were wnl  -pt not on home insulin.4 lantus, 3/3/3 lispro low correctional scale    # CT abnomalities in kidney  -Renal US negative  -f/u radiologist Dr. Castañeda for CT comparison.    # dvt ppx: Heparin sc 5000 u q8h  # gi ppx: Protonix 40mg PO qam  DISPO: Home with family and assistance. SUBJECTIVE:    Patient is a 89y old Female who presents with a chief complaint of SOB -Pleural Effusions b/l (20 Sep 2018 06:06)  Currently admitted to medicine with the primary diagnosis of Shortness of breath   Today is hospital day 7d. This morning she is sitting comfortably in bed. She notes occasional sharp stabbing shooting pains that last a couple of seconds in her R costovertebral area that is not associated with movement or deep breaths. Patient attributes symptoms to musculoskeletal pain.     PAST MEDICAL & SURGICAL HISTORY  CAD (coronary artery disease)  Hypertension  GERD (gastroesophageal reflux disease)  Diabetes  Arthritis  S/P TAVR (transcatheter aortic valve replacement)    SOCIAL HISTORY:  Negative for smoking/alcohol/drug use.     ALLERGIES:  penicillins (Other)    MEDICATIONS:  STANDING MEDICATIONS  buDESOnide  80 MICROgram(s)/formoterol 4.5 MICROgram(s) Inhaler 2 Puff(s) Inhalation two times a day  chlorhexidine 4% Liquid 1 Application(s) Topical <User Schedule>  clopidogrel Tablet 75 milliGRAM(s) Oral daily  dextrose 50% Injectable 12.5 Gram(s) IV Push once  dextrose 50% Injectable 25 Gram(s) IV Push once  dextrose 50% Injectable 25 Gram(s) IV Push once  docusate sodium 100 milliGRAM(s) Oral two times a day  furosemide    Tablet 40 milliGRAM(s) Oral two times a day  heparin  Injectable 5000 Unit(s) SubCutaneous every 8 hours  influenza   Vaccine 0.5 milliLiter(s) IntraMuscular once  insulin glargine Injectable (LANTUS) 4 Unit(s) SubCutaneous at bedtime  insulin lispro (HumaLOG) corrective regimen sliding scale   SubCutaneous three times a day before meals  insulin lispro Injectable (HumaLOG) 3 Unit(s) SubCutaneous three times a day before meals  lisinopril 10 milliGRAM(s) Oral daily  meropenem  IVPB 500 milliGRAM(s) IV Intermittent every 6 hours  metoprolol tartrate 25 milliGRAM(s) Oral two times a day  pantoprazole    Tablet 40 milliGRAM(s) Oral before breakfast  senna 2 Tablet(s) Oral at bedtime  simethicone 80 milliGRAM(s) Chew three times a day  simvastatin 10 milliGRAM(s) Oral at bedtime    PRN MEDICATIONS  acetaminophen   Tablet .. 650 milliGRAM(s) Oral every 6 hours PRN  dextrose 40% Gel 15 Gram(s) Oral once PRN  glucagon  Injectable 1 milliGRAM(s) IntraMuscular once PRN    VITALS:   T(F): 98.2  HR: 90  BP: 133/58  RR: 20  SpO2: 94% RA    LABS:                        10.2   14.53 )-----------( 338      ( 20 Sep 2018 06:43 )             32.0     09-20    142  |  104  |  27<H>  ----------------------------<  156<H>  3.9   |  24  |  1.1    Ca    8.3<L>      20 Sep 2018 06:43    TPro  5.3<L>  /  Alb  2.6<L>  /  TBili  0.3  /  DBili  x   /  AST  13  /  ALT  6   /  AlkPhos  109  09-20              Culture - Blood (collected 18 Sep 2018 09:26)  Source: .Blood None  Preliminary Report (19 Sep 2018 17:02):    No growth to date.    Culture - Fungal, Body Fluid (collected 17 Sep 2018 11:41)  Source: Pleural Fl Pleural Fluid  Preliminary Report (18 Sep 2018 07:12):    Testing in progress    Culture - Body Fluid with Gram Stain (collected 17 Sep 2018 11:41)  Source: Pleural Fl Pleural Fluid  Gram Stain (17 Sep 2018 20:40):    polymorphonuclear leukocytes seen    Gram Negative Rods seen by cytocentrifuge  Cytopathology - Non Gyn Report:   ACCESSION No:  [id number]  MATYMELINDA MUKESH                     1  Cytopathology Report  Specimen(s) Submitted  1     PLEURAL FLUID  Clinical History  Pleural fluid  Gross Description  The specimen is received fresh labeled with the patient's name  and consists of 4 ml of yellow fluid. One monolayer slide (Thin  Prep) is prepared and stained with Papanicolaou stain. One cell  block is made.  Final Diagnosis  PLEURAL FLUID, THORACENTESIS:  - NO MALIGNANT CELLS IDENTIFIED.  - FEW MESOTHELIAL CELLS AND HISTIOCYTES.  - CELL BLOCK PENDING.    Screened by: Yanique Lackey M.D.  Verified by: Yanique Lackey M.D.  (Electronic Signature)  Reported on: 09/18/18 15:45 EDT, [address] [place] [address]  Cytologytechnical processing performed at [facility],  [address] Floor, [place] [address]  _________________________________________________________________ (09.17.18 @ 09:13)    Preliminary Report (19 Sep 2018 18:47):    Few Pseudomonas aeruginosa mucoid strain    Culture - Acid Fast - Body Fluid w/Smear (collected 17 Sep 2018 11:41)  Source: Pleural Fl Pleural Fluid          RADIOLOGY:  < from: CT Chest No Cont (09.20.18 @ 09:51) >  Findings:     Tubes/Lines:  Interval placement of a right-sided pleural catheter.      Lungs, Pleura, and Airways: Decreased size small right pleural effusion   and increased small to moderate right pneumothorax.  Stable small left   pleural effusion.  Unchanged moderate centrilobular emphysema.      Mediastinum/Lymph Nodes: Unchanged.    Heart/Great Vessels: Unchanged.     Bones and soft tissues: Unchanged.    IMPRESSION:    1.  Since September 18, 2018, interval placement of a right-sided pleural   catheter with decreased small right pleural effusion and increased small   to moderate right pneumothorax.    2.  Stable small left pleural effusion.    3.  Remainder of findings are overall unchanged on this short-term   follow-up exam.    < end of copied text >    < from: Xray Chest 1 View- PORTABLE-Routine (09.19.18 @ 08:16) >  Findings:    Support devices: Stable placement of right basilar chest tube.    Cardiac/mediastinum/hilum: Stable. Prior TAVR.    Lung parenchyma/Pleura: Unchanged right hydropneumothorax and adjacent   opacity. Stable left pleural effusion.    Skeleton/soft tissues: Stable.    Impression:      Unchanged right hydropneumothorax and adjacent opacity..    < end of copied text >    PHYSICAL EXAM:  GEN: No acute distress, awake, alert.   Pulmonary: Decreased lung sounds bilaterally, no increased WOB. Chest tube + , unclamped, 500 mL serosanguinous fluid drainage since this AM. Dressing clean, dry, intact.   CV: Regular rate and rhythm  GI: BS+, abdomen is soft, non-tender, non-distended.  MSK: Full ROM bilaterally, DP 2+ bilaterally, no edema legs  Neuro: AAOx3  Skin: no rashes or lesions    89 yo F with PMH extensive cardiac history presented to the hospital for the evaluation of sob x 10 days secondary to  empyema.     # SOB due to b/l Pleural Effusions , empyema, secondary to psudomonas  -s/p R large bore chest tube placement to suction. Increased drainage s/p TPA  - leukocytosis increased to 14.53 9/20 uptrending  -Meropenem 500 mg IV q6, per ID. Patient willl likely need long term Abx. PICC placement likely , per ID  -Chest CT repeated. Per pulm and CT surgery, no further intervention at this time.   -increased pneumothorax on CT--> per CT surg, will get daily x-rays, stable.   -US showed complex loculated effusion R>L.  -  s/p thoracentesis. pH 7.0, polymorphonuclear leukocytes and GNR, 96% granulocytes, Cell count 961.  LDH fluid 1588 , LDH serum 198 Ratio 8.0  - fluid protein 3.5 , Serum Total  Protein 5.8 Ratio: 0.60-->exudate  -cytology shows no malignant cells  -f/u cell block.   -history of severe pulmonary HTN, not in proportion to SOB per cardiology.    #HFpEF  -c/w symbicort  -f/u PFTs as OP, per pulm, might need home O2. Check Sat.   - fluid restriction   - Trend daily weights 54.4kg 9/15, 9/20 61.6 kg.   -no swelling legs.   -AS S/P TAVR ,  - c/w Furosemide 40mg PO BID per cardiology.   -Echo 9/17 showed EF 60% and severe pulmonary htn, unchanged from  Echo 9/1/2018 : EF 59%, results in chart    #HTN  -lisinopril 10 mg daily started 9/17  -BP wnl 123//62  -f/u K and Cr as outpatient, within 1 week.   -Cr increased from 0.8 to 1.1  -K 3.9    # Atrial Fibrillation rate controlled  - AC discontinued due to GI Bleed  - Continue clopidogrel  - Continue Metoprolol    # GERD: Patient takes Famotidine at home.   -c/w protonix    # HLD: Continue simvastatin    # DM  - -214, yesterday FS were wnl  -pt not on home insulin.4 lantus, 3/3/3 lispro low correctional scale    # CT abnomalities in kidney  -Renal US negative  -f/u radiologist Dr. Castañeda for CT comparison.    # dvt ppx: Heparin sc 5000 u q8h  # gi ppx: Protonix 40mg PO qam  DISPO: Home with family and assistance.

## 2018-09-20 NOTE — DIETITIAN INITIAL EVALUATION ADULT. - NS FNS WEIGHT CHANGE REASON
Pt reports UBW has been between 125-132 lbs for year. Possible 5 lb wt loss noted PTA (pt reports she was not eating well for few days PTA and during admission), however now intake improved and most recent wt from today is 135 lbs. Will continue to monitor/unintentional

## 2018-09-21 LAB
ALBUMIN SERPL ELPH-MCNC: 2.4 G/DL — LOW (ref 3.5–5.2)
ALP SERPL-CCNC: 100 U/L — SIGNIFICANT CHANGE UP (ref 30–115)
ALT FLD-CCNC: 6 U/L — SIGNIFICANT CHANGE UP (ref 0–41)
ANION GAP SERPL CALC-SCNC: 15 MMOL/L — HIGH (ref 7–14)
AST SERPL-CCNC: 12 U/L — SIGNIFICANT CHANGE UP (ref 0–41)
BILIRUB SERPL-MCNC: 0.2 MG/DL — SIGNIFICANT CHANGE UP (ref 0.2–1.2)
BUN SERPL-MCNC: 29 MG/DL — HIGH (ref 10–20)
CALCIUM SERPL-MCNC: 8.2 MG/DL — LOW (ref 8.5–10.1)
CHLORIDE SERPL-SCNC: 102 MMOL/L — SIGNIFICANT CHANGE UP (ref 98–110)
CO2 SERPL-SCNC: 25 MMOL/L — SIGNIFICANT CHANGE UP (ref 17–32)
CREAT SERPL-MCNC: 1.4 MG/DL — SIGNIFICANT CHANGE UP (ref 0.7–1.5)
GLUCOSE BLDC GLUCOMTR-MCNC: 174 MG/DL — HIGH (ref 70–99)
GLUCOSE BLDC GLUCOMTR-MCNC: 185 MG/DL — HIGH (ref 70–99)
GLUCOSE BLDC GLUCOMTR-MCNC: 191 MG/DL — HIGH (ref 70–99)
GLUCOSE BLDC GLUCOMTR-MCNC: 242 MG/DL — HIGH (ref 70–99)
GLUCOSE SERPL-MCNC: 183 MG/DL — HIGH (ref 70–99)
HCT VFR BLD CALC: 29.9 % — LOW (ref 37–47)
HGB BLD-MCNC: 9.2 G/DL — LOW (ref 12–16)
MCHC RBC-ENTMCNC: 27.8 PG — SIGNIFICANT CHANGE UP (ref 27–31)
MCHC RBC-ENTMCNC: 30.8 G/DL — LOW (ref 32–37)
MCV RBC AUTO: 90.3 FL — SIGNIFICANT CHANGE UP (ref 81–99)
NRBC # BLD: 0 /100 WBCS — SIGNIFICANT CHANGE UP (ref 0–0)
PLATELET # BLD AUTO: 307 K/UL — SIGNIFICANT CHANGE UP (ref 130–400)
POTASSIUM SERPL-MCNC: 3.7 MMOL/L — SIGNIFICANT CHANGE UP (ref 3.5–5)
POTASSIUM SERPL-SCNC: 3.7 MMOL/L — SIGNIFICANT CHANGE UP (ref 3.5–5)
PROT SERPL-MCNC: 5.2 G/DL — LOW (ref 6–8)
RBC # BLD: 3.31 M/UL — LOW (ref 4.2–5.4)
RBC # FLD: 14.4 % — SIGNIFICANT CHANGE UP (ref 11.5–14.5)
SODIUM SERPL-SCNC: 142 MMOL/L — SIGNIFICANT CHANGE UP (ref 135–146)
WBC # BLD: 11.89 K/UL — HIGH (ref 4.8–10.8)
WBC # FLD AUTO: 11.89 K/UL — HIGH (ref 4.8–10.8)

## 2018-09-21 RX ORDER — FUROSEMIDE 40 MG
40 TABLET ORAL DAILY
Qty: 0 | Refills: 0 | Status: DISCONTINUED | OUTPATIENT
Start: 2018-09-21 | End: 2018-09-24

## 2018-09-21 RX ADMIN — CLOPIDOGREL BISULFATE 75 MILLIGRAM(S): 75 TABLET, FILM COATED ORAL at 11:14

## 2018-09-21 RX ADMIN — BUDESONIDE AND FORMOTEROL FUMARATE DIHYDRATE 2 PUFF(S): 160; 4.5 AEROSOL RESPIRATORY (INHALATION) at 20:15

## 2018-09-21 RX ADMIN — MEROPENEM 100 MILLIGRAM(S): 1 INJECTION INTRAVENOUS at 04:40

## 2018-09-21 RX ADMIN — INSULIN GLARGINE 4 UNIT(S): 100 INJECTION, SOLUTION SUBCUTANEOUS at 21:39

## 2018-09-21 RX ADMIN — CHLORHEXIDINE GLUCONATE 1 APPLICATION(S): 213 SOLUTION TOPICAL at 05:08

## 2018-09-21 RX ADMIN — Medication 650 MILLIGRAM(S): at 20:14

## 2018-09-21 RX ADMIN — SIMVASTATIN 10 MILLIGRAM(S): 20 TABLET, FILM COATED ORAL at 21:39

## 2018-09-21 RX ADMIN — MEROPENEM 100 MILLIGRAM(S): 1 INJECTION INTRAVENOUS at 20:13

## 2018-09-21 RX ADMIN — SIMETHICONE 80 MILLIGRAM(S): 80 TABLET, CHEWABLE ORAL at 21:39

## 2018-09-21 RX ADMIN — Medication 3 UNIT(S): at 12:44

## 2018-09-21 RX ADMIN — Medication 25 MILLIGRAM(S): at 05:05

## 2018-09-21 RX ADMIN — SIMETHICONE 80 MILLIGRAM(S): 80 TABLET, CHEWABLE ORAL at 13:23

## 2018-09-21 RX ADMIN — MEROPENEM 100 MILLIGRAM(S): 1 INJECTION INTRAVENOUS at 13:22

## 2018-09-21 RX ADMIN — Medication 1: at 08:03

## 2018-09-21 RX ADMIN — SIMETHICONE 80 MILLIGRAM(S): 80 TABLET, CHEWABLE ORAL at 05:06

## 2018-09-21 RX ADMIN — PANTOPRAZOLE SODIUM 40 MILLIGRAM(S): 20 TABLET, DELAYED RELEASE ORAL at 08:03

## 2018-09-21 RX ADMIN — Medication 40 MILLIGRAM(S): at 05:06

## 2018-09-21 RX ADMIN — LISINOPRIL 10 MILLIGRAM(S): 2.5 TABLET ORAL at 05:06

## 2018-09-21 RX ADMIN — MEROPENEM 100 MILLIGRAM(S): 1 INJECTION INTRAVENOUS at 08:03

## 2018-09-21 RX ADMIN — HEPARIN SODIUM 5000 UNIT(S): 5000 INJECTION INTRAVENOUS; SUBCUTANEOUS at 21:39

## 2018-09-21 RX ADMIN — Medication 100 MILLIGRAM(S): at 05:06

## 2018-09-21 RX ADMIN — HEPARIN SODIUM 5000 UNIT(S): 5000 INJECTION INTRAVENOUS; SUBCUTANEOUS at 05:05

## 2018-09-21 RX ADMIN — HEPARIN SODIUM 5000 UNIT(S): 5000 INJECTION INTRAVENOUS; SUBCUTANEOUS at 13:22

## 2018-09-21 RX ADMIN — BUDESONIDE AND FORMOTEROL FUMARATE DIHYDRATE 2 PUFF(S): 160; 4.5 AEROSOL RESPIRATORY (INHALATION) at 08:15

## 2018-09-21 RX ADMIN — Medication 100 MILLIGRAM(S): at 17:46

## 2018-09-21 RX ADMIN — SENNA PLUS 2 TABLET(S): 8.6 TABLET ORAL at 21:39

## 2018-09-21 RX ADMIN — Medication 3 UNIT(S): at 16:36

## 2018-09-21 RX ADMIN — Medication 1: at 12:43

## 2018-09-21 RX ADMIN — Medication 3 UNIT(S): at 08:03

## 2018-09-21 RX ADMIN — Medication 2: at 16:36

## 2018-09-21 NOTE — PROGRESS NOTE ADULT - ASSESSMENT
SUBJECTIVE:    Patient is a 89y old Female who presents with a chief complaint of SOB -Pleural Effusions b/l (21 Sep 2018 12:57)  Currently admitted to medicine with the primary diagnosis of Shortness of breath   Today is hospital day 8d. This morning she is resting comfortably in bed and reports no new issues or overnight events.  200 cc fluid drainage from chest tube ON. Patient notes no pain, no dyspnea, no dysuria, no blood /discoloration in urine.     PAST MEDICAL & SURGICAL HISTORY  CAD (coronary artery disease)  Hypertension  GERD (gastroesophageal reflux disease)  Diabetes  Arthritis  S/P TAVR (transcatheter aortic valve replacement)    SOCIAL HISTORY:  Negative for smoking/alcohol/drug use.     ALLERGIES:  penicillins (Other)    MEDICATIONS:  STANDING MEDICATIONS  buDESOnide  80 MICROgram(s)/formoterol 4.5 MICROgram(s) Inhaler 2 Puff(s) Inhalation two times a day  chlorhexidine 4% Liquid 1 Application(s) Topical <User Schedule>  clopidogrel Tablet 75 milliGRAM(s) Oral daily  dextrose 50% Injectable 12.5 Gram(s) IV Push once  dextrose 50% Injectable 25 Gram(s) IV Push once  dextrose 50% Injectable 25 Gram(s) IV Push once  docusate sodium 100 milliGRAM(s) Oral two times a day  furosemide    Tablet 40 milliGRAM(s) Oral daily  heparin  Injectable 5000 Unit(s) SubCutaneous every 8 hours  influenza   Vaccine 0.5 milliLiter(s) IntraMuscular once  insulin glargine Injectable (LANTUS) 4 Unit(s) SubCutaneous at bedtime  insulin lispro (HumaLOG) corrective regimen sliding scale   SubCutaneous three times a day before meals  insulin lispro Injectable (HumaLOG) 3 Unit(s) SubCutaneous three times a day before meals  lisinopril 10 milliGRAM(s) Oral daily  meropenem  IVPB 500 milliGRAM(s) IV Intermittent every 6 hours  metoprolol tartrate 25 milliGRAM(s) Oral two times a day  pantoprazole    Tablet 40 milliGRAM(s) Oral before breakfast  senna 2 Tablet(s) Oral at bedtime  simethicone 80 milliGRAM(s) Chew three times a day  simvastatin 10 milliGRAM(s) Oral at bedtime    PRN MEDICATIONS  acetaminophen   Tablet .. 650 milliGRAM(s) Oral every 6 hours PRN  dextrose 40% Gel 15 Gram(s) Oral once PRN  glucagon  Injectable 1 milliGRAM(s) IntraMuscular once PRN    VITALS:   T(F): 97.7  HR: 74  BP: 108/50  RR: 20  SpO2: 92% RA    LABS:                        9.2    11.89 )-----------( 307      ( 21 Sep 2018 05:30 )             29.9     09-21    142  |  102  |  29<H>  ----------------------------<  183<H>  3.7   |  25  |  1.4    Ca    8.2<L>      21 Sep 2018 05:30    TPro  5.2<L>  /  Alb  2.4<L>  /  TBili  0.2  /  DBili  x   /  AST  12  /  ALT  6   /  AlkPhos  100  09-21                  RADIOLOGY:  < from: Xray Chest 1 View- PORTABLE-Routine (09.21.18 @ 06:36) >  Impression:      Decreased right hydropneumothorax and adjacent opacity. Stable left   pleural effusion/opacity.        < end of copied text >    PHYSICAL EXAM:  GEN: No acute distress, resting comfortably in bed.   LUNGS: Decreased lung sounds in bases bilaterally, dressing clean dry, intact.    HEART: Regular rate rhythm  ABD: BS+, no tenderness, Soft, non-tender, non-distended.  EXT: Full ROM bilaterally, no edema.   NEURO: AAOX3    Intravenous access:   NG tube:   العر[place]ق Catheter: SUBJECTIVE:    Patient is a 89y old Female who presents with a chief complaint of SOB -Pleural Effusions b/l (21 Sep 2018 12:57)  Currently admitted to medicine with the primary diagnosis of Shortness of breath   Today is hospital day 8d. This morning she is resting comfortably in bed and reports no new issues or overnight events.  200 cc fluid drainage from chest tube ON. Patient notes no pain, no dyspnea, no dysuria, no blood /discoloration in urine.     PAST MEDICAL & SURGICAL HISTORY  CAD (coronary artery disease)  Hypertension  GERD (gastroesophageal reflux disease)  Diabetes  Arthritis  S/P TAVR (transcatheter aortic valve replacement)    SOCIAL HISTORY:  Negative for smoking/alcohol/drug use.     ALLERGIES:  penicillins (Other)    MEDICATIONS:  STANDING MEDICATIONS  buDESOnide  80 MICROgram(s)/formoterol 4.5 MICROgram(s) Inhaler 2 Puff(s) Inhalation two times a day  chlorhexidine 4% Liquid 1 Application(s) Topical <User Schedule>  clopidogrel Tablet 75 milliGRAM(s) Oral daily  dextrose 50% Injectable 12.5 Gram(s) IV Push once  dextrose 50% Injectable 25 Gram(s) IV Push once  dextrose 50% Injectable 25 Gram(s) IV Push once  docusate sodium 100 milliGRAM(s) Oral two times a day  furosemide    Tablet 40 milliGRAM(s) Oral daily  heparin  Injectable 5000 Unit(s) SubCutaneous every 8 hours  influenza   Vaccine 0.5 milliLiter(s) IntraMuscular once  insulin glargine Injectable (LANTUS) 4 Unit(s) SubCutaneous at bedtime  insulin lispro (HumaLOG) corrective regimen sliding scale   SubCutaneous three times a day before meals  insulin lispro Injectable (HumaLOG) 3 Unit(s) SubCutaneous three times a day before meals  lisinopril 10 milliGRAM(s) Oral daily  meropenem  IVPB 500 milliGRAM(s) IV Intermittent every 6 hours  metoprolol tartrate 25 milliGRAM(s) Oral two times a day  pantoprazole    Tablet 40 milliGRAM(s) Oral before breakfast  senna 2 Tablet(s) Oral at bedtime  simethicone 80 milliGRAM(s) Chew three times a day  simvastatin 10 milliGRAM(s) Oral at bedtime    PRN MEDICATIONS  acetaminophen   Tablet .. 650 milliGRAM(s) Oral every 6 hours PRN  dextrose 40% Gel 15 Gram(s) Oral once PRN  glucagon  Injectable 1 milliGRAM(s) IntraMuscular once PRN    VITALS:   T(F): 97.7  HR: 74  BP: 108/50  RR: 20  SpO2: 92% RA    LABS:                        9.2    11.89 )-----------( 307      ( 21 Sep 2018 05:30 )             29.9     09-21    142  |  102  |  29<H>  ----------------------------<  183<H>  3.7   |  25  |  1.4    Ca    8.2<L>      21 Sep 2018 05:30    TPro  5.2<L>  /  Alb  2.4<L>  /  TBili  0.2  /  DBili  x   /  AST  12  /  ALT  6   /  AlkPhos  100  09-21      RADIOLOGY:  < from: Xray Chest 1 View- PORTABLE-Routine (09.21.18 @ 06:36) >  Impression:      Decreased right hydropneumothorax and adjacent opacity. Stable left   pleural effusion/opacity.  < end of copied text >    PHYSICAL EXAM:  GEN: No acute distress, resting comfortably in bed.   LUNGS: Decreased lung sounds in bases bilaterally, dressing clean dry, intact.    HEART: Regular rate rhythm  ABD: BS+, no tenderness, Soft, non-tender, non-distended.  EXT: Full ROM bilaterally, no edema.   NEURO: AAOX3    87 yo F with PMH extensive cardiac history presented to the hospital for the evaluation of sob x 10 days secondary to  empyema.     # HAL 2/2 antibiotic vs lasix vs lisinopril,   -Cr increased to 1.4 from 0.8 on admission.   -continue current doses     # SOB 2/2 empyema, secondary to psudomonas  -s/p R large bore chest tube placement to suction. s/p TPA  -continue abx, f/u serial CXR.   - leukocytosis increased to 14.53 9/20 uptrending  -Meropenem 500 mg IV q6, per ID. will need PICC placement  per ID  -increased pneumothorax on CT--> per CT surg, will get daily x-rays, stable.   -  thoracentesis. pH 7.0, polymorphonuclear leukocytes and GNR, 96% granulocytes, Cell count 961.  LDH fluid 1588 , LDH serum 198 Ratio 8.0  fluid protein 3.5 , Serum Total  Protein 5.8 Ratio: 0.60-->exudate  -cytology shows no malignant cells  -f/u cell block.   -history of severe pulmonary HTN    #HFpEF  -c/w symbicort  -f/u PFTs as OP, per pulm, might need home O2. Check Sat.   - fluid restriction   - Trend daily weights 54.4kg 9/15, 9/20 61.6 kg.   -no swelling legs.   -AS S/P TAVR ,  - c/w Furosemide 40mg PO BID per cardiology.   -Echo 9/17 showed EF 60% and severe pulmonary htn, unchanged from  Echo 9/1/2018 : EF 59%, results in chart    #HTN  -lisinopril 10 mg daily started 9/17  -BP wnl 123//62  -f/u K and Cr as outpatient, within 1 week.   -Cr increased from 0.8 to 1.4  -K 3.7    # Atrial Fibrillation rate controlled  - AC discontinued due to GI Bleed  - Continue clopidogrel  - Continue Metoprolol    # GERD: Patient takes Famotidine at home.   -c/w protonix    # HLD: Continue simvastatin    # DM  - -214, yesterday FS were wnl  -pt not on home insulin.4 lantus, 3/3/3 lispro low correctional scale    # CT abnomalities in kidney  -Renal US negative  -f/u radiologist Dr. Castañeda for CT comparison.    # dvt ppx: Heparin sc 5000 u q8h  # gi ppx: Protonix 40mg PO qam  DISPO: Home with family and assistance.

## 2018-09-21 NOTE — PROGRESS NOTE ADULT - ASSESSMENT
87 yo F with PMH of Atrial Fibrillation ( not on AC due to previous GI Bleed ), HTN, CAD with stent placement, CHF, s/p TAVR, GERD, DM, and HLD presented to the hospital for the evaluation of sob x 10 days. Patient states that she first noticed SOB ten days ago and it has been progressively worsening. She has RAIN after about 25 ft ambulation. She used to sleep with one pillow and now is using two for symptomatic relief. Patients son is at bedside and notes that these symptoms are similar to symptoms she had prior to her TAVR. Patient also notes increased swelling of bilaterally lower extremities. She recently saw Cardiologist who completed ECHO and increased dosage of Furosemide. She has had improvement of LE edema but not of SOB to baseline. Patient also notes recent feelings of nausea, chills, fatigue and 5 pound weight loss in the last month too.    #RLL pneumonia with Right sided empyema  - s/p right thoracentesis- 400 ml drained  - F/u Pleural fluid  culture- pseudomonas  -  pleural fluid pH 7.0  - Pt evaluated by CT surgery- S/p  Large bore Rt chest tube placement for drainage  and TPA.  -  CT Chest No Cont (09.20.18 @ 09:51) >Since September 18, 2018, interval placement of a right-sided pleural catheter with decreased small right pleural effusion and increased small to moderate right pneumothorax. Stable small left pleural effusion  - C/w antibiotics-meropenem.  - PICC line for antibiotics  - no further pleural fluid  drainage, plan is to discontinue CT    #Acute kidney injury  - decrease lasix 40 mg once daily  - monitor BUN/creat    # Acute on chronic diastolic CHF, H/o TAVR, pulmonary HTN- resolved  - BNP 4692   -  daily weights, I/o, restrict fluids, low Na diet  - c/w lasix , metoprolol, lisinopril    # COPD  -c/w  Budesonide    # H/o CAD  - c/w Plavix, statin, metoprolol.    # Atrial Fibrillation  - c/w  clopidogrel, Metoprolol  - not on anticoagulants due to H/o GI bleed    # GERD  - c/w Protonix     # Dyslipidemia  - c/w simvastatin    # Kidney abnormalities noted on CT Scan:   - Renal US negative     # DM type 2   - monitor finger sticks   - c/w lantus, lispro    #GI/ DVT prophylaxis    PT eval: STR vs home with PT

## 2018-09-21 NOTE — PROCEDURE NOTE - NSPROCDETAILS_GEN_ALL_CORE
ultrasound assessment of effusion (localization)/location identified, draped/prepped, sterile technique used, needle inserted/introduced/ultrasound assessment of effusion (size)
supine position/sterile dressing applied/ultrasound guidance
percutaneous/Pigtail catheter/thoracostomy tube placed percutaneously/dressing applied/secured in place/Seldinger technique/sterile dressing applied

## 2018-09-21 NOTE — PROGRESS NOTE ADULT - ASSESSMENT
SOB/ PUL HTN/ S/P TAVR/ A FIB / HIGHLY COMPONENT OF COPD (SEEN ALSO ON CHEST CT 8/18), PUL HTN MULTIFACTORIAL / Neutrophilic exudative effusion GR in fluid/ pseudomonas s/p pig tail/ tpa/ better      - CT SX TO DC TUBE  - PICC LINE ABX  - HIGH RISK FOR ANY INVASIVE PROCEDURE  - DC PLANNING  SPOKE AT LENGTH WITH SON / PATIENT AT BEDSIDE

## 2018-09-21 NOTE — PROGRESS NOTE ADULT - ASSESSMENT
IMPRESSION:  RLL PNA secondary to Pseudomonas aeruginosa with loculated empyema on the right which has resolved significantly on CT from today  Right sided pneumothorax  Blood cultures negative to date    RECOMMENDATIONS:  Meropenem 500 mg iv q6h  PICC line for long term iv antibiotics  Will need at least 14 days of iv antibiotics

## 2018-09-21 NOTE — PROGRESS NOTE ADULT - ATTENDING COMMENTS
89y Female patient admitted right CT placement for right empyema  lung reexpanded  CT drained 200cc, yesterday 1400cc  will keep the tube in for one more day  OOB ambulate  pulmonary toilet chest pt  case d/w CEU team

## 2018-09-21 NOTE — PROCEDURE NOTE - NSINFORMCONSENT_GEN_A_CORE
Benefits, risks, and possible complications of procedure explained to patient/caregiver who verbalized understanding and gave written consent.
Benefits, risks, and possible complications of procedure explained to patient/caregiver who verbalized understanding and gave verbal consent.
Benefits, risks, and possible complications of procedure explained to patient/caregiver who verbalized understanding and gave written consent.

## 2018-09-22 ENCOUNTER — TRANSCRIPTION ENCOUNTER (OUTPATIENT)
Age: 83
End: 2018-09-22

## 2018-09-22 LAB
ALBUMIN SERPL ELPH-MCNC: 2.6 G/DL — LOW (ref 3.5–5.2)
ALP SERPL-CCNC: 98 U/L — SIGNIFICANT CHANGE UP (ref 30–115)
ALT FLD-CCNC: 5 U/L — SIGNIFICANT CHANGE UP (ref 0–41)
ANION GAP SERPL CALC-SCNC: 14 MMOL/L — SIGNIFICANT CHANGE UP (ref 7–14)
AST SERPL-CCNC: 13 U/L — SIGNIFICANT CHANGE UP (ref 0–41)
BILIRUB SERPL-MCNC: 0.3 MG/DL — SIGNIFICANT CHANGE UP (ref 0.2–1.2)
BUN SERPL-MCNC: 27 MG/DL — HIGH (ref 10–20)
CALCIUM SERPL-MCNC: 8.5 MG/DL — SIGNIFICANT CHANGE UP (ref 8.5–10.1)
CHLORIDE SERPL-SCNC: 106 MMOL/L — SIGNIFICANT CHANGE UP (ref 98–110)
CO2 SERPL-SCNC: 23 MMOL/L — SIGNIFICANT CHANGE UP (ref 17–32)
CREAT SERPL-MCNC: 1.2 MG/DL — SIGNIFICANT CHANGE UP (ref 0.7–1.5)
CULTURE RESULTS: SIGNIFICANT CHANGE UP
GLUCOSE BLDC GLUCOMTR-MCNC: 161 MG/DL — HIGH (ref 70–99)
GLUCOSE BLDC GLUCOMTR-MCNC: 173 MG/DL — HIGH (ref 70–99)
GLUCOSE BLDC GLUCOMTR-MCNC: 203 MG/DL — HIGH (ref 70–99)
GLUCOSE BLDC GLUCOMTR-MCNC: 240 MG/DL — HIGH (ref 70–99)
GLUCOSE SERPL-MCNC: 146 MG/DL — HIGH (ref 70–99)
HCT VFR BLD CALC: 31.7 % — LOW (ref 37–47)
HGB BLD-MCNC: 9.8 G/DL — LOW (ref 12–16)
MCHC RBC-ENTMCNC: 28.3 PG — SIGNIFICANT CHANGE UP (ref 27–31)
MCHC RBC-ENTMCNC: 30.9 G/DL — LOW (ref 32–37)
MCV RBC AUTO: 91.6 FL — SIGNIFICANT CHANGE UP (ref 81–99)
NRBC # BLD: 0 /100 WBCS — SIGNIFICANT CHANGE UP (ref 0–0)
ORGANISM # SPEC MICROSCOPIC CNT: SIGNIFICANT CHANGE UP
ORGANISM # SPEC MICROSCOPIC CNT: SIGNIFICANT CHANGE UP
PLATELET # BLD AUTO: 339 K/UL — SIGNIFICANT CHANGE UP (ref 130–400)
POTASSIUM SERPL-MCNC: 4.2 MMOL/L — SIGNIFICANT CHANGE UP (ref 3.5–5)
POTASSIUM SERPL-SCNC: 4.2 MMOL/L — SIGNIFICANT CHANGE UP (ref 3.5–5)
PROT SERPL-MCNC: 5.5 G/DL — LOW (ref 6–8)
RBC # BLD: 3.46 M/UL — LOW (ref 4.2–5.4)
RBC # FLD: 14.6 % — HIGH (ref 11.5–14.5)
SODIUM SERPL-SCNC: 143 MMOL/L — SIGNIFICANT CHANGE UP (ref 135–146)
SPECIMEN SOURCE: SIGNIFICANT CHANGE UP
WBC # BLD: 13 K/UL — HIGH (ref 4.8–10.8)
WBC # FLD AUTO: 13 K/UL — HIGH (ref 4.8–10.8)

## 2018-09-22 RX ADMIN — SIMETHICONE 80 MILLIGRAM(S): 80 TABLET, CHEWABLE ORAL at 21:01

## 2018-09-22 RX ADMIN — SIMVASTATIN 10 MILLIGRAM(S): 20 TABLET, FILM COATED ORAL at 21:01

## 2018-09-22 RX ADMIN — Medication 25 MILLIGRAM(S): at 17:43

## 2018-09-22 RX ADMIN — MEROPENEM 100 MILLIGRAM(S): 1 INJECTION INTRAVENOUS at 21:01

## 2018-09-22 RX ADMIN — BUDESONIDE AND FORMOTEROL FUMARATE DIHYDRATE 2 PUFF(S): 160; 4.5 AEROSOL RESPIRATORY (INHALATION) at 21:00

## 2018-09-22 RX ADMIN — Medication 3 UNIT(S): at 17:40

## 2018-09-22 RX ADMIN — CLOPIDOGREL BISULFATE 75 MILLIGRAM(S): 75 TABLET, FILM COATED ORAL at 11:30

## 2018-09-22 RX ADMIN — Medication 25 MILLIGRAM(S): at 06:24

## 2018-09-22 RX ADMIN — HEPARIN SODIUM 5000 UNIT(S): 5000 INJECTION INTRAVENOUS; SUBCUTANEOUS at 13:51

## 2018-09-22 RX ADMIN — HEPARIN SODIUM 5000 UNIT(S): 5000 INJECTION INTRAVENOUS; SUBCUTANEOUS at 21:01

## 2018-09-22 RX ADMIN — Medication 100 MILLIGRAM(S): at 06:24

## 2018-09-22 RX ADMIN — CHLORHEXIDINE GLUCONATE 1 APPLICATION(S): 213 SOLUTION TOPICAL at 06:23

## 2018-09-22 RX ADMIN — Medication 2: at 12:25

## 2018-09-22 RX ADMIN — INSULIN GLARGINE 4 UNIT(S): 100 INJECTION, SOLUTION SUBCUTANEOUS at 21:02

## 2018-09-22 RX ADMIN — Medication 40 MILLIGRAM(S): at 06:24

## 2018-09-22 RX ADMIN — Medication 1: at 17:40

## 2018-09-22 RX ADMIN — SIMETHICONE 80 MILLIGRAM(S): 80 TABLET, CHEWABLE ORAL at 06:24

## 2018-09-22 RX ADMIN — PANTOPRAZOLE SODIUM 40 MILLIGRAM(S): 20 TABLET, DELAYED RELEASE ORAL at 07:32

## 2018-09-22 RX ADMIN — LISINOPRIL 10 MILLIGRAM(S): 2.5 TABLET ORAL at 06:24

## 2018-09-22 RX ADMIN — Medication 650 MILLIGRAM(S): at 02:40

## 2018-09-22 RX ADMIN — MEROPENEM 100 MILLIGRAM(S): 1 INJECTION INTRAVENOUS at 02:32

## 2018-09-22 RX ADMIN — MEROPENEM 100 MILLIGRAM(S): 1 INJECTION INTRAVENOUS at 13:51

## 2018-09-22 RX ADMIN — Medication 100 MILLIGRAM(S): at 17:44

## 2018-09-22 RX ADMIN — MEROPENEM 100 MILLIGRAM(S): 1 INJECTION INTRAVENOUS at 07:32

## 2018-09-22 RX ADMIN — HEPARIN SODIUM 5000 UNIT(S): 5000 INJECTION INTRAVENOUS; SUBCUTANEOUS at 06:23

## 2018-09-22 RX ADMIN — SIMETHICONE 80 MILLIGRAM(S): 80 TABLET, CHEWABLE ORAL at 13:51

## 2018-09-22 RX ADMIN — BUDESONIDE AND FORMOTEROL FUMARATE DIHYDRATE 2 PUFF(S): 160; 4.5 AEROSOL RESPIRATORY (INHALATION) at 13:52

## 2018-09-22 RX ADMIN — Medication 1: at 08:24

## 2018-09-22 RX ADMIN — Medication 3 UNIT(S): at 08:25

## 2018-09-22 RX ADMIN — Medication 3 UNIT(S): at 12:25

## 2018-09-22 NOTE — DISCHARGE NOTE ADULT - SECONDARY DIAGNOSIS.
CAD (coronary artery disease) S/P TAVR (transcatheter aortic valve replacement) Hypertension Diabetes

## 2018-09-22 NOTE — DISCHARGE NOTE ADULT - CARE PROVIDER_API CALL
Mike Haney), Critical Care Medicine; Internal Medicine; Pulmonary Disease; Sleep Medicine  [address] [place] [address]  Phone: [phone]  Fax: [phone]    Kaur Bone), Internal Medicine  [address] [place] [address]  Phone: [phone]  Fax: [phone] Mike Haney), Critical Care Medicine; Internal Medicine; Pulmonary Disease; Sleep Medicine  [address] [place] [address]  Phone: [phone]  Fax: [phone]    Kaur Bone), Internal Medicine  [address] [place] [address]  Phone: [phone]  Fax: [phone]    Dami Ray), Infectious Disease; Internal Medicine  [address] [place] [address]  Phone: [phone]  Fax: [phone]

## 2018-09-22 NOTE — PROGRESS NOTE ADULT - ASSESSMENT
IMPRESSION:  RLL PNA secondary to Pseudomonas aeruginosa with loculated empyema on the right which has resolved significantly on CT from today  Right sided pneumothorax resolved  Blood cultures negative to date  Clinically much improved    RECOMMENDATIONS:  Meropenem 500 mg iv q6h  PICC line for long term iv antibiotics  Will need at least 14 days of iv antibiotics

## 2018-09-22 NOTE — DISCHARGE NOTE ADULT - MEDICATION SUMMARY - MEDICATIONS TO TAKE
I will START or STAY ON the medications listed below when I get home from the hospital:    acetaminophen 325 mg oral tablet  -- 2 tab(s) by mouth every 6 hours, As needed, Mild Pain (1 - 3)  -- Indication: For Pain    lisinopril 10 mg oral tablet  -- 1 tab(s) by mouth once a day  -- Indication: For high blood pressure    glimepiride 2 mg oral tablet  -- 6 milligram(s) by mouth once a day  -- Indication: For Glucose control    metFORMIN 1000 mg oral tablet  -- 1 tab(s) by mouth 2 times a day  -- Indication: For Glucose control    simvastatin 10 mg oral tablet  -- 1 tab(s) by mouth once a day (at bedtime)  -- Indication: For High cholesterol    clopidogrel 75 mg oral tablet  -- 1 tab(s) by mouth once a day  -- Indication: For CHF    metoprolol tartrate 25 mg oral tablet  -- 1 tab(s) by mouth 2 times a day  -- Indication: For CHF    budesonide-formoterol 80 mcg-4.5 mcg/inh inhalation aerosol  -- 2 puff(s) inhaled 2 times a day  -- Indication: For Shortness of breath    meropenem 500 mg intravenous injection  -- 500 milligram(s) intravenous every 6 hours until 10/4  -- Indication: For Empyema    furosemide 40 mg oral tablet  -- 1 tab(s) by mouth once a day  -- Indication: For High blood pressure, CHF    famotidine 20 mg oral tablet, disintegrating  -- 1  by mouth once a day  -- Indication: For GI protection

## 2018-09-22 NOTE — DISCHARGE NOTE ADULT - PLAN OF CARE
Resolved infection Continue with antibiotics via midline as specified above for a total of at least 14 days. Follow up with Dr. Haney within 2 weeks of discharge. Medical management. Continue with simvastatin as above. Follow up with your primary care doctor within 1 month of discharge. Continue with plavix as above. Follow up with your primary care doctor within 1 month of discharge. BP <140/90 Continue with lisinopril 10 mg daily and furosemide 40 mg daily. Follow up with your primary care doctor within 1 month. Glucose control < 180. Continue with glimepiride and metformin as above. Follow up with your primary care doctor within 1 month. Status post chest tube placement. Continue with antibiotics via midline as specified above for a total of at least 14 days. Follow up with Dr. Smith within 2 weeks of discharge. Follow up with Dr. Ray within 3 weeks of discharge.

## 2018-09-22 NOTE — DISCHARGE NOTE ADULT - CARE PLAN
Principal Discharge DX:	Empyema lung  Goal:	Resolved infection  Assessment and plan of treatment:	Continue with antibiotics via midline as specified above for a total of at least 14 days. Follow up with Dr. Haney within 2 weeks of discharge.  Secondary Diagnosis:	CAD (coronary artery disease)  Goal:	Medical management.  Assessment and plan of treatment:	Continue with simvastatin as above. Follow up with your primary care doctor within 1 month of discharge.  Secondary Diagnosis:	S/P TAVR (transcatheter aortic valve replacement)  Goal:	Medical management.  Assessment and plan of treatment:	Continue with plavix as above. Follow up with your primary care doctor within 1 month of discharge.  Secondary Diagnosis:	Hypertension  Goal:	BP <140/90  Assessment and plan of treatment:	Continue with lisinopril 10 mg daily and furosemide 40 mg daily. Follow up with your primary care doctor within 1 month.  Secondary Diagnosis:	Diabetes  Goal:	Glucose control < 180.  Assessment and plan of treatment:	Continue with glimepiride and metformin as above. Follow up with your primary care doctor within 1 month. Principal Discharge DX:	Empyema lung  Goal:	Resolved infection  Assessment and plan of treatment:	Status post chest tube placement. Continue with antibiotics via midline as specified above for a total of at least 14 days. Follow up with Dr. Smith within 2 weeks of discharge. Follow up with Dr. Ray within 3 weeks of discharge.  Secondary Diagnosis:	CAD (coronary artery disease)  Goal:	Medical management.  Assessment and plan of treatment:	Continue with simvastatin as above. Follow up with your primary care doctor within 1 month of discharge.  Secondary Diagnosis:	S/P TAVR (transcatheter aortic valve replacement)  Goal:	Medical management.  Assessment and plan of treatment:	Continue with plavix as above. Follow up with your primary care doctor within 1 month of discharge.  Secondary Diagnosis:	Hypertension  Goal:	BP <140/90  Assessment and plan of treatment:	Continue with lisinopril 10 mg daily and furosemide 40 mg daily. Follow up with your primary care doctor within 1 month.  Secondary Diagnosis:	Diabetes  Goal:	Glucose control < 180.  Assessment and plan of treatment:	Continue with glimepiride and metformin as above. Follow up with your primary care doctor within 1 month.

## 2018-09-22 NOTE — DISCHARGE NOTE ADULT - PROVIDER TOKENS
TOKEN:'[id number]:'[id number] TOKEN:'[id number]:'[id number]'

## 2018-09-22 NOTE — PROGRESS NOTE ADULT - ASSESSMENT
SUBJECTIVE:    Patient is a 89y old Female who presents with a chief complaint of SOB -Pleural Effusions b/l (22 Sep 2018 08:49)  Currently admitted to medicine with the primary diagnosis of Shortness of breath  Today is hospital day 9d. This morning she is resting comfortably in bed and reports no new issues or overnight events. 50 cc drainage ON. Patient denies shorntess of breath or pain. Eager to walk with PT.     PAST MEDICAL & SURGICAL HISTORY  CAD (coronary artery disease)  Hypertension  GERD (gastroesophageal reflux disease)  Diabetes  Arthritis  S/P TAVR (transcatheter aortic valve replacement)    SOCIAL HISTORY:  Negative for smoking/alcohol/drug use.     ALLERGIES:  penicillins (Other)    MEDICATIONS:  STANDING MEDICATIONS  buDESOnide  80 MICROgram(s)/formoterol 4.5 MICROgram(s) Inhaler 2 Puff(s) Inhalation two times a day  chlorhexidine 4% Liquid 1 Application(s) Topical <User Schedule>  clopidogrel Tablet 75 milliGRAM(s) Oral daily  dextrose 50% Injectable 12.5 Gram(s) IV Push once  dextrose 50% Injectable 25 Gram(s) IV Push once  dextrose 50% Injectable 25 Gram(s) IV Push once  docusate sodium 100 milliGRAM(s) Oral two times a day  furosemide    Tablet 40 milliGRAM(s) Oral daily  heparin  Injectable 5000 Unit(s) SubCutaneous every 8 hours  influenza   Vaccine 0.5 milliLiter(s) IntraMuscular once  insulin glargine Injectable (LANTUS) 4 Unit(s) SubCutaneous at bedtime  insulin lispro (HumaLOG) corrective regimen sliding scale   SubCutaneous three times a day before meals  insulin lispro Injectable (HumaLOG) 3 Unit(s) SubCutaneous three times a day before meals  lisinopril 10 milliGRAM(s) Oral daily  meropenem  IVPB 500 milliGRAM(s) IV Intermittent every 6 hours  metoprolol tartrate 25 milliGRAM(s) Oral two times a day  pantoprazole    Tablet 40 milliGRAM(s) Oral before breakfast  senna 2 Tablet(s) Oral at bedtime  simethicone 80 milliGRAM(s) Chew three times a day  simvastatin 10 milliGRAM(s) Oral at bedtime    PRN MEDICATIONS  acetaminophen   Tablet .. 650 milliGRAM(s) Oral every 6 hours PRN  dextrose 40% Gel 15 Gram(s) Oral once PRN  glucagon  Injectable 1 milliGRAM(s) IntraMuscular once PRN    VITALS:   T(F): 97.6  HR: 91  BP: 152/62  RR: 20  SpO2: 95% RA    LABS:                        9.8    13.00 )-----------( 339      ( 22 Sep 2018 07:18 )             31.7     09-22    143  |  106  |  27<H>  ----------------------------<  146<H>  4.2   |  23  |  1.2    Ca    8.5      22 Sep 2018 07:18    TPro  5.5<L>  /  Alb  2.6<L>  /  TBili  0.3  /  DBili  x   /  AST  13  /  ALT  5   /  AlkPhos  98  09-22        RADIOLOGY:    f/u 9/22 cxr read  < from: Xray Chest 1 View- PORTABLE-Routine (09.21.18 @ 06:36) >  Impression:      Decreased right hydropneumothorax and adjacent opacity. Stable left   pleural effusion/opacity.    < end of copied text >    PHYSICAL EXAM:  GEN: NAD, resting in bed.   Pulmonary: No increased WOB, clear to auscultation bilaterally, chest tube site clean to suction. 50 cc drainage ON   CV: Regular rate and rhythm, no murmurs, rubs, or gallops.   GI: BS+. Soft, non-tender  MSK: Full ROM bilaterally, DP 2+ bilaterally, no edema.   Neuro: AAOx3  Skin: no rashes or lesions      89 yo F with PMH extensive cardiac history presented to the hospital for the evaluation of sob x 10 days secondary to  empyema.     # HAL 2/2 antibiotic vs lasix vs lisinopril,   -lasix dose decreased to daily. c/w lisinopril, meropenem.   -Cr at 1.2 9/22 from 0.8 on admission.      # SOB 2/2 empyema, secondary to psudomonas  -s/p R large bore chest tube placement to suction. s/p TPA  -f/u serial CXR.   -Midline placed  - leukocytosis stable  -Meropenem 500 mg IV q6, per ID. will need at least 14 degrees abx.   -  thoracentesis. pH 7.0, polymorphonuclear leukocytes and GNR, 96% granulocytes, Cell count 961.  LDH fluid 1588 , LDH serum 198 Ratio 8.0  fluid protein 3.5 , Serum Total  Protein 5.8 Ratio: 0.60-->exudate  -cytology shows no malignant cells  -f/u cell block.   -history of severe pulmonary HTN    #HFpEF  -c/w symbicort  -f/u PFTs as OP, per pulm, might need home O2. Check Sat.   - fluid restriction   - Trend daily weights 54.4kg 9/15, 9/22 61.2 kg.   -no swelling legs.   -AS S/P TAVR ,  - Furosemide 40mg PO daily  -Echo 9/17 showed EF 60% and severe pulmonary htn, unchanged from  Echo 9/1/2018 : EF 59%, results in chart    #HTN  -lisinopril 10 mg daily started 9/17  -BP wnl 123//62  -f/u K and Cr as outpatient, within 1 week.   -Cr increased from 0.8 to 1.4  -K 3.7    # Atrial Fibrillation rate controlled  - AC discontinued due to GI Bleed  - Continue clopidogrel  - Continue Metoprolol    # GERD: Patient takes Famotidine at home.   -c/w protonix    # HLD: Continue simvastatin    # DM  - -214, yesterday FS were wnl  -pt not on home insulin.4 lantus, 3/3/3 lispro low correctional scale    # CT abnomalities in kidney  -Renal US negative  -f/u radiologist Dr. Castañeda for CT comparison.    # dvt ppx: Heparin sc 5000 u q8h  # gi ppx: Protonix 40mg PO qam  DISPO: Short term rehab for [facility] CARE

## 2018-09-22 NOTE — PROGRESS NOTE ADULT - ASSESSMENT
Assessment:  88 y/o Female patient admitted for SOB, CHF exacerbation and right sided pneumonia, s/p right CT placement for right empyema    Plan:  Continue antibiotics  F/u AM CXR  Possible DC CT today

## 2018-09-22 NOTE — PROGRESS NOTE ADULT - ASSESSMENT
87 yo F with PMH of Atrial Fibrillation ( not on AC due to previous GI Bleed ), HTN, CAD with stent placement, CHF, s/p TAVR, GERD, DM, and HLD presented to the hospital for the evaluation of sob x 10 days. Patient states that she first noticed SOB ten days ago and it has been progressively worsening. She has RAIN after about 25 ft ambulation. She used to sleep with one pillow and now is using two for symptomatic relief. Patients son is at bedside and notes that these symptoms are similar to symptoms she had prior to her TAVR. Patient also notes increased swelling of bilaterally lower extremities. She recently saw Cardiologist who completed ECHO and increased dosage of Furosemide. She has had improvement of LE edema but not of SOB to baseline. Patient also notes recent feelings of nausea, chills, fatigue and 5 pound weight loss in the last month too.    #RLL pneumonia with Right sided empyema  - s/p right thoracentesis- 400 ml drained  - F/u Pleural fluid  culture- pseudomonas  -  pleural fluid pH 7.0  - Pt evaluated by CT surgery- S/p  Large bore Rt chest tube placement for drainage  and TPA.  -  CT Chest No Cont (09.20.18 @ 09:51) >Since September 18, 2018, interval placement of a right-sided pleural catheter with decreased small right pleural effusion and increased small to moderate right pneumothorax. Stable small left pleural effusion  - C/w antibiotics-meropenem.  - mid line placed for  antibiotics  - no further pleural fluid  drainage, plan is to discontinue CT tomorrow    #Acute kidney injury resolving  - c/w lasix 40 mg once daily  - monitor BUN/creat    # Acute on chronic diastolic CHF, H/o TAVR, pulmonary HTN- resolved  - BNP 4692   -  daily weights, I/o, restrict fluids, low Na diet  - c/w lasix , metoprolol, lisinopril    # COPD  -c/w  Budesonide    # H/o CAD  - c/w Plavix, statin, metoprolol.    # Atrial Fibrillation  - c/w  clopidogrel, Metoprolol  - not on anticoagulants due to H/o GI bleed    # GERD  - c/w Protonix     # Dyslipidemia  - c/w simvastatin    # Kidney abnormalities noted on CT Scan:   - Renal US negative     # DM type 2   - monitor finger sticks   - c/w lantus, lispro    #GI/ DVT prophylaxis    PT eval: STR

## 2018-09-22 NOTE — DISCHARGE NOTE ADULT - OTHER SIGNIFICANT FINDINGS
< from: CT Chest No Cont (09.20.18 @ 09:51) >  1.  Since September 18, 2018, interval placement of a right-sided pleural   catheter with decreased small right pleural effusion and increased small   to moderate right pneumothorax.    2.  Stable small left pleural effusion.    3.  Remainder of findings are overall unchanged on this short-term   follow-up exam.    < end of copied text >  < from: Xray Chest 1 View- PORTABLE-Routine (09.22.18 @ 08:06) >  Impression:      Stable right basilar effusion and opacity with small amount of pleural   air with right-sided pigtail catheter.     < end of copied text >

## 2018-09-22 NOTE — DISCHARGE NOTE ADULT - ADDITIONAL INSTRUCTIONS
Follow up with Dr. Smith within 2 weeks of discharge. Follow up with your primary care doctor within 1 month of discharge. Continue antibiotics as above.

## 2018-09-22 NOTE — DISCHARGE NOTE ADULT - PATIENT PORTAL LINK FT
You can access the [facility] Portal, offered by [facility], by registering with the following website: [email or web address]

## 2018-09-22 NOTE — DISCHARGE NOTE ADULT - CARE PROVIDERS DIRECT ADDRESSES
,DirectAddress_Unknown,[email or web address] ,DirectAddress_Unknown,[email or web address],DirectAddress_Unknown

## 2018-09-23 LAB
ALBUMIN SERPL ELPH-MCNC: 2.7 G/DL — LOW (ref 3.5–5.2)
ALP SERPL-CCNC: 97 U/L — SIGNIFICANT CHANGE UP (ref 30–115)
ALT FLD-CCNC: 6 U/L — SIGNIFICANT CHANGE UP (ref 0–41)
ANION GAP SERPL CALC-SCNC: 16 MMOL/L — HIGH (ref 7–14)
AST SERPL-CCNC: 13 U/L — SIGNIFICANT CHANGE UP (ref 0–41)
BILIRUB SERPL-MCNC: 0.2 MG/DL — SIGNIFICANT CHANGE UP (ref 0.2–1.2)
BUN SERPL-MCNC: 29 MG/DL — HIGH (ref 10–20)
CALCIUM SERPL-MCNC: 8.5 MG/DL — SIGNIFICANT CHANGE UP (ref 8.5–10.1)
CHLORIDE SERPL-SCNC: 103 MMOL/L — SIGNIFICANT CHANGE UP (ref 98–110)
CO2 SERPL-SCNC: 24 MMOL/L — SIGNIFICANT CHANGE UP (ref 17–32)
CREAT SERPL-MCNC: 1.1 MG/DL — SIGNIFICANT CHANGE UP (ref 0.7–1.5)
CULTURE RESULTS: SIGNIFICANT CHANGE UP
GLUCOSE BLDC GLUCOMTR-MCNC: 114 MG/DL — HIGH (ref 70–99)
GLUCOSE BLDC GLUCOMTR-MCNC: 175 MG/DL — HIGH (ref 70–99)
GLUCOSE BLDC GLUCOMTR-MCNC: 207 MG/DL — HIGH (ref 70–99)
GLUCOSE BLDC GLUCOMTR-MCNC: 222 MG/DL — HIGH (ref 70–99)
GLUCOSE SERPL-MCNC: 147 MG/DL — HIGH (ref 70–99)
HCT VFR BLD CALC: 30.4 % — LOW (ref 37–47)
HGB BLD-MCNC: 9.3 G/DL — LOW (ref 12–16)
MCHC RBC-ENTMCNC: 28.1 PG — SIGNIFICANT CHANGE UP (ref 27–31)
MCHC RBC-ENTMCNC: 30.6 G/DL — LOW (ref 32–37)
MCV RBC AUTO: 91.8 FL — SIGNIFICANT CHANGE UP (ref 81–99)
NRBC # BLD: 0 /100 WBCS — SIGNIFICANT CHANGE UP (ref 0–0)
PLATELET # BLD AUTO: 306 K/UL — SIGNIFICANT CHANGE UP (ref 130–400)
POTASSIUM SERPL-MCNC: 3.9 MMOL/L — SIGNIFICANT CHANGE UP (ref 3.5–5)
POTASSIUM SERPL-SCNC: 3.9 MMOL/L — SIGNIFICANT CHANGE UP (ref 3.5–5)
PROT SERPL-MCNC: 5.5 G/DL — LOW (ref 6–8)
RBC # BLD: 3.31 M/UL — LOW (ref 4.2–5.4)
RBC # FLD: 14.5 % — SIGNIFICANT CHANGE UP (ref 11.5–14.5)
SODIUM SERPL-SCNC: 143 MMOL/L — SIGNIFICANT CHANGE UP (ref 135–146)
SPECIMEN SOURCE: SIGNIFICANT CHANGE UP
WBC # BLD: 10.9 K/UL — HIGH (ref 4.8–10.8)
WBC # FLD AUTO: 10.9 K/UL — HIGH (ref 4.8–10.8)

## 2018-09-23 RX ORDER — MEROPENEM 1 G/30ML
500 INJECTION INTRAVENOUS
Qty: 0 | Refills: 0 | COMMUNITY
Start: 2018-09-23 | End: 2018-10-03

## 2018-09-23 RX ORDER — FUROSEMIDE 40 MG
1 TABLET ORAL
Qty: 0 | Refills: 0 | COMMUNITY

## 2018-09-23 RX ORDER — ACETAMINOPHEN 500 MG
2 TABLET ORAL
Qty: 0 | Refills: 0 | COMMUNITY
Start: 2018-09-23

## 2018-09-23 RX ORDER — ACETAMINOPHEN 500 MG
2 TABLET ORAL
Qty: 0 | Refills: 0 | COMMUNITY

## 2018-09-23 RX ORDER — LISINOPRIL 2.5 MG/1
1 TABLET ORAL
Qty: 0 | Refills: 0 | COMMUNITY
Start: 2018-09-23

## 2018-09-23 RX ORDER — FUROSEMIDE 40 MG
1 TABLET ORAL
Qty: 0 | Refills: 0 | COMMUNITY
Start: 2018-09-23

## 2018-09-23 RX ORDER — BUDESONIDE AND FORMOTEROL FUMARATE DIHYDRATE 160; 4.5 UG/1; UG/1
2 AEROSOL RESPIRATORY (INHALATION)
Qty: 0 | Refills: 0 | COMMUNITY
Start: 2018-09-23

## 2018-09-23 RX ORDER — MEROPENEM 1 G/30ML
500 INJECTION INTRAVENOUS
Qty: 0 | Refills: 0 | COMMUNITY
Start: 2018-09-23 | End: 2018-10-04

## 2018-09-23 RX ADMIN — MEROPENEM 100 MILLIGRAM(S): 1 INJECTION INTRAVENOUS at 21:26

## 2018-09-23 RX ADMIN — MEROPENEM 100 MILLIGRAM(S): 1 INJECTION INTRAVENOUS at 11:19

## 2018-09-23 RX ADMIN — Medication 25 MILLIGRAM(S): at 17:07

## 2018-09-23 RX ADMIN — HEPARIN SODIUM 5000 UNIT(S): 5000 INJECTION INTRAVENOUS; SUBCUTANEOUS at 13:46

## 2018-09-23 RX ADMIN — PANTOPRAZOLE SODIUM 40 MILLIGRAM(S): 20 TABLET, DELAYED RELEASE ORAL at 07:49

## 2018-09-23 RX ADMIN — MEROPENEM 100 MILLIGRAM(S): 1 INJECTION INTRAVENOUS at 13:48

## 2018-09-23 RX ADMIN — Medication 3 UNIT(S): at 16:50

## 2018-09-23 RX ADMIN — MEROPENEM 100 MILLIGRAM(S): 1 INJECTION INTRAVENOUS at 01:39

## 2018-09-23 RX ADMIN — Medication 1: at 16:49

## 2018-09-23 RX ADMIN — LISINOPRIL 10 MILLIGRAM(S): 2.5 TABLET ORAL at 05:12

## 2018-09-23 RX ADMIN — Medication 3 UNIT(S): at 12:40

## 2018-09-23 RX ADMIN — SIMETHICONE 80 MILLIGRAM(S): 80 TABLET, CHEWABLE ORAL at 13:47

## 2018-09-23 RX ADMIN — Medication 100 MILLIGRAM(S): at 05:12

## 2018-09-23 RX ADMIN — INSULIN GLARGINE 4 UNIT(S): 100 INJECTION, SOLUTION SUBCUTANEOUS at 21:27

## 2018-09-23 RX ADMIN — BUDESONIDE AND FORMOTEROL FUMARATE DIHYDRATE 2 PUFF(S): 160; 4.5 AEROSOL RESPIRATORY (INHALATION) at 07:45

## 2018-09-23 RX ADMIN — CLOPIDOGREL BISULFATE 75 MILLIGRAM(S): 75 TABLET, FILM COATED ORAL at 11:20

## 2018-09-23 RX ADMIN — Medication 2: at 07:44

## 2018-09-23 RX ADMIN — SENNA PLUS 2 TABLET(S): 8.6 TABLET ORAL at 21:28

## 2018-09-23 RX ADMIN — CHLORHEXIDINE GLUCONATE 1 APPLICATION(S): 213 SOLUTION TOPICAL at 05:12

## 2018-09-23 RX ADMIN — Medication 3 UNIT(S): at 07:45

## 2018-09-23 RX ADMIN — SIMVASTATIN 10 MILLIGRAM(S): 20 TABLET, FILM COATED ORAL at 21:28

## 2018-09-23 RX ADMIN — Medication 100 MILLIGRAM(S): at 17:07

## 2018-09-23 RX ADMIN — Medication 40 MILLIGRAM(S): at 05:12

## 2018-09-23 RX ADMIN — SIMETHICONE 80 MILLIGRAM(S): 80 TABLET, CHEWABLE ORAL at 05:12

## 2018-09-23 RX ADMIN — SIMETHICONE 80 MILLIGRAM(S): 80 TABLET, CHEWABLE ORAL at 21:28

## 2018-09-23 RX ADMIN — Medication 25 MILLIGRAM(S): at 05:12

## 2018-09-23 RX ADMIN — HEPARIN SODIUM 5000 UNIT(S): 5000 INJECTION INTRAVENOUS; SUBCUTANEOUS at 21:26

## 2018-09-23 RX ADMIN — HEPARIN SODIUM 5000 UNIT(S): 5000 INJECTION INTRAVENOUS; SUBCUTANEOUS at 05:12

## 2018-09-23 NOTE — PROGRESS NOTE ADULT - ASSESSMENT
87 yo F with PMH of Atrial Fibrillation ( not on AC due to previous GI Bleed ), HTN, CAD with stent placement, CHF, s/p TAVR, GERD, DM, and HLD presented to the hospital for the evaluation of sob x 10 days. Patient states that she first noticed SOB ten days ago and it has been progressively worsening. She has RAIN after about 25 ft ambulation. She used to sleep with one pillow and now is using two for symptomatic relief. Patients son is at bedside and notes that these symptoms are similar to symptoms she had prior to her TAVR. Patient also notes increased swelling of bilaterally lower extremities. She recently saw Cardiologist who completed ECHO and increased dosage of Furosemide. She has had improvement of LE edema but not of SOB to baseline. Patient also notes recent feelings of nausea, chills, fatigue and 5 pound weight loss in the last month too.    #RLL pneumonia with Right sided empyema resolving  - s/p right thoracentesis- 400 ml drained  - F/u Pleural fluid  culture- pseudomonas  -  pleural fluid pH 7.0  -  CT Chest No Cont (09.20.18 @ 09:51) >Since September 18, 2018, interval placement of a right-sided pleural catheter with decreased small right pleural effusion and increased small to moderate right pneumothorax. Stable small left pleural effusion  - C/w antibiotics-meropenem.  - mid line placed for  antibiotics  - chest tube discontinued yesterday  -  Xray Chest 1 View-PORTABLE IMMEDIATE (09.22.18 @ 18:58)Interval removal of right basilar pigtail catheter. No new pneumothorax. Small loculated pleural air at the right base is unchanged. Bibasilar opacities and small left pleural effusion is unchanged.    #Acute kidney injury resolving  - c/w lasix 40 mg once daily  - monitor BUN/creat    # Acute on chronic diastolic CHF, H/o TAVR, pulmonary HTN- resolved  - BNP 4692   -  daily weights, I/o, restrict fluids, low Na diet  - c/w lasix , metoprolol, lisinopril    # COPD  -c/w  Budesonide    # H/o CAD  - c/w Plavix, statin, metoprolol.    # Atrial Fibrillation  - c/w  clopidogrel, Metoprolol  - not on anticoagulants due to H/o GI bleed    # GERD  - c/w Protonix     # Dyslipidemia  - c/w simvastatin    # Kidney abnormalities noted on CT Scan:   - Renal US negative     # DM type 2   - monitor finger sticks   - c/w lantus, lispro    #GI/ DVT prophylaxis    PT eval: STR     Planned for discharge to SNF tomorrow

## 2018-09-23 NOTE — PROGRESS NOTE ADULT - ASSESSMENT
89y/o F s/p large bore pigtail for R empyema. Pigtail pulled yesterday.    PLAN:  - f/u CXR  - f/u AM CXR

## 2018-09-24 VITALS
TEMPERATURE: 98 F | SYSTOLIC BLOOD PRESSURE: 137 MMHG | DIASTOLIC BLOOD PRESSURE: 63 MMHG | HEART RATE: 80 BPM | OXYGEN SATURATION: 98 % | RESPIRATION RATE: 20 BRPM

## 2018-09-24 LAB
ALBUMIN SERPL ELPH-MCNC: 2.7 G/DL — LOW (ref 3.5–5.2)
ALP SERPL-CCNC: 103 U/L — SIGNIFICANT CHANGE UP (ref 30–115)
ALT FLD-CCNC: 6 U/L — SIGNIFICANT CHANGE UP (ref 0–41)
ANION GAP SERPL CALC-SCNC: 14 MMOL/L — SIGNIFICANT CHANGE UP (ref 7–14)
AST SERPL-CCNC: 16 U/L — SIGNIFICANT CHANGE UP (ref 0–41)
BILIRUB SERPL-MCNC: 0.3 MG/DL — SIGNIFICANT CHANGE UP (ref 0.2–1.2)
BUN SERPL-MCNC: 28 MG/DL — HIGH (ref 10–20)
CALCIUM SERPL-MCNC: 8.5 MG/DL — SIGNIFICANT CHANGE UP (ref 8.5–10.1)
CHLORIDE SERPL-SCNC: 104 MMOL/L — SIGNIFICANT CHANGE UP (ref 98–110)
CO2 SERPL-SCNC: 24 MMOL/L — SIGNIFICANT CHANGE UP (ref 17–32)
CREAT SERPL-MCNC: 1 MG/DL — SIGNIFICANT CHANGE UP (ref 0.7–1.5)
ESTIMATED AVERAGE GLUCOSE: 166 MG/DL — HIGH (ref 68–114)
GLUCOSE BLDC GLUCOMTR-MCNC: 146 MG/DL — HIGH (ref 70–99)
GLUCOSE BLDC GLUCOMTR-MCNC: 155 MG/DL — HIGH (ref 70–99)
GLUCOSE BLDC GLUCOMTR-MCNC: 187 MG/DL — HIGH (ref 70–99)
GLUCOSE SERPL-MCNC: 176 MG/DL — HIGH (ref 70–99)
HBA1C BLD-MCNC: 7.4 % — HIGH (ref 4–5.6)
HCT VFR BLD CALC: 29.1 % — LOW (ref 37–47)
HGB BLD-MCNC: 9 G/DL — LOW (ref 12–16)
MCHC RBC-ENTMCNC: 28.2 PG — SIGNIFICANT CHANGE UP (ref 27–31)
MCHC RBC-ENTMCNC: 30.9 G/DL — LOW (ref 32–37)
MCV RBC AUTO: 91.2 FL — SIGNIFICANT CHANGE UP (ref 81–99)
NRBC # BLD: 0 /100 WBCS — SIGNIFICANT CHANGE UP (ref 0–0)
PLATELET # BLD AUTO: 282 K/UL — SIGNIFICANT CHANGE UP (ref 130–400)
POTASSIUM SERPL-MCNC: 4.7 MMOL/L — SIGNIFICANT CHANGE UP (ref 3.5–5)
POTASSIUM SERPL-SCNC: 4.7 MMOL/L — SIGNIFICANT CHANGE UP (ref 3.5–5)
PROT SERPL-MCNC: 5.5 G/DL — LOW (ref 6–8)
RBC # BLD: 3.19 M/UL — LOW (ref 4.2–5.4)
RBC # FLD: 14.2 % — SIGNIFICANT CHANGE UP (ref 11.5–14.5)
SODIUM SERPL-SCNC: 142 MMOL/L — SIGNIFICANT CHANGE UP (ref 135–146)
WBC # BLD: 8.68 K/UL — SIGNIFICANT CHANGE UP (ref 4.8–10.8)
WBC # FLD AUTO: 8.68 K/UL — SIGNIFICANT CHANGE UP (ref 4.8–10.8)

## 2018-09-24 RX ADMIN — CLOPIDOGREL BISULFATE 75 MILLIGRAM(S): 75 TABLET, FILM COATED ORAL at 11:18

## 2018-09-24 RX ADMIN — MEROPENEM 100 MILLIGRAM(S): 1 INJECTION INTRAVENOUS at 08:55

## 2018-09-24 RX ADMIN — LISINOPRIL 10 MILLIGRAM(S): 2.5 TABLET ORAL at 05:25

## 2018-09-24 RX ADMIN — Medication 40 MILLIGRAM(S): at 05:25

## 2018-09-24 RX ADMIN — HEPARIN SODIUM 5000 UNIT(S): 5000 INJECTION INTRAVENOUS; SUBCUTANEOUS at 14:48

## 2018-09-24 RX ADMIN — Medication 25 MILLIGRAM(S): at 05:25

## 2018-09-24 RX ADMIN — Medication 100 MILLIGRAM(S): at 05:25

## 2018-09-24 RX ADMIN — Medication 1: at 08:18

## 2018-09-24 RX ADMIN — HEPARIN SODIUM 5000 UNIT(S): 5000 INJECTION INTRAVENOUS; SUBCUTANEOUS at 05:28

## 2018-09-24 RX ADMIN — PANTOPRAZOLE SODIUM 40 MILLIGRAM(S): 20 TABLET, DELAYED RELEASE ORAL at 08:56

## 2018-09-24 RX ADMIN — SIMETHICONE 80 MILLIGRAM(S): 80 TABLET, CHEWABLE ORAL at 14:48

## 2018-09-24 RX ADMIN — MEROPENEM 100 MILLIGRAM(S): 1 INJECTION INTRAVENOUS at 15:24

## 2018-09-24 RX ADMIN — Medication 3 UNIT(S): at 11:25

## 2018-09-24 RX ADMIN — Medication 650 MILLIGRAM(S): at 10:47

## 2018-09-24 RX ADMIN — Medication 3 UNIT(S): at 08:18

## 2018-09-24 RX ADMIN — Medication 1: at 11:24

## 2018-09-24 RX ADMIN — Medication 3 UNIT(S): at 17:09

## 2018-09-24 RX ADMIN — Medication 650 MILLIGRAM(S): at 11:17

## 2018-09-24 NOTE — PROGRESS NOTE ADULT - ASSESSMENT
SOB/ PUL HTN/ S/P TAVR/ A FIB / HIGHLY COMPONENT OF COPD (SEEN ALSO ON CHEST CT 8/18), PUL HTN MULTIFACTORIAL / Neutrophilic exudative effusion GR in fluid/ pseudomonas s/p pig tail/ tpa/ better    - ABX PER ID  - OOB TO CHAIR  - DC PLANNING  SPOKE AT LENGTH WITH SON / PATIENT AT BEDSIDE

## 2018-09-24 NOTE — PROGRESS NOTE ADULT - NSHPATTENDINGPLANDISCUSS_GEN_ALL_CORE
Housestaff, pt's son
Housestaff
Housestaff, pt's son
TEAM
housestaff
TEAM
resident

## 2018-09-24 NOTE — PROGRESS NOTE ADULT - REASON FOR ADMISSION
SOB -Pleural Effusions b/l
empyema
SOB -Pleural Effusions b/l

## 2018-09-24 NOTE — PROGRESS NOTE ADULT - SUBJECTIVE AND OBJECTIVE BOX
MUKESH POLLACK  89y Female   [id number]    Procedure/Diagnosis: S/p large bore pigtail for right empyema  Events over 24h: No acute overnight events.     PAST MEDICAL & SURGICAL HISTORY:  CAD (coronary artery disease)  Hypertension  GERD (gastroesophageal reflux disease)  Diabetes  Arthritis  S/P TAVR (transcatheter aortic valve replacement)    Vital Signs Last 24 Hrs  T(C): 36.7 (21 Sep 2018 21:39), Max: 36.7 (21 Sep 2018 21:39)  T(F): 98 (21 Sep 2018 21:39), Max: 98 (21 Sep 2018 21:39)  HR: 89 (21 Sep 2018 21:39) (74 - 89)  BP: 135/64 (21 Sep 2018 21:39) (108/50 - 135/64)  BP(mean): --  RR: 20 (21 Sep 2018 21:39) (20 - 20)  SpO2: 93% (21 Sep 2018 21:39) (92% - 95%)    Diet, Regular:   Consistent Carbohydrate No Snacks (09-20-18 @ 15:03)    I&O's Detail    20 Sep 2018 07:01  -  21 Sep 2018 07:00  --------------------------------------------------------  IN:  Total IN: 0 mL    OUT:    Chest Tube: 216 mL    Voided: 200 mL  Total OUT: 416 mL    Total NET: -416 mL      21 Sep 2018 07:01  -  22 Sep 2018 05:13  --------------------------------------------------------  IN:  Total IN: 0 mL    OUT:    Chest Tube: 114 mL  Total OUT: 114 mL    Total NET: -114 mL      MEDICATIONS  (STANDING):  buDESOnide  80 MICROgram(s)/formoterol 4.5 MICROgram(s) Inhaler 2 Puff(s) Inhalation two times a day  chlorhexidine 4% Liquid 1 Application(s) Topical <User Schedule>  clopidogrel Tablet 75 milliGRAM(s) Oral daily  dextrose 50% Injectable 12.5 Gram(s) IV Push once  dextrose 50% Injectable 25 Gram(s) IV Push once  dextrose 50% Injectable 25 Gram(s) IV Push once  docusate sodium 100 milliGRAM(s) Oral two times a day  furosemide    Tablet 40 milliGRAM(s) Oral daily  heparin  Injectable 5000 Unit(s) SubCutaneous every 8 hours  influenza   Vaccine 0.5 milliLiter(s) IntraMuscular once  insulin glargine Injectable (LANTUS) 4 Unit(s) SubCutaneous at bedtime  insulin lispro (HumaLOG) corrective regimen sliding scale   SubCutaneous three times a day before meals  insulin lispro Injectable (HumaLOG) 3 Unit(s) SubCutaneous three times a day before meals  lisinopril 10 milliGRAM(s) Oral daily  meropenem  IVPB 500 milliGRAM(s) IV Intermittent every 6 hours  metoprolol tartrate 25 milliGRAM(s) Oral two times a day  pantoprazole    Tablet 40 milliGRAM(s) Oral before breakfast  senna 2 Tablet(s) Oral at bedtime  simethicone 80 milliGRAM(s) Chew three times a day  simvastatin 10 milliGRAM(s) Oral at bedtime    MEDICATIONS  (PRN):  acetaminophen   Tablet .. 650 milliGRAM(s) Oral every 6 hours PRN Mild Pain (1 - 3)  dextrose 40% Gel 15 Gram(s) Oral once PRN Blood Glucose LESS THAN 70 milliGRAM(s)/deciliter  glucagon  Injectable 1 milliGRAM(s) IntraMuscular once PRN Glucose LESS THAN 70 milligrams/deciliter    PHYSICAL EXAM:  GENERAL: NAD,   CHEST/LUNG: Clear to auscultation bilaterally;  HEART: S1 and S2 noted  ABDOMEN: Soft, Nontender, Nondistended; Bowel sounds present  EXTREMITIES: No cyanosis, or edema  PSYCH: AAOx3    LABS:                         9.2    11.89 )-----------( 307      ( 21 Sep 2018 05:30 )             29.9        09-21    142  |  102  |  29<H>  ----------------------------<  183<H>  3.7   |  25  |  1.4    Ca    8.2<L>      21 Sep 2018 05:30    TPro  5.2<L>  /  Alb  2.4<L>  /  TBili  0.2  /  DBili  x   /  AST  12  /  ALT  6   /  AlkPhos  100  09-21    LIVER FUNCTIONS - ( 21 Sep 2018 05:30 )  Alb: 2.4 g/dL / Pro: 5.2 g/dL / ALK PHOS: 100 U/L / ALT: 6 U/L / AST: 12 U/L / GGT: x           IMAGING:  Xray Chest 1 View- PORTABLE-Routine (09.21.18 @ 06:36) >  Impression:    Decreased right hydropneumothorax and adjacent opacity. Stable left   pleural effusion/opacity.
MUKESH POLLACK  89y, Female      OVERNIGHT EVENTS:  no acute events overnight    ROS negative except as per above    VITALS:  T(F): 97.9, Max: 98.4 (09-23-18 @ 14:13)  HR: 81  BP: 172/70  RR: 18Vital Signs Last 24 Hrs  T(C): 36.6 (24 Sep 2018 07:01), Max: 36.9 (23 Sep 2018 14:13)  T(F): 97.9 (24 Sep 2018 07:01), Max: 98.4 (23 Sep 2018 14:13)  HR: 81 (24 Sep 2018 07:01) (76 - 88)  BP: 172/70 (24 Sep 2018 07:01) (129/62 - 172/70)  BP(mean): 89 (23 Sep 2018 14:13) (89 - 89)  RR: 18 (24 Sep 2018 07:01) (18 - 20)  SpO2: 97% (23 Sep 2018 14:13) (97% - 97%)    PHYSICAL EXAM  Gen: NAD  HEENT: NCAT  CV: RRR, no murmurs  Lungs: decreased bases  Abd: Soft. NTND  Extr: wwp  Skin: no rash  Neuro: No focal deficits  Lines: PICC clean      TESTS & MEASUREMENTS:                        9.0    8.68  )-----------( 282      ( 24 Sep 2018 07:55 )             29.1     09-24    142  |  104  |  28<H>  ----------------------------<  176<H>  4.7   |  24  |  1.0    Ca    8.5      24 Sep 2018 07:55    TPro  5.5<L>  /  Alb  2.7<L>  /  TBili  0.3  /  DBili  x   /  AST  16  /  ALT  6   /  AlkPhos  103  09-24    LIVER FUNCTIONS - ( 24 Sep 2018 07:55 )  Alb: 2.7 g/dL / Pro: 5.5 g/dL / ALK PHOS: 103 U/L / ALT: 6 U/L / AST: 16 U/L / GGT: x             Culture - Blood (collected 09-18-18 @ 09:26)  Source: .Blood None  Final Report (09-23-18 @ 17:00):    No growth at 5 days.    Culture - Fungal, Body Fluid (collected 09-17-18 @ 11:41)  Source: Pleural Fl Pleural Fluid  Preliminary Report (09-18-18 @ 07:12):    Testing in progress    Culture - Body Fluid with Gram Stain (collected 09-17-18 @ 11:41)  Source: Pleural Fl Pleural Fluid  Gram Stain (09-17-18 @ 20:40):    polymorphonuclear leukocytes seen    Gram Negative Rods seen by cytocentrifuge  Final Report (09-22-18 @ 22:05):    Few Pseudomonas aeruginosa mucoid strain  Organism: Pseudomonas aeruginosa (09-22-18 @ 22:05)  Organism: Pseudomonas aeruginosa (09-22-18 @ 22:05)      -  Amikacin: S <=8      -  Aztreonam: R >16      -  Cefepime: R >16      -  Ceftazidime: R >16      -  Ciprofloxacin: S <=0.5      -  Gentamicin: S <=1      -  Imipenem: S <=1      -  Levofloxacin: S <=1      -  Meropenem: S <=1      -  Piperacillin/Tazobactam: R >64      -  Tobramycin: S <=2      Method Type: LINDSAY    Culture - Acid Fast - Body Fluid w/Smear (collected 09-17-18 @ 11:41)  Source: Pleural Fl Pleural Fluid          RADIOLOGY & ADDITIONAL TESTS:    ANTIBIOTICS:    levoFLOXacin IVPB   100 mL/Hr IV Intermittent (09-18-18 @ 12:14)   100 mL/Hr IV Intermittent (09-17-18 @ 15:58)    meropenem  IVPB   100 mL/Hr IV Intermittent (09-20-18 @ 06:37)   100 mL/Hr IV Intermittent (09-19-18 @ 21:58)    meropenem  IVPB   100 mL/Hr IV Intermittent (09-24-18 @ 08:55)   100 mL/Hr IV Intermittent (09-23-18 @ 21:26)   100 mL/Hr IV Intermittent (09-23-18 @ 13:48)   100 mL/Hr IV Intermittent (09-23-18 @ 11:19)   100 mL/Hr IV Intermittent (09-23-18 @ 01:39)   100 mL/Hr IV Intermittent (09-22-18 @ 21:01)   100 mL/Hr IV Intermittent (09-22-18 @ 13:51)   100 mL/Hr IV Intermittent (09-22-18 @ 07:32)   100 mL/Hr IV Intermittent (09-22-18 @ 02:32)   100 mL/Hr IV Intermittent (09-21-18 @ 20:13)   100 mL/Hr IV Intermittent (09-21-18 @ 13:22)   100 mL/Hr IV Intermittent (09-21-18 @ 08:03)   100 mL/Hr IV Intermittent (09-21-18 @ 04:40)   100 mL/Hr IV Intermittent (09-20-18 @ 21:07)   100 mL/Hr IV Intermittent (09-20-18 @ 13:55)    metroNIDAZOLE  IVPB   100 mL/Hr IV Intermittent (09-17-18 @ 15:59)    metroNIDAZOLE  IVPB   100 mL/Hr IV Intermittent (09-19-18 @ 05:34)   100 mL/Hr IV Intermittent (09-18-18 @ 21:23)   100 mL/Hr IV Intermittent (09-18-18 @ 13:09)   100 mL/Hr IV Intermittent (09-18-18 @ 06:24)   100 mL/Hr IV Intermittent (09-17-18 @ 21:40)    vancomycin  IVPB   250 mL/Hr IV Intermittent (09-17-18 @ 15:59)    vancomycin  IVPB   250 mL/Hr IV Intermittent (09-19-18 @ 06:36)   250 mL/Hr IV Intermittent (09-18-18 @ 17:04)   250 mL/Hr IV Intermittent (09-18-18 @ 06:28)        meropenem  IVPB 500 milliGRAM(s) IV Intermittent every 6 hours
Patient is a 88y old  Female who presents with a chief complaint of SOB -Pleural Effusions b/l (17 Sep 2018 08:19)    Patient was seen and examined.  Denies chest pain, sob improved.  S/p Right thoracentesis- 400 ml drained    PAST MEDICAL & SURGICAL HISTORY:  CAD (coronary artery disease)  Hypertension  GERD (gastroesophageal reflux disease)  Diabetes  Arthritis  S/P TAVR (transcatheter aortic valve replacement)    Allergies  penicillins (Other)    MEDICATIONS  (STANDING):  buDESOnide  80 MICROgram(s)/formoterol 4.5 MICROgram(s) Inhaler 2 Puff(s) Inhalation two times a day  chlorhexidine 4% Liquid 1 Application(s) Topical <User Schedule>  clopidogrel Tablet 75 milliGRAM(s) Oral daily  dextrose 50% Injectable 12.5 Gram(s) IV Push once  dextrose 50% Injectable 25 Gram(s) IV Push once  dextrose 50% Injectable 25 Gram(s) IV Push once  furosemide    Tablet 40 milliGRAM(s) Oral two times a day  heparin  Injectable 5000 Unit(s) SubCutaneous every 8 hours  influenza   Vaccine 0.5 milliLiter(s) IntraMuscular once  insulin glargine Injectable (LANTUS) 4 Unit(s) SubCutaneous at bedtime  insulin lispro (HumaLOG) corrective regimen sliding scale   SubCutaneous three times a day before meals  insulin lispro Injectable (HumaLOG) 3 Unit(s) SubCutaneous three times a day before meals  lisinopril 10 milliGRAM(s) Oral daily  magnesium oxide 400 milliGRAM(s) Oral three times a day with meals  metoprolol tartrate 25 milliGRAM(s) Oral two times a day  pantoprazole    Tablet 40 milliGRAM(s) Oral before breakfast  simvastatin 10 milliGRAM(s) Oral at bedtime  vancomycin  IVPB      vancomycin  IVPB 750 milliGRAM(s) IV Intermittent once    MEDICATIONS  (PRN):  acetaminophen   Tablet .. 650 milliGRAM(s) Oral every 6 hours PRN Mild Pain (1 - 3)  dextrose 40% Gel 15 Gram(s) Oral once PRN Blood Glucose LESS THAN 70 milliGRAM(s)/deciliter  glucagon  Injectable 1 milliGRAM(s) IntraMuscular once PRN Glucose LESS THAN 70 milligrams/deciliter    Vital Signs Last 24 Hrs  T(C): 36.4  T(F): 97.6  HR: 80  BP: 152/63  BP(mean): --  RR: 18  SpO2: 100%    O/E:  Awake, alert, not in distress.  HEENT: atraumatic, EOMI.  Chest: decreased breath sounds at bases  CVS: SIS2 +,irregular, Systolic murmur+  P/A: Soft, BS+  CNS: non focal.  Ext: no edema feet.  Skin: no rash, no ulcers.  All systems reviewed positive findings as above.      POCT Blood Glucose.: 214 mg/dL (17 Sep 2018 11:06)  POCT Blood Glucose.: 243 mg/dL (17 Sep 2018 07:24)  POCT Blood Glucose.: 258 mg/dL (16 Sep 2018 21:27)  POCT Blood Glucose.: 257 mg/dL (16 Sep 2018 20:01)  POCT Blood Glucose.: 275 mg/dL (16 Sep 2018 16:36)                        10.3<L>  13.83<H> )-----------( 350      ( 17 Sep 2018 12:18 )             32.4<L>                        9.7<L>  11.29<H> )-----------( 333      ( 16 Sep 2018 06:10 )             30.8<L>    09-17    138  |  99  |  23<H>  ----------------------------<  205<H>  4.4   |  29  |  0.7  09-16    141  |  100  |  22<H>  ----------------------------<  194<H>  3.9   |  25  |  0.8    Ca    9.0      17 Sep 2018 12:18  Ca    8.9      16 Sep 2018 06:10  Mg     1.9     09-17    TPro  6.5  /  Alb  3.2<L>  /  TBili  0.4  /  DBili  x   /  AST  14  /  ALT  7   /  AlkPhos  154<H>  09-17  TPro  6.1  /  Alb  3.1<L>  /  TBili  0.5  /  DBili  x   /  AST  12  /  ALT  6   /  AlkPhos  141<H>  09-16
Patient is a 89y old  Female who presents with a chief complaint of SOB -Pleural Effusions b/l (23 Sep 2018 15:14) s/p removal of large bore pigtail for right empyema    PAST MEDICAL & SURGICAL HISTORY:  CAD (coronary artery disease)  Hypertension  GERD (gastroesophageal reflux disease)  Diabetes  Arthritis  S/P TAVR (transcatheter aortic valve replacement)      Events of the Last 24h:none  Vital Signs Last 24 Hrs  T(C): 36.2 (23 Sep 2018 21:49), Max: 36.9 (23 Sep 2018 14:13)  T(F): 97.2 (23 Sep 2018 21:49), Max: 98.4 (23 Sep 2018 14:13)  HR: 76 (23 Sep 2018 21:49) (76 - 88)  BP: 133/62 (23 Sep 2018 21:49) (129/62 - 133/62)  BP(mean): 89 (23 Sep 2018 14:13) (89 - 89)  RR: 18 (23 Sep 2018 21:49) (18 - 20)  SpO2: 97% (23 Sep 2018 14:13) (97% - 97%)        Diet, Regular:   Consistent Carbohydrate No Snacks (09-20-18 @ 15:03)      I&O's Summary    22 Sep 2018 07:01  -  23 Sep 2018 07:00  --------------------------------------------------------  IN: 240 mL / OUT: 0 mL / NET: 240 mL     I&O's Detail    22 Sep 2018 07:01  -  23 Sep 2018 07:00  --------------------------------------------------------  IN:    Oral Fluid: 240 mL  Total IN: 240 mL    OUT:  Total OUT: 0 mL    Total NET: 240 mL          MEDICATIONS  (STANDING):  buDESOnide  80 MICROgram(s)/formoterol 4.5 MICROgram(s) Inhaler 2 Puff(s) Inhalation two times a day  chlorhexidine 4% Liquid 1 Application(s) Topical <User Schedule>  clopidogrel Tablet 75 milliGRAM(s) Oral daily  dextrose 50% Injectable 12.5 Gram(s) IV Push once  dextrose 50% Injectable 25 Gram(s) IV Push once  dextrose 50% Injectable 25 Gram(s) IV Push once  docusate sodium 100 milliGRAM(s) Oral two times a day  furosemide    Tablet 40 milliGRAM(s) Oral daily  heparin  Injectable 5000 Unit(s) SubCutaneous every 8 hours  influenza   Vaccine 0.5 milliLiter(s) IntraMuscular once  insulin glargine Injectable (LANTUS) 4 Unit(s) SubCutaneous at bedtime  insulin lispro (HumaLOG) corrective regimen sliding scale   SubCutaneous three times a day before meals  insulin lispro Injectable (HumaLOG) 3 Unit(s) SubCutaneous three times a day before meals  lisinopril 10 milliGRAM(s) Oral daily  meropenem  IVPB 500 milliGRAM(s) IV Intermittent every 6 hours  metoprolol tartrate 25 milliGRAM(s) Oral two times a day  pantoprazole    Tablet 40 milliGRAM(s) Oral before breakfast  senna 2 Tablet(s) Oral at bedtime  simethicone 80 milliGRAM(s) Chew three times a day  simvastatin 10 milliGRAM(s) Oral at bedtime    MEDICATIONS  (PRN):  acetaminophen   Tablet .. 650 milliGRAM(s) Oral every 6 hours PRN Mild Pain (1 - 3)  dextrose 40% Gel 15 Gram(s) Oral once PRN Blood Glucose LESS THAN 70 milliGRAM(s)/deciliter  glucagon  Injectable 1 milliGRAM(s) IntraMuscular once PRN Glucose LESS THAN 70 milligrams/deciliter      PHYSICAL EXAM:    GENERAL: NAD    HEENT: NCAT    CHEST/LUNGS: CTAB    HEART: RRR,  No murmurs, rubs, or gallops    ABDOMEN: SNTND +BS    EXTREMITIES:  FROM, No clubbing, cyanosis, or edema, palpable pulse    NEURO: No focal neurological deficits    SKIN: No rashes or lesions    INCISION/WOUNDS:                          9.3    10.90 )-----------( 306      ( 23 Sep 2018 09:50 )             30.4        CBC Full  -  ( 23 Sep 2018 09:50 )  WBC Count : 10.90 K/uL  Hemoglobin : 9.3 g/dL  Hematocrit : 30.4 %  Platelet Count - Automated : 306 K/uL  Mean Cell Volume : 91.8 fL  Mean Cell Hemoglobin : 28.1 pg  Mean Cell Hemoglobin Concentration : 30.6 g/dL  Auto Neutrophil # : x  Auto Lymphocyte # : x  Auto Monocyte # : x  Auto Eosinophil # : x  Auto Basophil # : x  Auto Neutrophil % : x  Auto Lymphocyte % : x  Auto Monocyte % : x  Auto Eosinophil % : x  Auto Basophil % : x               143   |  103   |  29                 Ca: 8.5    BMP:   ----------------------------< 147    Mg: x     (09-23-18 @ 09:50)             3.9    |  24    | 1.1                Ph: x        LFT:     TPro: 5.5 / Alb: 2.7 / TBili: 0.2 / DBili: x / AST: 13 / ALT: 6 / AlkPhos: 97   (09-23-18 @ 09:50)    LIVER FUNCTIONS - ( 23 Sep 2018 09:50 )  Alb: 2.7 g/dL / Pro: 5.5 g/dL / ALK PHOS: 97 U/L / ALT: 6 U/L / AST: 13 U/L / GGT: x               < from: Xray Chest 1 View- PORTABLE-Routine (09.23.18 @ 08:31) >  Impression:    Obscured right lung base.    Left-sided opacity is resolving.    < end of copied text >          IMAGING:    PATHOLOGY:      SPECTRA:
Events noted . results of Thoracentesis and CT scan of the chest noted. Finding are thought to be c/w empyema . CT surgery has seen the patient and chest tube is plannned .     PHYSICAL EXAM:  Vital Signs Last 24 Hrs  T(C): 35.9 (18 Sep 2018 05:50), Max: 36.9 (17 Sep 2018 21:44)  T(F): 96.6 (18 Sep 2018 05:50), Max: 98.4 (17 Sep 2018 21:44)  HR: 82 (18 Sep 2018 05:50) (82 - 114)  BP: 124/58 (18 Sep 2018 05:50) (97/52 - 156/67)  BP(mean): 90 (17 Sep 2018 17:23) (90 - 97)  RR: 18 (18 Sep 2018 05:50) (18 - 20)  SpO2: 95% (18 Sep 2018 05:50) (94% - 99%)  Daily     Daily   I&O's Detail    17 Sep 2018 07:01  -  18 Sep 2018 07:00  --------------------------------------------------------  IN:  Total IN: 0 mL    OUT:    Other: 350 mL    Voided: 3 mL  Total OUT: 353 mL    Total NET: -353 mL        GENERAL: NAD, well-groomed, well-developed  HEAD:  Atraumatic, Normocephalic  EYES: EOMI, PERRLA, conjunctiva and sclera clear  ENMT: No tonsillar erythema, exudates, or enlargement; Moist mucous membranes, Good dentition, No lesions  NECK: Supple, No JVD, Normal thyroid  NERVOUS SYSTEM:  Alert & Oriented X3, Good concentration; Motor Strength 5/5 B/L upper and lower extremities; DTRs 2+ intact and symmetric  CHEST/LUNG: decreased BS noted at bases  .  HEART: irregular  ABDOMEN: Soft, Nontender, Nondistended; Bowel sounds present  EXTREMITIES:  No edmea   LYMPH: No lymphadenopathy noted  SKIN: No rashes or lesion        LABS:                        9.5    13.33 )-----------( 339      ( 18 Sep 2018 09:26 )             30.8     09-18    141  |  101  |  24<H>  ----------------------------<  159<H>  3.9   |  26  |  0.9    Ca    8.7      18 Sep 2018 09:26  Mg     1.9     09-17    TPro  6.1  /  Alb  2.9<L>  /  TBili  0.4  /  DBili  x   /  AST  14  /  ALT  7   /  AlkPhos  135<H>  09-18      MEDICATIONS  (STANDING):  buDESOnide  80 MICROgram(s)/formoterol 4.5 MICROgram(s) Inhaler 2 Puff(s) Inhalation two times a day  chlorhexidine 4% Liquid 1 Application(s) Topical <User Schedule>  clopidogrel Tablet 75 milliGRAM(s) Oral daily  dextrose 50% Injectable 12.5 Gram(s) IV Push once  dextrose 50% Injectable 25 Gram(s) IV Push once  dextrose 50% Injectable 25 Gram(s) IV Push once  docusate sodium 100 milliGRAM(s) Oral two times a day  furosemide    Tablet 40 milliGRAM(s) Oral two times a day  heparin  Injectable 5000 Unit(s) SubCutaneous every 8 hours  influenza   Vaccine 0.5 milliLiter(s) IntraMuscular once  insulin glargine Injectable (LANTUS) 4 Unit(s) SubCutaneous at bedtime  insulin lispro (HumaLOG) corrective regimen sliding scale   SubCutaneous three times a day before meals  insulin lispro Injectable (HumaLOG) 3 Unit(s) SubCutaneous three times a day before meals  lactulose Syrup 10 Gram(s) Oral three times a day  levoFLOXacin IVPB 750 milliGRAM(s) IV Intermittent every 24 hours  lisinopril 10 milliGRAM(s) Oral daily  metoprolol tartrate 25 milliGRAM(s) Oral two times a day  metroNIDAZOLE  IVPB      metroNIDAZOLE  IVPB 500 milliGRAM(s) IV Intermittent every 8 hours  pantoprazole    Tablet 40 milliGRAM(s) Oral before breakfast  senna 2 Tablet(s) Oral at bedtime  simethicone 80 milliGRAM(s) Chew three times a day  simvastatin 10 milliGRAM(s) Oral at bedtime  vancomycin  IVPB      vancomycin  IVPB 750 milliGRAM(s) IV Intermittent every 12 hours    MEDICATIONS  (PRN):  acetaminophen   Tablet .. 650 milliGRAM(s) Oral every 6 hours PRN Mild Pain (1 - 3)  dextrose 40% Gel 15 Gram(s) Oral once PRN Blood Glucose LESS THAN 70 milliGRAM(s)/deciliter  glucagon  Injectable 1 milliGRAM(s) IntraMuscular once PRN Glucose LESS THAN 70 milligrams/deciliter
GENERAL SURGERY PROGRESS NOTE    Patient: MUKESH POLLACK , 89y ([birth year])Female   MRN: [id number]  Location: [address]  Visit: 09-13-18 Inpatient  Date: 09-23-18 @ 03:36    Hospital Day #: 10    Procedure/Dx/Injuries: s/p large bore pigtail for right empyema    Events of past 24 hours: Pigtail pulled, CXR ordered and awaiting official read by radiologist. No acute events overnight. Pt resting comfortably in bed.    PAST MEDICAL & SURGICAL HISTORY:  CAD (coronary artery disease)  Hypertension  GERD (gastroesophageal reflux disease)  Diabetes  Arthritis  S/P TAVR (transcatheter aortic valve replacement)      Vitals: T(F): 99 (09-22-18 @ 20:53), Max: 99 (09-22-18 @ 20:53)  HR: 71 (09-22-18 @ 20:53)  BP: 122/57 (09-22-18 @ 20:53)  RR: 18 (09-22-18 @ 20:53)  SpO2: 96% (09-22-18 @ 14:07)      Diet, Regular:   Consistent Carbohydrate No Snacks      Fluids:     I & O's:    09-21-18 @ 07:01  -  09-22-18 @ 07:00  --------------------------------------------------------  IN:  Total IN: 0 mL    OUT:    Chest Tube: 164 mL  Total OUT: 164 mL    Total NET: -164 mL      PHYSICAL EXAM  GEN: NAD, resting comfortably  CV: RRR, no rubs/murmurs  LUNGS: CTAB, no wheeze/rales/ronchi  ABD: soft, nt, nd, no guarding, +BS  EXT: FROM, no clubbing/cyanosis    MEDICATIONS  (STANDING):  buDESOnide  80 MICROgram(s)/formoterol 4.5 MICROgram(s) Inhaler 2 Puff(s) Inhalation two times a day  chlorhexidine 4% Liquid 1 Application(s) Topical <User Schedule>  clopidogrel Tablet 75 milliGRAM(s) Oral daily  dextrose 50% Injectable 12.5 Gram(s) IV Push once  dextrose 50% Injectable 25 Gram(s) IV Push once  dextrose 50% Injectable 25 Gram(s) IV Push once  docusate sodium 100 milliGRAM(s) Oral two times a day  furosemide    Tablet 40 milliGRAM(s) Oral daily  heparin  Injectable 5000 Unit(s) SubCutaneous every 8 hours  influenza   Vaccine 0.5 milliLiter(s) IntraMuscular once  insulin glargine Injectable (LANTUS) 4 Unit(s) SubCutaneous at bedtime  insulin lispro (HumaLOG) corrective regimen sliding scale   SubCutaneous three times a day before meals  insulin lispro Injectable (HumaLOG) 3 Unit(s) SubCutaneous three times a day before meals  lisinopril 10 milliGRAM(s) Oral daily  meropenem  IVPB 500 milliGRAM(s) IV Intermittent every 6 hours  metoprolol tartrate 25 milliGRAM(s) Oral two times a day  pantoprazole    Tablet 40 milliGRAM(s) Oral before breakfast  senna 2 Tablet(s) Oral at bedtime  simethicone 80 milliGRAM(s) Chew three times a day  simvastatin 10 milliGRAM(s) Oral at bedtime    MEDICATIONS  (PRN):  acetaminophen   Tablet .. 650 milliGRAM(s) Oral every 6 hours PRN Mild Pain (1 - 3)  dextrose 40% Gel 15 Gram(s) Oral once PRN Blood Glucose LESS THAN 70 milliGRAM(s)/deciliter  glucagon  Injectable 1 milliGRAM(s) IntraMuscular once PRN Glucose LESS THAN 70 milligrams/deciliter       ANTIBIOTICS:  meropenem  IVPB 500 milliGRAM(s)        LAB/STUDIES:  CAPILLARY BLOOD GLUCOSE      POCT Blood Glucose.: 203 mg/dL (22 Sep 2018 20:57)  POCT Blood Glucose.: 173 mg/dL (22 Sep 2018 15:55)  POCT Blood Glucose.: 240 mg/dL (22 Sep 2018 11:04)  POCT Blood Glucose.: 161 mg/dL (22 Sep 2018 07:15)                          9.8    13.00 )-----------( 339      ( 22 Sep 2018 07:18 )             31.7     09-22    143  |  106  |  27<H>  ----------------------------<  146<H>  4.2   |  23  |  1.2    Ca    8.5      22 Sep 2018 07:18    TPro  5.5<L>  /  Alb  2.6<L>  /  TBili  0.3  /  DBili  x   /  AST  13  /  ALT  5   /  AlkPhos  98  09-22               5.5  | 0.3  | 13       ------------------[98      ( 22 Sep 2018 07:18 )  2.6  | x    | 5           Lipase:x      Amylase:x
HOSPITALIST ATTENDING NOTE    MATYMELINDA MUKESH  88y Female  [id number]    INTERVAL HPI/OVERNIGHT EVENTS: feels well - no complaints     T(C): 37.4 (09-16-18 @ 05:48), Max: 37.4 (09-15-18 @ 22:06)  HR: 107 (09-16-18 @ 05:48) (92 - 107)  BP: 188/81 (09-16-18 @ 05:48) (141/62 - 188/81)  RR: 18 (09-16-18 @ 05:48) (18 - 20)  SpO2: 95% (09-16-18 @ 05:48) (95% - 100%)  Wt(kg): --    PHYSICAL EXAM:  GENERAL: NAD  HEAD:  Atraumatic, Normocephalic  EYES: EOMI, PERRLA, conjunctiva and sclera clear  ENMT: No tonsillar erythema, exudates, or enlargement  NECK: Supple, No JVD, Normal thyroid  NERVOUS SYSTEM:  Alert & Oriented X3, Good concentration; Non-focal- Motor Strength 5/5 B/L upper and lower extremities;  CHEST/LUNG: decreased BS at bases R>L  HEART: irRegular rate and rhythm; No murmurs, rubs, or gallops  ABDOMEN: Soft, Nontender, Nondistended; Bowel sounds present  EXTREMITIES: +edema   PSYCH: No suicidal/homicial ideation/hallucinations    Consultant(s) Notes Reviewed:  [x ] YES  [ ] NO  Care Discussed with Consultants/Other Providers/ Housestaff [ x] YES  [ ] NO    LABS:                        9.7    11.29 )-----------( 333      ( 16 Sep 2018 06:10 )             30.8     09-16    141  |  100  |  22<H>  ----------------------------<  194<H>  3.9   |  25  |  0.8    Ca    8.9      16 Sep 2018 06:10  Mg     1.7     09-16    TPro  6.1  /  Alb  3.1<L>  /  TBili  0.5  /  DBili  x   /  AST  12  /  ALT  6   /  AlkPhos  141<H>  09-16          RADIOLOGY & ADDITIONAL TESTS:    Imaging or report Personally Reviewed:  [ ] YES  [ ] NO    Case discussed with resident    Care discussed with pt/family      HEALTH ISSUES - PROBLEM Dx:
HOSPITALIST ATTENDING NOTE    MATYMELINDA MUKESH  88y Female  [id number]    INTERVAL HPI/OVERNIGHT EVENTS: sob improved +cough nonproductive     T(C): 36.1 (09-15-18 @ 07:19), Max: 37.5 (09-15-18 @ 00:04)  HR: 84 (09-15-18 @ 07:19) (65 - 95)  BP: 138/64 (09-15-18 @ 07:19) (138/64 - 181/70)  RR: 20 (09-15-18 @ 07:19) (16 - 20)  SpO2: 100% (09-15-18 @ 00:04) (94% - 100%)  Wt(kg): --      PHYSICAL EXAM:  GENERAL: NAD  HEAD:  Atraumatic, Normocephalic  EYES: EOMI, PERRLA, conjunctiva and sclera clear  ENMT: No tonsillar erythema, exudates, or enlargement  NECK: Supple, No JVD, Normal thyroid  NERVOUS SYSTEM:  Alert & Oriented X3, Good concentration; Non-focal- Motor Strength 5/5 B/L upper and lower extremities;  CHEST/LUNG: decreased BS at bases R>L  HEART: irRegular rate and rhythm; No murmurs, rubs, or gallops  ABDOMEN: Soft, Nontender, Nondistended; Bowel sounds present  EXTREMITIES: +edema   PSYCH: No suicidal/homicial ideation/hallucinations    Consultant(s) Notes Reviewed:  [x ] YES  [ ] NO  Care Discussed with Consultants/Other Providers/ Housestaff [ x] YES  [ ] NO    LABS:                        10.1   11.21 )-----------( 340      ( 15 Sep 2018 06:44 )             31.5     09-15    138  |  98  |  27<H>  ----------------------------<  213<H>  3.8   |  24  |  0.8    Ca    8.8      15 Sep 2018 06:44  Mg     1.6     09-14    TPro  6.1  /  Alb  3.1<L>  /  TBili  0.6  /  DBili  x   /  AST  13  /  ALT  7   /  AlkPhos  150<H>  09-15      Culture - Urine (collected 13 Sep 2018 13:19)  Source: .Urine Clean Catch (Midstream)  Final Report (14 Sep 2018 23:29):    <10,000 CFU/ml Normal Urogenital simran present          RADIOLOGY & ADDITIONAL TESTS:    Imaging or report Personally Reviewed:  [ ] YES  [ ] NO    Case discussed with resident    Care discussed with pt/family      HEALTH ISSUES - PROBLEM Dx:
Hospital Day:  3d    Subjective:  No overnight events. Seen and examined at bedside overnight and telemetry reviewed. No fevers, chills, nausea, vomiting chest pain, constipation, diarrhea, shortness of breath, weakness, fatigue. Remainder of review of systems negative.     Past Medical Hx:   CAD (coronary artery disease)  Hypertension  GERD (gastroesophageal reflux disease)  Diabetes  Arthritis    Past Sx:  S/P TAVR (transcatheter aortic valve replacement)    Allergies:  penicillins (Other)    Current Meds:   Standng Meds:  chlorhexidine 4% Liquid 1 Application(s) Topical <User Schedule>  clopidogrel Tablet 75 milliGRAM(s) Oral daily  furosemide   Injectable 40 milliGRAM(s) IV Push two times a day  heparin  Injectable 5000 Unit(s) SubCutaneous every 8 hours  influenza   Vaccine 0.5 milliLiter(s) IntraMuscular once  metoprolol tartrate 25 milliGRAM(s) Oral two times a day  pantoprazole    Tablet 40 milliGRAM(s) Oral before breakfast  simvastatin 10 milliGRAM(s) Oral at bedtime    PRN Meds:  acetaminophen   Tablet .. 650 milliGRAM(s) Oral every 6 hours PRN Mild Pain (1 - 3)    Vital Signs:   T(F): 99.3 (18 @ 05:48), Max: 99.4 (09-15-18 @ 22:06)  HR: 107 (18 @ 05:48) (84 - 107)  BP: 188/81 (18 @ 05:48) (138/64 - 188/81)  RR: 18 (18 @ 05:48) (18 - 20)  SpO2: 95% (18 @ 05:48) (95% - 100%)        Physical Exam:   GENERAL: NAD, lying in bed, pleasant  HEENT: NCAT, PERRLA, EOMI, Neck supple, Mucous membranes moist  CHEST/LUNG: Bilaterally diminished breath sounds with some crackles appreciated at the bases. No w/r/r  HEART: irregularly irregular; s1 s2 appreciated, No murmurs, rubs, or gallops  ABDOMEN: Soft, Nontender, Nondistended; Bowel sounds present  EXTREMITIES: No LE edema b/l, Peripheral pulses 2+, No cyanosis, no clubbing   NERVOUS SYSTEM:  Alert & Oriented X3, Non focal        Labs:                         10.1   11.21 )-----------( 340      ( 15 Sep 2018 06:44 )             31.5       15 Sep 2018 06:44    138    |  98     |  27     ----------------------------<  213    3.8     |  24     |  0.8      Ca    8.8        15 Sep 2018 06:44  Mg     1.6       14 Sep 2018 06:52    TPro  6.1    /  Alb  3.1    /  TBili  0.6    /  DBili  x      /  AST  13     /  ALT  7      /  AlkPhos  150    15 Sep 2018 06:44      Amylase --, Lipase 41, 18 @ 13:18    Serum Pro-Brain Natriuretic Peptide: 4692 pg/mL (18 @ 13:18)    Troponin <0.01, CKMB --, CK -- 18 @ 13:18    Urinalysis Basic - ( 13 Sep 2018 13:19 )    Color: Yellow / Appearance: Clear / S.025 / pH: x  Gluc: x / Ketone: Negative  / Bili: Negative / Urobili: 0.2 mg/dL   Blood: x / Protein: 30 mg/dL / Nitrite: Negative   Leuk Esterase: Small / RBC: x / WBC 6-10 /HPF   Sq Epi: x / Non Sq Epi: x / Bacteria: x    Radiology:   Xray Chest 1 View- PORTABLE-Routine (18 @ 05:05) >  INTERPRETATION:  CLINICAL HISTORY: Shortness of breath, pleural effusion.    COMPARISON: Chest radiograph dated 2018. Correlation with CT chest   dated 2018.    TECHNIQUE: Portable frontal chest radiograph. Adequate positioning.    FINDINGS:    Support devices: Stable cardiac valve prosthesis. Telemetry leads overlie   the chest.    Cardiac/mediastinum/hilum: Stable.    Lung parenchyma/Pleura: Stable-appearing bilateral pleural effusions,   right greater than left, which are better evaluated on recent CT. No   pneumothorax.    Skeleton/soft tissues: Stable.      IMPRESSION:      Stable-appearing bilateral pleural effusions, right greater than left,   which are better evaluated on recent CT.    Assessment and Plan:   87 yo F with PMH of Atrial Fibrillation ( not on AC due to previous GI Bleed ), HTN, CAD with stent placement, CHF, s/p TAVR, GERD, DM, and HLD presented to the hospital for the evaluation of sob x 10 days. Found to have b/l Pleural effusions Right is moderate and left is small.     # SOB due to b/l Pleural Effusions: Pleural effusion likely to be secondary to CHF exacerbation   - BNP 4692  - CT scan shows worsening pleural effusions when compared to study completed recently.   - Trend daily weights  - furosemide 40mg bid iv for now   -cardiology: switch to PO Lasix, continue Metoprolol, suggest pulmonary evaluation for pulmonary HTN out of proportion to her  HF, suggest repeat CT scan of the head, work up for renal lesion as per PCP, monitor electrolytes and renal function closely  - Monitor Labs and CXR       # Atrial Fibrillation:  - AC discontinued due to GI Bleed  - Continue clopidogrel  - Continue Metoprolol    # GERD:   - Protonix 40mg PO every morning.   - Patient does take Famotidine at home     # HLD:   - Continue simvastatin    # Kidney abnormalities noted on CT Scan:   - Renal US negative     # DM:   - monitor finger sticks   - If greater than 180 with improved PO intake: begin weight based Insulin regimen     Activity: As tolerated  Diet: Carb Consistent  DVT ppx: Heparin SubQ q8  GI ppx: Protonix 40mg PO daily
MUKESH POLLACK  89y, Female      OVERNIGHT EVENTS:    feels well    VITALS:  T(F): 98.3, Max: 98.3 (09-20-18 @ 13:49)  HR: 107  BP: 114/54  RR: 20Vital Signs Last 24 Hrs  T(C): 36.8 (20 Sep 2018 13:49), Max: 36.8 (20 Sep 2018 06:35)  T(F): 98.3 (20 Sep 2018 13:49), Max: 98.3 (20 Sep 2018 13:49)  HR: 107 (20 Sep 2018 13:49) (82 - 107)  BP: 114/54 (20 Sep 2018 13:49) (114/54 - 133/58)  BP(mean): --  RR: 20 (20 Sep 2018 13:49) (20 - 20)  SpO2: 94% (20 Sep 2018 06:35) (94% - 95%)    TESTS & MEASUREMENTS:                        10.2   14.53 )-----------( 338      ( 20 Sep 2018 06:43 )             32.0     09-20    142  |  104  |  27<H>  ----------------------------<  156<H>  3.9   |  24  |  1.1    Ca    8.3<L>      20 Sep 2018 06:43    TPro  5.3<L>  /  Alb  2.6<L>  /  TBili  0.3  /  DBili  x   /  AST  13  /  ALT  6   /  AlkPhos  109  09-20    LIVER FUNCTIONS - ( 20 Sep 2018 06:43 )  Alb: 2.6 g/dL / Pro: 5.3 g/dL / ALK PHOS: 109 U/L / ALT: 6 U/L / AST: 13 U/L / GGT: x             Culture - Blood (collected 09-18-18 @ 09:26)  Source: .Blood None  Preliminary Report (09-19-18 @ 17:02):    No growth to date.    Culture - Fungal, Body Fluid (collected 09-17-18 @ 11:41)  Source: Pleural Fl Pleural Fluid  Preliminary Report (09-18-18 @ 07:12):    Testing in progress    Culture - Body Fluid with Gram Stain (collected 09-17-18 @ 11:41)  Source: Pleural Fl Pleural Fluid  Gram Stain (09-17-18 @ 20:40):    polymorphonuclear leukocytes seen    Gram Negative Rods seen by cytocentrifuge  Preliminary Report (09-19-18 @ 18:47):    Few Pseudomonas aeruginosa mucoid strain    Culture - Acid Fast - Body Fluid w/Smear (collected 09-17-18 @ 11:41)  Source: Pleural Fl Pleural Fluid            RADIOLOGY & ADDITIONAL TESTS:    ANTIBIOTICS:  meropenem  IVPB 500 milliGRAM(s) IV Intermittent every 6 hours
MUKESH POLLACK  89y, Female      OVERNIGHT EVENTS:    feels well, no chest pain, minimal SOB    VITALS:  T(F): 97.6, Max: 98 (09-21-18 @ 21:39)  HR: 91  BP: 152/62  RR: 20Vital Signs Last 24 Hrs  T(C): 36.4 (22 Sep 2018 06:52), Max: 36.7 (21 Sep 2018 21:39)  T(F): 97.6 (22 Sep 2018 06:52), Max: 98 (21 Sep 2018 21:39)  HR: 91 (22 Sep 2018 06:52) (74 - 91)  BP: 152/62 (22 Sep 2018 06:52) (108/50 - 152/62)  BP(mean): --  RR: 20 (22 Sep 2018 06:52) (20 - 20)  SpO2: 95% (22 Sep 2018 06:52) (92% - 95%)    TESTS & MEASUREMENTS:                        9.2    11.89 )-----------( 307      ( 21 Sep 2018 05:30 )             29.9     09-21    142  |  102  |  29<H>  ----------------------------<  183<H>  3.7   |  25  |  1.4    Ca    8.2<L>      21 Sep 2018 05:30    TPro  5.2<L>  /  Alb  2.4<L>  /  TBili  0.2  /  DBili  x   /  AST  12  /  ALT  6   /  AlkPhos  100  09-21    LIVER FUNCTIONS - ( 21 Sep 2018 05:30 )  Alb: 2.4 g/dL / Pro: 5.2 g/dL / ALK PHOS: 100 U/L / ALT: 6 U/L / AST: 12 U/L / GGT: x             Culture - Blood (collected 09-18-18 @ 09:26)  Source: .Blood None  Preliminary Report (09-19-18 @ 17:02):    No growth to date.    Culture - Fungal, Body Fluid (collected 09-17-18 @ 11:41)  Source: Pleural Fl Pleural Fluid  Preliminary Report (09-18-18 @ 07:12):    Testing in progress    Culture - Body Fluid with Gram Stain (collected 09-17-18 @ 11:41)  Source: Pleural Fl Pleural Fluid  Gram Stain (09-17-18 @ 20:40):    polymorphonuclear leukocytes seen    Gram Negative Rods seen by cytocentrifuge  Preliminary Report (09-19-18 @ 18:47):    Few Pseudomonas aeruginosa mucoid strain  Organism: Pseudomonas aeruginosa (09-20-18 @ 17:33)  Organism: Pseudomonas aeruginosa (09-20-18 @ 17:33)      -  Amikacin: S <=8      -  Aztreonam: R >16      -  Cefepime: R >16      -  Ceftazidime: R >16      -  Ciprofloxacin: S <=0.5      -  Gentamicin: S <=1      -  Imipenem: S <=1      -  Levofloxacin: S <=1      -  Meropenem: S <=1      -  Piperacillin/Tazobactam: R >64      -  Tobramycin: S <=2      Method Type: LINDSAY    Culture - Acid Fast - Body Fluid w/Smear (collected 09-17-18 @ 11:41)  Source: Pleural Fl Pleural Fluid            RADIOLOGY & ADDITIONAL TESTS:    ANTIBIOTICS:  meropenem  IVPB 500 milliGRAM(s) IV Intermittent every 6 hours
MUKESH POLLACK  89y, Female      OVERNIGHT EVENTS:    no fevers. Feels well. No cough. No increase in SOB. No chest pain    VITALS:  T(F): 97.9, Max: 98.3 (09-20-18 @ 13:49)  HR: 81  BP: 113/56  RR: 20Vital Signs Last 24 Hrs  T(C): 36.6 (21 Sep 2018 05:46), Max: 36.8 (20 Sep 2018 13:49)  T(F): 97.9 (21 Sep 2018 05:46), Max: 98.3 (20 Sep 2018 13:49)  HR: 81 (21 Sep 2018 05:46) (81 - 107)  BP: 113/56 (21 Sep 2018 05:46) (113/56 - 119/51)  BP(mean): --  RR: 20 (21 Sep 2018 05:46) (20 - 20)  SpO2: 95% (21 Sep 2018 05:46) (95% - 96%)    TESTS & MEASUREMENTS:                        9.2    11.89 )-----------( 307      ( 21 Sep 2018 05:30 )             29.9     09-21    142  |  102  |  29<H>  ----------------------------<  183<H>  3.7   |  25  |  1.4    Ca    8.2<L>      21 Sep 2018 05:30    TPro  5.2<L>  /  Alb  2.4<L>  /  TBili  0.2  /  DBili  x   /  AST  12  /  ALT  6   /  AlkPhos  100  09-21    LIVER FUNCTIONS - ( 21 Sep 2018 05:30 )  Alb: 2.4 g/dL / Pro: 5.2 g/dL / ALK PHOS: 100 U/L / ALT: 6 U/L / AST: 12 U/L / GGT: x             Culture - Blood (collected 09-18-18 @ 09:26)  Source: .Blood None  Preliminary Report (09-19-18 @ 17:02):    No growth to date.    Culture - Fungal, Body Fluid (collected 09-17-18 @ 11:41)  Source: Pleural Fl Pleural Fluid  Preliminary Report (09-18-18 @ 07:12):    Testing in progress    Culture - Body Fluid with Gram Stain (collected 09-17-18 @ 11:41)  Source: Pleural Fl Pleural Fluid  Gram Stain (09-17-18 @ 20:40):    polymorphonuclear leukocytes seen    Gram Negative Rods seen by cytocentrifuge  Preliminary Report (09-19-18 @ 18:47):    Few Pseudomonas aeruginosa mucoid strain  Organism: Pseudomonas aeruginosa (09-20-18 @ 17:33)  Organism: Pseudomonas aeruginosa (09-20-18 @ 17:33)      -  Amikacin: S <=8      -  Aztreonam: R >16      -  Cefepime: R >16      -  Ceftazidime: R >16      -  Ciprofloxacin: S <=0.5      -  Gentamicin: S <=1      -  Imipenem: S <=1      -  Levofloxacin: S <=1      -  Meropenem: S <=1      -  Piperacillin/Tazobactam: R >64      -  Tobramycin: S <=2      Method Type: LINDSAY    Culture - Acid Fast - Body Fluid w/Smear (collected 09-17-18 @ 11:41)  Source: Pleural Fl Pleural Fluid            RADIOLOGY & ADDITIONAL TESTS:    ANTIBIOTICS:  meropenem  IVPB 500 milliGRAM(s) IV Intermittent every 6 hours
MUKESH POLLACK  89y, Female      OVERNIGHT EVENTS:    none    VITALS:  T(F): 98.4, Max: 99 (09-22-18 @ 20:53)  HR: 83  BP: 154/67  RR: 18Vital Signs Last 24 Hrs  T(C): 36.9 (23 Sep 2018 05:10), Max: 37.2 (22 Sep 2018 20:53)  T(F): 98.4 (23 Sep 2018 05:10), Max: 99 (22 Sep 2018 20:53)  HR: 83 (23 Sep 2018 05:10) (71 - 83)  BP: 154/67 (23 Sep 2018 05:10) (122/57 - 154/67)  BP(mean): --  RR: 18 (23 Sep 2018 05:10) (18 - 20)  SpO2: 96% (22 Sep 2018 14:07) (96% - 96%)    TESTS & MEASUREMENTS:                        9.8    13.00 )-----------( 339      ( 22 Sep 2018 07:18 )             31.7     09-22    143  |  106  |  27<H>  ----------------------------<  146<H>  4.2   |  23  |  1.2    Ca    8.5      22 Sep 2018 07:18    TPro  5.5<L>  /  Alb  2.6<L>  /  TBili  0.3  /  DBili  x   /  AST  13  /  ALT  5   /  AlkPhos  98  09-22    LIVER FUNCTIONS - ( 22 Sep 2018 07:18 )  Alb: 2.6 g/dL / Pro: 5.5 g/dL / ALK PHOS: 98 U/L / ALT: 5 U/L / AST: 13 U/L / GGT: x             Culture - Blood (collected 09-18-18 @ 09:26)  Source: .Blood None  Preliminary Report (09-19-18 @ 17:02):    No growth to date.    Culture - Fungal, Body Fluid (collected 09-17-18 @ 11:41)  Source: Pleural Fl Pleural Fluid  Preliminary Report (09-18-18 @ 07:12):    Testing in progress    Culture - Body Fluid with Gram Stain (collected 09-17-18 @ 11:41)  Source: Pleural Fl Pleural Fluid  Gram Stain (09-17-18 @ 20:40):    polymorphonuclear leukocytes seen    Gram Negative Rods seen by cytocentrifuge  Final Report (09-22-18 @ 22:05):    Few Pseudomonas aeruginosa mucoid strain  Organism: Pseudomonas aeruginosa (09-22-18 @ 22:05)  Organism: Pseudomonas aeruginosa (09-22-18 @ 22:05)      -  Amikacin: S <=8      -  Aztreonam: R >16      -  Cefepime: R >16      -  Ceftazidime: R >16      -  Ciprofloxacin: S <=0.5      -  Gentamicin: S <=1      -  Imipenem: S <=1      -  Levofloxacin: S <=1      -  Meropenem: S <=1      -  Piperacillin/Tazobactam: R >64      -  Tobramycin: S <=2      Method Type: LINDSAY    Culture - Acid Fast - Body Fluid w/Smear (collected 09-17-18 @ 11:41)  Source: Pleural Fl Pleural Fluid            RADIOLOGY & ADDITIONAL TESTS:    ANTIBIOTICS:  meropenem  IVPB 500 milliGRAM(s) IV Intermittent every 6 hours
OVERNIGHT EVENTS: feels better, no drainage s/p chest ct    Vital Signs Last 24 Hrs  T(C): 36.6 (21 Sep 2018 05:46), Max: 36.8 (20 Sep 2018 13:49)  T(F): 97.9 (21 Sep 2018 05:46), Max: 98.3 (20 Sep 2018 13:49)  HR: 81 (21 Sep 2018 05:46) (81 - 107)  BP: 113/56 (21 Sep 2018 05:46) (113/56 - 119/51)  BP(mean): --  RR: 20 (21 Sep 2018 05:46) (20 - 20)  SpO2: 95% (21 Sep 2018 05:46) (95% - 96%)    PHYSICAL EXAMINATION:    GENERAL: The patient is awake and alert in no apparent distress.     HEENT: Head is normocephalic and atraumatic. Extraocular muscles are intact. Mucous membranes are moist.    NECK: Supple.    LUNGS: dec bs  r side    HEART: Regular rate and rhythm without murmur.    ABDOMEN: Soft, nontender, and nondistended.      EXTREMITIES: Without any cyanosis, clubbing, rash, lesions or edema.    NEUROLOGIC: Grossly intact.    SKIN: No ulceration or induration present.      LABS:                        9.2    11.89 )-----------( 307      ( 21 Sep 2018 05:30 )             29.9     09-21    142  |  102  |  29<H>  ----------------------------<  183<H>  3.7   |  25  |  1.4    Ca    8.2<L>      21 Sep 2018 05:30    TPro  5.2<L>  /  Alb  2.4<L>  /  TBili  0.2  /  DBili  x   /  AST  12  /  ALT  6   /  AlkPhos  100  09-21 09-20-18 @ 07:01  -  09-21-18 @ 07:00  --------------------------------------------------------  IN: 0 mL / OUT: 416 mL / NET: -416 mL        MICROBIOLOGY:  Culture Results:   No growth to date. (09-18 @ 09:26)  Culture Results:   Few Pseudomonas aeruginosa mucoid strain (09-17 @ 11:41)  Culture Results:   Testing in progress (09-17 @ 11:41)      MEDICATIONS  (STANDING):  buDESOnide  80 MICROgram(s)/formoterol 4.5 MICROgram(s) Inhaler 2 Puff(s) Inhalation two times a day  chlorhexidine 4% Liquid 1 Application(s) Topical <User Schedule>  clopidogrel Tablet 75 milliGRAM(s) Oral daily  dextrose 50% Injectable 12.5 Gram(s) IV Push once  dextrose 50% Injectable 25 Gram(s) IV Push once  dextrose 50% Injectable 25 Gram(s) IV Push once  docusate sodium 100 milliGRAM(s) Oral two times a day  furosemide    Tablet 40 milliGRAM(s) Oral two times a day  heparin  Injectable 5000 Unit(s) SubCutaneous every 8 hours  influenza   Vaccine 0.5 milliLiter(s) IntraMuscular once  insulin glargine Injectable (LANTUS) 4 Unit(s) SubCutaneous at bedtime  insulin lispro (HumaLOG) corrective regimen sliding scale   SubCutaneous three times a day before meals  insulin lispro Injectable (HumaLOG) 3 Unit(s) SubCutaneous three times a day before meals  lisinopril 10 milliGRAM(s) Oral daily  meropenem  IVPB 500 milliGRAM(s) IV Intermittent every 6 hours  metoprolol tartrate 25 milliGRAM(s) Oral two times a day  pantoprazole    Tablet 40 milliGRAM(s) Oral before breakfast  senna 2 Tablet(s) Oral at bedtime  simethicone 80 milliGRAM(s) Chew three times a day  simvastatin 10 milliGRAM(s) Oral at bedtime    MEDICATIONS  (PRN):  acetaminophen   Tablet .. 650 milliGRAM(s) Oral every 6 hours PRN Mild Pain (1 - 3)  dextrose 40% Gel 15 Gram(s) Oral once PRN Blood Glucose LESS THAN 70 milliGRAM(s)/deciliter  glucagon  Injectable 1 milliGRAM(s) IntraMuscular once PRN Glucose LESS THAN 70 milligrams/deciliter      RADIOLOGY & ADDITIONAL STUDIES:
OVERNIGHT EVENTS: looks comfortable, no major events    Vital Signs Last 24 Hrs  T(C): 36.4 (17 Sep 2018 06:06), Max: 36.9 (16 Sep 2018 14:50)  T(F): 97.6 (17 Sep 2018 06:06), Max: 98.4 (16 Sep 2018 14:50)  HR: 80 (17 Sep 2018 06:06) (80 - 116)  BP: 152/63 (17 Sep 2018 06:06) (152/63 - 194/75)  RR: 18 (17 Sep 2018 06:06) (18 - 18)  SpO2: 100% (17 Sep 2018 06:06) (97% - 100%)    PHYSICAL EXAMINATION:    GENERAL: The patient is awake and alert in no apparent distress.     HEENT: Head is normocephalic and atraumatic. Extraocular muscles are intact. Mucous membranes are moist.    NECK: Supple.    LUNGS: dec bs r side    HEART: Regular rate and rhythm without murmur.    ABDOMEN: Soft, nontender, and nondistended.      EXTREMITIES: Without any cyanosis, clubbing, rash, lesions or edema.    SKIN: No ulceration or induration present.      LABS:                        9.7    11.29 )-----------( 333      ( 16 Sep 2018 06:10 )             30.8     09-16    141  |  100  |  22<H>  ----------------------------<  194<H>  3.9   |  25  |  0.8    Ca    8.9      16 Sep 2018 06:10  Mg     1.7     09-16    TPro  6.1  /  Alb  3.1<L>  /  TBili  0.5  /  DBili  x   /  AST  12  /  ALT  6   /  AlkPhos  141<H>  09-16                            MICROBIOLOGY:  Culture Results:   <10,000 CFU/ml Normal Urogenital simran present (09-13 @ 13:19)      MEDICATIONS  (STANDING):  buDESOnide  80 MICROgram(s)/formoterol 4.5 MICROgram(s) Inhaler 2 Puff(s) Inhalation two times a day  chlorhexidine 4% Liquid 1 Application(s) Topical <User Schedule>  clopidogrel Tablet 75 milliGRAM(s) Oral daily  furosemide    Tablet 40 milliGRAM(s) Oral two times a day  heparin  Injectable 5000 Unit(s) SubCutaneous every 8 hours  influenza   Vaccine 0.5 milliLiter(s) IntraMuscular once  magnesium oxide 400 milliGRAM(s) Oral three times a day with meals  metoprolol tartrate 25 milliGRAM(s) Oral two times a day  pantoprazole    Tablet 40 milliGRAM(s) Oral before breakfast  simvastatin 10 milliGRAM(s) Oral at bedtime    MEDICATIONS  (PRN):  acetaminophen   Tablet .. 650 milliGRAM(s) Oral every 6 hours PRN Mild Pain (1 - 3)      RADIOLOGY & ADDITIONAL STUDIES:
OVERNIGHT EVENTS: s/p dc pigtail doing well    Vital Signs Last 24 Hrs  T(C): 36.6 (24 Sep 2018 07:01), Max: 36.9 (23 Sep 2018 14:13)  T(F): 97.9 (24 Sep 2018 07:01), Max: 98.4 (23 Sep 2018 14:13)  HR: 81 (24 Sep 2018 07:01) (76 - 88)  BP: 172/70 (24 Sep 2018 07:01) (129/62 - 172/70)  BP(mean): 89 (23 Sep 2018 14:13) (89 - 89)  RR: 18 (24 Sep 2018 07:01) (18 - 20)  SpO2: 97% (23 Sep 2018 14:13) (97% - 97%)    PHYSICAL EXAMINATION:    GENERAL: The patient is awake and alert in no apparent distress.     HEENT: Head is normocephalic and atraumatic. Extraocular muscles are intact. Mucous membranes are moist.    NECK: Supple.    LUNGS: dec bs r side    HEART: Regular rate and rhythm without murmur.    ABDOMEN: Soft, nontender, and nondistended.      EXTREMITIES: Without any cyanosis, clubbing, rash, lesions or edema.    NEUROLOGIC: Grossly intact.    SKIN: No ulceration or induration present.      LABS:                        9.0    8.68  )-----------( 282      ( 24 Sep 2018 07:55 )             29.1     09-24    142  |  104  |  28<H>  ----------------------------<  176<H>  4.7   |  24  |  1.0    Ca    8.5      24 Sep 2018 07:55    TPro  5.5<L>  /  Alb  2.7<L>  /  TBili  0.3  /  DBili  x   /  AST  16  /  ALT  6   /  AlkPhos  103  09-24 09-23-18 @ 07:01  -  09-24-18 @ 07:00  --------------------------------------------------------  IN: 0 mL / OUT: 900 mL / NET: -900 mL        MICROBIOLOGY:      MEDICATIONS  (STANDING):  buDESOnide  80 MICROgram(s)/formoterol 4.5 MICROgram(s) Inhaler 2 Puff(s) Inhalation two times a day  chlorhexidine 4% Liquid 1 Application(s) Topical <User Schedule>  clopidogrel Tablet 75 milliGRAM(s) Oral daily  dextrose 50% Injectable 12.5 Gram(s) IV Push once  dextrose 50% Injectable 25 Gram(s) IV Push once  dextrose 50% Injectable 25 Gram(s) IV Push once  docusate sodium 100 milliGRAM(s) Oral two times a day  furosemide    Tablet 40 milliGRAM(s) Oral daily  heparin  Injectable 5000 Unit(s) SubCutaneous every 8 hours  influenza   Vaccine 0.5 milliLiter(s) IntraMuscular once  insulin glargine Injectable (LANTUS) 4 Unit(s) SubCutaneous at bedtime  insulin lispro (HumaLOG) corrective regimen sliding scale   SubCutaneous three times a day before meals  insulin lispro Injectable (HumaLOG) 3 Unit(s) SubCutaneous three times a day before meals  lisinopril 10 milliGRAM(s) Oral daily  meropenem  IVPB 500 milliGRAM(s) IV Intermittent every 6 hours  metoprolol tartrate 25 milliGRAM(s) Oral two times a day  pantoprazole    Tablet 40 milliGRAM(s) Oral before breakfast  senna 2 Tablet(s) Oral at bedtime  simethicone 80 milliGRAM(s) Chew three times a day  simvastatin 10 milliGRAM(s) Oral at bedtime    MEDICATIONS  (PRN):  acetaminophen   Tablet .. 650 milliGRAM(s) Oral every 6 hours PRN Mild Pain (1 - 3)  dextrose 40% Gel 15 Gram(s) Oral once PRN Blood Glucose LESS THAN 70 milliGRAM(s)/deciliter  glucagon  Injectable 1 milliGRAM(s) IntraMuscular once PRN Glucose LESS THAN 70 milligrams/deciliter      RADIOLOGY & ADDITIONAL STUDIES:
OVERNIGHT EVENTS: s/p pigtail, looks comfortable    Vital Signs Last 24 Hrs  T(C): 35.7 (18 Sep 2018 22:08), Max: 35.8 (18 Sep 2018 13:39)  T(F): 96.3 (18 Sep 2018 22:08), Max: 96.5 (18 Sep 2018 13:39)  HR: 91 (19 Sep 2018 05:35) (81 - 93)  BP: 146/65 (19 Sep 2018 05:35) (111/52 - 146/65)  BP(mean): --  RR: 20 (18 Sep 2018 22:08) (20 - 20)  SpO2: 93% (18 Sep 2018 13:39) (93% - 93%)    PHYSICAL EXAMINATION:    GENERAL: The patient is awake and alert in no apparent distress.     HEENT: Head is normocephalic and atraumatic. Extraocular muscles are intact. Mucous membranes are moist.    NECK: Supple.    LUNGS: dec bs r side    HEART: Regular rate and rhythm without murmur.    ABDOMEN: Soft, nontender, and nondistended.      EXTREMITIES: Without any cyanosis, clubbing, rash, lesions or edema.    NEUROLOGIC: Grossly intact.    SKIN: No ulceration or induration present.      LABS:                        9.5    13.33 )-----------( 339      ( 18 Sep 2018 09:26 )             30.8     09-18    141  |  101  |  24<H>  ----------------------------<  159<H>  3.9   |  26  |  0.9    Ca    8.7      18 Sep 2018 09:26  Mg     1.9     09-17    TPro  6.1  /  Alb  2.9<L>  /  TBili  0.4  /  DBili  x   /  AST  14  /  ALT  7   /  AlkPhos  135<H>  09-18 09-18-18 @ 07:01  -  09-19-18 @ 07:00  --------------------------------------------------------  IN: 160 mL / OUT: 350 mL / NET: -190 mL        MICROBIOLOGY:  Culture Results:   Few Gram Negative Rods (09-17 @ 11:41)  Culture Results:   Testing in progress (09-17 @ 11:41)      MEDICATIONS  (STANDING):  buDESOnide  80 MICROgram(s)/formoterol 4.5 MICROgram(s) Inhaler 2 Puff(s) Inhalation two times a day  chlorhexidine 4% Liquid 1 Application(s) Topical <User Schedule>  clopidogrel Tablet 75 milliGRAM(s) Oral daily  dextrose 50% Injectable 12.5 Gram(s) IV Push once  dextrose 50% Injectable 25 Gram(s) IV Push once  dextrose 50% Injectable 25 Gram(s) IV Push once  docusate sodium 100 milliGRAM(s) Oral two times a day  furosemide    Tablet 40 milliGRAM(s) Oral two times a day  heparin  Injectable 5000 Unit(s) SubCutaneous every 8 hours  influenza   Vaccine 0.5 milliLiter(s) IntraMuscular once  insulin glargine Injectable (LANTUS) 4 Unit(s) SubCutaneous at bedtime  insulin lispro (HumaLOG) corrective regimen sliding scale   SubCutaneous three times a day before meals  insulin lispro Injectable (HumaLOG) 3 Unit(s) SubCutaneous three times a day before meals  levoFLOXacin IVPB 750 milliGRAM(s) IV Intermittent every 24 hours  lisinopril 10 milliGRAM(s) Oral daily  metoprolol tartrate 25 milliGRAM(s) Oral two times a day  metroNIDAZOLE  IVPB      metroNIDAZOLE  IVPB 500 milliGRAM(s) IV Intermittent every 8 hours  pantoprazole    Tablet 40 milliGRAM(s) Oral before breakfast  senna 2 Tablet(s) Oral at bedtime  simethicone 80 milliGRAM(s) Chew three times a day  simvastatin 10 milliGRAM(s) Oral at bedtime  vancomycin  IVPB      vancomycin  IVPB 750 milliGRAM(s) IV Intermittent every 12 hours    MEDICATIONS  (PRN):  acetaminophen   Tablet .. 650 milliGRAM(s) Oral every 6 hours PRN Mild Pain (1 - 3)  dextrose 40% Gel 15 Gram(s) Oral once PRN Blood Glucose LESS THAN 70 milliGRAM(s)/deciliter  glucagon  Injectable 1 milliGRAM(s) IntraMuscular once PRN Glucose LESS THAN 70 milligrams/deciliter      RADIOLOGY & ADDITIONAL STUDIES:
OVERNIGHT EVENTS: s/p thora 350 cc thick fluid removed    Vital Signs Last 24 Hrs  T(C): 35.9 (18 Sep 2018 05:50), Max: 36.9 (17 Sep 2018 21:44)  T(F): 96.6 (18 Sep 2018 05:50), Max: 98.4 (17 Sep 2018 21:44)  HR: 82 (18 Sep 2018 05:50) (82 - 114)  BP: 124/58 (18 Sep 2018 05:50) (97/52 - 156/67)  BP(mean): 90 (17 Sep 2018 17:23) (90 - 97)  RR: 18 (18 Sep 2018 05:50) (18 - 20)  SpO2: 95% (18 Sep 2018 05:50) (94% - 99%)    PHYSICAL EXAMINATION:    GENERAL: The patient is awake and alert in no apparent distress.     HEENT: Head is normocephalic and atraumatic. Extraocular muscles are intact. Mucous membranes are moist.    NECK: Supple.    LUNGS: dec bs r side    HEART: Regular rate and rhythm without murmur.    ABDOMEN: Soft, nontender, and nondistended.      EXTREMITIES: Without any cyanosis, clubbing, rash, lesions or edema.    NEUROLOGIC: Grossly intact.    SKIN: No ulceration or induration present.      LABS:                        9.3    12.92 )-----------( 332      ( 17 Sep 2018 17:20 )             29.3     09-17    137  |  97<L>  |  25<H>  ----------------------------<  242<H>  3.8   |  25  |  0.7    Ca    8.5      17 Sep 2018 17:20  Mg     1.9     09-17    TPro  5.8<L>  /  Alb  2.7<L>  /  TBili  0.3  /  DBili  x   /  AST  13  /  ALT  7   /  AlkPhos  136<H>  09-17 09-17-18 @ 07:01  -  09-18-18 @ 07:00  --------------------------------------------------------  IN: 0 mL / OUT: 353 mL / NET: -353 mL        MICROBIOLOGY:  Culture Results:   Testing in progress (09-17 @ 11:41)      MEDICATIONS  (STANDING):  buDESOnide  80 MICROgram(s)/formoterol 4.5 MICROgram(s) Inhaler 2 Puff(s) Inhalation two times a day  chlorhexidine 4% Liquid 1 Application(s) Topical <User Schedule>  clopidogrel Tablet 75 milliGRAM(s) Oral daily  dextrose 50% Injectable 12.5 Gram(s) IV Push once  dextrose 50% Injectable 25 Gram(s) IV Push once  dextrose 50% Injectable 25 Gram(s) IV Push once  docusate sodium 100 milliGRAM(s) Oral two times a day  furosemide    Tablet 40 milliGRAM(s) Oral two times a day  heparin  Injectable 5000 Unit(s) SubCutaneous every 8 hours  influenza   Vaccine 0.5 milliLiter(s) IntraMuscular once  insulin glargine Injectable (LANTUS) 4 Unit(s) SubCutaneous at bedtime  insulin lispro (HumaLOG) corrective regimen sliding scale   SubCutaneous three times a day before meals  insulin lispro Injectable (HumaLOG) 3 Unit(s) SubCutaneous three times a day before meals  levoFLOXacin IVPB 750 milliGRAM(s) IV Intermittent every 24 hours  lisinopril 10 milliGRAM(s) Oral daily  magnesium oxide 400 milliGRAM(s) Oral three times a day with meals  metoprolol tartrate 25 milliGRAM(s) Oral two times a day  metroNIDAZOLE  IVPB      metroNIDAZOLE  IVPB 500 milliGRAM(s) IV Intermittent every 8 hours  pantoprazole    Tablet 40 milliGRAM(s) Oral before breakfast  senna 2 Tablet(s) Oral at bedtime  simvastatin 10 milliGRAM(s) Oral at bedtime  vancomycin  IVPB      vancomycin  IVPB 750 milliGRAM(s) IV Intermittent every 12 hours    MEDICATIONS  (PRN):  acetaminophen   Tablet .. 650 milliGRAM(s) Oral every 6 hours PRN Mild Pain (1 - 3)  dextrose 40% Gel 15 Gram(s) Oral once PRN Blood Glucose LESS THAN 70 milliGRAM(s)/deciliter  glucagon  Injectable 1 milliGRAM(s) IntraMuscular once PRN Glucose LESS THAN 70 milligrams/deciliter      RADIOLOGY & ADDITIONAL STUDIES:
OVERNIGHT EVENTS: sitting on chair, looks comfortable, pseudomonas in fluid    Vital Signs Last 24 Hrs  T(C): 36.8 (20 Sep 2018 13:49), Max: 36.8 (20 Sep 2018 06:35)  T(F): 98.3 (20 Sep 2018 13:49), Max: 98.3 (20 Sep 2018 13:49)  HR: 107 (20 Sep 2018 13:49) (82 - 107)  BP: 114/54 (20 Sep 2018 13:49) (114/54 - 133/58)  BP(mean): --  RR: 20 (20 Sep 2018 13:49) (20 - 20)  SpO2: 94% (20 Sep 2018 06:35) (94% - 95%)    PHYSICAL EXAMINATION:    GENERAL: The patient is awake and alert in no apparent distress.     HEENT: Head is normocephalic and atraumatic. Extraocular muscles are intact. Mucous membranes are moist.    NECK: Supple.    LUNGS: dec bs r side    HEART: Regular rate and rhythm without murmur.    ABDOMEN: Soft, nontender, and nondistended.      EXTREMITIES: Without any cyanosis, clubbing, rash, lesions or edema.    NEUROLOGIC: Grossly intact.    SKIN: No ulceration or induration present.      LABS:                        10.2   14.53 )-----------( 338      ( 20 Sep 2018 06:43 )             32.0     09-20    142  |  104  |  27<H>  ----------------------------<  156<H>  3.9   |  24  |  1.1    Ca    8.3<L>      20 Sep 2018 06:43    TPro  5.3<L>  /  Alb  2.6<L>  /  TBili  0.3  /  DBili  x   /  AST  13  /  ALT  6   /  AlkPhos  109  09-20 09-19-18 @ 07:01  -  09-20-18 @ 07:00  --------------------------------------------------------  IN: 0 mL / OUT: 1480 mL / NET: -1480 mL    09-20-18 @ 07:01 - 09-20-18 @ 14:18  --------------------------------------------------------  IN: 0 mL / OUT: 200 mL / NET: -200 mL        MICROBIOLOGY:  Culture Results:   No growth to date. (09-18 @ 09:26)  Culture Results:   Few Pseudomonas aeruginosa mucoid strain (09-17 @ 11:41)  Culture Results:   Testing in progress (09-17 @ 11:41)      MEDICATIONS  (STANDING):  buDESOnide  80 MICROgram(s)/formoterol 4.5 MICROgram(s) Inhaler 2 Puff(s) Inhalation two times a day  chlorhexidine 4% Liquid 1 Application(s) Topical <User Schedule>  clopidogrel Tablet 75 milliGRAM(s) Oral daily  dextrose 50% Injectable 12.5 Gram(s) IV Push once  dextrose 50% Injectable 25 Gram(s) IV Push once  dextrose 50% Injectable 25 Gram(s) IV Push once  docusate sodium 100 milliGRAM(s) Oral two times a day  furosemide    Tablet 40 milliGRAM(s) Oral two times a day  heparin  Injectable 5000 Unit(s) SubCutaneous every 8 hours  influenza   Vaccine 0.5 milliLiter(s) IntraMuscular once  insulin glargine Injectable (LANTUS) 4 Unit(s) SubCutaneous at bedtime  insulin lispro (HumaLOG) corrective regimen sliding scale   SubCutaneous three times a day before meals  insulin lispro Injectable (HumaLOG) 3 Unit(s) SubCutaneous three times a day before meals  lisinopril 10 milliGRAM(s) Oral daily  meropenem  IVPB 500 milliGRAM(s) IV Intermittent every 6 hours  metoprolol tartrate 25 milliGRAM(s) Oral two times a day  pantoprazole    Tablet 40 milliGRAM(s) Oral before breakfast  senna 2 Tablet(s) Oral at bedtime  simethicone 80 milliGRAM(s) Chew three times a day  simvastatin 10 milliGRAM(s) Oral at bedtime    MEDICATIONS  (PRN):  acetaminophen   Tablet .. 650 milliGRAM(s) Oral every 6 hours PRN Mild Pain (1 - 3)  dextrose 40% Gel 15 Gram(s) Oral once PRN Blood Glucose LESS THAN 70 milliGRAM(s)/deciliter  glucagon  Injectable 1 milliGRAM(s) IntraMuscular once PRN Glucose LESS THAN 70 milligrams/deciliter      RADIOLOGY & ADDITIONAL STUDIES:
Patient is a 88y old  Female who presents with a chief complaint of SOB -Pleural Effusions b/l (14 Sep 2018 11:46)      PAST MEDICAL & SURGICAL HISTORY:  CAD (coronary artery disease)  Hypertension  GERD (gastroesophageal reflux disease)  Diabetes  Arthritis  S/P TAVR (transcatheter aortic valve replacement)      MEDICATIONS  (STANDING):  chlorhexidine 4% Liquid 1 Application(s) Topical <User Schedule>  clopidogrel Tablet 75 milliGRAM(s) Oral daily  furosemide   Injectable 40 milliGRAM(s) IV Push two times a day  heparin  Injectable 5000 Unit(s) SubCutaneous every 8 hours  metoprolol tartrate 25 milliGRAM(s) Oral two times a day  pantoprazole    Tablet 40 milliGRAM(s) Oral before breakfast  simvastatin 10 milliGRAM(s) Oral at bedtime    MEDICATIONS  (PRN):  acetaminophen   Tablet .. 650 milliGRAM(s) Oral every 6 hours PRN Mild Pain (1 - 3)      Overnight events:    Vital Signs Last 24 Hrs  T(C): 35.8 (14 Sep 2018 15:28), Max: 36.9 (14 Sep 2018 00:26)  T(F): 96.5 (14 Sep 2018 15:28), Max: 98.4 (14 Sep 2018 00:26)  HR: 83 (14 Sep 2018 15:28) (83 - 104)  BP: 147/63 (14 Sep 2018 15:28) (147/63 - 184/80)  BP(mean): --  RR: 18 (14 Sep 2018 15:28) (18 - 19)  SpO2: 94% (14 Sep 2018 15:28) (94% - 98%)  CAPILLARY BLOOD GLUCOSE  281 (14 Sep 2018 11:16)      POCT Blood Glucose.: 281 mg/dL (14 Sep 2018 11:16)  POCT Blood Glucose.: 154 mg/dL (14 Sep 2018 07:45)    I&O's Summary      Physical Exam:    -     General : NAD, sitting up in bed    -      HEENT: NCAT    -      Cardiac: S1, S2  no m/r/g/    -      Pulm: mild crackles LLL, rest of lung sounds clear     -      GI: soft, NT, +BS    -      Ext: b/l pitting edema 2+    -      Neuro: moving all extremities        Labs:                        11.1   10.70 )-----------( 345      ( 14 Sep 2018 06:52 )             34.7             09-14    142  |  97<L>  |  27<H>  ----------------------------<  153<H>  4.0   |  24  |  0.8    Ca    9.0      14 Sep 2018 06:52  Mg     1.6         TPro  7.0  /  Alb  3.4<L>  /  TBili  0.5  /  DBili  x   /  AST  30  /  ALT  9   /  AlkPhos  157<H>      LIVER FUNCTIONS - ( 13 Sep 2018 13:18 )  Alb: 3.4 g/dL / Pro: 7.0 g/dL / ALK PHOS: 157 U/L / ALT: 9 U/L / AST: 30 U/L / GGT: x                 PT/INR - ( 13 Sep 2018 13:18 )   PT: 14.60 sec;   INR: 1.36 ratio         PTT - ( 13 Sep 2018 13:18 )  PTT:29.9 sec  CARDIAC MARKERS ( 13 Sep 2018 13:18 )  x     / <0.01 ng/mL / x     / x     / x            Urinalysis Basic - ( 13 Sep 2018 13:19 )    Color: Yellow / Appearance: Clear / S.025 / pH: x  Gluc: x / Ketone: Negative  / Bili: Negative / Urobili: 0.2 mg/dL   Blood: x / Protein: 30 mg/dL / Nitrite: Negative   Leuk Esterase: Small / RBC: x / WBC 6-10 /HPF   Sq Epi: x / Non Sq Epi: x / Bacteria: x
Patient is a 88y old  Female who presents with a chief complaint of SOB -Pleural Effusions b/l (15 Sep 2018 12:41)    HPI:  87 yo F with PMH of Atrial Fibrillation ( not on AC due to previous GI Bleed ), HTN, CAD with stent placement, CHF, s/p TAVR, GERD, DM, and HLD presented to the hospital for the evaluation of sob x 10 days. Patient states that she first noticed SOB ten days ago and it has been progressively worsening. She has RAIN after about 25 ft ambulation. She used to sleep with one pillow and now is using two for symptomatic relief. Patients son is at bedside and notes that these symptoms are similar to symptoms she had prior to her TAVR. Patient also notes increased swelling of bilaterally lower extremities. She recently saw Cardiologist who completed ECHO and increased dosage of Furosemide. She has had improvement of LE edema but not of SOB to baseline. Patient also notes recent feelings of nausea, chills, fatigue and 5 pound weight loss in the last month too. She denies and fevers, vomiting chest pain, palpitations, abdominal pain or back pain. Patient also notes loose stools increased in frequency ( as per son this is normal for her). Patient notes she has had pleural effusions for three years which have been monitored with CXR. (13 Sep 2018 20:52)      SUBJ:  Patient seen and examined. Comfortable. Dyspnea improved.      MEDICATIONS  (STANDING):  chlorhexidine 4% Liquid 1 Application(s) Topical <User Schedule>  clopidogrel Tablet 75 milliGRAM(s) Oral daily  furosemide   Injectable 40 milliGRAM(s) IV Push two times a day  heparin  Injectable 5000 Unit(s) SubCutaneous every 8 hours  influenza   Vaccine 0.5 milliLiter(s) IntraMuscular once  metoprolol tartrate 25 milliGRAM(s) Oral two times a day  pantoprazole    Tablet 40 milliGRAM(s) Oral before breakfast  simvastatin 10 milliGRAM(s) Oral at bedtime    MEDICATIONS  (PRN):  acetaminophen   Tablet .. 650 milliGRAM(s) Oral every 6 hours PRN Mild Pain (1 - 3)            Vital Signs Last 24 Hrs  T(C): 36.8 (15 Sep 2018 13:59), Max: 37.5 (15 Sep 2018 00:04)  T(F): 98.3 (15 Sep 2018 13:59), Max: 99.5 (15 Sep 2018 00:04)  HR: 101 (15 Sep 2018 13:59) (65 - 101)  BP: 148/66 (15 Sep 2018 13:59) (138/64 - 181/70)  BP(mean): --  RR: 20 (15 Sep 2018 13:59) (16 - 20)  SpO2: 100% (15 Sep 2018 13:59) (96% - 100%)      PHYSICAL EXAM:    GEN: NAD  HEENT: NC/AT  Neck: No JVD  CV: irreg, S1-S2, 2/6 syst. murmur  Lungs: decreased BS  Abd: Soft, non-tender  Ext: No edema      I&O's Summary  	      	    LABS:                        10.1   11.21 )-----------( 340      ( 15 Sep 2018 06:44 )             31.5     09-15    138  |  98  |  27<H>  ----------------------------<  213<H>  3.8   |  24  |  0.8    Ca    8.8      15 Sep 2018 06:44  Mg     1.6     09-14    TPro  6.1  /  Alb  3.1<L>  /  TBili  0.6  /  DBili  x   /  AST  13  /  ALT  7   /  AlkPhos  150<H>  09-15              BNP  RADIOLOGY & ADDITIONAL STUDIES:      IMPRESSION AND PLAN:
Patient is a 88y old  Female who presents with a chief complaint of SOB -Pleural Effusions b/l (17 Sep 2018 08:19)    Patient was seen and examined.  Denies chest pain, sob improved.    PAST MEDICAL & SURGICAL HISTORY:  CAD (coronary artery disease)  Hypertension  GERD (gastroesophageal reflux disease)  Diabetes  Arthritis  S/P TAVR (transcatheter aortic valve replacement)    Allergies  penicillins (Other)    MEDICATIONS  (STANDING):  buDESOnide  80 MICROgram(s)/formoterol 4.5 MICROgram(s) Inhaler 2 Puff(s) Inhalation two times a day  chlorhexidine 4% Liquid 1 Application(s) Topical <User Schedule>  clopidogrel Tablet 75 milliGRAM(s) Oral daily  dextrose 50% Injectable 12.5 Gram(s) IV Push once  dextrose 50% Injectable 25 Gram(s) IV Push once  dextrose 50% Injectable 25 Gram(s) IV Push once  docusate sodium 100 milliGRAM(s) Oral two times a day  furosemide    Tablet 40 milliGRAM(s) Oral daily  heparin  Injectable 5000 Unit(s) SubCutaneous every 8 hours  influenza   Vaccine 0.5 milliLiter(s) IntraMuscular once  insulin glargine Injectable (LANTUS) 4 Unit(s) SubCutaneous at bedtime  insulin lispro (HumaLOG) corrective regimen sliding scale   SubCutaneous three times a day before meals  insulin lispro Injectable (HumaLOG) 3 Unit(s) SubCutaneous three times a day before meals  lisinopril 10 milliGRAM(s) Oral daily  meropenem  IVPB 500 milliGRAM(s) IV Intermittent every 6 hours  metoprolol tartrate 25 milliGRAM(s) Oral two times a day  pantoprazole    Tablet 40 milliGRAM(s) Oral before breakfast  senna 2 Tablet(s) Oral at bedtime  simethicone 80 milliGRAM(s) Chew three times a day  simvastatin 10 milliGRAM(s) Oral at bedtime    MEDICATIONS  (PRN):  acetaminophen   Tablet .. 650 milliGRAM(s) Oral every 6 hours PRN Mild Pain (1 - 3)  dextrose 40% Gel 15 Gram(s) Oral once PRN Blood Glucose LESS THAN 70 milliGRAM(s)/deciliter  glucagon  Injectable 1 milliGRAM(s) IntraMuscular once PRN Glucose LESS THAN 70 milligrams/deciliter    Vital Signs Last 24 Hrs  T(C): 36.6 (21 Sep 2018 05:46), Max: 36.8 (20 Sep 2018 13:49)  T(F): 97.9 (21 Sep 2018 05:46), Max: 98.3 (20 Sep 2018 13:49)  HR: 81 (21 Sep 2018 05:46) (81 - 107)  BP: 113/56 (21 Sep 2018 05:46) (113/56 - 119/51)  BP(mean): --  RR: 20 (21 Sep 2018 05:46) (20 - 20)  SpO2: 95% (21 Sep 2018 05:46) (95% - 96%)    O/E:  Awake, alert, not in distress.  HEENT: atraumatic, EOMI.  Chest: decreased breath sounds at bases, right CT +  CVS: SIS2 +,regular, Systolic murmur+  P/A: Soft, BS+  CNS: non focal.  Ext: no edema feet.  Skin: no rash, no ulcers.  All systems reviewed positive findings as above.                          9.2<L>  11.89<H> )-----------( 307      ( 21 Sep 2018 05:30 )             29.9<L>                        10.2<L>  14.53<H> )-----------( 338      ( 20 Sep 2018 06:43 )             32.0<L>  09-21    142  |  102  |  29<H>  ----------------------------<  183<H>  3.7   |  25  |  1.4  09-20    142  |  104  |  27<H>  ----------------------------<  156<H>  3.9   |  24  |  1.1    Ca    8.2<L>      21 Sep 2018 05:30  Ca    8.3<L>      20 Sep 2018 06:43    TPro  5.2<L>  /  Alb  2.4<L>  /  TBili  0.2  /  DBili  x   /  AST  12  /  ALT  6   /  AlkPhos  100  09-21  TPro  5.3<L>  /  Alb  2.6<L>  /  TBili  0.3  /  DBili  x   /  AST  13  /  ALT  6   /  AlkPhos  109  09-20
Patient is a 88y old  Female who presents with a chief complaint of SOB -Pleural Effusions b/l (17 Sep 2018 08:19)    Patient was seen and examined.  Denies chest pain, sob improved.    PAST MEDICAL & SURGICAL HISTORY:  CAD (coronary artery disease)  Hypertension  GERD (gastroesophageal reflux disease)  Diabetes  Arthritis  S/P TAVR (transcatheter aortic valve replacement)    Allergies  penicillins (Other)    MEDICATIONS  (STANDING):  buDESOnide  80 MICROgram(s)/formoterol 4.5 MICROgram(s) Inhaler 2 Puff(s) Inhalation two times a day  chlorhexidine 4% Liquid 1 Application(s) Topical <User Schedule>  clopidogrel Tablet 75 milliGRAM(s) Oral daily  dextrose 50% Injectable 12.5 Gram(s) IV Push once  dextrose 50% Injectable 25 Gram(s) IV Push once  dextrose 50% Injectable 25 Gram(s) IV Push once  docusate sodium 100 milliGRAM(s) Oral two times a day  furosemide    Tablet 40 milliGRAM(s) Oral two times a day  heparin  Injectable 5000 Unit(s) SubCutaneous every 8 hours  influenza   Vaccine 0.5 milliLiter(s) IntraMuscular once  insulin glargine Injectable (LANTUS) 4 Unit(s) SubCutaneous at bedtime  insulin lispro (HumaLOG) corrective regimen sliding scale   SubCutaneous three times a day before meals  insulin lispro Injectable (HumaLOG) 3 Unit(s) SubCutaneous three times a day before meals  lisinopril 10 milliGRAM(s) Oral daily  meropenem  IVPB 500 milliGRAM(s) IV Intermittent every 6 hours  metoprolol tartrate 25 milliGRAM(s) Oral two times a day  pantoprazole    Tablet 40 milliGRAM(s) Oral before breakfast  senna 2 Tablet(s) Oral at bedtime  simethicone 80 milliGRAM(s) Chew three times a day  simvastatin 10 milliGRAM(s) Oral at bedtime    MEDICATIONS  (PRN):  acetaminophen   Tablet .. 650 milliGRAM(s) Oral every 6 hours PRN Mild Pain (1 - 3)  dextrose 40% Gel 15 Gram(s) Oral once PRN Blood Glucose LESS THAN 70 milliGRAM(s)/deciliter  glucagon  Injectable 1 milliGRAM(s) IntraMuscular once PRN Glucose LESS THAN 70 milligrams/deciliter    ICU Vital Signs Last 24 Hrs  T(C): 36.8 (20 Sep 2018 13:49), Max: 36.8 (20 Sep 2018 06:35)  T(F): 98.3 (20 Sep 2018 13:49), Max: 98.3 (20 Sep 2018 13:49)  HR: 107 (20 Sep 2018 13:49) (82 - 107)  BP: 114/54 (20 Sep 2018 13:49) (114/54 - 133/58)  BP(mean): --  ABP: --  ABP(mean): --  RR: 20 (20 Sep 2018 13:49) (20 - 20)  SpO2: 94% (20 Sep 2018 06:35) (94% - 95%)    O/E:  Awake, alert, not in distress.  HEENT: atraumatic, EOMI.  Chest: decreased breath sounds at bases, right CT +  CVS: SIS2 +,regular, Systolic murmur+  P/A: Soft, BS+  CNS: non focal.  Ext: no edema feet.  Skin: no rash, no ulcers.  All systems reviewed positive findings as above.                             10.2<L>  14.53<H> )-----------( 338      ( 20 Sep 2018 06:43 )             32.0<L>                        9.7<L>  13.37<H> )-----------( 339      ( 19 Sep 2018 08:35 )             30.4<L>  09-20    142  |  104  |  27<H>  ----------------------------<  156<H>  3.9   |  24  |  1.1  09-19    144  |  106  |  24<H>  ----------------------------<  109<H>  4.1   |  23  |  1.0    Ca    8.3<L>      20 Sep 2018 06:43  Ca    8.4<L>      19 Sep 2018 08:35    TPro  5.3<L>  /  Alb  2.6<L>  /  TBili  0.3  /  DBili  x   /  AST  13  /  ALT  6   /  AlkPhos  109  09-20  TPro  5.7<L>  /  Alb  2.6<L>  /  TBili  0.3  /  DBili  x   /  AST  14  /  ALT  6   /  AlkPhos  116<H>  09-19
Patient is a 88y old  Female who presents with a chief complaint of SOB -Pleural Effusions b/l (17 Sep 2018 08:19)    Patient was seen and examined.  Denies chest pain, sob improved.  S/p Right  Chest tube placement yesterday.  Planned for TPA today    PAST MEDICAL & SURGICAL HISTORY:  CAD (coronary artery disease)  Hypertension  GERD (gastroesophageal reflux disease)  Diabetes  Arthritis  S/P TAVR (transcatheter aortic valve replacement)    Allergies  penicillins (Other)    MEDICATIONS  (STANDING):  buDESOnide  80 MICROgram(s)/formoterol 4.5 MICROgram(s) Inhaler 2 Puff(s) Inhalation two times a day  chlorhexidine 4% Liquid 1 Application(s) Topical <User Schedule>  clopidogrel Tablet 75 milliGRAM(s) Oral daily  dextrose 50% Injectable 12.5 Gram(s) IV Push once  dextrose 50% Injectable 25 Gram(s) IV Push once  dextrose 50% Injectable 25 Gram(s) IV Push once  docusate sodium 100 milliGRAM(s) Oral two times a day  furosemide    Tablet 40 milliGRAM(s) Oral two times a day  heparin  Injectable 5000 Unit(s) SubCutaneous every 8 hours  influenza   Vaccine 0.5 milliLiter(s) IntraMuscular once  insulin glargine Injectable (LANTUS) 4 Unit(s) SubCutaneous at bedtime  insulin lispro (HumaLOG) corrective regimen sliding scale   SubCutaneous three times a day before meals  insulin lispro Injectable (HumaLOG) 3 Unit(s) SubCutaneous three times a day before meals  lisinopril 10 milliGRAM(s) Oral daily  meropenem  IVPB 1000 milliGRAM(s) IV Intermittent every 8 hours  metoprolol tartrate 25 milliGRAM(s) Oral two times a day  pantoprazole    Tablet 40 milliGRAM(s) Oral before breakfast  senna 2 Tablet(s) Oral at bedtime  simethicone 80 milliGRAM(s) Chew three times a day  simvastatin 10 milliGRAM(s) Oral at bedtime    MEDICATIONS  (PRN):  acetaminophen   Tablet .. 650 milliGRAM(s) Oral every 6 hours PRN Mild Pain (1 - 3)  dextrose 40% Gel 15 Gram(s) Oral once PRN Blood Glucose LESS THAN 70 milliGRAM(s)/deciliter  glucagon  Injectable 1 milliGRAM(s) IntraMuscular once PRN Glucose LESS THAN 70 milligrams/deciliter    ICU Vital Signs Last 24 Hrs  T(C): 35.7 (18 Sep 2018 22:08), Max: 35.8 (18 Sep 2018 13:39)  T(F): 96.3 (18 Sep 2018 22:08), Max: 96.5 (18 Sep 2018 13:39)  HR: 91 (19 Sep 2018 05:35) (81 - 93)  BP: 146/65 (19 Sep 2018 05:35) (111/52 - 146/65)  BP(mean): --  ABP: --  ABP(mean): --  RR: 18 (19 Sep 2018 09:30) (18 - 20)  SpO2: 97% (19 Sep 2018 09:30) (93% - 97%)    O/E:  Awake, alert, not in distress.  HEENT: atraumatic, EOMI.  Chest: decreased breath sounds at bases, right CT +  CVS: SIS2 +,regular, Systolic murmur+  P/A: Soft, BS+  CNS: non focal.  Ext: no edema feet.  Skin: no rash, no ulcers.  All systems reviewed positive findings as above.                                9.7<L>  13.37<H> )-----------( 339      ( 19 Sep 2018 08:35 )             30.4<L>                        9.5<L>  13.33<H> )-----------( 339      ( 18 Sep 2018 09:26 )             30.8<L>  09-19    144  |  106  |  24<H>  ----------------------------<  109<H>  4.1   |  23  |  1.0  09-18    141  |  101  |  24<H>  ----------------------------<  159<H>  3.9   |  26  |  0.9    Ca    8.4<L>      19 Sep 2018 08:35  Ca    8.7      18 Sep 2018 09:26  Ca    8.5      17 Sep 2018 17:20    TPro  5.7<L>  /  Alb  2.6<L>  /  TBili  0.3  /  DBili  x   /  AST  14  /  ALT  6   /  AlkPhos  116<H>  09-19  TPro  6.1  /  Alb  2.9<L>  /  TBili  0.4  /  DBili  x   /  AST  14  /  ALT  7   /  AlkPhos  135<H>  09-18  TPro  5.8<L>  /  Alb  2.7<L>  /  TBili  0.3  /  DBili  x   /  AST  13  /  ALT  7   /  AlkPhos  136<H>  09-17
Patient is a 88y old  Female who presents with a chief complaint of SOB -Pleural Effusions b/l (17 Sep 2018 08:19)    Patient was seen and examined.  Denies chest pain, sob improved.  S/p Right thoracentesis- 400 ml drained yesterday.  Planned for Chest tube placement today.    PAST MEDICAL & SURGICAL HISTORY:  CAD (coronary artery disease)  Hypertension  GERD (gastroesophageal reflux disease)  Diabetes  Arthritis  S/P TAVR (transcatheter aortic valve replacement)    Allergies  penicillins (Other)    MEDICATIONS  (STANDING):  buDESOnide  80 MICROgram(s)/formoterol 4.5 MICROgram(s) Inhaler 2 Puff(s) Inhalation two times a day  chlorhexidine 4% Liquid 1 Application(s) Topical <User Schedule>  clopidogrel Tablet 75 milliGRAM(s) Oral daily  dextrose 50% Injectable 12.5 Gram(s) IV Push once  dextrose 50% Injectable 25 Gram(s) IV Push once  dextrose 50% Injectable 25 Gram(s) IV Push once  docusate sodium 100 milliGRAM(s) Oral two times a day  furosemide    Tablet 40 milliGRAM(s) Oral two times a day  heparin  Injectable 5000 Unit(s) SubCutaneous every 8 hours  influenza   Vaccine 0.5 milliLiter(s) IntraMuscular once  insulin glargine Injectable (LANTUS) 4 Unit(s) SubCutaneous at bedtime  insulin lispro (HumaLOG) corrective regimen sliding scale   SubCutaneous three times a day before meals  insulin lispro Injectable (HumaLOG) 3 Unit(s) SubCutaneous three times a day before meals  lactulose Syrup 10 Gram(s) Oral three times a day  levoFLOXacin IVPB 750 milliGRAM(s) IV Intermittent every 24 hours  lisinopril 10 milliGRAM(s) Oral daily  metoprolol tartrate 25 milliGRAM(s) Oral two times a day  metroNIDAZOLE  IVPB      metroNIDAZOLE  IVPB 500 milliGRAM(s) IV Intermittent every 8 hours  pantoprazole    Tablet 40 milliGRAM(s) Oral before breakfast  senna 2 Tablet(s) Oral at bedtime  simethicone 80 milliGRAM(s) Chew three times a day  simvastatin 10 milliGRAM(s) Oral at bedtime  vancomycin  IVPB      vancomycin  IVPB 750 milliGRAM(s) IV Intermittent every 12 hours    MEDICATIONS  (PRN):  acetaminophen   Tablet .. 650 milliGRAM(s) Oral every 6 hours PRN Mild Pain (1 - 3)  dextrose 40% Gel 15 Gram(s) Oral once PRN Blood Glucose LESS THAN 70 milliGRAM(s)/deciliter  glucagon  Injectable 1 milliGRAM(s) IntraMuscular once PRN Glucose LESS THAN 70 milligrams/deciliter    Vital Signs Last 24 Hrs  T(C): 35.9  T(F): 96.6  HR: 82  BP: 124/58  BP(mean): 90  RR: 18  SpO2: 95%    O/E:  Awake, alert, not in distress.  HEENT: atraumatic, EOMI.  Chest: decreased breath sounds at bases  CVS: SIS2 +,irregular, Systolic murmur+  P/A: Soft, BS+  CNS: non focal.  Ext: no edema feet.  Skin: no rash, no ulcers.  All systems reviewed positive findings as above.                            9.5<L>  13.33<H> )-----------( 339      ( 18 Sep 2018 09:26 )             30.8<L>                        9.3<L>  12.92<H> )-----------( 332      ( 17 Sep 2018 17:20 )             29.3<L>  09-18    141  |  101  |  24<H>  ----------------------------<  159<H>  3.9   |  26  |  0.9  09-17    137  |  97<L>  |  25<H>  ----------------------------<  242<H>  3.8   |  25  |  0.7    Ca    8.7      18 Sep 2018 09:26  Ca    8.5      17 Sep 2018 17:20  Ca    9.0      17 Sep 2018 12:18  Mg     1.9     09-17    TPro  6.1  /  Alb  2.9<L>  /  TBili  0.4  /  DBili  x   /  AST  14  /  ALT  7   /  AlkPhos  135<H>  09-18  TPro  5.8<L>  /  Alb  2.7<L>  /  TBili  0.3  /  DBili  x   /  AST  13  /  ALT  7   /  AlkPhos  136<H>  09-17  TPro  6.5  /  Alb  3.2<L>  /  TBili  0.4  /  DBili  x   /  AST  14  /  ALT  7   /  AlkPhos  154<H>  09-17
Patient is a 88y old  Female who presents with a chief complaint of SOB -Pleural Effusions b/l (17 Sep 2018 08:19)    Patient was seen and examined.  Denies chest pain, sob.    PAST MEDICAL & SURGICAL HISTORY:  CAD (coronary artery disease)  Hypertension  GERD (gastroesophageal reflux disease)  Diabetes  Arthritis  S/P TAVR (transcatheter aortic valve replacement)    Allergies  penicillins (Other)    MEDICATIONS  (STANDING):  buDESOnide  80 MICROgram(s)/formoterol 4.5 MICROgram(s) Inhaler 2 Puff(s) Inhalation two times a day  chlorhexidine 4% Liquid 1 Application(s) Topical <User Schedule>  clopidogrel Tablet 75 milliGRAM(s) Oral daily  dextrose 50% Injectable 12.5 Gram(s) IV Push once  dextrose 50% Injectable 25 Gram(s) IV Push once  dextrose 50% Injectable 25 Gram(s) IV Push once  docusate sodium 100 milliGRAM(s) Oral two times a day  furosemide    Tablet 40 milliGRAM(s) Oral daily  heparin  Injectable 5000 Unit(s) SubCutaneous every 8 hours  influenza   Vaccine 0.5 milliLiter(s) IntraMuscular once  insulin glargine Injectable (LANTUS) 4 Unit(s) SubCutaneous at bedtime  insulin lispro (HumaLOG) corrective regimen sliding scale   SubCutaneous three times a day before meals  insulin lispro Injectable (HumaLOG) 3 Unit(s) SubCutaneous three times a day before meals  lisinopril 10 milliGRAM(s) Oral daily  meropenem  IVPB 500 milliGRAM(s) IV Intermittent every 6 hours  metoprolol tartrate 25 milliGRAM(s) Oral two times a day  pantoprazole    Tablet 40 milliGRAM(s) Oral before breakfast  senna 2 Tablet(s) Oral at bedtime  simethicone 80 milliGRAM(s) Chew three times a day  simvastatin 10 milliGRAM(s) Oral at bedtime    MEDICATIONS  (PRN):  acetaminophen   Tablet .. 650 milliGRAM(s) Oral every 6 hours PRN Mild Pain (1 - 3)  dextrose 40% Gel 15 Gram(s) Oral once PRN Blood Glucose LESS THAN 70 milliGRAM(s)/deciliter  glucagon  Injectable 1 milliGRAM(s) IntraMuscular once PRN Glucose LESS THAN 70 milligrams/deciliter    ICU Vital Signs Last 24 Hrs  T(C): 36.9 (22 Sep 2018 14:07), Max: 36.9 (22 Sep 2018 14:07)  T(F): 98.4 (22 Sep 2018 14:07), Max: 98.4 (22 Sep 2018 14:07)  HR: 73 (22 Sep 2018 14:07) (73 - 91)  BP: 124/55 (22 Sep 2018 14:07) (124/55 - 152/62)  BP(mean): --  ABP: --  ABP(mean): --  RR: 20 (22 Sep 2018 14:07) (20 - 20)  SpO2: 96% (22 Sep 2018 14:07) (93% - 96%)      O/E:  Awake, alert, not in distress.  HEENT: atraumatic, EOMI.  Chest: decreased breath sounds at bases, right CT +  CVS: SIS2 +,regular, Systolic murmur+  P/A: Soft, BS+  CNS: non focal.  Ext: no edema feet.  Skin: no rash, no ulcers.  All systems reviewed positive findings as above.                               9.8<L>  13.00<H> )-----------( 339      ( 22 Sep 2018 07:18 )             31.7<L>                        9.2<L>  11.89<H> )-----------( 307      ( 21 Sep 2018 05:30 )             29.9<L>  09-22    143  |  106  |  27<H>  ----------------------------<  146<H>  4.2   |  23  |  1.2  09-21    142  |  102  |  29<H>  ----------------------------<  183<H>  3.7   |  25  |  1.4    Ca    8.5      22 Sep 2018 07:18  Ca    8.2<L>      21 Sep 2018 05:30    TPro  5.5<L>  /  Alb  2.6<L>  /  TBili  0.3  /  DBili  x   /  AST  13  /  ALT  5   /  AlkPhos  98  09-22  TPro  5.2<L>  /  Alb  2.4<L>  /  TBili  0.2  /  DBili  x   /  AST  12  /  ALT  6   /  AlkPhos  100  09-21
Patient is a 88y old  Female who presents with a chief complaint of SOB -Pleural Effusions b/l (17 Sep 2018 18:42)        SUBJECTIVE:    s/p thora yesterday, 400cc out, feels better today, less pleuritic chest pain and less dyspnea  no overnight events    REVIEW OF SYSTEMS:  See HPI    PHYSICAL EXAM  Vital Signs Last 24 Hrs  T(C): 35.9 (18 Sep 2018 05:50), Max: 36.9 (17 Sep 2018 21:44)  T(F): 96.6 (18 Sep 2018 05:50), Max: 98.4 (17 Sep 2018 21:44)  HR: 82 (18 Sep 2018 05:50) (82 - 114)  BP: 124/58 (18 Sep 2018 05:50) (97/52 - 156/67)  BP(mean): 90 (17 Sep 2018 17:23) (90 - 97)  RR: 18 (18 Sep 2018 05:50) (18 - 20)  SpO2: 95% (18 Sep 2018 05:50) (94% - 99%)    General: In NAD  HEENT: BRYAN               Lymphatic system: No adenopathy   Respiratory: Lisandro BS, GAE, reduced breath sounds bilateral  Cardiovascular: Regular RR  Gastrointestinal: Soft. + BS NT  Musculoskeletal: No clubbing.  moves all extremities.  Full range of motion    Skin: Warm.  Intact  Neurological: No motor or sensory deficit      09-17-18 @ 07:01  -  09-18-18 @ 07:00  --------------------------------------------------------  IN:  Total IN: 0 mL    OUT:    Other: 350 mL    Voided: 3 mL  Total OUT: 353 mL    Total NET: -353 mL          LABS:                          9.3    12.92 )-----------( 332      ( 17 Sep 2018 17:20 )             29.3                                               09-17    137  |  97<L>  |  25<H>  ----------------------------<  242<H>  3.8   |  25  |  0.7    Ca    8.5      17 Sep 2018 17:20  Mg     1.9     09-17    TPro  5.8<L>  /  Alb  2.7<L>  /  TBili  0.3  /  DBili  x   /  AST  13  /  ALT  7   /  AlkPhos  136<H>  09-17                                                                                           LIVER FUNCTIONS - ( 17 Sep 2018 17:20 )  Alb: 2.7 g/dL / Pro: 5.8 g/dL / ALK PHOS: 136 U/L / ALT: 7 U/L / AST: 13 U/L / GGT: x                                                  Culture - Fungal, Body Fluid (collected 17 Sep 2018 11:41)  Source: Pleural Fl Pleural Fluid  Preliminary Report (18 Sep 2018 07:12):    Testing in progress    Culture - Body Fluid with Gram Stain (collected 17 Sep 2018 11:41)  Source: Pleural Fl Pleural Fluid  Gram Stain (17 Sep 2018 20:40):    polymorphonuclear leukocytes seen    Gram Negative Rods seen by cytocentrifuge                                                    MEDICATIONS  (STANDING):  buDESOnide  80 MICROgram(s)/formoterol 4.5 MICROgram(s) Inhaler 2 Puff(s) Inhalation two times a day  chlorhexidine 4% Liquid 1 Application(s) Topical <User Schedule>  clopidogrel Tablet 75 milliGRAM(s) Oral daily  dextrose 50% Injectable 12.5 Gram(s) IV Push once  dextrose 50% Injectable 25 Gram(s) IV Push once  dextrose 50% Injectable 25 Gram(s) IV Push once  docusate sodium 100 milliGRAM(s) Oral two times a day  furosemide    Tablet 40 milliGRAM(s) Oral two times a day  heparin  Injectable 5000 Unit(s) SubCutaneous every 8 hours  influenza   Vaccine 0.5 milliLiter(s) IntraMuscular once  insulin glargine Injectable (LANTUS) 4 Unit(s) SubCutaneous at bedtime  insulin lispro (HumaLOG) corrective regimen sliding scale   SubCutaneous three times a day before meals  insulin lispro Injectable (HumaLOG) 3 Unit(s) SubCutaneous three times a day before meals  levoFLOXacin IVPB 750 milliGRAM(s) IV Intermittent every 24 hours  lisinopril 10 milliGRAM(s) Oral daily  magnesium oxide 400 milliGRAM(s) Oral three times a day with meals  metoprolol tartrate 25 milliGRAM(s) Oral two times a day  metroNIDAZOLE  IVPB      metroNIDAZOLE  IVPB 500 milliGRAM(s) IV Intermittent every 8 hours  pantoprazole    Tablet 40 milliGRAM(s) Oral before breakfast  senna 2 Tablet(s) Oral at bedtime  simvastatin 10 milliGRAM(s) Oral at bedtime  vancomycin  IVPB      vancomycin  IVPB 750 milliGRAM(s) IV Intermittent every 12 hours    MEDICATIONS  (PRN):  acetaminophen   Tablet .. 650 milliGRAM(s) Oral every 6 hours PRN Mild Pain (1 - 3)  dextrose 40% Gel 15 Gram(s) Oral once PRN Blood Glucose LESS THAN 70 milliGRAM(s)/deciliter  glucagon  Injectable 1 milliGRAM(s) IntraMuscular once PRN Glucose LESS THAN 70 milligrams/deciliter
Progress Note: General Surgery  Patient: MUKESH POLLACK , 89y (19-Sep-1929)Female   MRN: [id number]  Location: [address]  Visit: 09-13-18 Inpatient  Date: 09-20-18 @ 06:06  Hospital Day: 7    Procedure/Diagnosis: S/p large bore pigtail for right empyema  Events over 24h: No acute overnight events, it was patient's Birthday she celebrated. Pigtail catheter to suction, no air leak. S/p tPA flush yesterday, putting out well now.     Vitals: T(F): 98 (09-19-18 @ 21:04), Max: 98.7 (09-19-18 @ 13:45)  HR: 82 (09-19-18 @ 21:04)  BP: 123/54 (09-19-18 @ 21:04) (123/54 - 144/62)  RR: 20 (09-19-18 @ 21:04)  SpO2: 95% (09-19-18 @ 21:04)    In:   09-18-18 @ 07:01  -  09-19-18 @ 07:00  --------------------------------------------------------  IN: 160 mL    09-19-18 @ 07:01  -  09-20-18 @ 06:06  --------------------------------------------------------  IN: 0 mL      Out:   09-18-18 @ 07:01  -  09-19-18 @ 07:00  --------------------------------------------------------  OUT:    Chest Tube: 350 mL  Total OUT: 350 mL      09-19-18 @ 07:01  -  09-20-18 @ 06:06  --------------------------------------------------------  OUT:    Chest Tube: 1400 mL  Total OUT: 1400 mL      Net:   09-18-18 @ 07:01  -  09-19-18 @ 07:00  --------------------------------------------------------  NET: -190 mL    09-19-18 @ 07:01  -  09-20-18 @ 06:06  --------------------------------------------------------  NET: -1400 mL      Diet: Diet, Consistent Carbohydrate Renal/No Snacks:   Low Sodium (09-17-18 @ 19:00)    Physical Examination:  General Appearance: NAD, alert and cooperative  HEENT: NCAT, WNL  Heart: S1 and S2. No murmurs. Rhythm is regular  Lungs: Clear to auscultation BL without rales, rhonchi, wheezing, crackles or diminished breath sounds.  Abdomen:  Positive bowel sounds. Soft, nondistended. No rigidity, guarding, or rebound tenderness.    Medications: [Standing]  buDESOnide  80 MICROgram(s)/formoterol 4.5 MICROgram(s) Inhaler 2 Puff(s) Inhalation two times a day  chlorhexidine 4% Liquid 1 Application(s) Topical <User Schedule>  clopidogrel Tablet 75 milliGRAM(s) Oral daily  dextrose 50% Injectable 12.5 Gram(s) IV Push once  dextrose 50% Injectable 25 Gram(s) IV Push once  dextrose 50% Injectable 25 Gram(s) IV Push once  docusate sodium 100 milliGRAM(s) Oral two times a day  furosemide    Tablet 40 milliGRAM(s) Oral two times a day  heparin  Injectable 5000 Unit(s) SubCutaneous every 8 hours  influenza   Vaccine 0.5 milliLiter(s) IntraMuscular once  insulin glargine Injectable (LANTUS) 4 Unit(s) SubCutaneous at bedtime  insulin lispro (HumaLOG) corrective regimen sliding scale   SubCutaneous three times a day before meals  insulin lispro Injectable (HumaLOG) 3 Unit(s) SubCutaneous three times a day before meals  lisinopril 10 milliGRAM(s) Oral daily  meropenem  IVPB 500 milliGRAM(s) IV Intermittent every 12 hours  metoprolol tartrate 25 milliGRAM(s) Oral two times a day  pantoprazole    Tablet 40 milliGRAM(s) Oral before breakfast  senna 2 Tablet(s) Oral at bedtime  simethicone 80 milliGRAM(s) Chew three times a day  simvastatin 10 milliGRAM(s) Oral at bedtime    DVT Prophylaxis: heparin  Injectable 5000 Unit(s) SubCutaneous every 8 hours  GI Prophylaxis: pantoprazole    Tablet 40 milliGRAM(s) Oral before breakfast    Antibiotics: meropenem  IVPB 500 milliGRAM(s) IV Intermittent every 12 hours    Anticoagulation:   Medications:[PRN]  acetaminophen   Tablet .. 650 milliGRAM(s) Oral every 6 hours PRN  dextrose 40% Gel 15 Gram(s) Oral once PRN  glucagon  Injectable 1 milliGRAM(s) IntraMuscular once PRN    Labs:                        9.7    13.37 )-----------( 339      ( 19 Sep 2018 08:35 )             30.4     09-19    144  |  106  |  24<H>  ----------------------------<  109<H>  4.1   |  23  |  1.0    Ca    8.4<L>      19 Sep 2018 08:35    TPro  5.7<L>  /  Alb  2.6<L>  /  TBili  0.3  /  DBili  x   /  AST  14  /  ALT  6   /  AlkPhos  116<H>  09-19    LIVER FUNCTIONS - ( 19 Sep 2018 08:35 )  Alb: 2.6 g/dL / Pro: 5.7 g/dL / ALK PHOS: 116 U/L / ALT: 6 U/L / AST: 14 U/L / GGT: x           Urine/Micro:    Culture - Blood (collected 18 Sep 2018 09:26)  Source: .Blood None  Preliminary Report (19 Sep 2018 17:02):    No growth to date.    Culture - Fungal, Body Fluid (collected 17 Sep 2018 11:41)  Source: Pleural Fl Pleural Fluid  Preliminary Report (18 Sep 2018 07:12):    Testing in progress    Culture - Body Fluid with Gram Stain (collected 17 Sep 2018 11:41)  Source: Pleural Fl Pleural Fluid  Gram Stain (17 Sep 2018 20:40):    polymorphonuclear leukocytes seen    Gram Negative Rods seen by cytocentrifuge  Preliminary Report (19 Sep 2018 18:47):    Few Pseudomonas aeruginosa mucoid strain    Culture - Acid Fast - Body Fluid w/Smear (collected 17 Sep 2018 11:41)  Source: Pleural Fl Pleural Fluid    Assessment:  89y Female patient admitted right CT placement for right empyema    Plan:  Continue antibiotics  +/- tPA  F/u CXR    Date/Time: 09-20-18 @ 06:06
Progress Note: General Surgery  Patient: MUKESH POLLACK , 89y (19-Sep-1929)Female   MRN: [id number]  Location: [address]  Visit: 09-13-18 Inpatient  Date: 09-21-18 @ 05:56  Hospital Day: 8    Procedure/Diagnosis: S/p large bore pigtail for right empyema  Events over 24h: No acute overnight events. Patient received tpA, CT output well. No active complaints.     Vitals: T(F): 97.9 (09-21-18 @ 05:46), Max: 98.3 (09-20-18 @ 13:49)  HR: 81 (09-21-18 @ 05:46)  BP: 113/56 (09-21-18 @ 05:46) (113/56 - 133/58)  RR: 20 (09-21-18 @ 05:46)  SpO2: 95% (09-21-18 @ 05:46)    In:   09-19-18 @ 07:01  -  09-20-18 @ 07:00  --------------------------------------------------------  IN: 0 mL    09-20-18 @ 07:01  -  09-21-18 @ 05:56  --------------------------------------------------------  IN: 0 mL      Out:   09-19-18 @ 07:01  -  09-20-18 @ 07:00  --------------------------------------------------------  OUT:    Chest Tube: 1480 mL  Total OUT: 1480 mL      09-20-18 @ 07:01  -  09-21-18 @ 05:56  --------------------------------------------------------  OUT:    Chest Tube: 162 mL    Voided: 200 mL  Total OUT: 362 mL    Net:   09-19-18 @ 07:01  -  09-20-18 @ 07:00  --------------------------------------------------------  NET: -1480 mL    09-20-18 @ 07:01  -  09-21-18 @ 05:56  --------------------------------------------------------  NET: -362 mL      Diet: Diet, Regular:   Consistent Carbohydrate No Snacks (09-20-18 @ 15:03)    Physical Examination:  General Appearance: NAD, alert and cooperative  HEENT: NCAT, WNL  Heart: S1 and S2. No murmurs. Rhythm is regular  Lungs: Clear to auscultation BL without rales, rhonchi, wheezing, crackles or diminished breath sounds.  Abdomen:  Positive bowel sounds. Soft, nondistended. No rigidity, guarding, or rebound tenderness.    Medications: [Standing]  buDESOnide  80 MICROgram(s)/formoterol 4.5 MICROgram(s) Inhaler 2 Puff(s) Inhalation two times a day  chlorhexidine 4% Liquid 1 Application(s) Topical <User Schedule>  clopidogrel Tablet 75 milliGRAM(s) Oral daily  dextrose 50% Injectable 12.5 Gram(s) IV Push once  dextrose 50% Injectable 25 Gram(s) IV Push once  dextrose 50% Injectable 25 Gram(s) IV Push once  docusate sodium 100 milliGRAM(s) Oral two times a day  furosemide    Tablet 40 milliGRAM(s) Oral two times a day  heparin  Injectable 5000 Unit(s) SubCutaneous every 8 hours  influenza   Vaccine 0.5 milliLiter(s) IntraMuscular once  insulin glargine Injectable (LANTUS) 4 Unit(s) SubCutaneous at bedtime  insulin lispro (HumaLOG) corrective regimen sliding scale   SubCutaneous three times a day before meals  insulin lispro Injectable (HumaLOG) 3 Unit(s) SubCutaneous three times a day before meals  lisinopril 10 milliGRAM(s) Oral daily  meropenem  IVPB 500 milliGRAM(s) IV Intermittent every 6 hours  metoprolol tartrate 25 milliGRAM(s) Oral two times a day  pantoprazole    Tablet 40 milliGRAM(s) Oral before breakfast  senna 2 Tablet(s) Oral at bedtime  simethicone 80 milliGRAM(s) Chew three times a day  simvastatin 10 milliGRAM(s) Oral at bedtime    DVT Prophylaxis: heparin  Injectable 5000 Unit(s) SubCutaneous every 8 hours  GI Prophylaxis: pantoprazole    Tablet 40 milliGRAM(s) Oral before breakfast    Antibiotics: meropenem  IVPB 500 milliGRAM(s) IV Intermittent every 6 hours    Anticoagulation:   Medications:[PRN]  acetaminophen   Tablet .. 650 milliGRAM(s) Oral every 6 hours PRN  dextrose 40% Gel 15 Gram(s) Oral once PRN  glucagon  Injectable 1 milliGRAM(s) IntraMuscular once PRN    Labs:                        10.2   14.53 )-----------( 338      ( 20 Sep 2018 06:43 )             32.0     09-20    142  |  104  |  27<H>  ----------------------------<  156<H>  3.9   |  24  |  1.1    Ca    8.3<L>      20 Sep 2018 06:43    TPro  5.3<L>  /  Alb  2.6<L>  /  TBili  0.3  /  DBili  x   /  AST  13  /  ALT  6   /  AlkPhos  109  09-20    LIVER FUNCTIONS - ( 20 Sep 2018 06:43 )  Alb: 2.6 g/dL / Pro: 5.3 g/dL / ALK PHOS: 109 U/L / ALT: 6 U/L / AST: 13 U/L / GGT: x                   Urine/Micro:    Culture - Blood (collected 18 Sep 2018 09:26)  Source: .Blood None  Preliminary Report (19 Sep 2018 17:02):    No growth to date.    < from: CT Chest No Cont (09.20.18 @ 09:51) >  1.  Since September 18, 2018, interval placement of a right-sided pleural   catheter with decreased small right pleural effusion and increased small   to moderate right pneumothorax.    2.  Stable small left pleural effusion.    3.  Remainder of findings are overall unchanged on this short-term   follow-up exam.    < end of copied text >        Assessment:  89y Female patient admitted right CT placement for right empyema    Plan:  Continue antibiotics  F/u CXR      Date/Time: 09-21-18 @ 05:56
Progress Note: General Surgery  Patient: MUKESH POLLACK , 89y (19-Sep-1929)Female   MRN: [id number]  Location: [address]  Visit: 09-13-18 Inpatient  Date: 09-19-18 @ 05:50  Hospital Day: 6    Procedure/Diagnosis: S/p right chest tube placement for right empyema  Events over 24h: No acute overnight events. Patient seen and examined bedside with no active complaints. CT placed yesterday for right empyema.     Vitals: T(F): 96.3 (09-18-18 @ 22:08), Max: 96.5 (09-18-18 @ 13:39)  HR: 91 (09-19-18 @ 05:35)  BP: 146/65 (09-19-18 @ 05:35) (111/52 - 146/65)  RR: 20 (09-18-18 @ 22:08)  SpO2: 93% (09-18-18 @ 13:39)    In:   09-17-18 @ 07:01  -  09-18-18 @ 07:00  --------------------------------------------------------  IN: 0 mL    09-18-18 @ 07:01  -  09-19-18 @ 05:50  --------------------------------------------------------  IN: 160 mL      Out:   09-17-18 @ 07:01  -  09-18-18 @ 07:00  --------------------------------------------------------  OUT:    Other: 350 mL    Voided: 3 mL  Total OUT: 353 mL      09-18-18 @ 07:01  -  09-19-18 @ 05:50  --------------------------------------------------------  OUT:    Chest Tube: 350 mL  Total OUT: 350 mL        Net:   09-17-18 @ 07:01  -  09-18-18 @ 07:00  --------------------------------------------------------  NET: -353 mL    09-18-18 @ 07:01  -  09-19-18 @ 05:50  --------------------------------------------------------  NET: -190 mL      Diet: Diet, Consistent Carbohydrate Renal/No Snacks:   Low Sodium (09-17-18 @ 19:00)    IV Fluids: yes no , Type:   Physical Examination:  General Appearance: NAD, alert and cooperative  HEENT: NCAT, WNL  Heart: S1 and S2. No murmurs. Rhythm is regular  Lungs: Clear to auscultation BL without rales, rhonchi, wheezing, crackles or diminished breath sounds.  Abdomen:  Positive bowel sounds. Soft, nondistended. No rigidity, guarding, or rebound tenderness.      Medications: [Standing]  buDESOnide  80 MICROgram(s)/formoterol 4.5 MICROgram(s) Inhaler 2 Puff(s) Inhalation two times a day  chlorhexidine 4% Liquid 1 Application(s) Topical <User Schedule>  clopidogrel Tablet 75 milliGRAM(s) Oral daily  dextrose 50% Injectable 12.5 Gram(s) IV Push once  dextrose 50% Injectable 25 Gram(s) IV Push once  dextrose 50% Injectable 25 Gram(s) IV Push once  docusate sodium 100 milliGRAM(s) Oral two times a day  furosemide    Tablet 40 milliGRAM(s) Oral two times a day  heparin  Injectable 5000 Unit(s) SubCutaneous every 8 hours  influenza   Vaccine 0.5 milliLiter(s) IntraMuscular once  insulin glargine Injectable (LANTUS) 4 Unit(s) SubCutaneous at bedtime  insulin lispro (HumaLOG) corrective regimen sliding scale   SubCutaneous three times a day before meals  insulin lispro Injectable (HumaLOG) 3 Unit(s) SubCutaneous three times a day before meals  levoFLOXacin IVPB 750 milliGRAM(s) IV Intermittent every 24 hours  lisinopril 10 milliGRAM(s) Oral daily  metoprolol tartrate 25 milliGRAM(s) Oral two times a day  metroNIDAZOLE  IVPB      metroNIDAZOLE  IVPB 500 milliGRAM(s) IV Intermittent every 8 hours  pantoprazole    Tablet 40 milliGRAM(s) Oral before breakfast  senna 2 Tablet(s) Oral at bedtime  simethicone 80 milliGRAM(s) Chew three times a day  simvastatin 10 milliGRAM(s) Oral at bedtime  vancomycin  IVPB      vancomycin  IVPB 750 milliGRAM(s) IV Intermittent every 12 hours    DVT Prophylaxis: heparin  Injectable 5000 Unit(s) SubCutaneous every 8 hours  GI Prophylaxis: pantoprazole    Tablet 40 milliGRAM(s) Oral before breakfast    Antibiotics: levoFLOXacin IVPB 750 milliGRAM(s) IV Intermittent every 24 hours  metroNIDAZOLE  IVPB      metroNIDAZOLE  IVPB 500 milliGRAM(s) IV Intermittent every 8 hours  vancomycin  IVPB      vancomycin  IVPB 750 milliGRAM(s) IV Intermittent every 12 hours    Anticoagulation:   Medications:[PRN]  acetaminophen   Tablet .. 650 milliGRAM(s) Oral every 6 hours PRN  dextrose 40% Gel 15 Gram(s) Oral once PRN  glucagon  Injectable 1 milliGRAM(s) IntraMuscular once PRN    Labs:                        9.5    13.33 )-----------( 339      ( 18 Sep 2018 09:26 )             30.8     09-18    141  |  101  |  24<H>  ----------------------------<  159<H>  3.9   |  26  |  0.9    Ca    8.7      18 Sep 2018 09:26  Mg     1.9     09-17    TPro  6.1  /  Alb  2.9<L>  /  TBili  0.4  /  DBili  x   /  AST  14  /  ALT  7   /  AlkPhos  135<H>  09-18    LIVER FUNCTIONS - ( 18 Sep 2018 09:26 )  Alb: 2.9 g/dL / Pro: 6.1 g/dL / ALK PHOS: 135 U/L / ALT: 7 U/L / AST: 14 U/L / GGT: x       \    Urine/Micro:    Culture - Fungal, Body Fluid (collected 17 Sep 2018 11:41)  Source: Pleural Fl Pleural Fluid  Preliminary Report (18 Sep 2018 07:12):    Testing in progress    Culture - Body Fluid with Gram Stain (collected 17 Sep 2018 11:41)  Source: Pleural Fl Pleural Fluid  Gram Stain (17 Sep 2018 20:40):    polymorphonuclear leukocytes seen    Gram Negative Rods seen by cytocentrifuge  Preliminary Report (18 Sep 2018 19:22):    Few Gram Negative Rods    Culture - Acid Fast - Body Fluid w/Smear (collected 17 Sep 2018 11:41)  Source: Pleural Fl Pleural Fluid    Imaging:  < from: Xray Chest 1 View- PORTABLE-Urgent (09.18.18 @ 14:51) >    Impression:      New right basilar chest tube.    Unchanged right hydropneumothorax and adjacent opacity.     < end of copied text >      Assessment:  89y Female patient admitted right CT placement for right empyema    Plan:  Continue antiiobiotics  +/- tPA  F/u CXR      Date/Time: 09-19-18 @ 05:50
The patient has been feeling better. Pulmonary follow up appreciated. Chest tube in place     PHYSICAL EXAM:  Vital Signs Last 24 Hrs  T(C): 35.7 (18 Sep 2018 22:08), Max: 35.8 (18 Sep 2018 13:39)  T(F): 96.3 (18 Sep 2018 22:08), Max: 96.5 (18 Sep 2018 13:39)  HR: 91 (19 Sep 2018 05:35) (81 - 93)  BP: 146/65 (19 Sep 2018 05:35) (111/52 - 146/65)  BP(mean): --  RR: 20 (18 Sep 2018 22:08) (20 - 20)  SpO2: 93% (18 Sep 2018 13:39) (93% - 93%)  Daily     Daily   I&O's Detail    18 Sep 2018 07:01  -  19 Sep 2018 07:00  --------------------------------------------------------  IN:    IV PiggyBack: 100 mL    Oral Fluid: 60 mL  Total IN: 160 mL    OUT:    Chest Tube: 350 mL  Total OUT: 350 mL    Total NET: -190 mL        GENERAL: NAD, well-groomed, well-developed  HEAD:  Atraumatic, Normocephalic  EYES: EOMI, PERRLA, conjunctiva and sclera clear  ENMT: No tonsillar erythema, exudates, or enlargement; Moist mucous membranes, Good dentition, No lesions  NECK: Supple, No JVD, Normal thyroid  NERVOUS SYSTEM:  Alert & Oriented X3, Good concentration; Motor Strength 5/5 B/L upper and lower extremities; DTRs 2+ intact and symmetric  CHEST/LUNG:decreased BS right base.   HEART: S1 and S2 irregular   ABDOMEN: Soft, Nontender, Nondistended; Bowel sounds present  EXTREMITIES:  1+edema   LYMPH: No lymphadenopathy noted  SKIN: No rashes or lesion        LABS:                        9.5    13.33 )-----------( 339      ( 18 Sep 2018 09:26 )             30.8     09-18    141  |  101  |  24<H>  ----------------------------<  159<H>  3.9   |  26  |  0.9    Ca    8.7      18 Sep 2018 09:26  Mg     1.9     09-17    TPro  6.1  /  Alb  2.9<L>  /  TBili  0.4  /  DBili  x   /  AST  14  /  ALT  7   /  AlkPhos  135<H>  09-18      MEDICATIONS  (STANDING):  buDESOnide  80 MICROgram(s)/formoterol 4.5 MICROgram(s) Inhaler 2 Puff(s) Inhalation two times a day  chlorhexidine 4% Liquid 1 Application(s) Topical <User Schedule>  clopidogrel Tablet 75 milliGRAM(s) Oral daily  dextrose 50% Injectable 12.5 Gram(s) IV Push once  dextrose 50% Injectable 25 Gram(s) IV Push once  dextrose 50% Injectable 25 Gram(s) IV Push once  docusate sodium 100 milliGRAM(s) Oral two times a day  furosemide    Tablet 40 milliGRAM(s) Oral two times a day  heparin  Injectable 5000 Unit(s) SubCutaneous every 8 hours  influenza   Vaccine 0.5 milliLiter(s) IntraMuscular once  insulin glargine Injectable (LANTUS) 4 Unit(s) SubCutaneous at bedtime  insulin lispro (HumaLOG) corrective regimen sliding scale   SubCutaneous three times a day before meals  insulin lispro Injectable (HumaLOG) 3 Unit(s) SubCutaneous three times a day before meals  lisinopril 10 milliGRAM(s) Oral daily  meropenem  IVPB 1000 milliGRAM(s) IV Intermittent every 8 hours  metoprolol tartrate 25 milliGRAM(s) Oral two times a day  pantoprazole    Tablet 40 milliGRAM(s) Oral before breakfast  senna 2 Tablet(s) Oral at bedtime  simethicone 80 milliGRAM(s) Chew three times a day  simvastatin 10 milliGRAM(s) Oral at bedtime    MEDICATIONS  (PRN):  acetaminophen   Tablet .. 650 milliGRAM(s) Oral every 6 hours PRN Mild Pain (1 - 3)  dextrose 40% Gel 15 Gram(s) Oral once PRN Blood Glucose LESS THAN 70 milliGRAM(s)/deciliter  glucagon  Injectable 1 milliGRAM(s) IntraMuscular once PRN Glucose LESS THAN 70 milligrams/deciliter
The patient has less SOB . Seen by pulmonary     PHYSICAL EXAM:  Vital Signs Last 24 Hrs  T(C): 36.4 (17 Sep 2018 06:06), Max: 36.9 (16 Sep 2018 14:50)  T(F): 97.6 (17 Sep 2018 06:06), Max: 98.4 (16 Sep 2018 14:50)  HR: 80 (17 Sep 2018 06:06) (80 - 116)  BP: 152/63 (17 Sep 2018 06:06) (152/63 - 194/75)  BP(mean): --  RR: 18 (17 Sep 2018 06:06) (18 - 18)  SpO2: 100% (17 Sep 2018 06:06) (97% - 100%)  Daily     Daily   I&O's Detail    GENERAL: NAD, well-groomed, well-developed  HEAD:  Atraumatic, Normocephalic  EYES: EOMI, PERRLA, conjunctiva and sclera clear  ENMT: No tonsillar erythema, exudates, or enlargement; Moist mucous membranes, Good dentition, No lesions  NECK: Supple, No JVD, Normal thyroid  NERVOUS SYSTEM:  Alert & Oriented X3, Good concentration; Motor Strength 5/5 B/L upper and lower extremities; DTRs 2+ intact and symmetric  CHEST/LUNG: decreased BS at right base . 1/4 right sided .   HEART: irregular   ABDOMEN: Soft, Nontender, Nondistended; Bowel sounds present  EXTREMITIES:  2+ Peripheral Pulses, No clubbing, cyanosis, or edema  LYMPH: No lymphadenopathy noted  SKIN: No rashes or lesion        LABS:                        9.7    11.29 )-----------( 333      ( 16 Sep 2018 06:10 )             30.8     09-16    141  |  100  |  22<H>  ----------------------------<  194<H>  3.9   |  25  |  0.8    Ca    8.9      16 Sep 2018 06:10  Mg     1.7     09-16    TPro  6.1  /  Alb  3.1<L>  /  TBili  0.5  /  DBili  x   /  AST  12  /  ALT  6   /  AlkPhos  141<H>  09-16      MEDICATIONS  (STANDING):  buDESOnide  80 MICROgram(s)/formoterol 4.5 MICROgram(s) Inhaler 2 Puff(s) Inhalation two times a day  chlorhexidine 4% Liquid 1 Application(s) Topical <User Schedule>  clopidogrel Tablet 75 milliGRAM(s) Oral daily  furosemide    Tablet 40 milliGRAM(s) Oral two times a day  heparin  Injectable 5000 Unit(s) SubCutaneous every 8 hours  influenza   Vaccine 0.5 milliLiter(s) IntraMuscular once  magnesium oxide 400 milliGRAM(s) Oral three times a day with meals  metoprolol tartrate 25 milliGRAM(s) Oral two times a day  pantoprazole    Tablet 40 milliGRAM(s) Oral before breakfast  simvastatin 10 milliGRAM(s) Oral at bedtime    MEDICATIONS  (PRN):  acetaminophen   Tablet .. 650 milliGRAM(s) Oral every 6 hours PRN Mild Pain (1 - 3)
Patient is a 88y old  Female who presents with a chief complaint of SOB -Pleural Effusions b/l (17 Sep 2018 08:19)    Patient was seen and examined.  Denies chest pain, sob.  S/p chest tube removal yesterday.    PAST MEDICAL & SURGICAL HISTORY:  CAD (coronary artery disease)  Hypertension  GERD (gastroesophageal reflux disease)  Diabetes  Arthritis  S/P TAVR (transcatheter aortic valve replacement)    Allergies  penicillins (Other)    MEDICATIONS  (STANDING):  buDESOnide  80 MICROgram(s)/formoterol 4.5 MICROgram(s) Inhaler 2 Puff(s) Inhalation two times a day  chlorhexidine 4% Liquid 1 Application(s) Topical <User Schedule>  clopidogrel Tablet 75 milliGRAM(s) Oral daily  dextrose 50% Injectable 12.5 Gram(s) IV Push once  dextrose 50% Injectable 25 Gram(s) IV Push once  dextrose 50% Injectable 25 Gram(s) IV Push once  docusate sodium 100 milliGRAM(s) Oral two times a day  furosemide    Tablet 40 milliGRAM(s) Oral daily  heparin  Injectable 5000 Unit(s) SubCutaneous every 8 hours  influenza   Vaccine 0.5 milliLiter(s) IntraMuscular once  insulin glargine Injectable (LANTUS) 4 Unit(s) SubCutaneous at bedtime  insulin lispro (HumaLOG) corrective regimen sliding scale   SubCutaneous three times a day before meals  insulin lispro Injectable (HumaLOG) 3 Unit(s) SubCutaneous three times a day before meals  lisinopril 10 milliGRAM(s) Oral daily  meropenem  IVPB 500 milliGRAM(s) IV Intermittent every 6 hours  metoprolol tartrate 25 milliGRAM(s) Oral two times a day  pantoprazole    Tablet 40 milliGRAM(s) Oral before breakfast  senna 2 Tablet(s) Oral at bedtime  simethicone 80 milliGRAM(s) Chew three times a day  simvastatin 10 milliGRAM(s) Oral at bedtime    MEDICATIONS  (PRN):  acetaminophen   Tablet .. 650 milliGRAM(s) Oral every 6 hours PRN Mild Pain (1 - 3)  dextrose 40% Gel 15 Gram(s) Oral once PRN Blood Glucose LESS THAN 70 milliGRAM(s)/deciliter  glucagon  Injectable 1 milliGRAM(s) IntraMuscular once PRN Glucose LESS THAN 70 milligrams/deciliter    Vital Signs Last 24 Hrs  T(C): 36.9 (23 Sep 2018 14:13), Max: 37.2 (22 Sep 2018 20:53)  T(F): 98.4 (23 Sep 2018 14:13), Max: 99 (22 Sep 2018 20:53)  HR: 88 (23 Sep 2018 14:13) (71 - 88)  BP: 129/62 (23 Sep 2018 14:13) (122/57 - 154/67)  BP(mean): 89 (23 Sep 2018 14:13) (89 - 89)  RR: 20 (23 Sep 2018 14:13) (18 - 20)  SpO2: 97% (23 Sep 2018 14:13) (97% - 97%)  O/E:  Awake, alert, not in distress.  HEENT: atraumatic, EOMI.  Chest: decreased breath sounds at bases  CVS: SIS2 +,regular, Systolic murmur+  P/A: Soft, BS+  CNS: non focal.  Ext: no edema feet.  Skin: no rash, no ulcers.  All systems reviewed positive findings as above.                             9.3<L>  10.90<H> )-----------( 306      ( 23 Sep 2018 09:50 )             30.4<L>                        9.8<L>  13.00<H> )-----------( 339      ( 22 Sep 2018 07:18 )             31.7<L>  09-23    143  |  103  |  29<H>  ----------------------------<  147<H>  3.9   |  24  |  1.1    Ca    8.5      23 Sep 2018 09:50    TPro  5.5<L>  /  Alb  2.7<L>  /  TBili  0.2  /  DBili  x   /  AST  13  /  ALT  6   /  AlkPhos  97  09-23

## 2018-09-24 NOTE — PROGRESS NOTE ADULT - ASSESSMENT
SUBJECTIVE:    Patient is a 89y old Female who presents with a chief complaint of SOB -Pleural Effusions b/l (24 Sep 2018 11:14)  Currently admitted to medicine with the primary diagnosis of Empyema lung   Today is hospital day 11d. This morning she is resting comfortably in bed and reports no new issues or overnight events. This morning, patient is improved. She in not complaining of any pain or shortness of breath.     PAST MEDICAL & SURGICAL HISTORY  CAD (coronary artery disease)  Hypertension  GERD (gastroesophageal reflux disease)  Diabetes  Arthritis  S/P TAVR (transcatheter aortic valve replacement)    SOCIAL HISTORY:  Negative for smoking/alcohol/drug use.     ALLERGIES:  penicillins (Other)    MEDICATIONS:  STANDING MEDICATIONS  buDESOnide  80 MICROgram(s)/formoterol 4.5 MICROgram(s) Inhaler 2 Puff(s) Inhalation two times a day  chlorhexidine 4% Liquid 1 Application(s) Topical <User Schedule>  clopidogrel Tablet 75 milliGRAM(s) Oral daily  dextrose 50% Injectable 12.5 Gram(s) IV Push once  dextrose 50% Injectable 25 Gram(s) IV Push once  dextrose 50% Injectable 25 Gram(s) IV Push once  docusate sodium 100 milliGRAM(s) Oral two times a day  furosemide    Tablet 40 milliGRAM(s) Oral daily  heparin  Injectable 5000 Unit(s) SubCutaneous every 8 hours  influenza   Vaccine 0.5 milliLiter(s) IntraMuscular once  insulin glargine Injectable (LANTUS) 4 Unit(s) SubCutaneous at bedtime  insulin lispro (HumaLOG) corrective regimen sliding scale   SubCutaneous three times a day before meals  insulin lispro Injectable (HumaLOG) 3 Unit(s) SubCutaneous three times a day before meals  lisinopril 10 milliGRAM(s) Oral daily  meropenem  IVPB 500 milliGRAM(s) IV Intermittent every 6 hours  metoprolol tartrate 25 milliGRAM(s) Oral two times a day  pantoprazole    Tablet 40 milliGRAM(s) Oral before breakfast  senna 2 Tablet(s) Oral at bedtime  simethicone 80 milliGRAM(s) Chew three times a day  simvastatin 10 milliGRAM(s) Oral at bedtime    PRN MEDICATIONS  acetaminophen   Tablet .. 650 milliGRAM(s) Oral every 6 hours PRN  dextrose 40% Gel 15 Gram(s) Oral once PRN  glucagon  Injectable 1 milliGRAM(s) IntraMuscular once PRN    VITALS:   T(F): 98  HR: 80  BP: 137/63  RR: 20  SpO2: 98% RA    LABS:                        9.0    8.68  )-----------( 282      ( 24 Sep 2018 07:55 )             29.1     09-24    142  |  104  |  28<H>  ----------------------------<  176<H>  4.7   |  24  |  1.0    Ca    8.5      24 Sep 2018 07:55    TPro  5.5<L>  /  Alb  2.7<L>  /  TBili  0.3  /  DBili  x   /  AST  16  /  ALT  6   /  AlkPhos  103  09-24        RADIOLOGY:  < from: Xray Chest 1 View- PORTABLE-Routine (09.24.18 @ 06:29) >  Findings:    Support devices: None.    Cardiac/mediastinum/hilum:  Post TAVR Procedure. Stable cardiac   silhouette.    Lung parenchyma/Pleura: Stable right basilar opacity/loculated air   bubble. Smaller left effusion. No pneumothorax    Skeleton/soft tissues: Stable    Impression:      Stable right basilar opacity/loculated air bubble. Smaller left effusion.   No pneumothorax      < end of copied text >    PHYSICAL EXAM:  GEN: NAD, resting comfortably in bed, sitting, eating.   Pulmonary: No increased WOB, clear to auscultation bilaterally, chest dressing clean, dry intact.   CV: Regular rate and rhythm, no murmurs, rubs, or gallops.   GI: Soft, non-tender, BS+  MSK: Full ROM bilaterally, no edema, DP 2+ bilaterally, L arm midline site non-erythematous.   Neuro: AAOx3  Skin: no rashes or lesions    87 yo F with PMH extensive cardiac history presented to the hospital for the evaluation of sob x 10 days secondary to  empyema.     # HAL 2/2 antibiotic vs lasix vs lisinopril,   -lasix dose decreased to daily. c/w lisinopril, meropenem.   -Cr at 1.2 9/22 from 0.8 on admission.      # SOB 2/2 empyema, secondary to psudomonas  -pigtail for R empyema pulled 2/22,   -am CXR shows Stable right basilar opacity/loculated air bubble. Smaller left effusion. No pneumothorax  -Midline placed  - leukocytosis stable  -Meropenem 500 mg IV q6 until 10/4  -follow up with ID within 3 weeks.   -cytology shows no malignant cells. flow cytometry shows few lymphocytes  -f/u cell block.     #HFpEF  -c/w symbicort  - Trend daily weights 54.4kg 9/15, 9/22 61.2 kg.   -no swelling legs.   -AS S/P TAVR ,  - Furosemide 40mg PO daily  -Echo 9/17 showed EF 60% and severe pulmonary htn, unchanged from  Echo 9/1/2018 : EF 59%, results in chart    #HTN  -lisinopril 10 mg daily started 9/17  -BP wnl   -f/u K and Cr as outpatient, within 1 week.   -Cr increased from 0.8 to 1.0  -K 4.7    # Atrial Fibrillation rate controlled  - AC discontinued due to GI Bleed  - Continue clopidogrel  - Continue Metoprolol    # GERD: Patient takes Famotidine at home.   -c/w protonix    # HLD: Continue simvastatin    # DM  - -207,   -pt not on home insulin.4 lantus, 3/3/3 lispro low correctional scale    # CT abnomalities in kidney  -Renal US negative  -f/u radiologist Dr. Castañeda for CT comparison.    # dvt ppx: Heparin sc 5000 u q8h  # gi ppx: Protonix 40mg PO qam  DISPO: Short term rehab for [facility] CARE SUBJECTIVE:    Patient is a 89y old Female who presents with a chief complaint of SOB -Pleural Effusions b/l (24 Sep 2018 11:14)  Currently admitted to medicine with the primary diagnosis of Empyema lung   Today is hospital day 11d. This morning she is resting comfortably in bed and reports no new issues or overnight events. This morning, patient is improved. She in not complaining of any pain or shortness of breath.     PAST MEDICAL & SURGICAL HISTORY  CAD (coronary artery disease)  Hypertension  GERD (gastroesophageal reflux disease)  Diabetes  Arthritis  S/P TAVR (transcatheter aortic valve replacement)    SOCIAL HISTORY:  Negative for smoking/alcohol/drug use.     ALLERGIES:  penicillins (Other)    MEDICATIONS:  STANDING MEDICATIONS  buDESOnide  80 MICROgram(s)/formoterol 4.5 MICROgram(s) Inhaler 2 Puff(s) Inhalation two times a day  chlorhexidine 4% Liquid 1 Application(s) Topical <User Schedule>  clopidogrel Tablet 75 milliGRAM(s) Oral daily  dextrose 50% Injectable 12.5 Gram(s) IV Push once  dextrose 50% Injectable 25 Gram(s) IV Push once  dextrose 50% Injectable 25 Gram(s) IV Push once  docusate sodium 100 milliGRAM(s) Oral two times a day  furosemide    Tablet 40 milliGRAM(s) Oral daily  heparin  Injectable 5000 Unit(s) SubCutaneous every 8 hours  influenza   Vaccine 0.5 milliLiter(s) IntraMuscular once  insulin glargine Injectable (LANTUS) 4 Unit(s) SubCutaneous at bedtime  insulin lispro (HumaLOG) corrective regimen sliding scale   SubCutaneous three times a day before meals  insulin lispro Injectable (HumaLOG) 3 Unit(s) SubCutaneous three times a day before meals  lisinopril 10 milliGRAM(s) Oral daily  meropenem  IVPB 500 milliGRAM(s) IV Intermittent every 6 hours  metoprolol tartrate 25 milliGRAM(s) Oral two times a day  pantoprazole    Tablet 40 milliGRAM(s) Oral before breakfast  senna 2 Tablet(s) Oral at bedtime  simethicone 80 milliGRAM(s) Chew three times a day  simvastatin 10 milliGRAM(s) Oral at bedtime    PRN MEDICATIONS  acetaminophen   Tablet .. 650 milliGRAM(s) Oral every 6 hours PRN  dextrose 40% Gel 15 Gram(s) Oral once PRN  glucagon  Injectable 1 milliGRAM(s) IntraMuscular once PRN    VITALS:   T(F): 98  HR: 80  BP: 137/63  RR: 20  SpO2: 98% RA    LABS:                        9.0    8.68  )-----------( 282      ( 24 Sep 2018 07:55 )             29.1     09-24    142  |  104  |  28<H>  ----------------------------<  176<H>  4.7   |  24  |  1.0    Ca    8.5      24 Sep 2018 07:55    TPro  5.5<L>  /  Alb  2.7<L>  /  TBili  0.3  /  DBili  x   /  AST  16  /  ALT  6   /  AlkPhos  103  09-24        RADIOLOGY:  < from: Xray Chest 1 View- PORTABLE-Routine (09.24.18 @ 06:29) >  Findings:    Support devices: None.    Cardiac/mediastinum/hilum:  Post TAVR Procedure. Stable cardiac   silhouette.    Lung parenchyma/Pleura: Stable right basilar opacity/loculated air   bubble. Smaller left effusion. No pneumothorax    Skeleton/soft tissues: Stable    Impression:      Stable right basilar opacity/loculated air bubble. Smaller left effusion.   No pneumothorax      < end of copied text >    PHYSICAL EXAM:  GEN: NAD, resting comfortably in bed, sitting, eating.   Pulmonary: No increased WOB, clear to auscultation bilaterally, chest dressing clean, dry intact.   CV: Regular rate and rhythm, no murmurs, rubs, or gallops.   GI: Soft, non-tender, BS+  MSK: Full ROM bilaterally, no edema, DP 2+ bilaterally, L arm midline site non-erythematous.   Neuro: AAOx3  Skin: no rashes or lesions    89 yo F with PMH extensive cardiac history presented to the hospital for the evaluation of sob x 10 days secondary to  empyema.     # HAL 2/2 antibiotic vs lasix vs lisinopril,   -lasix dose decreased to daily. c/w lisinopril, meropenem.   -Cr at 1.2 9/22 from 0.8 on admission.      # SOB 2/2 empyema, secondary to psudomonas  -pigtail for R empyema pulled 2/22,   -am CXR shows Stable right basilar opacity/loculated air bubble. Smaller left effusion. No pneumothorax  -Midline placed  - leukocytosis stable  -Meropenem 500 mg IV q6 until 10/4  -follow up with ID within 3 weeks.   -cytology shows no malignant cells. flow cytometry shows few lymphocytes  -f/u cell block.     #HFpEF  -c/w symbicort  - Trend daily weights 54.4kg 9/15, 9/22 61.2 kg.   -no swelling legs.   -AS S/P TAVR ,  - Furosemide 40mg PO daily  -Echo 9/17 showed EF 60% and severe pulmonary htn, unchanged from  Echo 9/1/2018 : EF 59%, results in chart    #HTN  -lisinopril 10 mg daily started 9/17  -BP wnl   -f/u K and Cr as outpatient, within 1 week.   -Cr increased from 0.8 to 1.0  -K 4.7    # Atrial Fibrillation rate controlled  - AC discontinued due to GI Bleed  - Continue clopidogrel  - Continue Metoprolol    # GERD: Patient takes Famotidine at home.   -c/w protonix    # HLD: Continue simvastatin    # DM  - -207,   -pt not on home insulin.4 lantus, 3/3/3 lispro low correctional scale    # CT abnomalities in kidney  -Renal US negative per Dr. Elver Castañeda, no need for follow up.     # dvt ppx: Heparin sc 5000 u q8h  # gi ppx: Protonix 40mg PO qam  DISPO: Short term rehab for [facility] CARE

## 2018-09-24 NOTE — PROGRESS NOTE ADULT - ASSESSMENT
IMPRESSION:  RLL PNA secondary to Pseudomonas aeruginosa with loculated empyema on the right which has resolved significantly on CT from today  Right sided pneumothorax resolved  Blood cultures negative to date  Clinically much improved    RECOMMENDATIONS:  Meropenem 500 mg iv q6h while in-patient  PICC line for long term iv antibiotics (on discharge can change to ertapenem 1g q24h) (end 10/3)  Will need at least 14 days of iv antibiotics, can transition to PO levaquin if further abx needed  ID follow-up [address] [phone] IMPRESSION:  RLL PNA secondary to Pseudomonas aeruginosa with loculated empyema on the right which has resolved significantly on CT from today  Right sided pneumothorax resolved  Blood cultures negative to date  Clinically much improved    RECOMMENDATIONS:  Meropenem 2g q12h  PICC line for long term iv antibiotics  (end 10/3)  Will need at least 14 days of iv antibiotics, can transition to PO levaquin if further abx needed  ID follow-up [address] [phone]

## 2018-09-26 DIAGNOSIS — J86.9 PYOTHORAX WITHOUT FISTULA: ICD-10-CM

## 2018-09-26 DIAGNOSIS — T37.95XA: ICD-10-CM

## 2018-09-26 DIAGNOSIS — J15.6 PNEUMONIA DUE TO OTHER GRAM-NEGATIVE BACTERIA: ICD-10-CM

## 2018-09-26 DIAGNOSIS — Z95.5 PRESENCE OF CORONARY ANGIOPLASTY IMPLANT AND GRAFT: ICD-10-CM

## 2018-09-26 DIAGNOSIS — I11.0 HYPERTENSIVE HEART DISEASE WITH HEART FAILURE: ICD-10-CM

## 2018-09-26 DIAGNOSIS — Z88.0 ALLERGY STATUS TO PENICILLIN: ICD-10-CM

## 2018-09-26 DIAGNOSIS — E11.9 TYPE 2 DIABETES MELLITUS WITHOUT COMPLICATIONS: ICD-10-CM

## 2018-09-26 DIAGNOSIS — Z79.84 LONG TERM (CURRENT) USE OF ORAL HYPOGLYCEMIC DRUGS: ICD-10-CM

## 2018-09-26 DIAGNOSIS — I50.9 HEART FAILURE, UNSPECIFIED: ICD-10-CM

## 2018-09-26 DIAGNOSIS — M19.90 UNSPECIFIED OSTEOARTHRITIS, UNSPECIFIED SITE: ICD-10-CM

## 2018-09-26 DIAGNOSIS — Z87.891 PERSONAL HISTORY OF NICOTINE DEPENDENCE: ICD-10-CM

## 2018-09-26 DIAGNOSIS — I25.10 ATHEROSCLEROTIC HEART DISEASE OF NATIVE CORONARY ARTERY WITHOUT ANGINA PECTORIS: ICD-10-CM

## 2018-09-26 DIAGNOSIS — D72.829 ELEVATED WHITE BLOOD CELL COUNT, UNSPECIFIED: ICD-10-CM

## 2018-09-26 DIAGNOSIS — Z95.4 PRESENCE OF OTHER HEART-VALVE REPLACEMENT: ICD-10-CM

## 2018-09-26 DIAGNOSIS — Z83.6 FAMILY HISTORY OF OTHER DISEASES OF THE RESPIRATORY SYSTEM: ICD-10-CM

## 2018-09-26 DIAGNOSIS — I27.20 PULMONARY HYPERTENSION, UNSPECIFIED: ICD-10-CM
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
## 2018-09-26 DIAGNOSIS — I08.3 COMBINED RHEUMATIC DISORDERS OF MITRAL, AORTIC AND TRICUSPID VALVES: ICD-10-CM

## 2018-09-26 DIAGNOSIS — I50.33 ACUTE ON CHRONIC DIASTOLIC (CONGESTIVE) HEART FAILURE: ICD-10-CM

## 2018-09-26 DIAGNOSIS — I48.91 UNSPECIFIED ATRIAL FIBRILLATION: ICD-10-CM

## 2018-09-26 DIAGNOSIS — E78.5 HYPERLIPIDEMIA, UNSPECIFIED: ICD-10-CM

## 2018-09-26 DIAGNOSIS — J95.811 POSTPROCEDURAL PNEUMOTHORAX: ICD-10-CM

## 2018-09-26 DIAGNOSIS — N17.9 ACUTE KIDNEY FAILURE, UNSPECIFIED: ICD-10-CM

## 2018-09-26 DIAGNOSIS — K21.9 GASTRO-ESOPHAGEAL REFLUX DISEASE WITHOUT ESOPHAGITIS: ICD-10-CM

## 2018-09-26 DIAGNOSIS — J90 PLEURAL EFFUSION, NOT ELSEWHERE CLASSIFIED: ICD-10-CM

## 2018-10-17 LAB
CULTURE RESULTS: SIGNIFICANT CHANGE UP
SPECIMEN SOURCE: SIGNIFICANT CHANGE UP

## 2018-10-19 ENCOUNTER — APPOINTMENT (OUTPATIENT)
Dept: CARDIOLOGY | Facility: CLINIC | Age: 83
End: 2018-10-19

## 2018-10-19 VITALS
SYSTOLIC BLOOD PRESSURE: 118 MMHG | HEIGHT: 60 IN | WEIGHT: 128 LBS | DIASTOLIC BLOOD PRESSURE: 56 MMHG | BODY MASS INDEX: 25.13 KG/M2

## 2018-10-19 VITALS — HEART RATE: 80 BPM

## 2018-10-19 DIAGNOSIS — I25.10 ATHEROSCLEROTIC HEART DISEASE OF NATIVE CORONARY ARTERY W/OUT ANGINA PECTORIS: ICD-10-CM

## 2018-10-24 ENCOUNTER — LABORATORY RESULT (OUTPATIENT)
Age: 83
End: 2018-10-24

## 2018-10-24 ENCOUNTER — OUTPATIENT (OUTPATIENT)
Dept: OUTPATIENT SERVICES | Facility: HOSPITAL | Age: 83
LOS: 1 days | Discharge: HOME | End: 2018-10-24

## 2018-10-24 DIAGNOSIS — Z95.2 PRESENCE OF PROSTHETIC HEART VALVE: Chronic | ICD-10-CM

## 2018-10-25 DIAGNOSIS — I50.30 UNSPECIFIED DIASTOLIC (CONGESTIVE) HEART FAILURE: ICD-10-CM

## 2018-10-25 DIAGNOSIS — E78.5 HYPERLIPIDEMIA, UNSPECIFIED: ICD-10-CM

## 2018-10-25 DIAGNOSIS — I50.32 CHRONIC DIASTOLIC (CONGESTIVE) HEART FAILURE: ICD-10-CM

## 2018-10-25 DIAGNOSIS — I35.0 NONRHEUMATIC AORTIC (VALVE) STENOSIS: ICD-10-CM

## 2018-10-25 DIAGNOSIS — I65.29 OCCLUSION AND STENOSIS OF UNSPECIFIED CAROTID ARTERY: ICD-10-CM

## 2018-10-31 ENCOUNTER — RX RENEWAL (OUTPATIENT)
Age: 83
End: 2018-10-31

## 2018-11-07 LAB
CULTURE RESULTS: SIGNIFICANT CHANGE UP
SPECIMEN SOURCE: SIGNIFICANT CHANGE UP

## 2018-12-27 ENCOUNTER — INPATIENT (INPATIENT)
Facility: HOSPITAL | Age: 83
LOS: 6 days | Discharge: HOME HEALTH | End: 2019-01-03
Attending: INTERNAL MEDICINE | Admitting: INTERNAL MEDICINE

## 2018-12-27 VITALS
DIASTOLIC BLOOD PRESSURE: 58 MMHG | TEMPERATURE: 98 F | SYSTOLIC BLOOD PRESSURE: 116 MMHG | RESPIRATION RATE: 20 BRPM | HEART RATE: 84 BPM | OXYGEN SATURATION: 99 %

## 2018-12-27 DIAGNOSIS — Z95.2 PRESENCE OF PROSTHETIC HEART VALVE: Chronic | ICD-10-CM

## 2018-12-27 LAB
ALBUMIN SERPL ELPH-MCNC: 3.5 G/DL — SIGNIFICANT CHANGE UP (ref 3.5–5.2)
ALP SERPL-CCNC: 122 U/L — HIGH (ref 30–115)
ALT FLD-CCNC: 8 U/L — SIGNIFICANT CHANGE UP (ref 0–41)
ANION GAP SERPL CALC-SCNC: 18 MMOL/L — HIGH (ref 7–14)
APTT BLD: 25.4 SEC — LOW (ref 27–39.2)
AST SERPL-CCNC: 19 U/L — SIGNIFICANT CHANGE UP (ref 0–41)
BASOPHILS # BLD AUTO: 0.04 K/UL — SIGNIFICANT CHANGE UP (ref 0–0.2)
BASOPHILS NFR BLD AUTO: 0.5 % — SIGNIFICANT CHANGE UP (ref 0–1)
BILIRUB SERPL-MCNC: <0.2 MG/DL — SIGNIFICANT CHANGE UP (ref 0.2–1.2)
BLD GP AB SCN SERPL QL: SIGNIFICANT CHANGE UP
BUN SERPL-MCNC: 75 MG/DL — CRITICAL HIGH (ref 10–20)
CALCIUM SERPL-MCNC: 8.5 MG/DL — SIGNIFICANT CHANGE UP (ref 8.5–10.1)
CHLORIDE SERPL-SCNC: 99 MMOL/L — SIGNIFICANT CHANGE UP (ref 98–110)
CK MB CFR SERPL CALC: 3.2 NG/ML — SIGNIFICANT CHANGE UP (ref 0.6–6.3)
CK SERPL-CCNC: 24 U/L — SIGNIFICANT CHANGE UP (ref 0–225)
CO2 SERPL-SCNC: 19 MMOL/L — SIGNIFICANT CHANGE UP (ref 17–32)
CREAT SERPL-MCNC: 1.6 MG/DL — HIGH (ref 0.7–1.5)
EOSINOPHIL # BLD AUTO: 0.17 K/UL — SIGNIFICANT CHANGE UP (ref 0–0.7)
EOSINOPHIL NFR BLD AUTO: 2.1 % — SIGNIFICANT CHANGE UP (ref 0–8)
GLUCOSE SERPL-MCNC: 231 MG/DL — HIGH (ref 70–99)
HCT VFR BLD CALC: 19.6 % — LOW (ref 37–47)
HCT VFR BLD CALC: 25.2 % — LOW (ref 37–47)
HGB BLD-MCNC: 5.8 G/DL — CRITICAL LOW (ref 12–16)
HGB BLD-MCNC: 8.3 G/DL — LOW (ref 12–16)
IMM GRANULOCYTES NFR BLD AUTO: 0.7 % — HIGH (ref 0.1–0.3)
INR BLD: 1.04 RATIO — SIGNIFICANT CHANGE UP (ref 0.65–1.3)
LYMPHOCYTES # BLD AUTO: 1.05 K/UL — LOW (ref 1.2–3.4)
LYMPHOCYTES # BLD AUTO: 12.7 % — LOW (ref 20.5–51.1)
MCHC RBC-ENTMCNC: 25 PG — LOW (ref 27–31)
MCHC RBC-ENTMCNC: 27 PG — SIGNIFICANT CHANGE UP (ref 27–31)
MCHC RBC-ENTMCNC: 29.6 G/DL — LOW (ref 32–37)
MCHC RBC-ENTMCNC: 32.9 G/DL — SIGNIFICANT CHANGE UP (ref 32–37)
MCV RBC AUTO: 82.1 FL — SIGNIFICANT CHANGE UP (ref 81–99)
MCV RBC AUTO: 84.5 FL — SIGNIFICANT CHANGE UP (ref 81–99)
MONOCYTES # BLD AUTO: 0.66 K/UL — HIGH (ref 0.1–0.6)
MONOCYTES NFR BLD AUTO: 8 % — SIGNIFICANT CHANGE UP (ref 1.7–9.3)
NEUTROPHILS # BLD AUTO: 6.29 K/UL — SIGNIFICANT CHANGE UP (ref 1.4–6.5)
NEUTROPHILS NFR BLD AUTO: 76 % — HIGH (ref 42.2–75.2)
NRBC # BLD: 0 /100 WBCS — SIGNIFICANT CHANGE UP (ref 0–0)
NRBC # BLD: 0 /100 WBCS — SIGNIFICANT CHANGE UP (ref 0–0)
PLATELET # BLD AUTO: 277 K/UL — SIGNIFICANT CHANGE UP (ref 130–400)
PLATELET # BLD AUTO: 286 K/UL — SIGNIFICANT CHANGE UP (ref 130–400)
POTASSIUM SERPL-MCNC: 4.9 MMOL/L — SIGNIFICANT CHANGE UP (ref 3.5–5)
POTASSIUM SERPL-SCNC: 4.9 MMOL/L — SIGNIFICANT CHANGE UP (ref 3.5–5)
PROT SERPL-MCNC: 6.5 G/DL — SIGNIFICANT CHANGE UP (ref 6–8)
PROTHROM AB SERPL-ACNC: 11.9 SEC — SIGNIFICANT CHANGE UP (ref 9.95–12.87)
RBC # BLD: 2.32 M/UL — LOW (ref 4.2–5.4)
RBC # BLD: 3.07 M/UL — LOW (ref 4.2–5.4)
RBC # FLD: 14.4 % — SIGNIFICANT CHANGE UP (ref 11.5–14.5)
RBC # FLD: 14.8 % — HIGH (ref 11.5–14.5)
SODIUM SERPL-SCNC: 136 MMOL/L — SIGNIFICANT CHANGE UP (ref 135–146)
TROPONIN T SERPL-MCNC: 0.26 NG/ML — CRITICAL HIGH
TYPE + AB SCN PNL BLD: SIGNIFICANT CHANGE UP
WBC # BLD: 8.27 K/UL — SIGNIFICANT CHANGE UP (ref 4.8–10.8)
WBC # BLD: 9.6 K/UL — SIGNIFICANT CHANGE UP (ref 4.8–10.8)
WBC # FLD AUTO: 8.27 K/UL — SIGNIFICANT CHANGE UP (ref 4.8–10.8)
WBC # FLD AUTO: 9.6 K/UL — SIGNIFICANT CHANGE UP (ref 4.8–10.8)

## 2018-12-27 RX ORDER — GLIMEPIRIDE 1 MG
6 TABLET ORAL
Qty: 0 | Refills: 0 | COMMUNITY

## 2018-12-27 RX ORDER — FUROSEMIDE 40 MG
40 TABLET ORAL
Qty: 0 | Refills: 0 | Status: DISCONTINUED | OUTPATIENT
Start: 2018-12-27 | End: 2019-01-03

## 2018-12-27 RX ORDER — PANTOPRAZOLE SODIUM 20 MG/1
8 TABLET, DELAYED RELEASE ORAL
Qty: 80 | Refills: 0 | Status: DISCONTINUED | OUTPATIENT
Start: 2018-12-27 | End: 2018-12-31

## 2018-12-27 RX ORDER — METFORMIN HYDROCHLORIDE 850 MG/1
1 TABLET ORAL
Qty: 0 | Refills: 0 | COMMUNITY

## 2018-12-27 RX ORDER — FUROSEMIDE 40 MG
1 TABLET ORAL
Qty: 0 | Refills: 0 | COMMUNITY

## 2018-12-27 RX ORDER — IRON,CARBONYL 45 MG
325 TABLET ORAL
Qty: 0 | Refills: 0 | COMMUNITY

## 2018-12-27 RX ORDER — FAMOTIDINE 10 MG/ML
1 INJECTION INTRAVENOUS
Qty: 0 | Refills: 0 | COMMUNITY

## 2018-12-27 RX ORDER — GLIMEPIRIDE 1 MG
1 TABLET ORAL
Qty: 0 | Refills: 0 | COMMUNITY

## 2018-12-27 RX ORDER — SIMVASTATIN 20 MG/1
1 TABLET, FILM COATED ORAL
Qty: 0 | Refills: 0 | COMMUNITY

## 2018-12-27 RX ORDER — SIMVASTATIN 20 MG/1
10 TABLET, FILM COATED ORAL AT BEDTIME
Qty: 0 | Refills: 0 | Status: DISCONTINUED | OUTPATIENT
Start: 2018-12-27 | End: 2019-01-03

## 2018-12-27 RX ORDER — METOPROLOL TARTRATE 50 MG
25 TABLET ORAL
Qty: 0 | Refills: 0 | Status: DISCONTINUED | OUTPATIENT
Start: 2018-12-27 | End: 2019-01-02

## 2018-12-27 RX ORDER — OMEGA-3 ACID ETHYL ESTERS 1 G
4000 CAPSULE ORAL
Qty: 0 | Refills: 0 | COMMUNITY

## 2018-12-27 RX ORDER — OMEGA-3 ACID ETHYL ESTERS 1 G
2000 CAPSULE ORAL
Qty: 0 | Refills: 0 | COMMUNITY

## 2018-12-27 RX ORDER — METOPROLOL TARTRATE 50 MG
1 TABLET ORAL
Qty: 0 | Refills: 0 | COMMUNITY

## 2018-12-27 RX ADMIN — Medication 25 MILLIGRAM(S): at 20:05

## 2018-12-27 RX ADMIN — SIMVASTATIN 10 MILLIGRAM(S): 20 TABLET, FILM COATED ORAL at 21:13

## 2018-12-27 RX ADMIN — PANTOPRAZOLE SODIUM 10 MG/HR: 20 TABLET, DELAYED RELEASE ORAL at 15:15

## 2018-12-27 RX ADMIN — Medication 40 MILLIGRAM(S): at 20:05

## 2018-12-27 NOTE — H&P ADULT - NSHPLABSRESULTS_GEN_ALL_CORE
CARDIAC MARKERS ( 27 Dec 2018 12:00 )  x     / 0.26 ng/mL / x     / x     / x                         5.8    9.60  )-----------( 286      ( 27 Dec 2018 12:00 )             19.6     12-27  136  |  99  |  75<HH>  ----------------------------<  231<H>  4.9   |  19  |  1.6<H>  Ca    8.5      27 Dec 2018 12:00  TPro  6.5  /  Alb  3.5  /  TBili  <0.2  /  DBili  x   /  AST  19  /  ALT  8   /  AlkPhos  122<H>  12-27    LIVER FUNCTIONS - ( 27 Dec 2018 12:00 )  Alb: 3.5 g/dL / Pro: 6.5 g/dL / ALK PHOS: 122 U/L / ALT: 8 U/L / AST: 19 U/L / GGT: x         PT/INR - ( 27 Dec 2018 14:15 )   PT: 11.90 sec;   INR: 1.04 ratio    PTT - ( 27 Dec 2018 14:15 )  PTT:25.4 sec      I&O's Summary    27 Dec 2018 07:01  -  27 Dec 2018 17:11  --------------------------------------------------------  IN: 348 mL / OUT: 0 mL / NET: 348 mL    Echo 9/2018:  1. LV Ejection Fraction by Moy's Method with a biplane EF of 60 %.    2. Normal left ventricular size and wall thicknesses, with normal systolic   function.    3. Mild to moderate mitral valve regurgitation.    4. Moderate mitral annular calcification.    5. Thickening and calcification of the anterior and posterior mitral valve   leaflets.    6. Mild tricuspid regurgitation.    7. Bioprosthesis in the aortic position.    8. Estimated pulmonary artery systolic pressure is 66.2 mmHg assuming a   right atrial pressure of 10 mmHg, which is consistent with severe pulmonary   hypertension.    9. LA volume Index is 32.9 ml/mï¿½ ml/m2.   10. Peak aortic valve gradient is 15.6 mmHg and the mean gradient is 7.2   mmHg, which is probably normal in the setting of a prosthetic aortic valve.

## 2018-12-27 NOTE — H&P ADULT - NSHPPHYSICALEXAM_GEN_ALL_CORE
General: elderly, pale woman, reclining on her back on RA  Cardiac: +TAVR  Lungs: CTAB  Abd: NTND, +BS  LE: no swelling  Access: 2 peripheral 18 gauge IV

## 2018-12-27 NOTE — ED PROVIDER NOTE - OBJECTIVE STATEMENT
89 yo F with hx of GI bleed a few months ago, HTN, CAD x 1 stent, GERD, DM, s/p TAVR ( transcatherter aortic valve replacement), afib on plavix, sent in for evaluation of low hgb of 6.4, drawn yesterday, associated with dizziness when she was reading, feeling cold for the past three days and dark stools for a few days. No fever, no chest pain, no back pain, no abdominal pain, no headache, no bright red blood per rectum. Per family, patient has pallor more than her usual color. She was seen yesterday for a regular check up when the blood was drawn. PMD Lizandro, Puljere Haney, GI Andrzej. No other symptoms reported. 89 yo F with hx of GI bleed a few months ago, HTN, CAD x 1 stent, GERD, DM, s/p TAVR ( transcatherter aortic valve replacement), afib on plavix, sent in for evaluation of low hgb of 6.4, drawn yesterday, associated with dizziness when she was reading, feeling cold for the past three days and dark stools for a few days. No fever, no chest pain, no back pain, no abdominal pain, no headache, no bright red blood per rectum. Per family, patient has pallor more than her usual color. She was seen yesterday for a regular check up when the blood was drawn. PMD Lizandro, Napoleon Haney, GI Andrzej, Card Ramirez. No other symptoms reported.

## 2018-12-27 NOTE — CONSULT NOTE ADULT - PROVIDER SPECIALTY LIST ADULT
Cardiology Home Suture Removal Text: Patient was provided instructions on removing sutures and will remove their sutures at home.  If they have any questions or difficulties they will call the office.

## 2018-12-27 NOTE — ED ADULT NURSE REASSESSMENT NOTE - NS ED NURSE REASSESS COMMENT FT1
Spoke with blood bank, type and screen is still working, blood will be ready in about 40 min. Pt and family made aware

## 2018-12-27 NOTE — CONSULT NOTE ADULT - ASSESSMENT
88 yo with Dm, Dl, CHF, CAD (stents in: )on DAPT anemia with sine september now with symptoms (lightheadedness etc..) and melena suggestive of a non variceal UGIB (DDx: bleeding PUD NSAID (more likely vs H.pylori vs ideopathic) vs other etiologies- Cannot rule out R sided GIB.  HD stable.    rec:  - Hold a single antiplatelet (plavix).  - 2 x 18G IVs.  - CBC checks and transfuse to Hgb of 8.  - Agree with Protonix 8mg/hr IV.  - EGD after adequate resuscitation or on emergent basis if profuse GIB +/- HD instability. 89 yo with Dm, Dl, CHF, CAD (stents in: )on DAPT anemia with sine september now with symptoms (lightheadedness etc..) and melena suggestive of a non variceal UGIB (DDx: bleeding PUD NSAID (more likely vs H.pylori vs ideopathic) vs other etiologies- Cannot rule out R sided GIB.  HD stable. Elevated BUn points to possible UGI source    rec:  - Hold antiplatelet (plavix) if deemed safe by cardiology to do so  - 2 x 18G IVs.  - CBC checks and transfuse to Hgb of 8.  - Agree with Protonix 8mg/hr IV.  - PPI BID  - EGD after adequate resuscitation (on 12/28)  or on emergent basis if profuse GIB +/- HD instability.  - IF EGD is negative patient will likely need colonoscopy

## 2018-12-27 NOTE — CONSULT NOTE ADULT - ASSESSMENT
GI BLEED HIGHLY UPPER MULTIPLE CO MORBIDITIES    - TRANSFUSE KEEP HB > 8  - PROTONIX DRIP  - NPO  - GI EVAL  - HOLD PLAVIX  - SERIAL CBC  - IF REPEAT CBC STABLE AND NO PROCEDURE TELE  SPOKE WITH SON AT BEDSIDE

## 2018-12-27 NOTE — H&P ADULT - HISTORY OF PRESENT ILLNESS
88 y/o F with PMHx severe pulm htn, COPD on 1L NC (only uses it at bedtime, saw Dr Koko Nichols yesterday in office and everything was ok), DMII< HTN, Afib not on a/c due to GIB (2013, had egd+colo+capsule done but all workup neg, xarelto was stopped at that time), TAVR (2013 at [place]), CAD s/p PCI x1 (2013), arthritis, presenting for outpt bloodwork finding of low hgb of 6 yesterday. Pt had fall 2 wks ago, ct of left hip done with acp but no result, cbc done yesterday showed hgb 6. Pt was told to come to er bc she is high risk. She states that she has been having 2-3 episodes of dark colored stools daily for the past 2 wks. She also endorses more generalized weakness and family members noted that her pallor was poor. Pt denies any chest pain, no recent illness, no fevers, +chills. Pt currently taking plavix right now. Pt was hemodynamically stable on arrival to the ED, hgb was 5.8, CE was elevated to 0.26, ekg showed afib but no ischemic changes, Cr was 1.6 (baseline 1).

## 2018-12-27 NOTE — ED PROVIDER NOTE - PROGRESS NOTE DETAILS
Dr. Donnelly, , paged, awaiting call back I personally evaluated the patient. I reviewed the Resident’s note (as assigned above), and agree with the findings and plan except as documented in my note.   89 y/o F ex-smoker PMHx HTN, stent and TAVR placed, diabetes, gerd, AFib on Plavix, GI bleed x6 years ago arthritis, and anemia sent in to ED by Dr. Sharpe following blood work done yesterday found hemoglobin 6.6. Pt’s family reports pt had a fall two weeks ago and yesterday had a follow up with all of her doctors; Pulmonologist Dr. Haney, PMD Dr. Sharpe, Gasto Dr. Donnelly. Pt with hemoglobin x2 months ago 9.7, at which time she was started on iron. Pt with last colonoscopy and endoscopy x6 years ago while on 3 blood thinners and found to have GI bleed. No fever, chills, n/v, cp,  pleuritic cp, sob, palpitations, diaphoresis, cough, ha/lh/dizziness, numbness/tingling, neck pain/ stiffness, abd pain, diarrhea, constipation, melena/brbpr, urinary symptoms, trauma, weakness, edema, calf pain/swelling/erythema, sick contacts, recent travel or rash. Vital Signs: I have reviewed the initial vital signs. Constitutional: WDWN in NAD. Sitting on stretcher in NAD. Integumentary: No rash. Eyes - pale conjunctiva. ENT: MMM NECK: Supple, non-tender, no meningeal signs. Cardiovascular: irregularly regular. No JVD. Respiratory: BS present b/l, ctabl, no wheezing or crackles, good resp effort and excursion, good air exchange,  no accessory muscle use, no stridor. Gastrointestinal: BS present throughout all 4 quadrants, soft, nd, nt, no rebound tenderness or guarding, no cvat. Musculoskeletal: FROM, no edema, no calf pain/swelling/erythema. Neurologic: AAOx3, motor 5/5 and sensation intact throughout upper and lower ext, CN II-XII intact, No facial droop or slurring of speech. No focal deficits. Rectal exam as documented by resident. Plan: EKG, CXR, labs including type and screen, consent for blood transfusion, GI consult, and reassess. Spoke to Dr. Donnelly, who states patient has a history of errosive gastritis. He states that Dr. Cruz, would like patient to be admitted for an inpatient work up by GI. GI fellow aware of patient, will see patient in the ED I personally evaluated the patient. I reviewed the Resident’s note (as assigned above), and agree with the findings and plan except as documented in my note.   88 y/o F ex-smoker PMHx HTN, CAD s/p stent, TAVR placed, diabetes, gerd, AFib on Plavix, GI bleed ~ 5/6 years ago, arthritis, and anemia sent in to ED by Dr. Sharpe following blood work done yesterday found hemoglobin 6.6. Pt’s family reports pt had a fall two weeks ago and yesterday had a follow up with all of her doctors; Pulmonologist Dr. Haney, PMD Dr. Sharpe, Gasto Dr. Donnelly. Pt with hemoglobin x2 months ago 9.7, at which time she was started on iron. Pt with last colonoscopy and endoscopy x6 years ago while on 3 blood thinners and found to have GI bleed. No fever, chills, n/v, cp,  pleuritic cp, sob, palpitations, diaphoresis, cough, ha/lh/dizziness, numbness/tingling, neck pain/ stiffness, abd pain, diarrhea, constipation, melena/brbpr, urinary symptoms, any new trauma, weakness, edema, calf pain/swelling/erythema, sick contacts, recent travel or rash. Vital Signs: I have reviewed the initial vital signs. Constitutional: WDWN in NAD. Sitting on stretcher speaking full sentences. Integumentary: No rash. Eyes - pale conjunctiva. ENT: MMM NECK: Supple, non-tender, no meningeal signs. Cardiovascular: irregularly regular. No JVD. Respiratory: BS present b/l, ctabl, no wheezing or crackles, good resp effort and excursion, good air exchange,  no accessory muscle use, no stridor. Gastrointestinal: BS present throughout all 4 quadrants, soft, nd, nt, no rebound tenderness or guarding, no cvat. Rectal exam as documented by resident. Musculoskeletal: FROM, no edema, no calf pain/swelling/erythema. Neurologic: AAOx3, motor 5/5 and sensation intact throughout upper and lower ext, CN II-XII intact, No facial droop or slurring of speech. No focal deficits. Plan: EKG, CXR, labs including type and screen, consent for blood transfusion, GI consult, and reassess. pt resting comfortably in nad, hgb noted, pt consented for blood, witnessed, signed given to  to scan, blood to be given. Trop noted, Cardiologist Dr. Guzmán. Cardio fellow aware, will evaluate in the ED I personally evaluated the patient. I reviewed the Resident’s note (as assigned above), and agree with the findings and plan except as documented in my note.   88 y/o F ex-smoker PMHx HTN, CAD s/p stent, TAVR, DM, gerd, AFib on Plavix, GI bleed ~ 5/6 years ago, arthritis, anemia sent in to ED by Dr. Sharpe following blood work done yesterday, was called today and told hgb 6.6. Pt’s family reports pt had a fall ~ two weeks ago, was evaluated in ed, negative scans, and yesterday had a follow up with her physicians including Pulm: Dr. Haney, PMD Dr. Sharpe. Pt with hemoglobin x2 months ago 9.7, at which time she was started on iron. Pt with last colonoscopy and endoscopy x6 years ago while on 3 blood thinners and found to have GI bleed (GI Dr. Donnelly, now only on plavix). No fever, chills, n/v, cp,  pleuritic cp, sob, palpitations, diaphoresis, cough, ha/lh/dizziness, numbness/tingling, neck pain/ stiffness, abd pain, diarrhea, constipation, known melena/brbpr, urinary symptoms, any new trauma, weakness, edema, calf pain/swelling/erythema, sick contacts, recent travel or rash. Vital Signs: I have reviewed the initial vital signs. Constitutional: WDWN in NAD. Sitting on stretcher speaking full sentences. Integumentary: No rash. Eyes - pale conjunctiva. ENT: MMM NECK: Supple, non-tender, no meningeal signs. Cardiovascular: irregularly regular. No JVD. Respiratory: BS present b/l, ctabl, no wheezing or crackles, good resp effort and excursion, good air exchange,  no accessory muscle use, no stridor. Gastrointestinal: BS present throughout all 4 quadrants, soft, nd, nt, no rebound tenderness or guarding, no cvat. Rectal exam as documented by resident- (+) melena. Msk: FROM, no edema, no calf pain/swelling/erythema. Neurologic: AAOx3, motor 5/5 and sensation intact throughout upper and lower ext, CN II-XII intact, No facial droop or slurring of speech. No focal deficits. Plan: EKG, CXR, labs including type and screen, consent for blood transfusion, GI consult, and reassess. ICU fellow Dr. Keith aware of and evaluted pt, reports can be admitted to ICU. ICU team aware of pt, pt and family aware of this and agree, 2x  18 gauges in place, blood consent obtained, scanned, protonix given, GI and CARDIO aware of pt, admitted to ICU. I personally evaluated the patient. I reviewed the Resident’s note (as assigned above), and agree with the findings and plan except as documented in my note.   88 y/o F ex-smoker PMHx HTN, CAD s/p stent, TAVR, DM, gerd, AFib on Plavix, GI bleed ~ 5/6 years ago, arthritis, anemia sent in to ED by Dr. Sharpe following blood work done yesterday, was called today and told hgb 6.6. Pt’s family reports pt had a fall ~ two weeks ago, was evaluated in ed, negative scans, and yesterday had a follow up with her physicians including Pulm: Dr. Haney, PMD Dr. Sharpe. Pt with hemoglobin x2 months ago 9.7, at which time she was started on iron. Pt with last colonoscopy and endoscopy x6 years ago while on 3 blood thinners and found to have GI bleed (GI Dr. Donnelly, now only on plavix). No fever, chills, n/v, cp,  pleuritic cp, sob, palpitations, diaphoresis, cough, ha/lh/dizziness, numbness/tingling, neck pain/ stiffness, abd pain, diarrhea, constipation, known melena/brbpr, urinary symptoms, any new trauma, weakness, edema, calf pain/swelling/erythema, sick contacts, recent travel or rash. Vital Signs: I have reviewed the initial vital signs. Constitutional: WDWN in NAD. Sitting on stretcher speaking full sentences. Integumentary: No rash. Eyes - pale conjunctiva. ENT: MMM NECK: Supple, non-tender, no meningeal signs. Cardiovascular: irregularly regular. No JVD. Respiratory: BS present b/l, bibasilar crackles, good resp effort and excursion, poor air exchange, no accessory muscle use, no stridor. Gastrointestinal: BS present throughout all 4 quadrants, soft, nd, nt, no rebound tenderness or guarding, no cvat. Rectal exam as documented by resident- (+) melena. Msk: FROM, no edema, no calf pain/swelling/erythema. Neurologic: AAOx3, motor 5/5 and sensation intact throughout upper and lower ext, CN II-XII intact, No facial droop or slurring of speech. No focal deficits. Plan: EKG, CXR, labs including type and screen, consent for blood transfusion, GI consult, and reassess. ICU fellow Dr. Keith aware of and evaluated pt, reports can be admitted to ICU. ICU team aware of pt, pt and family aware of this and agree, 2x  18 gauges in place, blood consent obtained, scanned, protonix given, GI and CARDIO aware of pt, admitted to ICU.

## 2018-12-27 NOTE — CONSULT NOTE ADULT - ASSESSMENT
# NSTEMI  # AS s/p TAVR  # CAD   # lower GI bleed  # HAL    - Type 2 NSTEMI most likely secondary to blood loss, anemia  - f/t TTE to assess LVEF  - transfuse to target Hb of approx. 8.0  - ugent procedure (endoscopy)  - no ACS, no ADHF, currently in a.fib, rate controlled.  - METS cannot be assessed due to poor functional status  - patient is moderate risk patient undergoing low risk procedure, risk of perioperative cardiac events <1%, may proceed for if TTE wnl & if procedural benefits>risk.  - intraoperatively maintain I>O, f/u routine EKG postoperatively to monitor for perioperative cardiac events. -Type II NSTEMI secondary to severe Anemia.   -S/P TAVR  -Permanent AF Not on A/C secondary to previous bleeding. Rate controlled.   - CAD S/P BMS in [place] prior to her TAVR . RCA was occluded LAD was ok . Has increased Troponin thought to be type II NSTEMI   -Anemia - severe . S/P transfusion    -pre renal azotemia .   -History of HFpEF .  -S/P recent Pseudomonas empyema.       Plan:  For EGD . The patient is a high cardiac risk undergoing a minor risk procedure. She is stable for this at this time . Son understands higher risk for procedure.   Maintain Hemoglobin >8 grams   Hold LAsix for now but this will eventually need to be restarted . Would give IV Lasix with transfusion   Maintain in ICU   Maintain Metoprolol for rate control of AF . This should be continued tariq-procedure.

## 2018-12-27 NOTE — ED PROVIDER NOTE - GASTROINTESTINAL NEGATIVE STATEMENT, MLM
+ dark stools, no abdominal pain, no bloating, no constipation, no diarrhea, no nausea and no vomiting.

## 2018-12-27 NOTE — H&P ADULT - ASSESSMENT
1. UGIB, on plavix, elevated CE likely 2/2 Type 2 NSTEMI most likely secondary to blood loss  -stop all blood thinners, pt has hx of GI bleed in past  -had egd+colo+capsule in 2013 for gi bleed, neg at that time  -protonix gtt  -npo for now  -volume resuscitation with 2 units prbc, then check cbc  -if 8pm cbc stable, then can downgrade as per Dr Koko Nichols  -spoke with GI, if pt responds appropriately to transfusion then she will go for egd gerber   -trend CE, recent echo mentioned above, will repeat, no active chest pain, hgb transfusion goal of 8  -cardio evaluated pt: patient is moderate risk patient undergoing low risk procedure, risk of perioperative cardiac events <1%, may proceed for if TTE wnl & if procedural benefits>risk    2. COPD: 1L prn and at night time, duonebs prn  3. CAD s/p PCI, s/p TAVR, Afib: hold all a/c, stent done in 2013, c/w lasix 40mg q24 considering cxr pos for bilateral pleural effusions  -if pt becomes hypotensive then stop lasix  -c/w statin  -not on any full a/c blood thinners due to prior hx of GI bleed  -rate control for now with metoprolol  4. DMII: stop all oral hypoglycemics, check FS, npo for now  5. HTN:   6. Recent fall on L hip 2 wks ago: please follow with family for hip fx result from ct done yesterday  7. DVT PPx: scd only  8. GI PPx: on protonix gtt 1. UGIB, on plavix, elevated CE likely 2/2 Type 2 NSTEMI most likely secondary to blood loss, HAL likely 2/2 Anemia  -stop all blood thinners, pt has hx of GI bleed in past  -had egd+colo+capsule in 2013 for gi bleed, neg at that time  -protonix gtt  -npo for now  -volume resuscitation with 2 units prbc, then check cbc  -if 8pm cbc stable, then can downgrade to medical as per Dr Koko Nichols  -spoke with GI, if pt responds appropriately to transfusion then she will go for egd gerber   -trend CE, recent echo mentioned above, will repeat, no active chest pain, hgb transfusion goal of 8  -cardio evaluated pt: patient is moderate risk patient undergoing low risk procedure, risk of perioperative cardiac events <1%, may proceed for if TTE wnl & if procedural benefits>risk  -hold acei in light of elevated cr, will trend bmp and tx prbc    2. COPD: 1L prn and at night time, duonebs prn  3. CAD s/p PCI, s/p TAVR, Afib: hold all a/c, stent done in 2013, c/w lasix 40mg q12 considering cxr pos for bilateral pleural effusions and getting prbc  -if pt becomes hypotensive then stop lasix  -c/w statin  -not on any full a/c blood thinners due to prior hx of GI bleed  -rate control for now with metoprolol  4. DMII: stop all oral hypoglycemics, check FS, npo for now  5. HTN: normotensive right now, hold acei  6. Recent fall on L hip 2 wks ago: please follow with family for hip fx result from ct done yesterday  7. DVT PPx: scd only  8. GI PPx: on protonix gtt

## 2018-12-27 NOTE — ED PROVIDER NOTE - MEDICAL DECISION MAKING DETAILS
pt and family aware of all labs and imaging, consented for blood transfusion, hgb noted, bun/cre, and troponin also noted. GI aware of pt, prtonix being given, 2x 18 guages in placed, cardio also aware of pt, ICU evaluated pt and reports can be admitted to ICU, ICU resident also aware of pt and admission.

## 2018-12-27 NOTE — CONSULT NOTE ADULT - SUBJECTIVE AND OBJECTIVE BOX
Gastroenterology Consultation    88yo Female with melena  Patient is a 89y old  Female who presents with a chief complaint of anemia (27 Dec 2018 14:25)      GI Hx:    Previous colonoscopy(ies): 2013 ([facility]) gastritis  Previous EGD(s):  ([facility]) diverticulosis  Family Hx:  Social Hx:    REVIEW OF SYSTEMS  General:  No weight loss, fevers, or chills.  Eyes:  No reported pain or visual changes  ENT:  No sore throat or runny nose.  NECK: No stiffness or lymphadenopathy  CV:  No chest pain or palpitations.  Resp:  No shortness of breath, cough, wheezing or hemoptysis  GI:  No abdominal pain, nausea, vomiting, dysphagia, diarrhea or constipation. No rectal bleeding, melena, or hematemesis.  :  No dysuria, urinary incontinence or hematuria.  Muscle:  No aches or weakness  Neuro:  No tingling, numbness or vertigo  Psych:  No mood problems or irritability.  Endocrine:  No polyuria, polydipsia or cold/heat intolerance  Heme:  No ecchymosis or easy bruisability  All other review of systems is negative unless indicated above.    PHYSICAL EXAM:  GENERAL: AAOx3, no acute distress.  HEAD:  Atraumatic, Normocephalic  EYES: EOMI, PERRLA, conjunctiva and sclera clear  NECK: Supple, no JVD or thyromegaly  NODES: No palpable cervical or supraclavicular lymphadenopathy   CHEST/LUNG: Clear to auscultation bilaterally; No wheeze, rhonchi, or rales  HEART: Regular rate and rhythm; normal S1, S2, No murmurs.  ABDOMEN: Soft, nontender, nondistended; Bowel sounds present, no abdominal bruit, masses, or hepatosplenomegaly  EXTREMITIES:  2+ Peripheral Pulses. No clubbing, cyanosis, or edema  PSYCH:  Cooperative and appropriate, normal affect.  NEUROLOGY: No asterixis or tremor. Motor and sensory functions grossly intact  SKIN: Intact, no jaundice  EXTREMITIES: warm, no periph edema.  Rectal exam: not done.    PAST MEDICAL & SURGICAL HISTORY:  CAD (coronary artery disease)  Hypertension  GERD (gastroesophageal reflux disease)  Diabetes  Arthritis  S/P TAVR (transcatheter aortic valve replacement)      acetaminophen 325 mg oral tablet: 2 tab(s) orally every 6 hours, As needed, Mild Pain (1 - 3)  budesonide-formoterol 80 mcg-4.5 mcg/inh inhalation aerosol: 2 puff(s) inhaled 2 times a day  clopidogrel 75 mg oral tablet: 1 tab(s) orally once a day  famotidine 20 mg oral tablet, disintegratin  orally once a day  furosemide 40 mg oral tablet: 1 tab(s) orally once a day  glimepiride 2 mg oral tablet: 6 milligram(s) orally once a day  lisinopril 10 mg oral tablet: 1 tab(s) orally once a day  meropenem 500 mg intravenous injection: 500 milligram(s) intravenous every 6 hours until 10/4  metFORMIN 1000 mg oral tablet: 1 tab(s) orally 2 times a day  metoprolol tartrate 25 mg oral tablet: 1 tab(s) orally 2 times a day  simvastatin 10 mg oral tablet: 1 tab(s) orally once a day (at bedtime)      Allergies: penicillins (Other)    Medications:   pantoprazole Infusion 8 mG/Hr IV Continuous <Continuous>        Physical Examination:  T(C): 36.5 (18 @ 09:53), Max: 36.5 (18 @ 09:53)  HR: 84 (18 @ 09:53) (84 - 84)  BP: 116/58 (18 @ 09:53) (116/58 - 116/58)  RR: 20 (18 @ 09:53) (20 - 20)  SpO2: 99% (18 @ 09:53) (99% - 99%)      Data:                        5.8    9.60  )-----------( 286      ( 27 Dec 2018 12:00 )             19.6     MCV 84.5 (18)    RDW 14.8 (18)    HGB trend:  5.8  18 @ 12:00  Hemoglobin: 9.0 g/dL (18 @ 07:55)              136  |  99  |  75<HH>  ----------------------------<  231<H>  4.9   |  19  |  1.6<H>    Ca    8.5      27 Dec 2018 12:00    TPro  6.5  /  Alb  3.5  /  TBili  <0.2  /  DBili  x   /  AST  19  /  ALT  8   /  AlkPhos  122<H>      Liver panel trend:  TBili <0.2   /   AST 19   /   ALT 8   /   AlkP 122   /   Tptn 6.5   /   Alb 3.5    /   DBili --          PT/INR - ( 27 Dec 2018 14:15 )   PT: 11.90 sec;   INR: 1.04 ratio         PTT - ( 27 Dec 2018 14:15 )  PTT:25.4 sec Gastroenterology Consultation    89yo Female with melena  Patient is a 89y old  Female who presents with a chief complaint of anemia (27 Dec 2018 14:25)      GI Hx:  88 yo F hx of CAD on single antiplatelet agent (plavix) Hx of overt obscure GI b in  Afib off AC since the bleeding in  admitted for recurrent falls 2/2 symptomatic normocytic anemia associated with dark stools up to 2 BMs per day ranging from loose to normal consistency.  Patient is known to have anemia (?CARLOS MANUEL) and was started on Fe OP and was following with GI for possible endocopic eval, deferred given the morbid risk profile.  Denies CP, abdominal pain, SOB, emesis dysphagia.  Denies NSAID use.    Previous colonoscopy(ies):  ([facility]) gastritis  Previous EGD(s):  ([facility]) diverticulosis  Family Hx: (-) CRC  Social Hx: quit smoking 15 year ago- no ETOH use - no illicit drug use    REVIEW OF SYSTEMS  General:  No weight loss, fevers, or chills.  Eyes:  No reported pain or visual changes  ENT:  No sore throat or runny nose.  NECK: No stiffness or lymphadenopathy  CV:  No chest pain or palpitations.  Resp:  No shortness of breath, cough, wheezing or hemoptysis  GI:  No abdominal pain, nausea, vomiting, dysphagia, diarrhea or constipation. No rectal bleeding, melena, or hematemesis.  :  No dysuria, urinary incontinence or hematuria.  Muscle:  No aches or weakness  Neuro:  No tingling, numbness or vertigo  Psych:  No mood problems or irritability.  Endocrine:  No polyuria, polydipsia or cold/heat intolerance  Heme:  No ecchymosis or easy bruisability  All other review of systems is negative unless indicated above.    PHYSICAL EXAM:  GENERAL: AAOx3, no acute distress.  HEAD:  Atraumatic, Normocephalic  EYES: EOMI, PERRLA, conjunctiva and sclera clear  NECK: Supple, no JVD or thyromegaly  NODES: No palpable cervical or supraclavicular lymphadenopathy   CHEST/LUNG: Clear to auscultation bilaterally; No wheeze, rhonchi, or rales  HEART: Regular rate and rhythm; normal S1, S2, No murmurs.  ABDOMEN: Soft, nontender, nondistended; Bowel sounds present, no abdominal bruit, masses, or hepatosplenomegaly  EXTREMITIES:  2+ Peripheral Pulses. No clubbing, cyanosis, or edema  PSYCH:  Cooperative and appropriate, normal affect.  NEUROLOGY: No asterixis or tremor. Motor and sensory functions grossly intact  SKIN: Intact, no jaundice  EXTREMITIES: warm, no periph edema.  Rectal exam: melena    PAST MEDICAL & SURGICAL HISTORY:  CAD (coronary artery disease)  Hypertension  GERD (gastroesophageal reflux disease)  Diabetes  Arthritis  S/P TAVR (transcatheter aortic valve replacement)      acetaminophen 325 mg oral tablet: 2 tab(s) orally every 6 hours, As needed, Mild Pain (1 - 3)  budesonide-formoterol 80 mcg-4.5 mcg/inh inhalation aerosol: 2 puff(s) inhaled 2 times a day  clopidogrel 75 mg oral tablet: 1 tab(s) orally once a day  famotidine 20 mg oral tablet, disintegratin  orally once a day  furosemide 40 mg oral tablet: 1 tab(s) orally once a day  glimepiride 2 mg oral tablet: 6 milligram(s) orally once a day  lisinopril 10 mg oral tablet: 1 tab(s) orally once a day  meropenem 500 mg intravenous injection: 500 milligram(s) intravenous every 6 hours until 10/4  metFORMIN 1000 mg oral tablet: 1 tab(s) orally 2 times a day  metoprolol tartrate 25 mg oral tablet: 1 tab(s) orally 2 times a day  simvastatin 10 mg oral tablet: 1 tab(s) orally once a day (at bedtime)      Allergies: penicillins (Other)    Medications:   pantoprazole Infusion 8 mG/Hr IV Continuous <Continuous>        Physical Examination:  T(C): 36.5 (18 @ 09:53), Max: 36.5 (18 @ 09:53)  HR: 84 (18 @ 09:53) (84 - 84)  BP: 116/58 (18 @ 09:53) (116/58 - 116/58)  RR: 20 (18 @ 09:53) (20 - 20)  SpO2: 99% (18 @ 09:53) (99% - 99%)      Data:                        5.8    9.60  )-----------( 286      ( 27 Dec 2018 12:00 )             19.6     MCV 84.5 (18)    RDW 14.8 (18)    HGB trend:  5.8  18 @ 12:00  Hemoglobin: 9.0 g/dL (18 @ 07:55)              136  |  99  |  75<HH>  ----------------------------<  231<H>  4.9   |  19  |  1.6<H>    Ca    8.5      27 Dec 2018 12:00    TPro  6.5  /  Alb  3.5  /  TBili  <0.2  /  DBili  x   /  AST  19  /  ALT  8   /  AlkPhos  122<H>      Liver panel trend:  TBili <0.2   /   AST 19   /   ALT 8   /   AlkP 122   /   Tptn 6.5   /   Alb 3.5    /   DBili --          PT/INR - ( 27 Dec 2018 14:15 )   PT: 11.90 sec;   INR: 1.04 ratio         PTT - ( 27 Dec 2018 14:15 )  PTT:25.4 sec Gastroenterology Consultation    88yo Female with melena  Patient is a 89y old  Female who presents with a chief complaint of anemia (27 Dec 2018 14:25)      GI Hx:  89 yo F hx of CAD on single antiplatelet agent (plavix) Hx of overt obscure GI b in  Afib off AC since the bleeding in  admitted for recurrent falls 2/2 symptomatic normocytic anemia associated with dark stools up to 2 BMs per day ranging from loose to normal consistency.  Patient is known to have anemia (?CARLOS MANUEL) and was started on Fe OP and was following with GI for possible endocopic eval, deferred given the risk profile.  Denies CP, abdominal pain, SOB, emesis dysphagia.  Denies NSAID use.    Previous colonoscopy(ies):  ([facility]) gastritis  Previous EGD(s):  ([facility]) diverticulosis  Family Hx: (-) CRC  Social Hx: quit smoking 15 year ago- no ETOH use - no illicit drug use    REVIEW OF SYSTEMS  General:  No weight loss, fevers, or chills.  Eyes:  No reported pain or visual changes  ENT:  No sore throat or runny nose.  NECK: No stiffness or lymphadenopathy  CV:  No chest pain or palpitations.  Resp:  No shortness of breath, cough, wheezing or hemoptysis  GI:  No abdominal pain, nausea, vomiting, dysphagia, diarrhea or constipation. No rectal bleeding, or hematemesis.  :  No dysuria, urinary incontinence or hematuria.  Muscle:  No aches or weakness  Neuro:  No tingling, numbness or vertigo  Psych:  No mood problems or irritability.  Endocrine:  No polyuria, polydipsia or cold/heat intolerance  Heme:  No ecchymosis or easy bruisability  All other review of systems is negative unless indicated above.    PHYSICAL EXAM:  GENERAL: AAOx3, no acute distress.  HEAD:  Atraumatic, Normocephalic  EYES: EOMI, PERRLA, conjunctiva and sclera clear  NECK: Supple, no JVD or thyromegaly  NODES: No palpable cervical or supraclavicular lymphadenopathy   CHEST/LUNG: Clear to auscultation bilaterally; No wheeze, rhonchi, or rales  HEART: Regular rate and rhythm; normal S1, S2, No murmurs.  ABDOMEN: Soft, nontender, nondistended; Bowel sounds present, no abdominal bruit, masses, or hepatosplenomegaly  EXTREMITIES:  2+ Peripheral Pulses. No clubbing, cyanosis, or edema  PSYCH:  Cooperative and appropriate, normal affect.  NEUROLOGY: No asterixis or tremor. Motor and sensory functions grossly intact  SKIN: Intact, no jaundice  EXTREMITIES: warm, no periph edema.  Rectal exam: melena    PAST MEDICAL & SURGICAL HISTORY:  CAD (coronary artery disease)  Hypertension  GERD (gastroesophageal reflux disease)  Diabetes  Arthritis  S/P TAVR (transcatheter aortic valve replacement)      acetaminophen 325 mg oral tablet: 2 tab(s) orally every 6 hours, As needed, Mild Pain (1 - 3)  budesonide-formoterol 80 mcg-4.5 mcg/inh inhalation aerosol: 2 puff(s) inhaled 2 times a day  clopidogrel 75 mg oral tablet: 1 tab(s) orally once a day  famotidine 20 mg oral tablet, disintegratin  orally once a day  furosemide 40 mg oral tablet: 1 tab(s) orally once a day  glimepiride 2 mg oral tablet: 6 milligram(s) orally once a day  lisinopril 10 mg oral tablet: 1 tab(s) orally once a day  meropenem 500 mg intravenous injection: 500 milligram(s) intravenous every 6 hours until 10/4  metFORMIN 1000 mg oral tablet: 1 tab(s) orally 2 times a day  metoprolol tartrate 25 mg oral tablet: 1 tab(s) orally 2 times a day  simvastatin 10 mg oral tablet: 1 tab(s) orally once a day (at bedtime)      Allergies: penicillins (Other)    Medications:   pantoprazole Infusion 8 mG/Hr IV Continuous <Continuous>        Physical Examination:  T(C): 36.5 (18 @ 09:53), Max: 36.5 (18 @ 09:53)  HR: 84 (18 @ 09:53) (84 - 84)  BP: 116/58 (18 @ 09:53) (116/58 - 116/58)  RR: 20 (18 @ 09:53) (20 - 20)  SpO2: 99% (18 @ 09:53) (99% - 99%)      Data:                        5.8    9.60  )-----------( 286      ( 27 Dec 2018 12:00 )             19.6     MCV 84.5 (18)    RDW 14.8 (18)    HGB trend:  5.8  18 @ 12:00  Hemoglobin: 9.0 g/dL (18 @ 07:55)              136  |  99  |  75<HH>  ----------------------------<  231<H>  4.9   |  19  |  1.6<H>    Ca    8.5      27 Dec 2018 12:00    TPro  6.5  /  Alb  3.5  /  TBili  <0.2  /  DBili  x   /  AST  19  /  ALT  8   /  AlkPhos  122<H>      Liver panel trend:  TBili <0.2   /   AST 19   /   ALT 8   /   AlkP 122   /   Tptn 6.5   /   Alb 3.5    /   DBili --          PT/INR - ( 27 Dec 2018 14:15 )   PT: 11.90 sec;   INR: 1.04 ratio         PTT - ( 27 Dec 2018 14:15 )  PTT:25.4 sec

## 2018-12-27 NOTE — CONSULT NOTE ADULT - SUBJECTIVE AND OBJECTIVE BOX
Patient is a 89y old  Female who presents with a chief complaint of anemia.    HPI: 88 y/o F, with CAD and Afib on plavix, presenting to the ED after being called by the Lab for a hb of 6. History goes back 4 months PTP when she was found to have a hb of 9 and was started on iron pills by her PMD. Over the past few weeks, her BMs were darker than usual and over the past 2-3 days,       PAST MEDICAL & SURGICAL HISTORY:  CAD (coronary artery disease)  Hypertension  GERD (gastroesophageal reflux disease)  Diabetes  Arthritis  S/P TAVR (transcatheter aortic valve replacement)      SOCIAL HX:   Smoking                         ETOH                            Other    FAMILY HISTORY:      ROS:  See HPI     Allergies    penicillins (Other)    Intolerances          PHYSICAL EXAM    ICU Vital Signs Last 24 Hrs  T(C): 36.5 (27 Dec 2018 09:53), Max: 36.5 (27 Dec 2018 09:53)  T(F): 97.7 (27 Dec 2018 09:53), Max: 97.7 (27 Dec 2018 09:53)  HR: 84 (27 Dec 2018 09:53) (84 - 84)  BP: 116/58 (27 Dec 2018 09:53) (116/58 - 116/58)  BP(mean): --  ABP: --  ABP(mean): --  RR: 20 (27 Dec 2018 09:53) (20 - 20)  SpO2: 99% (27 Dec 2018 09:53) (99% - 99%)      General:  HEENT:  BRYAN              Lymph Nodes: No cervical LN   Lungs: Bilateral BS  Cardiovascular: Regular  Abdomen: Soft, Positive BS  Extremities: No clubbing  Skin: Warm  Neurological: Non focal         LABS:                          5.8    9.60  )-----------( 286      ( 27 Dec 2018 12:00 )             19.6                                               12-27    136  |  99  |  75<HH>  ----------------------------<  231<H>  4.9   |  19  |  1.6<H>    Ca    8.5      27 Dec 2018 12:00    TPro  6.5  /  Alb  3.5  /  TBili  <0.2  /  DBili  x   /  AST  19  /  ALT  8   /  AlkPhos  122<H>  12-27                                                 CARDIAC MARKERS ( 27 Dec 2018 12:00 )  x     / 0.26 ng/mL / x     / x     / x                                                LIVER FUNCTIONS - ( 27 Dec 2018 12:00 )  Alb: 3.5 g/dL / Pro: 6.5 g/dL / ALK PHOS: 122 U/L / ALT: 8 U/L / AST: 19 U/L / GGT: x                                                                                                                                       X-Rays                                                                                     ECHO    MEDICATIONS  (STANDING):  pantoprazole Infusion 8 mG/Hr (10 mL/Hr) IV Continuous <Continuous>    MEDICATIONS  (PRN): Patient is a 89y old  Female who presents with a chief complaint of anemia.    HPI: 89 y/o F, with CAD and Afib on plavix, presenting to the ED after being called by the Lab for a hb of 6. History goes back 4 months PTP when she was found to have a hb of 9 and was started on iron pills by her PMD. Over the past few weeks, her BMs were darker than usual and over the past 2-3 days, also c/o being very weak, sob on minimal exertion last BM more than 16 h ago      PAST MEDICAL & SURGICAL HISTORY:  CAD (coronary artery disease)  Hypertension  GERD (gastroesophageal reflux disease)  Diabetes  Arthritis  S/P TAVR (transcatheter aortic valve replacement)      SOCIAL HX:   Smoking -    FAMILY HISTORY:      ROS:  See HPI     Allergies    penicillins (Other)    Intolerances          PHYSICAL EXAM    ICU Vital Signs Last 24 Hrs  T(C): 36.5 (27 Dec 2018 09:53), Max: 36.5 (27 Dec 2018 09:53)  T(F): 97.7 (27 Dec 2018 09:53), Max: 97.7 (27 Dec 2018 09:53)  HR: 84 (27 Dec 2018 09:53) (84 - 84)  BP: 116/58 (27 Dec 2018 09:53) (116/58 - 116/58)  RR: 20 (27 Dec 2018 09:53) (20 - 20)  SpO2: 99% (27 Dec 2018 09:53) (99% - 99%)      General:  HEENT:  BRYAN, pale              Lymph Nodes: No cervical LN   Lungs: Bilateral BS, dec bs r side  Cardiovascular: Regular  Abdomen: Soft, Positive BS  Extremities: No clubbing  Skin: Warm  Neurological: Non focal         LABS:                          5.8    9.60  )-----------( 286      ( 27 Dec 2018 12:00 )             19.6                                               12-27    136  |  99  |  75<HH>  ----------------------------<  231<H>  4.9   |  19  |  1.6<H>    Ca    8.5      27 Dec 2018 12:00    TPro  6.5  /  Alb  3.5  /  TBili  <0.2  /  DBili  x   /  AST  19  /  ALT  8   /  AlkPhos  122<H>  12-27                                                 CARDIAC MARKERS ( 27 Dec 2018 12:00 )  x     / 0.26 ng/mL / x     / x     / x                                                LIVER FUNCTIONS - ( 27 Dec 2018 12:00 )  Alb: 3.5 g/dL / Pro: 6.5 g/dL / ALK PHOS: 122 U/L / ALT: 8 U/L / AST: 19 U/L / GGT: x                                                                                                                                         MEDICATIONS  (STANDING):  pantoprazole Infusion 8 mG/Hr (10 mL/Hr) IV Continuous <Continuous>    MEDICATIONS  (PRN):

## 2018-12-27 NOTE — CONSULT NOTE ADULT - SUBJECTIVE AND OBJECTIVE BOX
CHIEF COMPLAINT:Patient is a 89y old  Female who presents with a chief complaint of Melena/anemia (27 Dec 2018 15:24)      HISTORY OF PRESENT ILLNESS:     89 yo F with hx of GI bleed a few months ago, HTN, CAD x 1 stent, GERD, DM, s/p TAVR ( transcatherter aortic valve replacement), afib on plavix, sent in for evaluation of low hgb of 6.4, drawn yesterday, associated with dizziness when she was reading, feeling cold for the past three days and dark stools for a few days. No fever, no chest pain, no back pain, no abdominal pain, no headache, no bright red blood per rectum. Per family, patient has pallor more than her usual color. She was seen yesterday for a regular check up when the blood was drawn. PMD Napoleon Bone, BRICE Donnelly, Card Ramirez. No other symptoms reported.    PAST MEDICAL & SURGICAL HISTORY:  CAD (coronary artery disease)  Hypertension  GERD (gastroesophageal reflux disease)  Diabetes  Arthritis  S/P TAVR (transcatheter aortic valve replacement)    FAMILY HISTORY:    Allergies    penicillins (Other)    Intolerances    	  Home Medications:  acetaminophen 325 mg oral tablet: 2 tab(s) orally every 6 hours, As needed, Mild Pain (1 - 3) (23 Sep 2018 08:49)  budesonide-formoterol 80 mcg-4.5 mcg/inh inhalation aerosol: 2 puff(s) inhaled 2 times a day (23 Sep 2018 08:49)  clopidogrel 75 mg oral tablet: 1 tab(s) orally once a day (13 Sep 2018 21:08)  famotidine 20 mg oral tablet, disintegratin  orally once a day (13 Sep 2018 21:08)  furosemide 40 mg oral tablet: 1 tab(s) orally once a day (23 Sep 2018 08:49)  glimepiride 2 mg oral tablet: 6 milligram(s) orally once a day (13 Sep 2018 21:08)  lisinopril 10 mg oral tablet: 1 tab(s) orally once a day (23 Sep 2018 08:49)  meropenem 500 mg intravenous injection: 500 milligram(s) intravenous every 6 hours until 10/4 (23 Sep 2018 08:50)  metFORMIN 1000 mg oral tablet: 1 tab(s) orally 2 times a day (13 Sep 2018 21:08)  metoprolol tartrate 25 mg oral tablet: 1 tab(s) orally 2 times a day (13 Sep 2018 21:08)  simvastatin 10 mg oral tablet: 1 tab(s) orally once a day (at bedtime) (13 Sep 2018 21:08)    MEDICATIONS  (STANDING):  pantoprazole Infusion 8 mG/Hr (10 mL/Hr) IV Continuous <Continuous>    SOCIAL HISTORY:    [  ] active smoker  [x  ] non smoker  [  ] Etoh  [  ] recreational drugs    REVIEW OF SYSTEMS:  14 point ROS negative except as mentioned above in HPI    PHYSICAL EXAM:  T(C): 36.5 (18 @ 09:53), Max: 36.5 (18 @ 09:53)  HR: 84 (18 @ 09:53) (84 - 84)  BP: 116/58 (18 @ 09:53) (116/58 - 116/58)  RR: 20 (18 @ 09:53) (20 - 20)  SpO2: 99% (18 @ 09:53) (99% - 99%)  Wt(kg): --  I&O's Summary    27 Dec 2018 07:01  -  27 Dec 2018 16:39  --------------------------------------------------------  IN: 348 mL / OUT: 0 mL / NET: 348 mL        General Appearance: in NAD, pale appearance	  HEENT: NC, No JVD appreciated  Cardiovascular: Normal S1 S2, No murmurs  Respiratory: cta b/l  Gastrointestinal:  Soft, Non-tender, BS +	  Neurologic: No focal deficits, AAOx3  Extremities: ROM wnl, No clubbing/cyanosis/edema  Skin: No rashes, No ecchymoses, No cyanosis  Vascular: Peripheral pulses palpable 2+ bilaterally    LABS:	 	                        5.8    9.60  )-----------( 286      ( 27 Dec 2018 12:00 )             19.6         136  |  99  |  75<HH>  ----------------------------<  231<H>  4.9   |  19  |  1.6<H>    Ca    8.5      27 Dec 2018 12:00    TPro  6.5  /  Alb  3.5  /  TBili  <0.2  /  DBili  x   /  AST  19  /  ALT  8   /  AlkPhos  122<H>      eGFR if Non African American: 28 mL/min/1.73M2 (18 @ 12:00)            CARDIAC MARKERS:  18 @ 12:00  TROPONIN-T  0.26 ng/mL  CKMB  --  CREATINE KINASE  --  	    ECG:  	< from: 12 Lead ECG (18 @ 11:07) >  Diagnosis Line Atrial fibrillation  Nonspecific ST and T wave abnormality  Abnormal ECG    Confirmed by Raphael Redding ([id number]) on 2018 11:31:20 AM    < end of copied text >    	    PREVIOUS DIAGNOSTIC TESTING:    [x] Echocardiogram:  < from: Transthoracic Echocardiogram (18 @ 14:51) >     Summary:   1. LV Ejection Fraction by Moy's Method with a biplane EF of 60 %.   2. Normal left ventricular size and wall thicknesses, with normal   systolic function.   3. Mild to moderate mitral valve regurgitation.   4. Moderate mitral annular calcification.   5. Thickening and calcification of the anterior and posterior mitral   valve leaflets.   6. Mild tricuspid regurgitation.   7. Bioprosthesis in the aortic position.   8. Estimated pulmonary artery systolic pressure is 66.2 mmHg assuming a   right atrial pressure of 10 mmHg, which is consistent with severe   pulmonary hypertension.   9. LA volume Index is 32.9 ml/m² ml/m2.  10. Peak aortic valve gradient is 15.6 mmHg and the mean gradient is 7.2   mmHg, which is probably normal in the setting of a prosthetic aortic   valve.    < end of copied text > CHIEF COMPLAINT:Patient is a 89y old  Female who presents with a chief complaint of Melena/anemia (27 Dec 2018 15:24)      HISTORY OF PRESENT ILLNESS:     89 yo F with HTN, CAD x 1 stent BMS in [place] RCA is occluded ,, GERD, history of anemia suspected past GI bleed which workup included EGD and colonosocpy DM, s/p TAVR ( transcatherter aortic valve replacement),  permanent afib on plavix,S/P recent pseudomonas empyema sent in for evaluation of low hgb of 6.4, drawn yesterday, associated with dizziness when she was reading, feeling cold for the past three days and dark stools for a few days. No fever, no chest pain, no back pain, no abdominal pain, no headache, no bright red blood per rectum. Per family, patient has pallor more than her usual color. She was seen yesterday for a regular check up when the blood was drawn. Hemoglobin was 5.8 on admission and she was given a transfusion to hemoglobin of 8.3 . She has had black stools chronically but takes iron supplements.     PAST MEDICAL & SURGICAL HISTORY:  CAD (coronary artery disease)  Hypertension  GERD (gastroesophageal reflux disease)  Diabetes  Arthritis  S/P TAVR (transcatheter aortic valve replacement)  Anemia  Empyema   CHF     FAMILY HISTORY:    Allergies    penicillins (Other)    Intolerances    	  Home Medications:  acetaminophen 325 mg oral tablet: 2 tab(s) orally every 6 hours, As needed, Mild Pain (1 - 3) (23 Sep 2018 08:49)  budesonide-formoterol 80 mcg-4.5 mcg/inh inhalation aerosol: 2 puff(s) inhaled 2 times a day (23 Sep 2018 08:49)  clopidogrel 75 mg oral tablet: 1 tab(s) orally once a day (13 Sep 2018 21:08)  famotidine 20 mg oral tablet, disintegratin  orally once a day (13 Sep 2018 21:08)  furosemide 40 mg oral tablet: 1 tab(s) orally once a day (23 Sep 2018 08:49)  glimepiride 2 mg oral tablet: 6 milligram(s) orally once a day (13 Sep 2018 21:08)  lisinopril 10 mg oral tablet: 1 tab(s) orally once a day (23 Sep 2018 08:49)  meropenem 500 mg intravenous injection: 500 milligram(s) intravenous every 6 hours until 10/4 (23 Sep 2018 08:50)  metFORMIN 1000 mg oral tablet: 1 tab(s) orally 2 times a day (13 Sep 2018 21:08)  metoprolol tartrate 25 mg oral tablet: 1 tab(s) orally 2 times a day (13 Sep 2018 21:08)  simvastatin 10 mg oral tablet: 1 tab(s) orally once a day (at bedtime) (13 Sep 2018 21:08)    MEDICATIONS  (STANDING):  pantoprazole Infusion 8 mG/Hr (10 mL/Hr) IV Continuous <Continuous>    SOCIAL HISTORY:    [  ] active smoker  [x  ] non smoker  [  ] Etoh  [  ] recreational drugs    REVIEW OF SYSTEMS:  14 point ROS negative except as mentioned above in HPI    PHYSICAL EXAM:  T(C): 36.5 (18 @ 09:53), Max: 36.5 (18 @ 09:53)  HR: 84 (18 @ 09:53) (84 - 84)  BP: 116/58 (18 @ 09:53) (116/58 - 116/58)  RR: 20 (18 @ 09:53) (20 - 20)  SpO2: 99% (18 @ 09:53) (99% - 99%)  Wt(kg): --  I&O's Summary    27 Dec 2018 07:01  -  27 Dec 2018 16:39  --------------------------------------------------------  IN: 348 mL / OUT: 0 mL / NET: 348 mL        General Appearance: in NAD, pale appearance	  HEENT: NC, No JVD appreciated  Cardiovascular: Normal S1 S2, No murmurs  Respiratory: cta b/l  Gastrointestinal:  Soft, Non-tender, BS +	  Neurologic: No focal deficits, AAOx3  Extremities: ROM wnl, No clubbing/cyanosis/edema  Skin: No rashes, No ecchymoses, No cyanosis  Vascular: Peripheral pulses palpable 2+ bilaterally    LABS:	 	                        5.8    9.60  )-----------( 286      ( 27 Dec 2018 12:00 )             19.6         136  |  99  |  75<HH>  ----------------------------<  231<H>  4.9   |  19  |  1.6<H>    Ca    8.5      27 Dec 2018 12:00    TPro  6.5  /  Alb  3.5  /  TBili  <0.2  /  DBili  x   /  AST  19  /  ALT  8   /  AlkPhos  122<H>      eGFR if Non African American: 28 mL/min/1.73M2 (18 @ 12:00)            CARDIAC MARKERS:  18 @ 12:00  TROPONIN-T  0.26 ng/mL  CKMB  --  CREATINE KINASE  --  	    ECG:  	< from: 12 Lead ECG (18 @ 11:07) >  Diagnosis Line Atrial fibrillation  Nonspecific ST and T wave abnormality  Abnormal ECG    Confirmed by Raphael Redding ([id number]) on 2018 11:31:20 AM    < end of copied text >    	    PREVIOUS DIAGNOSTIC TESTING:    [x] Echocardiogram:  < from: Transthoracic Echocardiogram (18 @ 14:51) >     Summary:   1. LV Ejection Fraction by Moy's Method with a biplane EF of 60 %.   2. Normal left ventricular size and wall thicknesses, with normal   systolic function.   3. Mild to moderate mitral valve regurgitation.   4. Moderate mitral annular calcification.   5. Thickening and calcification of the anterior and posterior mitral   valve leaflets.   6. Mild tricuspid regurgitation.   7. Bioprosthesis in the aortic position.   8. Estimated pulmonary artery systolic pressure is 66.2 mmHg assuming a   right atrial pressure of 10 mmHg, which is consistent with severe   pulmonary hypertension.   9. LA volume Index is 32.9 ml/m² ml/m2.  10. Peak aortic valve gradient is 15.6 mmHg and the mean gradient is 7.2   mmHg, which is probably normal in the setting of a prosthetic aortic   valve.    < end of copied text >

## 2018-12-27 NOTE — ED ADULT NURSE NOTE - NSIMPLEMENTINTERV_GEN_ALL_ED
Implemented All Fall with Harm Risk Interventions:  [place] to call system. Call bell, personal items and telephone within reach. Instruct patient to call for assistance. Room bathroom lighting operational. Non-slip footwear when patient is off stretcher. Physically safe environment: no spills, clutter or unnecessary equipment. Stretcher in lowest position, wheels locked, appropriate side rails in place. Provide visual cue, wrist band, yellow gown, etc. Monitor gait and stability. Monitor for mental status changes and reorient to person, place, and time. Review medications for side effects contributing to fall risk. Reinforce activity limits and safety measures with patient and family. Provide visual clues: red socks.

## 2018-12-27 NOTE — H&P ADULT - PMH
Aortic valve replaced    Arthritis    CAD (coronary artery disease)    COPD (chronic obstructive pulmonary disease)    Diabetes    GERD (gastroesophageal reflux disease)    GI bleed    Hypertension    Pulmonary hypertension

## 2018-12-27 NOTE — ED PROVIDER NOTE - GASTROINTESTINAL, MLM
Abdomen soft, non-tender, no guarding. Abdomen soft, non-tender, non distended, no rebound, no rigidity, no guarding. Rectal exam: non thrombosed external hemorrhoids, good rectal tone, melanic stools no bright red blood per rectum

## 2018-12-28 ENCOUNTER — TRANSCRIPTION ENCOUNTER (OUTPATIENT)
Age: 83
End: 2018-12-28

## 2018-12-28 LAB
ALBUMIN SERPL ELPH-MCNC: 3.8 G/DL — SIGNIFICANT CHANGE UP (ref 3.5–5.2)
ALP SERPL-CCNC: 119 U/L — HIGH (ref 30–115)
ALT FLD-CCNC: 8 U/L — SIGNIFICANT CHANGE UP (ref 0–41)
ANION GAP SERPL CALC-SCNC: 17 MMOL/L — HIGH (ref 7–14)
AST SERPL-CCNC: 19 U/L — SIGNIFICANT CHANGE UP (ref 0–41)
BASOPHILS # BLD AUTO: 0.06 K/UL — SIGNIFICANT CHANGE UP (ref 0–0.2)
BASOPHILS NFR BLD AUTO: 0.7 % — SIGNIFICANT CHANGE UP (ref 0–1)
BILIRUB SERPL-MCNC: 0.5 MG/DL — SIGNIFICANT CHANGE UP (ref 0.2–1.2)
BUN SERPL-MCNC: 65 MG/DL — CRITICAL HIGH (ref 10–20)
CALCIUM SERPL-MCNC: 9.2 MG/DL — SIGNIFICANT CHANGE UP (ref 8.5–10.1)
CHLORIDE SERPL-SCNC: 103 MMOL/L — SIGNIFICANT CHANGE UP (ref 98–110)
CO2 SERPL-SCNC: 23 MMOL/L — SIGNIFICANT CHANGE UP (ref 17–32)
CREAT SERPL-MCNC: 1.4 MG/DL — SIGNIFICANT CHANGE UP (ref 0.7–1.5)
EOSINOPHIL # BLD AUTO: 0.25 K/UL — SIGNIFICANT CHANGE UP (ref 0–0.7)
EOSINOPHIL NFR BLD AUTO: 2.8 % — SIGNIFICANT CHANGE UP (ref 0–8)
GLUCOSE BLDC GLUCOMTR-MCNC: 143 MG/DL — HIGH (ref 70–99)
GLUCOSE SERPL-MCNC: 106 MG/DL — HIGH (ref 70–99)
HCT VFR BLD CALC: 28 % — LOW (ref 37–47)
HGB BLD-MCNC: 9.1 G/DL — LOW (ref 12–16)
IMM GRANULOCYTES NFR BLD AUTO: 0.7 % — HIGH (ref 0.1–0.3)
LYMPHOCYTES # BLD AUTO: 1.31 K/UL — SIGNIFICANT CHANGE UP (ref 1.2–3.4)
LYMPHOCYTES # BLD AUTO: 14.6 % — LOW (ref 20.5–51.1)
MCHC RBC-ENTMCNC: 26.8 PG — LOW (ref 27–31)
MCHC RBC-ENTMCNC: 32.5 G/DL — SIGNIFICANT CHANGE UP (ref 32–37)
MCV RBC AUTO: 82.4 FL — SIGNIFICANT CHANGE UP (ref 81–99)
MONOCYTES # BLD AUTO: 0.63 K/UL — HIGH (ref 0.1–0.6)
MONOCYTES NFR BLD AUTO: 7 % — SIGNIFICANT CHANGE UP (ref 1.7–9.3)
NEUTROPHILS # BLD AUTO: 6.66 K/UL — HIGH (ref 1.4–6.5)
NEUTROPHILS NFR BLD AUTO: 74.2 % — SIGNIFICANT CHANGE UP (ref 42.2–75.2)
NRBC # BLD: 0 /100 WBCS — SIGNIFICANT CHANGE UP (ref 0–0)
PLATELET # BLD AUTO: 282 K/UL — SIGNIFICANT CHANGE UP (ref 130–400)
POTASSIUM SERPL-MCNC: 4.4 MMOL/L — SIGNIFICANT CHANGE UP (ref 3.5–5)
POTASSIUM SERPL-SCNC: 4.4 MMOL/L — SIGNIFICANT CHANGE UP (ref 3.5–5)
PROT SERPL-MCNC: 6.7 G/DL — SIGNIFICANT CHANGE UP (ref 6–8)
RBC # BLD: 3.4 M/UL — LOW (ref 4.2–5.4)
RBC # FLD: 14.7 % — HIGH (ref 11.5–14.5)
SODIUM SERPL-SCNC: 143 MMOL/L — SIGNIFICANT CHANGE UP (ref 135–146)
TROPONIN T SERPL-MCNC: 0.26 NG/ML — CRITICAL HIGH
WBC # BLD: 8.97 K/UL — SIGNIFICANT CHANGE UP (ref 4.8–10.8)
WBC # FLD AUTO: 8.97 K/UL — SIGNIFICANT CHANGE UP (ref 4.8–10.8)

## 2018-12-28 RX ORDER — SODIUM CHLORIDE 9 MG/ML
1000 INJECTION, SOLUTION INTRAVENOUS
Qty: 0 | Refills: 0 | Status: DISCONTINUED | OUTPATIENT
Start: 2018-12-28 | End: 2018-12-31

## 2018-12-28 RX ADMIN — Medication 40 MILLIGRAM(S): at 18:14

## 2018-12-28 RX ADMIN — PANTOPRAZOLE SODIUM 10 MG/HR: 20 TABLET, DELAYED RELEASE ORAL at 03:38

## 2018-12-28 RX ADMIN — SODIUM CHLORIDE 50 MILLILITER(S): 9 INJECTION, SOLUTION INTRAVENOUS at 21:30

## 2018-12-28 RX ADMIN — Medication 25 MILLIGRAM(S): at 18:14

## 2018-12-28 RX ADMIN — SODIUM CHLORIDE 50 MILLILITER(S): 9 INJECTION, SOLUTION INTRAVENOUS at 16:33

## 2018-12-28 RX ADMIN — Medication 25 MILLIGRAM(S): at 05:27

## 2018-12-28 RX ADMIN — PANTOPRAZOLE SODIUM 10 MG/HR: 20 TABLET, DELAYED RELEASE ORAL at 21:30

## 2018-12-28 RX ADMIN — Medication 40 MILLIGRAM(S): at 05:27

## 2018-12-28 RX ADMIN — PANTOPRAZOLE SODIUM 10 MG/HR: 20 TABLET, DELAYED RELEASE ORAL at 01:15

## 2018-12-28 RX ADMIN — PANTOPRAZOLE SODIUM 10 MG/HR: 20 TABLET, DELAYED RELEASE ORAL at 16:42

## 2018-12-28 RX ADMIN — SIMVASTATIN 10 MILLIGRAM(S): 20 TABLET, FILM COATED ORAL at 21:29

## 2018-12-28 NOTE — ADVANCED PRACTICE NURSE CONSULT - RECOMMEDATIONS
Pressure relief   moisture control  Turn and position Q2h  Off load heels  incontinent care /Barrier cream  Pain management to facilitate movement   Patient Family Son and  have been educated regarding prevention   Nutritional  support

## 2018-12-28 NOTE — CHART NOTE - NSCHARTNOTEFT_GEN_A_CORE
PACU ANESTHESIA ADMISSION NOTE      Procedure:   Post op diagnosis:      ____  Intubated  TV:______       Rate: ______      FiO2: ______    _x___  Patent Airway    _x___  Full return of protective reflexes    _x___  Full recovery from anesthesia / back to baseline     Vitals:   T:           R16:                  BP:168/68                  Sat: 100                  P: 83      Mental Status:  _x___ Awake   x_____ Alert   _____ Drowsy   _____ Sedated    Nausea/Vomiting:  _x___ NO  ______Yes,   See Post - Op Orders          Pain Scale (0-10):  _0____    Treatment: ____ None    ____ See Post - Op/PCA Orders    Post - Operative Fluids:   ____ Oral   ____ See Post - Op Orders    Plan: Discharge:   ____Home       _____Floor     _____Critical Care    _____  Other:_________________    Comments:

## 2018-12-28 NOTE — ADVANCED PRACTICE NURSE CONSULT - REASON FOR CONSULT
At risk patient for skin breakdown  Hip pain  Patient reports a heeled pressure injury prior to admission

## 2018-12-29 LAB
GLUCOSE BLDC GLUCOMTR-MCNC: 147 MG/DL — HIGH (ref 70–99)
GLUCOSE BLDC GLUCOMTR-MCNC: 149 MG/DL — HIGH (ref 70–99)
GLUCOSE BLDC GLUCOMTR-MCNC: 202 MG/DL — HIGH (ref 70–99)
GLUCOSE BLDC GLUCOMTR-MCNC: 209 MG/DL — HIGH (ref 70–99)
GLUCOSE BLDC GLUCOMTR-MCNC: 401 MG/DL — HIGH (ref 70–99)
HCT VFR BLD CALC: 26.8 % — LOW (ref 37–47)
HGB BLD-MCNC: 8.6 G/DL — LOW (ref 12–16)
MCHC RBC-ENTMCNC: 27.1 PG — SIGNIFICANT CHANGE UP (ref 27–31)
MCHC RBC-ENTMCNC: 32.1 G/DL — SIGNIFICANT CHANGE UP (ref 32–37)
MCV RBC AUTO: 84.5 FL — SIGNIFICANT CHANGE UP (ref 81–99)
NRBC # BLD: 0 /100 WBCS — SIGNIFICANT CHANGE UP (ref 0–0)
PLATELET # BLD AUTO: 224 K/UL — SIGNIFICANT CHANGE UP (ref 130–400)
RBC # BLD: 3.17 M/UL — LOW (ref 4.2–5.4)
RBC # FLD: 14.8 % — HIGH (ref 11.5–14.5)
TROPONIN T SERPL-MCNC: 0.09 NG/ML — CRITICAL HIGH
WBC # BLD: 7.88 K/UL — SIGNIFICANT CHANGE UP (ref 4.8–10.8)
WBC # FLD AUTO: 7.88 K/UL — SIGNIFICANT CHANGE UP (ref 4.8–10.8)

## 2018-12-29 RX ORDER — INSULIN GLARGINE 100 [IU]/ML
10 INJECTION, SOLUTION SUBCUTANEOUS ONCE
Qty: 0 | Refills: 0 | Status: COMPLETED | OUTPATIENT
Start: 2018-12-29 | End: 2018-12-29

## 2018-12-29 RX ORDER — INSULIN LISPRO 100/ML
3 VIAL (ML) SUBCUTANEOUS ONCE
Qty: 0 | Refills: 0 | Status: DISCONTINUED | OUTPATIENT
Start: 2018-12-29 | End: 2018-12-29

## 2018-12-29 RX ORDER — SOD SULF/SODIUM/NAHCO3/KCL/PEG
4000 SOLUTION, RECONSTITUTED, ORAL ORAL ONCE
Qty: 0 | Refills: 0 | Status: COMPLETED | OUTPATIENT
Start: 2018-12-30 | End: 2018-12-30

## 2018-12-29 RX ORDER — INSULIN LISPRO 100/ML
4 VIAL (ML) SUBCUTANEOUS ONCE
Qty: 0 | Refills: 0 | Status: DISCONTINUED | OUTPATIENT
Start: 2018-12-29 | End: 2018-12-29

## 2018-12-29 RX ORDER — LIDOCAINE 4 G/100G
1 CREAM TOPICAL ONCE
Qty: 0 | Refills: 0 | Status: COMPLETED | OUTPATIENT
Start: 2018-12-29 | End: 2018-12-29

## 2018-12-29 RX ORDER — INSULIN LISPRO 100/ML
4 VIAL (ML) SUBCUTANEOUS ONCE
Qty: 0 | Refills: 0 | Status: COMPLETED | OUTPATIENT
Start: 2018-12-29 | End: 2018-12-29

## 2018-12-29 RX ADMIN — Medication 40 MILLIGRAM(S): at 05:53

## 2018-12-29 RX ADMIN — Medication 4 UNIT(S): at 22:36

## 2018-12-29 RX ADMIN — Medication 25 MILLIGRAM(S): at 17:06

## 2018-12-29 RX ADMIN — INSULIN GLARGINE 10 UNIT(S): 100 INJECTION, SOLUTION SUBCUTANEOUS at 22:35

## 2018-12-29 RX ADMIN — LIDOCAINE 1 PATCH: 4 CREAM TOPICAL at 22:27

## 2018-12-29 RX ADMIN — SIMVASTATIN 10 MILLIGRAM(S): 20 TABLET, FILM COATED ORAL at 22:27

## 2018-12-29 RX ADMIN — Medication 40 MILLIGRAM(S): at 17:06

## 2018-12-29 RX ADMIN — SODIUM CHLORIDE 50 MILLILITER(S): 9 INJECTION, SOLUTION INTRAVENOUS at 11:39

## 2018-12-29 RX ADMIN — Medication 25 MILLIGRAM(S): at 05:53

## 2018-12-29 NOTE — PROGRESS NOTE ADULT - ASSESSMENT
MUKESH POLLACK 89y Female  MRN#: [id number]   CODE STATUS:FULLCODE      SUBJECTIVE  Patient is a 89y old Female who presents with a chief complaint of UGIB (27 Dec 2018 16:57)  Currently admitted to medicine with the primary diagnosis of GI bleed  Today is hospital day 2d, and this morning she is resting comfortably in bed and reports no overnight events.   states that she feels much better after she received the blood transfusion,     OBJECTIVE  PAST MEDICAL & SURGICAL HISTORY  Pulmonary hypertension  COPD (chronic obstructive pulmonary disease)  GI bleed  Aortic valve replaced  CAD (coronary artery disease)  Hypertension  GERD (gastroesophageal reflux disease)  Diabetes  Arthritis  S/P TAVR (transcatheter aortic valve replacement)    ALLERGIES:  penicillins (Other)    MEDICATIONS:  STANDING MEDICATIONS  furosemide    Tablet 40 milliGRAM(s) Oral two times a day  lactated ringers. 1000 milliLiter(s) IV Continuous <Continuous>  metoprolol tartrate 25 milliGRAM(s) Oral two times a day  pantoprazole Infusion 8 mG/Hr IV Continuous <Continuous>  simvastatin 10 milliGRAM(s) Oral at bedtime    PRN MEDICATIONS      VITAL SIGNS: Last 24 Hours  T(C): 36.1 (29 Dec 2018 05:31), Max: 37 (28 Dec 2018 12:19)  T(F): 97 (29 Dec 2018 05:31), Max: 98.6 (28 Dec 2018 12:19)  HR: 86 (29 Dec 2018 05:31) (70 - 98)  BP: 151/63 (29 Dec 2018 05:31) (115/48 - 172/68)  BP(mean): --  RR: 17 (29 Dec 2018 05:31) (17 - 18)  SpO2: 95% (28 Dec 2018 21:27) (95% - 100%)    LABS:                        9.1    8.97  )-----------( 282      ( 28 Dec 2018 07:31 )             28.0     12-28    143  |  103  |  65<HH>  ----------------------------<  106<H>  4.4   |  23  |  1.4    Ca    9.2      28 Dec 2018 07:31    TPro  6.7  /  Alb  3.8  /  TBili  0.5  /  DBili  x   /  AST  19  /  ALT  8   /  AlkPhos  119<H>  12-28    PT/INR - ( 27 Dec 2018 14:15 )   PT: 11.90 sec;   INR: 1.04 ratio         PTT - ( 27 Dec 2018 14:15 )  PTT:25.4 sec          CARDIAC MARKERS ( 27 Dec 2018 21:21 )  x     / 0.26 ng/mL / 24 U/L / x     / 3.2 ng/mL        PHYSICAL EXAM:        ASSESSMENT & PLAN    # UGIB,  - hb 5.8 > 9.1, s/p 2 unites, f/u repeat   -stop all blood thinners, pt has hx of GI bleed in past  -had egd+colo+capsule in 2013 for gi bleed, neg at that time  -protonix gtt  - EGD negative, colonoscopy on Monday, npo after midnight    -cardio evaluated pt: patient is moderate risk patient undergoing low risk procedure    # elevated CE likely 2/2 Type 2 NSTEMI most likely secondary to blood loss,   - no chest pain, acute ischemic changes    .26 > .26   f/u repeat     #HAL, improving   - prerenal   - Cr 1.6 > 1.4   -hold acei in light of elevated cr,   - will trend bmp and tx prbc    # COPD:   1L prn and at night time,   duonebs prn    # CAD s/p PCI, s/p TAVR, Afib:   hold all a/c, stent done in 2013, c/w lasix 40mg q12 considering cxr pos for bilateral pleural effusions and getting prbc  -if pt becomes hypotensive then stop lasix  -c/w statin  -not on any full a/c blood thinners due to prior hx of GI bleed  -rate control for now with metoprolol    # DMII:    check FS,   start insulin > 180      # HTN:   normotensive right now, hold acei       DVT PPx: scd only   GI PPx: on protonix gtt

## 2018-12-30 LAB
ANION GAP SERPL CALC-SCNC: 15 MMOL/L — HIGH (ref 7–14)
APTT BLD: 25.6 SEC — LOW (ref 27–39.2)
BASOPHILS # BLD AUTO: 0.03 K/UL — SIGNIFICANT CHANGE UP (ref 0–0.2)
BASOPHILS NFR BLD AUTO: 0.4 % — SIGNIFICANT CHANGE UP (ref 0–1)
BLD GP AB SCN SERPL QL: SIGNIFICANT CHANGE UP
BUN SERPL-MCNC: 36 MG/DL — HIGH (ref 10–20)
CALCIUM SERPL-MCNC: 8.3 MG/DL — LOW (ref 8.5–10.1)
CHLORIDE SERPL-SCNC: 103 MMOL/L — SIGNIFICANT CHANGE UP (ref 98–110)
CO2 SERPL-SCNC: 23 MMOL/L — SIGNIFICANT CHANGE UP (ref 17–32)
CREAT SERPL-MCNC: 1.1 MG/DL — SIGNIFICANT CHANGE UP (ref 0.7–1.5)
EOSINOPHIL # BLD AUTO: 0.29 K/UL — SIGNIFICANT CHANGE UP (ref 0–0.7)
EOSINOPHIL NFR BLD AUTO: 3.9 % — SIGNIFICANT CHANGE UP (ref 0–8)
GLUCOSE BLDC GLUCOMTR-MCNC: 129 MG/DL — HIGH (ref 70–99)
GLUCOSE BLDC GLUCOMTR-MCNC: 140 MG/DL — HIGH (ref 70–99)
GLUCOSE BLDC GLUCOMTR-MCNC: 155 MG/DL — HIGH (ref 70–99)
GLUCOSE BLDC GLUCOMTR-MCNC: 168 MG/DL — HIGH (ref 70–99)
GLUCOSE SERPL-MCNC: 96 MG/DL — SIGNIFICANT CHANGE UP (ref 70–99)
HCT VFR BLD CALC: 23.7 % — LOW (ref 37–47)
HGB BLD-MCNC: 7.3 G/DL — CRITICAL LOW (ref 12–16)
IMM GRANULOCYTES NFR BLD AUTO: 0.4 % — HIGH (ref 0.1–0.3)
INR BLD: 1.16 RATIO — SIGNIFICANT CHANGE UP (ref 0.65–1.3)
LYMPHOCYTES # BLD AUTO: 1.33 K/UL — SIGNIFICANT CHANGE UP (ref 1.2–3.4)
LYMPHOCYTES # BLD AUTO: 18 % — LOW (ref 20.5–51.1)
MCHC RBC-ENTMCNC: 25.9 PG — LOW (ref 27–31)
MCHC RBC-ENTMCNC: 30.8 G/DL — LOW (ref 32–37)
MCV RBC AUTO: 84 FL — SIGNIFICANT CHANGE UP (ref 81–99)
MONOCYTES # BLD AUTO: 0.66 K/UL — HIGH (ref 0.1–0.6)
MONOCYTES NFR BLD AUTO: 9 % — SIGNIFICANT CHANGE UP (ref 1.7–9.3)
NEUTROPHILS # BLD AUTO: 5.03 K/UL — SIGNIFICANT CHANGE UP (ref 1.4–6.5)
NEUTROPHILS NFR BLD AUTO: 68.3 % — SIGNIFICANT CHANGE UP (ref 42.2–75.2)
NRBC # BLD: 0 /100 WBCS — SIGNIFICANT CHANGE UP (ref 0–0)
PLATELET # BLD AUTO: 202 K/UL — SIGNIFICANT CHANGE UP (ref 130–400)
POTASSIUM SERPL-MCNC: 3.9 MMOL/L — SIGNIFICANT CHANGE UP (ref 3.5–5)
POTASSIUM SERPL-SCNC: 3.9 MMOL/L — SIGNIFICANT CHANGE UP (ref 3.5–5)
PROTHROM AB SERPL-ACNC: 13.3 SEC — HIGH (ref 9.95–12.87)
RBC # BLD: 2.82 M/UL — LOW (ref 4.2–5.4)
RBC # FLD: 14.7 % — HIGH (ref 11.5–14.5)
SODIUM SERPL-SCNC: 141 MMOL/L — SIGNIFICANT CHANGE UP (ref 135–146)
TYPE + AB SCN PNL BLD: SIGNIFICANT CHANGE UP
WBC # BLD: 7.37 K/UL — SIGNIFICANT CHANGE UP (ref 4.8–10.8)
WBC # FLD AUTO: 7.37 K/UL — SIGNIFICANT CHANGE UP (ref 4.8–10.8)

## 2018-12-30 RX ORDER — ACETAMINOPHEN 500 MG
650 TABLET ORAL ONCE
Qty: 0 | Refills: 0 | Status: COMPLETED | OUTPATIENT
Start: 2018-12-30 | End: 2018-12-31

## 2018-12-30 RX ADMIN — Medication 25 MILLIGRAM(S): at 17:26

## 2018-12-30 RX ADMIN — Medication 40 MILLIGRAM(S): at 17:26

## 2018-12-30 RX ADMIN — PANTOPRAZOLE SODIUM 10 MG/HR: 20 TABLET, DELAYED RELEASE ORAL at 00:48

## 2018-12-30 RX ADMIN — Medication 4000 MILLILITER(S): at 16:58

## 2018-12-30 RX ADMIN — Medication 25 MILLIGRAM(S): at 05:43

## 2018-12-30 RX ADMIN — SIMVASTATIN 10 MILLIGRAM(S): 20 TABLET, FILM COATED ORAL at 22:25

## 2018-12-30 RX ADMIN — Medication 40 MILLIGRAM(S): at 05:43

## 2018-12-30 RX ADMIN — LIDOCAINE 1 PATCH: 4 CREAM TOPICAL at 05:41

## 2018-12-30 RX ADMIN — SODIUM CHLORIDE 50 MILLILITER(S): 9 INJECTION, SOLUTION INTRAVENOUS at 05:41

## 2018-12-30 RX ADMIN — LIDOCAINE 1 PATCH: 4 CREAM TOPICAL at 17:25

## 2018-12-30 RX ADMIN — Medication 20 MILLIGRAM(S): at 17:26

## 2018-12-30 NOTE — PROGRESS NOTE ADULT - SUBJECTIVE AND OBJECTIVE BOX
OVERNIGHT EVENTS: doing well, no BM    Vital Signs Last 24 Hrs  T(C): 36.4 (30 Dec 2018 05:43), Max: 36.5 (29 Dec 2018 12:42)  T(F): 97.6 (30 Dec 2018 05:43), Max: 97.7 (29 Dec 2018 12:42)  HR: 92 (30 Dec 2018 05:43) (72 - 94)  BP: 107/55 (30 Dec 2018 05:43) (107/55 - 129/57)  BP(mean): --  RR: 17 (30 Dec 2018 05:43) (17 - 18)  SpO2: --    PHYSICAL EXAMINATION:    GENERAL: The patient is awake and alert in no apparent distress.     HEENT: Head is normocephalic and atraumatic. Extraocular muscles are intact. Mucous membranes are moist.    NECK: Supple.    LUNGS: DEC DS R SIDE    HEART: Regular rate and rhythm without murmur.    ABDOMEN: Soft, nontender, and nondistended.      EXTREMITIES: Without any cyanosis, clubbing, rash, lesions or edema.    NEUROLOGIC: Grossly intact.    SKIN: No ulceration or induration present.      LABS:                        7.3    7.37  )-----------( 202      ( 30 Dec 2018 05:45 )             23.7     12-30    141  |  103  |  36<H>  ----------------------------<  96  3.9   |  23  |  1.1    Ca    8.3<L>      30 Dec 2018 05:45      PT/INR - ( 30 Dec 2018 05:45 )   PT: 13.30 sec;   INR: 1.16 ratio         PTT - ( 30 Dec 2018 05:45 )  PTT:25.6 sec      CARDIAC MARKERS ( 29 Dec 2018 17:35 )  x     / 0.09 ng/mL / x     / x     / x                      12-29-18 @ 07:01  -  12-30-18 @ 07:00  --------------------------------------------------------  IN: 1870 mL / OUT: 600 mL / NET: 1270 mL    12-30-18 @ 07:01  -  12-30-18 @ 11:03  --------------------------------------------------------  IN: 0 mL / OUT: 500 mL / NET: -500 mL        MICROBIOLOGY:      MEDICATIONS  (STANDING):  bisacodyl 20 milliGRAM(s) Oral once  furosemide    Tablet 40 milliGRAM(s) Oral two times a day  lactated ringers. 1000 milliLiter(s) (50 mL/Hr) IV Continuous <Continuous>  metoprolol tartrate 25 milliGRAM(s) Oral two times a day  pantoprazole Infusion 8 mG/Hr (10 mL/Hr) IV Continuous <Continuous>  polyethylene glycol/electrolyte Solution. 4000 milliLiter(s) Oral once  simvastatin 10 milliGRAM(s) Oral at bedtime    MEDICATIONS  (PRN):      RADIOLOGY & ADDITIONAL STUDIES:

## 2018-12-30 NOTE — PROGRESS NOTE ADULT - ASSESSMENT
GI BLEED/ HX OF PAN/ EMPYEMA    - TRANSFUSE KEEP HB > 8  - GI F/YUP  - SERIAL CBC  - RECALL AS NEEDED  SPOKE WITH SON AT BEDSIDE

## 2018-12-30 NOTE — PROGRESS NOTE ADULT - ASSESSMENT
SUBJECTIVE:    Patient is a 89y old  Female who presents with a chief complaint of UGIB (29 Dec 2018 12:12)    Currently admitted to medicine with the primary diagnosis of GI bleed     Today is hospital day 3d. This morning she is resting comfortably in bed and reports no new issues or overnight events. No bleeding, dizziness, SOB or CP.    PAST MEDICAL & SURGICAL HISTORY  PAST MEDICAL & SURGICAL HISTORY:  Pulmonary hypertension  COPD (chronic obstructive pulmonary disease)  GI bleed  Aortic valve replaced  CAD (coronary artery disease)  Hypertension  GERD (gastroesophageal reflux disease)  Diabetes  Arthritis  S/P TAVR (transcatheter aortic valve replacement)    SOCIAL HISTORY:    ALLERGIES:  penicillins (Other)    MEDICATIONS:  STANDING MEDICATIONS  bisacodyl 20 milliGRAM(s) Oral once  furosemide    Tablet 40 milliGRAM(s) Oral two times a day  lactated ringers. 1000 milliLiter(s) IV Continuous <Continuous>  metoprolol tartrate 25 milliGRAM(s) Oral two times a day  pantoprazole Infusion 8 mG/Hr IV Continuous <Continuous>  polyethylene glycol/electrolyte Solution. 4000 milliLiter(s) Oral once  simvastatin 10 milliGRAM(s) Oral at bedtime    PRN MEDICATIONS    VITALS:   T(F): 97.6  HR: 92  BP: 107/55  RR: 17  SpO2: --    LABS:                        7.3    7.37  )-----------( 202      ( 30 Dec 2018 05:45 )             23.7     12-30    141  |  103  |  36<H>  ----------------------------<  96  3.9   |  23  |  1.1    Ca    8.3<L>      30 Dec 2018 05:45      PT/INR - ( 30 Dec 2018 05:45 )   PT: 13.30 sec;   INR: 1.16 ratio         PTT - ( 30 Dec 2018 05:45 )  PTT:25.6 sec      Troponin T, Serum: 0.09 ng/mL <HH> (12-29-18 @ 17:35)      CARDIAC MARKERS ( 29 Dec 2018 17:35 )  x     / 0.09 ng/mL / x     / x     / x          RADIOLOGY:    PHYSICAL EXAM:  GEN: No acute distress  HEENT: NCAT  LUNGS: Clear to auscultation bilaterally   HEART: S1/S2 present. no murmurs.   ABD: Soft, non-tender, non-distended.  EXT: venous stasis b/l LE, no edema  NEURO: AAOX3    Assessment and Plan:  # UGIB,  - hb 7.3 today, will transfuse 2 units  -stop all blood thinners, pt has hx of GI bleed in past  - EGD negative, colonoscopy tomorrow, npo after midnight tonight  -had egd+colo+capsule in 2013 for gi bleed, neg at that time  -protonix gtt  -cardio evaluated pt: patient is moderate risk patient undergoing low risk procedure    # elevated CE likely 2/2 Type 2 NSTEMI most likely secondary to blood loss,   - no chest pain, acute ischemic changes    - .26 > .26 > .09    #HAL, improving   - prerenal   - Cr 1.6 > 1.4 > 1.1  - consider restarting acei now that hal resolved (although she is not hypertensive now)  - will trend bmp and tx prbc    # COPD:   -1L prn and at night time,  -duonebs prn    # CAD s/p PCI, s/p TAVR, Afib:   - hold all a/c, stent done in 2013, c/w lasix PO 40mg q12 considering cxr pos for bilateral pleural effusions and getting prbc  -if pt becomes hypotensive then stop lasix  -c/w statin  -not on any full a/c blood thinners due to prior hx of GI bleed  -rate control for now with metoprolol    # DMII:    check FS,   start insulin > 180      # HTN:   normotensive right now, hold acei       DVT PPx: scd only   GI PPx: on protonix gtt

## 2018-12-31 ENCOUNTER — TRANSCRIPTION ENCOUNTER (OUTPATIENT)
Age: 83
End: 2018-12-31

## 2018-12-31 LAB
ANION GAP SERPL CALC-SCNC: 15 MMOL/L — HIGH (ref 7–14)
ANION GAP SERPL CALC-SCNC: 16 MMOL/L — HIGH (ref 7–14)
APTT BLD: 26.1 SEC — LOW (ref 27–39.2)
APTT BLD: 27.2 SEC — SIGNIFICANT CHANGE UP (ref 27–39.2)
BASOPHILS # BLD AUTO: 0.03 K/UL — SIGNIFICANT CHANGE UP (ref 0–0.2)
BASOPHILS NFR BLD AUTO: 0.3 % — SIGNIFICANT CHANGE UP (ref 0–1)
BUN SERPL-MCNC: 23 MG/DL — HIGH (ref 10–20)
BUN SERPL-MCNC: 26 MG/DL — HIGH (ref 10–20)
CALCIUM SERPL-MCNC: 8.3 MG/DL — LOW (ref 8.5–10.1)
CALCIUM SERPL-MCNC: 8.5 MG/DL — SIGNIFICANT CHANGE UP (ref 8.5–10.1)
CHLORIDE SERPL-SCNC: 101 MMOL/L — SIGNIFICANT CHANGE UP (ref 98–110)
CHLORIDE SERPL-SCNC: 103 MMOL/L — SIGNIFICANT CHANGE UP (ref 98–110)
CO2 SERPL-SCNC: 20 MMOL/L — SIGNIFICANT CHANGE UP (ref 17–32)
CO2 SERPL-SCNC: 24 MMOL/L — SIGNIFICANT CHANGE UP (ref 17–32)
CREAT SERPL-MCNC: 1 MG/DL — SIGNIFICANT CHANGE UP (ref 0.7–1.5)
CREAT SERPL-MCNC: 1.1 MG/DL — SIGNIFICANT CHANGE UP (ref 0.7–1.5)
EOSINOPHIL # BLD AUTO: 0.28 K/UL — SIGNIFICANT CHANGE UP (ref 0–0.7)
EOSINOPHIL NFR BLD AUTO: 3.2 % — SIGNIFICANT CHANGE UP (ref 0–8)
GLUCOSE BLDC GLUCOMTR-MCNC: 165 MG/DL — HIGH (ref 70–99)
GLUCOSE BLDC GLUCOMTR-MCNC: 177 MG/DL — HIGH (ref 70–99)
GLUCOSE BLDC GLUCOMTR-MCNC: 221 MG/DL — HIGH (ref 70–99)
GLUCOSE SERPL-MCNC: 164 MG/DL — HIGH (ref 70–99)
GLUCOSE SERPL-MCNC: 221 MG/DL — HIGH (ref 70–99)
HCT VFR BLD CALC: 32 % — LOW (ref 37–47)
HCT VFR BLD CALC: 32.4 % — LOW (ref 37–47)
HGB BLD-MCNC: 10.1 G/DL — LOW (ref 12–16)
HGB BLD-MCNC: 10.4 G/DL — LOW (ref 12–16)
IMM GRANULOCYTES NFR BLD AUTO: 0.6 % — HIGH (ref 0.1–0.3)
INR BLD: 1.18 RATIO — SIGNIFICANT CHANGE UP (ref 0.65–1.3)
INR BLD: 1.18 RATIO — SIGNIFICANT CHANGE UP (ref 0.65–1.3)
LYMPHOCYTES # BLD AUTO: 1.16 K/UL — LOW (ref 1.2–3.4)
LYMPHOCYTES # BLD AUTO: 13.3 % — LOW (ref 20.5–51.1)
MAGNESIUM SERPL-MCNC: 1.6 MG/DL — LOW (ref 1.8–2.4)
MCHC RBC-ENTMCNC: 26.9 PG — LOW (ref 27–31)
MCHC RBC-ENTMCNC: 27.4 PG — SIGNIFICANT CHANGE UP (ref 27–31)
MCHC RBC-ENTMCNC: 31.6 G/DL — LOW (ref 32–37)
MCHC RBC-ENTMCNC: 32.1 G/DL — SIGNIFICANT CHANGE UP (ref 32–37)
MCV RBC AUTO: 85.3 FL — SIGNIFICANT CHANGE UP (ref 81–99)
MCV RBC AUTO: 85.3 FL — SIGNIFICANT CHANGE UP (ref 81–99)
MONOCYTES # BLD AUTO: 0.76 K/UL — HIGH (ref 0.1–0.6)
MONOCYTES NFR BLD AUTO: 8.7 % — SIGNIFICANT CHANGE UP (ref 1.7–9.3)
NEUTROPHILS # BLD AUTO: 6.46 K/UL — SIGNIFICANT CHANGE UP (ref 1.4–6.5)
NEUTROPHILS NFR BLD AUTO: 73.9 % — SIGNIFICANT CHANGE UP (ref 42.2–75.2)
NRBC # BLD: 0 /100 WBCS — SIGNIFICANT CHANGE UP (ref 0–0)
NRBC # BLD: 0 /100 WBCS — SIGNIFICANT CHANGE UP (ref 0–0)
PLATELET # BLD AUTO: 177 K/UL — SIGNIFICANT CHANGE UP (ref 130–400)
PLATELET # BLD AUTO: 179 K/UL — SIGNIFICANT CHANGE UP (ref 130–400)
POTASSIUM SERPL-MCNC: 4.1 MMOL/L — SIGNIFICANT CHANGE UP (ref 3.5–5)
POTASSIUM SERPL-MCNC: 4.2 MMOL/L — SIGNIFICANT CHANGE UP (ref 3.5–5)
POTASSIUM SERPL-SCNC: 4.1 MMOL/L — SIGNIFICANT CHANGE UP (ref 3.5–5)
POTASSIUM SERPL-SCNC: 4.2 MMOL/L — SIGNIFICANT CHANGE UP (ref 3.5–5)
PROTHROM AB SERPL-ACNC: 13.5 SEC — HIGH (ref 9.95–12.87)
PROTHROM AB SERPL-ACNC: 13.5 SEC — HIGH (ref 9.95–12.87)
RBC # BLD: 3.75 M/UL — LOW (ref 4.2–5.4)
RBC # BLD: 3.8 M/UL — LOW (ref 4.2–5.4)
RBC # FLD: 14.8 % — HIGH (ref 11.5–14.5)
RBC # FLD: 15.2 % — HIGH (ref 11.5–14.5)
SODIUM SERPL-SCNC: 137 MMOL/L — SIGNIFICANT CHANGE UP (ref 135–146)
SODIUM SERPL-SCNC: 142 MMOL/L — SIGNIFICANT CHANGE UP (ref 135–146)
WBC # BLD: 8.74 K/UL — SIGNIFICANT CHANGE UP (ref 4.8–10.8)
WBC # BLD: 8.85 K/UL — SIGNIFICANT CHANGE UP (ref 4.8–10.8)
WBC # FLD AUTO: 8.74 K/UL — SIGNIFICANT CHANGE UP (ref 4.8–10.8)
WBC # FLD AUTO: 8.85 K/UL — SIGNIFICANT CHANGE UP (ref 4.8–10.8)

## 2018-12-31 RX ORDER — PANTOPRAZOLE SODIUM 20 MG/1
1 TABLET, DELAYED RELEASE ORAL
Qty: 0 | Refills: 0 | COMMUNITY

## 2018-12-31 RX ORDER — PANTOPRAZOLE SODIUM 20 MG/1
1 TABLET, DELAYED RELEASE ORAL
Qty: 0 | Refills: 0 | COMMUNITY
Start: 2018-12-31

## 2018-12-31 RX ORDER — SIMVASTATIN 20 MG/1
1 TABLET, FILM COATED ORAL
Qty: 0 | Refills: 0 | COMMUNITY
Start: 2018-12-31

## 2018-12-31 RX ORDER — PANTOPRAZOLE SODIUM 20 MG/1
40 TABLET, DELAYED RELEASE ORAL
Qty: 0 | Refills: 0 | Status: DISCONTINUED | OUTPATIENT
Start: 2018-12-31 | End: 2019-01-03

## 2018-12-31 RX ORDER — MAGNESIUM OXIDE 400 MG ORAL TABLET 241.3 MG
400 TABLET ORAL
Qty: 0 | Refills: 0 | Status: DISCONTINUED | OUTPATIENT
Start: 2019-01-01 | End: 2019-01-01

## 2018-12-31 RX ORDER — MAGNESIUM SULFATE 500 MG/ML
2 VIAL (ML) INJECTION EVERY 4 HOURS
Qty: 0 | Refills: 0 | Status: DISCONTINUED | OUTPATIENT
Start: 2018-12-31 | End: 2019-01-01

## 2018-12-31 RX ORDER — SIMVASTATIN 20 MG/1
1 TABLET, FILM COATED ORAL
Qty: 0 | Refills: 0 | COMMUNITY

## 2018-12-31 RX ADMIN — Medication 40 MILLIGRAM(S): at 18:10

## 2018-12-31 RX ADMIN — Medication 25 GRAM(S): at 15:20

## 2018-12-31 RX ADMIN — Medication 25 MILLIGRAM(S): at 18:10

## 2018-12-31 RX ADMIN — Medication 25 GRAM(S): at 18:09

## 2018-12-31 RX ADMIN — Medication 650 MILLIGRAM(S): at 15:00

## 2018-12-31 RX ADMIN — Medication 40 MILLIGRAM(S): at 05:24

## 2018-12-31 RX ADMIN — Medication 25 GRAM(S): at 21:50

## 2018-12-31 RX ADMIN — Medication 25 MILLIGRAM(S): at 05:24

## 2018-12-31 RX ADMIN — SIMVASTATIN 10 MILLIGRAM(S): 20 TABLET, FILM COATED ORAL at 21:50

## 2018-12-31 NOTE — DISCHARGE NOTE ADULT - HOSPITAL COURSE
89 y/o F with PMHx severe pulm htn, COPD on 1L NC (only uses it at bedtime, saw Dr Koko Nichols yesterday in office and everything was ok), DMII< HTN, Afib not on a/c due to GIB (2013, had egd+colo+capsule done but all workup neg, xarelto was stopped at that time), TAVR (2013 at [place]), CAD s/p PCI x1 (2013), arthritis, presenting for outpt bloodwork finding of low hgb of 6  wa complaining of generalized fatigue    # acute blood loss anemia s/p 4 unites this admission  - EGD angioectsaia in stomach and treated with thermal therapy , s/p colonoscopy this admission ; AVN , s/p APC, tolerated food after the colonoscopy, we started plavix after 24 hours   - had egd+colo+capsule in 2013 for gi bleed, neg at that time  - will continue with PO protonix   - cardio evaluated pt  for the procedure : patient is high risk undergoing low risk procedure    # elevated CE likely 2/2 Type 2 NSTEMI most likely secondary to blood loss,   - no chest pain, acute ischemic changes    - .26 > .26 > .09    #had prerenal HAL, resolved after holding ACEI    stable for discharge   no clinical signs of GI bleeding   Hemoglobin level is stable 88 y/o F with PMHx severe pulm htn, COPD on 1L NC (only uses it at bedtime, saw Dr Koko Nichols yesterday in office and everything was ok), DMII< HTN, Afib not on a/c due to GIB (2013, had egd+colo+capsule done but all workup neg, xarelto was stopped at that time), TAVR (2013 at [place]), CAD s/p PCI x1 (2013), arthritis, presenting for outpt bloodwork finding of low hgb of 6  wa complaining of generalized fatigue    # acute blood loss anemia s/p 4 unites this admission  - EGD angioectsaia in stomach and treated with thermal therapy , s/p colonoscopy this admission ; AVN , s/p APC, tolerated food after the colonoscopy, we started plavix after 24 hours   - had egd+colo+capsule in 2013 for gi bleed, neg at that time  - will continue with PO protonix   - cardio evaluated pt  for the procedure : patient is high risk undergoing low risk procedure    # elevated CE likely 2/2 Type 2 NSTEMI most likely secondary to blood loss,   - no chest pain, acute ischemic changes    - .26 > .26 > .09    #had prerenal HAL, resolved after holding ACEI  # one pause of 3.9 sec in the telemetry, will decrease the dose of metoprolo to 12.5 as per EP and follow up with cardiology as outpatient   stable for discharge   no clinical signs of GI bleeding   Hemoglobin level is stable

## 2018-12-31 NOTE — PROGRESS NOTE ADULT - ASSESSMENT
MUKESH POLLACK 89y Female  MRN#: [id number]   CODE STATUS:FULLCODE      SUBJECTIVE  Patient is a 89y old Female who presents with a chief complaint of UGIB (31 Dec 2018 09:05)  Currently admitted to medicine with the primary diagnosis of GI bleed  Today is hospital day 4d, and this morning she is sitting comfortably in the chair and reports no overnight events.   Had multiple BM overnight after she received the colon prep, non of them bloody, describe it as dark brown stool.   denies chest pain, abdominal pain or SOB.     OBJECTIVE  PAST MEDICAL & SURGICAL HISTORY  Pulmonary hypertension  COPD (chronic obstructive pulmonary disease)  GI bleed  Aortic valve replaced  CAD (coronary artery disease)  Hypertension  GERD (gastroesophageal reflux disease)  Diabetes  Arthritis  S/P TAVR (transcatheter aortic valve replacement)    ALLERGIES:  penicillins (Other)    MEDICATIONS:  STANDING MEDICATIONS  furosemide    Tablet 40 milliGRAM(s) Oral two times a day  lactated ringers. 1000 milliLiter(s) IV Continuous <Continuous>  metoprolol tartrate 25 milliGRAM(s) Oral two times a day  pantoprazole Infusion 8 mG/Hr IV Continuous <Continuous>  simvastatin 10 milliGRAM(s) Oral at bedtime    PRN MEDICATIONS  acetaminophen   Tablet .. 650 milliGRAM(s) Oral once PRN      VITAL SIGNS: Last 24 Hours  T(C): 36 (31 Dec 2018 07:36), Max: 37.4 (30 Dec 2018 17:02)  T(F): 96.8 (31 Dec 2018 07:36), Max: 99.3 (30 Dec 2018 17:02)  HR: 87 (31 Dec 2018 07:36) (83 - 91)  BP: 132/63 (31 Dec 2018 07:36) (130/56 - 156/67)  BP(mean): --  RR: 18 (31 Dec 2018 07:36) (18 - 18)  SpO2: 98% (31 Dec 2018 07:36) (98% - 98%)    LABS:                        10.1   8.74  )-----------( 179      ( 31 Dec 2018 06:52 )             32.0     12-31    142  |  103  |  23<H>  ----------------------------<  164<H>  4.1   |  24  |  1.1    Ca    8.5      31 Dec 2018 06:52  Mg     1.6     12-31      PT/INR - ( 31 Dec 2018 06:52 )   PT: 13.50 sec;   INR: 1.18 ratio         PTT - ( 31 Dec 2018 06:52 )  PTT:26.1 sec          CARDIAC MARKERS ( 29 Dec 2018 17:35 )  x     / 0.09 ng/mL / x     / x     / x          RADIOLOGY:      PHYSICAL EXAM:    GEN: No acute distress  HEENT: NCAT  LUNGS: Clear to auscultation bilaterally   HEART: S1/S2 present. no murmurs.   ABD: Soft, non-tender, non-distended.  EXT: venous stasis b/l LE, no edema  NEURO: AAOX3    ASSESSMENT & PLAN    # GI bleeding   - Hb is stable this morning, s/p 4 unites this admission  - stop all blood thinners, pt has hx of GI bleed in past  - 2 IVs, active T/s, CBC q12   - EGD negative, colonoscopy today. if negative will consider abd CT scan   - had egd+colo+capsule in 2013 for gi bleed, neg at that time  - protonix gtt  - cardio evaluated pt: patient is high risk undergoing low risk procedure    # elevated CE likely 2/2 Type 2 NSTEMI most likely secondary to blood loss,   - no chest pain, acute ischemic changes    - .26 > .26 > .09    #HAL, resolved   - prerenal   - Cr 1.6 > 1.4 > 1.1  - consider restarting acei now that hal resolved (although she is not hypertensive now)  - will trend bmp and tx prbc    # COPD, stable  -1L prn and at night time,  -duonebs prn    # CAD s/p PCI, s/p TAVR, Afib:   - hold all a/c, stent done in 2013, c/w lasix PO 40mg q12 considering cxr pos for bilateral pleural effusions and getting prbc  -if pt becomes hypotensive then stop lasix  -c/w statin  -not on any full a/c blood thinners due to prior hx of GI bleed  -rate control for now with metoprolol    # DMII, controlled    check FS,   start insulin > 180      # HTN, controlled    normotensive right now, hold acei     DVT PPx: scd only   GI PPx: on protonix gtt MUKESH POLLACK 89y Female  MRN#: [id number]   CODE STATUS:FULLCODE      SUBJECTIVE  Patient is a 89y old Female who presents with a chief complaint of UGIB (31 Dec 2018 09:05)  Currently admitted to medicine with the primary diagnosis of GI bleed  Today is hospital day 4d, and this morning she is sitting comfortably in the chair and reports no overnight events.   Had multiple BM overnight after she received the colon prep, non of them bloody, describe it as dark brown stool.   denies chest pain, abdominal pain or SOB.     OBJECTIVE  PAST MEDICAL & SURGICAL HISTORY  Pulmonary hypertension  COPD (chronic obstructive pulmonary disease)  GI bleed  Aortic valve replaced  CAD (coronary artery disease)  Hypertension  GERD (gastroesophageal reflux disease)  Diabetes  Arthritis  S/P TAVR (transcatheter aortic valve replacement)    ALLERGIES:  penicillins (Other)    MEDICATIONS:  STANDING MEDICATIONS  furosemide    Tablet 40 milliGRAM(s) Oral two times a day  lactated ringers. 1000 milliLiter(s) IV Continuous <Continuous>  metoprolol tartrate 25 milliGRAM(s) Oral two times a day  pantoprazole Infusion 8 mG/Hr IV Continuous <Continuous>  simvastatin 10 milliGRAM(s) Oral at bedtime    PRN MEDICATIONS  acetaminophen   Tablet .. 650 milliGRAM(s) Oral once PRN      VITAL SIGNS: Last 24 Hours  T(C): 36 (31 Dec 2018 07:36), Max: 37.4 (30 Dec 2018 17:02)  T(F): 96.8 (31 Dec 2018 07:36), Max: 99.3 (30 Dec 2018 17:02)  HR: 87 (31 Dec 2018 07:36) (83 - 91)  BP: 132/63 (31 Dec 2018 07:36) (130/56 - 156/67)  BP(mean): --  RR: 18 (31 Dec 2018 07:36) (18 - 18)  SpO2: 98% (31 Dec 2018 07:36) (98% - 98%)    LABS:                        10.1   8.74  )-----------( 179      ( 31 Dec 2018 06:52 )             32.0     12-31    142  |  103  |  23<H>  ----------------------------<  164<H>  4.1   |  24  |  1.1    Ca    8.5      31 Dec 2018 06:52  Mg     1.6     12-31      PT/INR - ( 31 Dec 2018 06:52 )   PT: 13.50 sec;   INR: 1.18 ratio         PTT - ( 31 Dec 2018 06:52 )  PTT:26.1 sec          CARDIAC MARKERS ( 29 Dec 2018 17:35 )  x     / 0.09 ng/mL / x     / x     / x          RADIOLOGY:      PHYSICAL EXAM:    GEN: No acute distress  HEENT: NCAT  Pulm Clear to auscultation bilaterally   Cv: S1/S2 present. no murmurs.   GI: Soft, non-tender, non-distended.  EXT: venous stasis b/l LE, no edema  NEURO: AAOX3    ASSESSMENT & PLAN    # acute blood loss anemia   - Hb is stable this morning, s/p 4 unites this admission  - stop all blood thinners, pt has hx of GI bleed in past  - 2 IVs, active T/s, CBC q12   - EGD negative angioectsaia in stomach and treated with thermal therapy , colonoscopy today. if negative will consider abd CT scan   - had egd+colo+capsule in 2013 for gi bleed, neg at that time  - protonix gtt  - cardio evaluated pt: patient is high risk undergoing low risk procedure    # elevated CE likely 2/2 Type 2 NSTEMI most likely secondary to blood loss,   - no chest pain, acute ischemic changes    - .26 > .26 > .09    #HAL, resolved   - prerenal   - Cr 1.6 > 1.4 > 1.1  - consider restarting acei now that hal resolved (although she is not hypertensive now)  - will trend bmp and tx prbc    # COPD, stable  -1L prn and at night time,  -duonebs prn    # CAD s/p PCI, s/p TAVR, Afib:   - hold all a/c, stent done in 2013, c/w lasix PO 40mg q12 considering cxr pos for bilateral pleural effusions and getting prbc  -if pt becomes hypotensive then stop lasix  -c/w statin  -not on any full a/c blood thinners due to prior hx of GI bleed  -rate control for now with metoprolol    # DMII, controlled    check FS,   start insulin > 180      # HTN, controlled    normotensive right now, hold acei     DVT PPx: scd only   GI PPx: on protonix gtt    PT/Rehab and possible SNF once medically cleared 24-48 hours MUKESH POLLACK 89y Female  MRN#: [id number]   CODE STATUS:FULLCODE      SUBJECTIVE  Patient is a 89y old Female who presents with a chief complaint of UGIB (31 Dec 2018 09:05)  Currently admitted to medicine with the primary diagnosis of GI bleed  Today is hospital day 4d, and this morning she is sitting comfortably in the chair and reports no overnight events.   Had multiple BM overnight after she received the colon prep, non of them bloody, describe it as dark brown stool.   denies chest pain, abdominal pain or SOB.     OBJECTIVE  PAST MEDICAL & SURGICAL HISTORY  Pulmonary hypertension  COPD (chronic obstructive pulmonary disease)  GI bleed  Aortic valve replaced  CAD (coronary artery disease)  Hypertension  GERD (gastroesophageal reflux disease)  Diabetes  Arthritis  S/P TAVR (transcatheter aortic valve replacement)    ALLERGIES:  penicillins (Other)    MEDICATIONS:  STANDING MEDICATIONS  furosemide    Tablet 40 milliGRAM(s) Oral two times a day  lactated ringers. 1000 milliLiter(s) IV Continuous <Continuous>  metoprolol tartrate 25 milliGRAM(s) Oral two times a day  pantoprazole Infusion 8 mG/Hr IV Continuous <Continuous>  simvastatin 10 milliGRAM(s) Oral at bedtime    PRN MEDICATIONS  acetaminophen   Tablet .. 650 milliGRAM(s) Oral once PRN      VITAL SIGNS: Last 24 Hours  T(C): 36 (31 Dec 2018 07:36), Max: 37.4 (30 Dec 2018 17:02)  T(F): 96.8 (31 Dec 2018 07:36), Max: 99.3 (30 Dec 2018 17:02)  HR: 87 (31 Dec 2018 07:36) (83 - 91)  BP: 132/63 (31 Dec 2018 07:36) (130/56 - 156/67)  BP(mean): --  RR: 18 (31 Dec 2018 07:36) (18 - 18)  SpO2: 98% (31 Dec 2018 07:36) (98% - 98%)    LABS:                        10.1   8.74  )-----------( 179      ( 31 Dec 2018 06:52 )             32.0     12-31    142  |  103  |  23<H>  ----------------------------<  164<H>  4.1   |  24  |  1.1    Ca    8.5      31 Dec 2018 06:52  Mg     1.6     12-31      PT/INR - ( 31 Dec 2018 06:52 )   PT: 13.50 sec;   INR: 1.18 ratio         PTT - ( 31 Dec 2018 06:52 )  PTT:26.1 sec          CARDIAC MARKERS ( 29 Dec 2018 17:35 )  x     / 0.09 ng/mL / x     / x     / x          RADIOLOGY:      PHYSICAL EXAM:    GEN: No acute distress  HEENT: NCAT  Pulm Clear to auscultation bilaterally   Cv: S1/S2 present. no murmurs.   GI: Soft, non-tender, non-distended.  EXT: venous stasis b/l LE, no edema  NEURO: AAOX3    ASSESSMENT & PLAN    # acute blood loss anemia   - Hb is stable this morning, s/p 4 unites this admission  - stop all blood thinners, pt has hx of GI bleed in past  - 2 IVs, active T/s, CBC q12   - EGD negative angioectsaia in stomach and treated with thermal therapy , colonoscopy today. if negative will consider abd CT scan   - had egd+colo+capsule in 2013 for gi bleed, neg at that time  - protonix gtt  - cardio evaluated pt: patient is high risk undergoing low risk procedure    # elevated CE likely 2/2 Type 2 NSTEMI most likely secondary to blood loss,   - no chest pain, acute ischemic changes    - .26 > .26 > .09    #HAL, resolved   - prerenal   - Cr 1.6 > 1.4 > 1.1  - consider restarting acei now that hal resolved (although she is not hypertensive now)  - will trend bmp and tx prbc    # COPD, stable  -1L prn and at night time,  -duonebs prn    # CAD s/p PCI, s/p TAVR, Afib:   - hold all a/c, stent done in 2013, c/w lasix PO 40mg q12 considering cxr pos for bilateral pleural effusions and getting prbc  -if pt becomes hypotensive then stop lasix  -c/w statin  -not on any full a/c blood thinners due to prior hx of GI bleed  -rate control for now with metoprolol    # DMII, controlled    check FS,   start insulin > 180      # HTN, controlled    normotensive right now, hold acei    # hypomagnesemia   replete, repeat Umu     DVT PPx: scd only   GI PPx: on protonix gtt    PT/Rehab and possible SNF once medically cleared 24-48 hours MUKESH POLLACK 89y Female  MRN#: [id number]   CODE STATUS:FULLCODE      SUBJECTIVE  Patient is a 89y old Female who presents with a chief complaint of UGIB (31 Dec 2018 09:05)  Currently admitted to medicine with the primary diagnosis of GI bleed  Today is hospital day 4d, and this morning she is sitting comfortably in the chair and reports no overnight events.   Had multiple BM overnight after she received the colon prep, non of them bloody, describe it as dark brown stool.   denies chest pain, abdominal pain or SOB.     OBJECTIVE  PAST MEDICAL & SURGICAL HISTORY  Pulmonary hypertension  COPD (chronic obstructive pulmonary disease)  GI bleed  Aortic valve replaced  CAD (coronary artery disease)  Hypertension  GERD (gastroesophageal reflux disease)  Diabetes  Arthritis  S/P TAVR (transcatheter aortic valve replacement)    ALLERGIES:  penicillins (Other)    MEDICATIONS:  STANDING MEDICATIONS  furosemide    Tablet 40 milliGRAM(s) Oral two times a day  lactated ringers. 1000 milliLiter(s) IV Continuous <Continuous>  metoprolol tartrate 25 milliGRAM(s) Oral two times a day  pantoprazole Infusion 8 mG/Hr IV Continuous <Continuous>  simvastatin 10 milliGRAM(s) Oral at bedtime    PRN MEDICATIONS  acetaminophen   Tablet .. 650 milliGRAM(s) Oral once PRN      VITAL SIGNS: Last 24 Hours  T(C): 36 (31 Dec 2018 07:36), Max: 37.4 (30 Dec 2018 17:02)  T(F): 96.8 (31 Dec 2018 07:36), Max: 99.3 (30 Dec 2018 17:02)  HR: 87 (31 Dec 2018 07:36) (83 - 91)  BP: 132/63 (31 Dec 2018 07:36) (130/56 - 156/67)  BP(mean): --  RR: 18 (31 Dec 2018 07:36) (18 - 18)  SpO2: 98% (31 Dec 2018 07:36) (98% - 98%)    LABS:                        10.1   8.74  )-----------( 179      ( 31 Dec 2018 06:52 )             32.0     12-31    142  |  103  |  23<H>  ----------------------------<  164<H>  4.1   |  24  |  1.1    Ca    8.5      31 Dec 2018 06:52  Mg     1.6     12-31      PT/INR - ( 31 Dec 2018 06:52 )   PT: 13.50 sec;   INR: 1.18 ratio         PTT - ( 31 Dec 2018 06:52 )  PTT:26.1 sec          CARDIAC MARKERS ( 29 Dec 2018 17:35 )  x     / 0.09 ng/mL / x     / x     / x          RADIOLOGY:      PHYSICAL EXAM:    GEN: No acute distress  HEENT: NCAT  Pulm Clear to auscultation bilaterally   Cv: S1/S2 present. no murmurs.   GI: Soft, non-tender, non-distended.  EXT: venous stasis b/l LE, no edema  NEURO: AAOX3    ASSESSMENT & PLAN    # acute blood loss anemia   - Hb is stable this morning, s/p 4 unites this admission  - stop all blood thinners, pt has hx of GI bleed in past  - 2 IVs, active T/s, CBC q12   - EGD angioectsaia in stomach and treated with thermal therapy , s/p colonoscopy today; AVN , s/p APC, will start clears and advance to regular will start Plavix tomorrow as per GI.   - had egd+colo+capsule in 2013 for gi bleed, neg at that time  - protonix gtt  - cardio evaluated pt: patient is high risk undergoing low risk procedure    # elevated CE likely 2/2 Type 2 NSTEMI most likely secondary to blood loss,   - no chest pain, acute ischemic changes    - .26 > .26 > .09    #HAL, resolved   - prerenal   - Cr 1.6 > 1.4 > 1.1  - consider restarting acei now that hal resolved (although she is not hypertensive now)  - will trend bmp and tx prbc    # COPD, stable  -1L prn and at night time,  -duonebs prn    # CAD s/p PCI, s/p TAVR, Afib:   - hold all a/c, stent done in 2013, c/w lasix PO 40mg q12 considering cxr pos for bilateral pleural effusions and getting prbc  -if pt becomes hypotensive then stop lasix  -c/w statin  -not on any full a/c blood thinners due to prior hx of GI bleed  -rate control for now with metoprolol    # DMII, controlled    check FS,   start insulin > 180      # HTN, controlled    normotensive right now, hold acei    # hypomagnesemia   replete, repeat Umu     DVT PPx: scd only   GI PPx: on protonix gtt    PT/Rehab and possible SNF once medically cleared 24-48 hours

## 2018-12-31 NOTE — DISCHARGE NOTE ADULT - MEDICATION SUMMARY - MEDICATIONS TO TAKE
I will START or STAY ON the medications listed below when I get home from the hospital:    lisinopril 10 mg oral tablet  -- 1 tab(s) by mouth once a day  -- Indication: For hypertension    metFORMIN 1000 mg oral tablet, extended release  -- orally 2 times a day  -- Indication: For diabetes    glimepiride 2 mg oral tablet  -- 1 tab(s) by mouth once a day  -- Indication: For diabetes    simvastatin 10 mg oral tablet  -- 1 tab(s) by mouth once a day (at bedtime)  -- Indication: For hyperlipidemia    clopidogrel 75 mg oral tablet  -- 1 tab(s) by mouth once a day  -- Indication: For COronary artery disease    metoprolol tartrate 25 mg oral tablet  -- 12.5 milligram(s) by mouth 2 times a day  -- Indication: For beta blocker    Lasix 40 mg oral tablet  -- 1 tab(s) by mouth 2 times a day, 3x/day every other day   -- Indication: For hypertension    Iron Chews  -- 325 milligram(s) by mouth once a day  -- Indication: For iron    Fish Oil oral capsule  -- 4000 milligram(s) by mouth once a day  -- Indication: For fish oil    pantoprazole 40 mg oral delayed release tablet  -- 1 tab(s) by mouth once a day (before a meal)  -- Indication: For Gastroesophegial reflux

## 2018-12-31 NOTE — DISCHARGE NOTE ADULT - PATIENT PORTAL LINK FT
You can access the [facility] Patient Portal, offered by [facility], by registering with the following website: [email or web address]

## 2018-12-31 NOTE — DISCHARGE NOTE ADULT - CARE PROVIDERS DIRECT ADDRESSES
[email or web address]

## 2018-12-31 NOTE — CONSULT NOTE ADULT - SUBJECTIVE AND OBJECTIVE BOX
HPI:  89 y/o F with PMHx severe pulm htn, COPD on 1L NC (only uses it at bedtime, saw Dr Koko Nichols yesterday in office and everything was ok), DMII< HTN, Afib not on a/c due to GIB (2013, had egd+colo+capsule done but all workup neg, xarelto was stopped at that time), TAVR (2013 at [place]), CAD s/p PCI x1 (2013), arthritis, presenting for outpt bloodwork finding of low hgb of 6 yesterday. Pt had fall 2 wks ago, ct of left hip done with acp but no result, cbc done yesterday showed hgb 6. Pt was told to come to er bc she is high risk. She states that she has been having 2-3 episodes of dark colored stools daily for the past 2 wks. She also endorses more generalized weakness and family members noted that her pallor was poor. Pt denies any chest pain, no recent illness, no fevers, +chills. Pt currently taking plavix right now. Pt was hemodynamically stable on arrival to the ED, hgb was 5.8, CE was elevated to 0.26, ekg showed afib but no ischemic changes, Cr was 1.6 (baseline 1). (27 Dec 2018 16:57)      PAST MEDICAL & SURGICAL HISTORY:  Pulmonary hypertension  COPD (chronic obstructive pulmonary disease)  GI bleed  Aortic valve replaced  CAD (coronary artery disease)  Hypertension  GERD (gastroesophageal reflux disease)  Diabetes  Arthritis  S/P TAVR (transcatheter aortic valve replacement)      Hospital Course:  She is deconditioned and weak. She lives alone.  TODAY'S SUBJECTIVE & REVIEW OF SYMPTOMS:     Constitutional WNL   Cardio WNL   Resp WNL   GI WNL  Heme WNL  Endo WNL  Skin WNL  MSK WNL  Neuro WNL  Cognitive WNL  Psych WNL      MEDICATIONS  (STANDING):  furosemide    Tablet 40 milliGRAM(s) Oral two times a day  lactated ringers. 1000 milliLiter(s) (50 mL/Hr) IV Continuous <Continuous>  magnesium sulfate  IVPB 2 Gram(s) IV Intermittent every 4 hours  metoprolol tartrate 25 milliGRAM(s) Oral two times a day  pantoprazole Infusion 8 mG/Hr (10 mL/Hr) IV Continuous <Continuous>  simvastatin 10 milliGRAM(s) Oral at bedtime    MEDICATIONS  (PRN):      FAMILY HISTORY:      Allergies    penicillins (Other)    Intolerances        SOCIAL HISTORY:    [  ] Etoh  [  ] Smoking  [  ] Substance abuse     Home Environment:  [x  ] Home Alone; dtr lives nearby and has a disability  [  ] Lives with Family  [  ] Home Health Aid    Dwelling:  [  ] Apartment  [  ] Private House  [  ] Adult Home  [  ] Skilled Nursing Facility      [  ] Short Term  [  ] Long Term  [  ] Stairs       Elevator [  ]    FUNCTIONAL STATUS PTA: (Check all that apply)  Ambulation: [   ]Independent    [  ] Dependent     [  ] Non-Ambulatory  Assistive Device: [  ] SA Cane  [  ]  Q Cane  [  ] Walker  [  ]  Wheelchair  ADL : [  ] Independent  [  ]  Dependent       Vital Signs Last 24 Hrs  T(C): 35.8 (31 Dec 2018 13:00), Max: 37.4 (30 Dec 2018 17:02)  T(F): 96.5 (31 Dec 2018 13:00), Max: 99.3 (30 Dec 2018 17:02)  HR: 81 (31 Dec 2018 13:00) (18 - 91)  BP: 135/64 (31 Dec 2018 13:00) (110/53 - 156/67)  BP(mean): --  RR: 18 (31 Dec 2018 13:00) (18 - 26)  SpO2: 97% (31 Dec 2018 12:27) (97% - 100%)      PHYSICAL EXAM: Alert & Oriented X3  GENERAL: NAD, well-groomed, well-developed  HEAD:  Atraumatic, Normocephalic  EYES: EOMI, PERRLA, conjunctiva and sclera clear  NECK: Supple, No JVD, Normal thyroid  CHEST/LUNG: Clear to percussion bilaterally; No rales, rhonchi, wheezing, or rubs  HEART: Regular rate and rhythm; No murmurs, rubs, or gallops  ABDOMEN: Soft, Nontender, Nondistended; Bowel sounds present  EXTREMITIES:  2+ Peripheral Pulses, No clubbing, cyanosis, or edema    NERVOUS SYSTEM:  Cranial Nerves 2-12 intact [  ] Abnormal  [  ]  ROM: WFL all extremities [  ]  Abnormal [  ]  Motor Strength: WFL all extremities  [  ]  Abnormal [x ]4+/5  Sensation: intact to light touch [ x ] Abnormal [  ]  Reflexes: Symmetric [  ]  Abnormal [  ]    FUNCTIONAL STATUS:  Bed Mobility: Independent [  ]  Supervision [  ]  Needs Assistance [  ]  N/A [  ]  Transfers: Independent [  ]  Supervision [  ]  Needs Assistance [  ]  N/A [  ]   Ambulation: Independent [  ]  Supervision [  ]  Needs Assistance [  ]  N/A [  ]  ADL: Independent [  ] Requires Assistance [  ] N/A [  ]      LABS:                        10.1   8.74  )-----------( 179      ( 31 Dec 2018 06:52 )             32.0     12-31    142  |  103  |  23<H>  ----------------------------<  164<H>  4.1   |  24  |  1.1    Ca    8.5      31 Dec 2018 06:52  Mg     1.6     12-31      PT/INR - ( 31 Dec 2018 06:52 )   PT: 13.50 sec;   INR: 1.18 ratio         PTT - ( 31 Dec 2018 06:52 )  PTT:26.1 sec      RADIOLOGY & ADDITIONAL STUDIES:    Assesment:

## 2018-12-31 NOTE — DISCHARGE NOTE ADULT - PLAN OF CARE
treatment and prevention of complications you were anemic due to Gi bleeding, colonoscopy and upper endoscopy was done and showed   AVM ( Colonic Arteriovenous Malformation is a benign condition that may have a congenital origin. An arteriovenous malformation (AVM) is an abnormal connection between the veins and arteries resulting in collection of vascular malformation. It can occur anywhere in the body, Colonic AVM can result in gastrointestinal tract bleeding and anemia.) which was treated with Argon plasma coagulation.  please follow up with your gastroenterologist in one week.   please report to ED if you have any of the anemia symptoms ( dizziness, headache, fatigue, early satiety or chest pain ) or if you notice any change in the stool color.   We monitored your hemoglobin level for 24 hours after the procedure, it has been stable.   resume your regular diet. you had one pause for less than 4 seconds.   will decrease the dose of metoprolol to 12.5 two times/ day.  follow up with Dr dempsey in 2weeks

## 2018-12-31 NOTE — DISCHARGE NOTE ADULT - CARE PLAN
Principal Discharge DX:	GI bleed  Goal:	treatment and prevention of complications  Assessment and plan of treatment:	you were anemic due to Gi bleeding, colonoscopy and upper endoscopy was done and showed   AVM ( Colonic Arteriovenous Malformation is a benign condition that may have a congenital origin. An arteriovenous malformation (AVM) is an abnormal connection between the veins and arteries resulting in collection of vascular malformation. It can occur anywhere in the body, Colonic AVM can result in gastrointestinal tract bleeding and anemia.) which was treated with Argon plasma coagulation.  please follow up with your gastroenterologist in one week.   please report to ED if you have any of the anemia symptoms ( dizziness, headache, fatigue, early satiety or chest pain ) or if you notice any change in the stool color.   We monitored your hemoglobin level for 24 hours after the procedure, it has been stable.   resume your regular diet. Principal Discharge DX:	GI bleed  Goal:	treatment and prevention of complications  Assessment and plan of treatment:	you were anemic due to Gi bleeding, colonoscopy and upper endoscopy was done and showed   AVM ( Colonic Arteriovenous Malformation is a benign condition that may have a congenital origin. An arteriovenous malformation (AVM) is an abnormal connection between the veins and arteries resulting in collection of vascular malformation. It can occur anywhere in the body, Colonic AVM can result in gastrointestinal tract bleeding and anemia.) which was treated with Argon plasma coagulation.  please follow up with your gastroenterologist in one week.   please report to ED if you have any of the anemia symptoms ( dizziness, headache, fatigue, early satiety or chest pain ) or if you notice any change in the stool color.   We monitored your hemoglobin level for 24 hours after the procedure, it has been stable.   resume your regular diet.  Secondary Diagnosis:	Sinus pause  Assessment and plan of treatment:	you had one pause for less than 4 seconds.   will decrease the dose of metoprolol to 12.5 two times/ day.  follow up with Dr dempsey in 2weeks

## 2018-12-31 NOTE — PROGRESS NOTE ADULT - SUBJECTIVE AND OBJECTIVE BOX
The patient is getting prepped for colonoscopy   Hemoglobin now over 10 gram   PHYSICAL EXAM:  Vital Signs Last 24 Hrs  T(C): 36 (31 Dec 2018 07:36), Max: 37.4 (30 Dec 2018 17:02)  T(F): 96.8 (31 Dec 2018 07:36), Max: 99.3 (30 Dec 2018 17:02)  HR: 87 (31 Dec 2018 07:36) (83 - 91)  BP: 132/63 (31 Dec 2018 07:36) (130/56 - 156/67)  BP(mean): --  RR: 18 (31 Dec 2018 07:36) (18 - 18)  SpO2: 98% (31 Dec 2018 07:36) (98% - 98%)  Daily     Daily Weight in k (31 Dec 2018 05:20)  I&O's Detail    30 Dec 2018 07:01  -  31 Dec 2018 07:00  --------------------------------------------------------  IN:    lactated ringers.: 50 mL    Oral Fluid: 910 mL    Packed Red Blood Cells: 626 mL    pantoprazole Infusion: 10 mL  Total IN: 1596 mL    OUT:    Stool: 7 mL    Voided: 2000 mL  Total OUT: 2007 mL    Total NET: -411 mL        GENERAL: NAD, well-groomed, well-developed  HEAD:  Atraumatic, Normocephalic  NECK: Supple, No JVD, Normal thyroid  NERVOUS SYSTEM:  Alert & Oriented X3  CHEST/LUNG: Slight decreased BS at bases   HEART: irregular rhythm  ABDOMEN: Soft, Nontender, Nondistended; Bowel sounds present  EXTREMITIES:  No edema   LYMPH: No lymphadenopathy noted  SKIN: No rashes or lesion        LABS:                        10.1   8.74  )-----------( 179      ( 31 Dec 2018 06:52 )             32.0         142  |  103  |  23<H>  ----------------------------<  164<H>  4.1   |  24  |  1.1    Ca    8.5      31 Dec 2018 06:52  Mg     1.6           PT/INR - ( 31 Dec 2018 06:52 )   PT: 13.50 sec;   INR: 1.18 ratio         PTT - ( 31 Dec 2018 06:52 )  PTT:26.1 sec  MEDICATIONS  (STANDING):  furosemide    Tablet 40 milliGRAM(s) Oral two times a day  lactated ringers. 1000 milliLiter(s) (50 mL/Hr) IV Continuous <Continuous>  metoprolol tartrate 25 milliGRAM(s) Oral two times a day  pantoprazole Infusion 8 mG/Hr (10 mL/Hr) IV Continuous <Continuous>  simvastatin 10 milliGRAM(s) Oral at bedtime    MEDICATIONS  (PRN):  acetaminophen   Tablet .. 650 milliGRAM(s) Oral once PRN Moderate Pain (4 - 6)    CARDIAC MARKERS ( 29 Dec 2018 17:35 )  x     / 0.09 ng/mL / x     / x     / x

## 2018-12-31 NOTE — PROGRESS NOTE ADULT - ASSESSMENT
-Type II NSTEMI secondary to severe Anemia.   -S/P TAVR  -Permanent AF Not on A/C secondary to previous bleeding. Rate controlled. On Metoprolol   - CAD S/P BMS in [place] prior to her TAVR . RCA was occluded LAD was ok . Has increased Troponin thought to be type II NSTEMI   -Anemia - severe . S/P transfusion. EGD was negative . for colonoscopy   Hemoglobn now >10 gram    -History of HFpEF . Back on Lasix   -S/P recent Pseudomonas empyema.       Plan:  For Colonoscopy . The patient is a high cardiac risk undergoing a minor risk procedure. She is stable for this at this time . Son understands higher risk for procedure.   Maintain Hemoglobin >8 grams    Maintain Metoprolol for rate control of AF . This should be continued tariq-procedure.   Consider CT scan of the abdomen if colonoscopy is negative .  Continue PO Lasix .

## 2018-12-31 NOTE — PRE-ANESTHESIA EVALUATION ADULT - NSANTHOSAYNRD_GEN_A_CORE
No. LASHAUN screening performed.  STOP BANG Legend: 0-2 = LOW Risk; 3-4 = INTERMEDIATE Risk; 5-8 = HIGH Risk

## 2018-12-31 NOTE — DISCHARGE NOTE ADULT - CARE PROVIDER_API CALL
Kaur Bone), Internal Medicine  [address] [place] [address]  Phone: [phone]  Fax: [phone]    Rufino Reeves), Cardiovascular Disease; Internal Medicine  [address] [place] [address]  Phone: [phone]  Fax: [phone]    Bandar Donnelly), Gastroenterology; Internal Medicine  [address] [place] [address]  Phone: [phone]  Fax: [phone]

## 2018-12-31 NOTE — CONSULT NOTE ADULT - ASSESSMENT
IMPRESSION: Rehab of disbaility    PRECAUTIONS: [x  ] Cardiac  [x  ] Respiratory  [  ] Seizures [  ] Contact Isolation  [  ] Droplet Isolation  [  ] Other    Weight Bearing Status:     RECOMMENDATION:    Out of Bed to Chair     DVT/Decubiti Prophylaxis    REHAB PLAN:     [xx   ] Bedside P/T 3-5 times a week   [   ]   Bedside O/T  2-3 times a week             [   ] No Rehab Therapy Indicated                   [   ]  Speech Therapy   Conditioning/ROM                                    ADL  Bed Mobility                                               Conditioning/ROM  Transfers                                                     Bed Mobility  Sitting /Standing Balance                         Transfers                                        Gait Training                                               Sitting/Standing Balance  Stair Training [   ]Applicable                    Home equipment Eval                                                                        Splinting  [   ] Only      GOALS:   ADL   [ x  ]   Independent                    Transfers  [ x  ] Independent                          Ambulation  [ x  ] Independent     [    ] With device                            [x   ]  CG                                                         [   ]  CG                                                                  [   ] CG                            [    ] Min A                                                   [   ] Min A                                                              [   ] Min  A          DISCHARGE PLAN:   [   ]  Good candidate for Intensive Rehabilitation/Hospital based-[facility]                                             Will tolerate 3hrs Intensive Rehab Daily                                       [ xx   ]  Short Term Rehab in Skilled Nursing Facility                                       [    ]  Home with Outpatient or  services                                         [    ]  Possible Candidate for Intensive Hospital based Rehab

## 2019-01-01 LAB
ANION GAP SERPL CALC-SCNC: 12 MMOL/L — SIGNIFICANT CHANGE UP (ref 7–14)
APTT BLD: 26.9 SEC — LOW (ref 27–39.2)
BASOPHILS # BLD AUTO: 0.04 K/UL — SIGNIFICANT CHANGE UP (ref 0–0.2)
BASOPHILS NFR BLD AUTO: 0.5 % — SIGNIFICANT CHANGE UP (ref 0–1)
BUN SERPL-MCNC: 23 MG/DL — HIGH (ref 10–20)
CALCIUM SERPL-MCNC: 8.7 MG/DL — SIGNIFICANT CHANGE UP (ref 8.5–10.1)
CHLORIDE SERPL-SCNC: 101 MMOL/L — SIGNIFICANT CHANGE UP (ref 98–110)
CO2 SERPL-SCNC: 24 MMOL/L — SIGNIFICANT CHANGE UP (ref 17–32)
CREAT SERPL-MCNC: 1.1 MG/DL — SIGNIFICANT CHANGE UP (ref 0.7–1.5)
EOSINOPHIL # BLD AUTO: 0.29 K/UL — SIGNIFICANT CHANGE UP (ref 0–0.7)
EOSINOPHIL NFR BLD AUTO: 3.4 % — SIGNIFICANT CHANGE UP (ref 0–8)
GLUCOSE BLDC GLUCOMTR-MCNC: 224 MG/DL — HIGH (ref 70–99)
GLUCOSE BLDC GLUCOMTR-MCNC: 260 MG/DL — HIGH (ref 70–99)
GLUCOSE BLDC GLUCOMTR-MCNC: 317 MG/DL — HIGH (ref 70–99)
GLUCOSE BLDC GLUCOMTR-MCNC: 338 MG/DL — HIGH (ref 70–99)
GLUCOSE SERPL-MCNC: 216 MG/DL — HIGH (ref 70–99)
HCT VFR BLD CALC: 33.8 % — LOW (ref 37–47)
HGB BLD-MCNC: 10.8 G/DL — LOW (ref 12–16)
IMM GRANULOCYTES NFR BLD AUTO: 0.5 % — HIGH (ref 0.1–0.3)
INR BLD: 1.04 RATIO — SIGNIFICANT CHANGE UP (ref 0.65–1.3)
LYMPHOCYTES # BLD AUTO: 0.87 K/UL — LOW (ref 1.2–3.4)
LYMPHOCYTES # BLD AUTO: 10.3 % — LOW (ref 20.5–51.1)
MAGNESIUM SERPL-MCNC: 3.7 MG/DL — CRITICAL HIGH (ref 1.8–2.4)
MCHC RBC-ENTMCNC: 27.7 PG — SIGNIFICANT CHANGE UP (ref 27–31)
MCHC RBC-ENTMCNC: 32 G/DL — SIGNIFICANT CHANGE UP (ref 32–37)
MCV RBC AUTO: 86.7 FL — SIGNIFICANT CHANGE UP (ref 81–99)
MONOCYTES # BLD AUTO: 0.65 K/UL — HIGH (ref 0.1–0.6)
MONOCYTES NFR BLD AUTO: 7.7 % — SIGNIFICANT CHANGE UP (ref 1.7–9.3)
NEUTROPHILS # BLD AUTO: 6.53 K/UL — HIGH (ref 1.4–6.5)
NEUTROPHILS NFR BLD AUTO: 77.6 % — HIGH (ref 42.2–75.2)
NRBC # BLD: 0 /100 WBCS — SIGNIFICANT CHANGE UP (ref 0–0)
PLATELET # BLD AUTO: 185 K/UL — SIGNIFICANT CHANGE UP (ref 130–400)
POTASSIUM SERPL-MCNC: 4.6 MMOL/L — SIGNIFICANT CHANGE UP (ref 3.5–5)
POTASSIUM SERPL-SCNC: 4.6 MMOL/L — SIGNIFICANT CHANGE UP (ref 3.5–5)
PROTHROM AB SERPL-ACNC: 12 SEC — SIGNIFICANT CHANGE UP (ref 9.95–12.87)
RBC # BLD: 3.9 M/UL — LOW (ref 4.2–5.4)
RBC # FLD: 15.5 % — HIGH (ref 11.5–14.5)
SODIUM SERPL-SCNC: 137 MMOL/L — SIGNIFICANT CHANGE UP (ref 135–146)
WBC # BLD: 8.42 K/UL — SIGNIFICANT CHANGE UP (ref 4.8–10.8)
WBC # FLD AUTO: 8.42 K/UL — SIGNIFICANT CHANGE UP (ref 4.8–10.8)

## 2019-01-01 RX ORDER — CLOPIDOGREL BISULFATE 75 MG/1
75 TABLET, FILM COATED ORAL DAILY
Qty: 0 | Refills: 0 | Status: DISCONTINUED | OUTPATIENT
Start: 2019-01-01 | End: 2019-01-03

## 2019-01-01 RX ADMIN — Medication 25 GRAM(S): at 01:41

## 2019-01-01 RX ADMIN — Medication 25 GRAM(S): at 06:35

## 2019-01-01 RX ADMIN — Medication 25 MILLIGRAM(S): at 17:12

## 2019-01-01 RX ADMIN — MAGNESIUM OXIDE 400 MG ORAL TABLET 400 MILLIGRAM(S): 241.3 TABLET ORAL at 07:11

## 2019-01-01 RX ADMIN — PANTOPRAZOLE SODIUM 40 MILLIGRAM(S): 20 TABLET, DELAYED RELEASE ORAL at 06:35

## 2019-01-01 RX ADMIN — CLOPIDOGREL BISULFATE 75 MILLIGRAM(S): 75 TABLET, FILM COATED ORAL at 11:43

## 2019-01-01 RX ADMIN — Medication 25 MILLIGRAM(S): at 06:35

## 2019-01-01 RX ADMIN — SIMVASTATIN 10 MILLIGRAM(S): 20 TABLET, FILM COATED ORAL at 21:08

## 2019-01-01 RX ADMIN — Medication 40 MILLIGRAM(S): at 06:36

## 2019-01-01 RX ADMIN — Medication 40 MILLIGRAM(S): at 17:12

## 2019-01-01 NOTE — PROGRESS NOTE ADULT - SUBJECTIVE AND OBJECTIVE BOX
no chest pain   no sob   no more black stool or red blood per rectum     Vital Signs Last 24 Hrs  T(C): 36.1 (01 Jan 2019 05:08), Max: 36.6 (31 Dec 2018 11:12)  T(F): 96.9 (01 Jan 2019 05:08), Max: 96.9 (01 Jan 2019 05:08)  HR: 74 (01 Jan 2019 05:08) (18 - 84)  BP: 148/66 (01 Jan 2019 05:08) (110/53 - 148/66)  BP(mean): --  RR: 19 (31 Dec 2018 20:18) (18 - 26)  SpO2: 97% (31 Dec 2018 12:27) (97% - 100%)    PHYSICAL EXAM:  GENERAL: NAD, well-developed  HEAD:  Atraumatic, Normocephalic  EYES: EOMI, PERRLA, conjunctiva and sclera clear  NECK: Supple, No JVD  Pulm Clear to auscultation bilaterally; No wheeze  CV: Regular rate and rhythm; No murmurs, rubs, or gallops  GI: Soft, Nontender, Nondistended; Bowel sounds present  EXTREMITIES:  2+ Peripheral Pulses, No clubbing, cyanosis, or edema  PSYCH: AAOx3  NEUROLOGY: non-focal  SKIN: No rashes or lesions                          10.8   8.42  )-----------( 185      ( 01 Jan 2019 07:21 )             33.8     01-01    137  |  101  |  23<H>  ----------------------------<  216<H>  4.6   |  24  |  1.1    Ca    8.7      01 Jan 2019 07:21  Mg     3.7     01-01        PT/INR - ( 01 Jan 2019 07:21 )   PT: 12.00 sec;   INR: 1.04 ratio         PTT - ( 01 Jan 2019 07:21 )  PTT:26.9 sec

## 2019-01-01 NOTE — PHYSICAL THERAPY INITIAL EVALUATION ADULT - PERTINENT HX OF CURRENT PROBLEM, REHAB EVAL
13:50-14:07 Pt encountered sitting in b/s chair with tele, IV (heplocked by RN Ophelia 2*medication complete), in NAD. Pt without c/o, agreeable to PT. Pt left same as found with tele, IV lock, in NAD.

## 2019-01-01 NOTE — PROGRESS NOTE ADULT - ASSESSMENT
# acute blood loss anemia   HGB stable   no more evidence of GIB   resumed plavix   c-scope showed AVMS treated and also diverticulosis         # elevated CE likely 2/2 Type 2 NSTEMI most likely secondary to blood loss,   echo pending     #HAL, resolved     # COPD, stable  -1L prn and at night time,  -duonebs prn    # CAD s/p PCI, s/p TAVR, Afib:   stable c/w current meds   not on AC because of bleeding     # DMII, controlled       # HTN, controlled    normotensive right now, holding  acei for now     # hypomagnesemia   stop magnesium its high today       son wants to take patient home with services likely tomorrow   PT to see patient

## 2019-01-02 LAB
ANION GAP SERPL CALC-SCNC: 12 MMOL/L — SIGNIFICANT CHANGE UP (ref 7–14)
APTT BLD: 26.5 SEC — LOW (ref 27–39.2)
BASOPHILS # BLD AUTO: 0.03 K/UL — SIGNIFICANT CHANGE UP (ref 0–0.2)
BASOPHILS NFR BLD AUTO: 0.4 % — SIGNIFICANT CHANGE UP (ref 0–1)
BUN SERPL-MCNC: 30 MG/DL — HIGH (ref 10–20)
CALCIUM SERPL-MCNC: 8.4 MG/DL — LOW (ref 8.5–10.1)
CHLORIDE SERPL-SCNC: 100 MMOL/L — SIGNIFICANT CHANGE UP (ref 98–110)
CO2 SERPL-SCNC: 24 MMOL/L — SIGNIFICANT CHANGE UP (ref 17–32)
CREAT SERPL-MCNC: 1.1 MG/DL — SIGNIFICANT CHANGE UP (ref 0.7–1.5)
EOSINOPHIL # BLD AUTO: 0.36 K/UL — SIGNIFICANT CHANGE UP (ref 0–0.7)
EOSINOPHIL NFR BLD AUTO: 4.6 % — SIGNIFICANT CHANGE UP (ref 0–8)
GLUCOSE BLDC GLUCOMTR-MCNC: 130 MG/DL — HIGH (ref 70–99)
GLUCOSE BLDC GLUCOMTR-MCNC: 157 MG/DL — HIGH (ref 70–99)
GLUCOSE BLDC GLUCOMTR-MCNC: 218 MG/DL — HIGH (ref 70–99)
GLUCOSE BLDC GLUCOMTR-MCNC: 412 MG/DL — HIGH (ref 70–99)
GLUCOSE SERPL-MCNC: 212 MG/DL — HIGH (ref 70–99)
HCT VFR BLD CALC: 31.6 % — LOW (ref 37–47)
HGB BLD-MCNC: 9.9 G/DL — LOW (ref 12–16)
IMM GRANULOCYTES NFR BLD AUTO: 0.6 % — HIGH (ref 0.1–0.3)
INR BLD: 1.08 RATIO — SIGNIFICANT CHANGE UP (ref 0.65–1.3)
LYMPHOCYTES # BLD AUTO: 0.9 K/UL — LOW (ref 1.2–3.4)
LYMPHOCYTES # BLD AUTO: 11.5 % — LOW (ref 20.5–51.1)
MCHC RBC-ENTMCNC: 27 PG — SIGNIFICANT CHANGE UP (ref 27–31)
MCHC RBC-ENTMCNC: 31.3 G/DL — LOW (ref 32–37)
MCV RBC AUTO: 86.3 FL — SIGNIFICANT CHANGE UP (ref 81–99)
MONOCYTES # BLD AUTO: 0.73 K/UL — HIGH (ref 0.1–0.6)
MONOCYTES NFR BLD AUTO: 9.3 % — SIGNIFICANT CHANGE UP (ref 1.7–9.3)
NEUTROPHILS # BLD AUTO: 5.74 K/UL — SIGNIFICANT CHANGE UP (ref 1.4–6.5)
NEUTROPHILS NFR BLD AUTO: 73.6 % — SIGNIFICANT CHANGE UP (ref 42.2–75.2)
NRBC # BLD: 0 /100 WBCS — SIGNIFICANT CHANGE UP (ref 0–0)
PLATELET # BLD AUTO: 173 K/UL — SIGNIFICANT CHANGE UP (ref 130–400)
POTASSIUM SERPL-MCNC: 4.5 MMOL/L — SIGNIFICANT CHANGE UP (ref 3.5–5)
POTASSIUM SERPL-SCNC: 4.5 MMOL/L — SIGNIFICANT CHANGE UP (ref 3.5–5)
PROTHROM AB SERPL-ACNC: 12.4 SEC — SIGNIFICANT CHANGE UP (ref 9.95–12.87)
RBC # BLD: 3.66 M/UL — LOW (ref 4.2–5.4)
RBC # FLD: 15.3 % — HIGH (ref 11.5–14.5)
SODIUM SERPL-SCNC: 136 MMOL/L — SIGNIFICANT CHANGE UP (ref 135–146)
WBC # BLD: 7.81 K/UL — SIGNIFICANT CHANGE UP (ref 4.8–10.8)
WBC # FLD AUTO: 7.81 K/UL — SIGNIFICANT CHANGE UP (ref 4.8–10.8)

## 2019-01-02 RX ORDER — INSULIN LISPRO 100/ML
VIAL (ML) SUBCUTANEOUS
Qty: 0 | Refills: 0 | Status: DISCONTINUED | OUTPATIENT
Start: 2019-01-02 | End: 2019-01-03

## 2019-01-02 RX ORDER — DEXTROSE 50 % IN WATER 50 %
15 SYRINGE (ML) INTRAVENOUS ONCE
Qty: 0 | Refills: 0 | Status: DISCONTINUED | OUTPATIENT
Start: 2019-01-02 | End: 2019-01-03

## 2019-01-02 RX ORDER — INSULIN LISPRO 100/ML
4 VIAL (ML) SUBCUTANEOUS
Qty: 0 | Refills: 0 | Status: DISCONTINUED | OUTPATIENT
Start: 2019-01-02 | End: 2019-01-03

## 2019-01-02 RX ORDER — DEXTROSE 50 % IN WATER 50 %
25 SYRINGE (ML) INTRAVENOUS ONCE
Qty: 0 | Refills: 0 | Status: DISCONTINUED | OUTPATIENT
Start: 2019-01-02 | End: 2019-01-03

## 2019-01-02 RX ORDER — GLUCAGON INJECTION, SOLUTION 0.5 MG/.1ML
1 INJECTION, SOLUTION SUBCUTANEOUS ONCE
Qty: 0 | Refills: 0 | Status: DISCONTINUED | OUTPATIENT
Start: 2019-01-02 | End: 2019-01-03

## 2019-01-02 RX ORDER — DEXTROSE 50 % IN WATER 50 %
12.5 SYRINGE (ML) INTRAVENOUS ONCE
Qty: 0 | Refills: 0 | Status: DISCONTINUED | OUTPATIENT
Start: 2019-01-02 | End: 2019-01-03

## 2019-01-02 RX ORDER — INSULIN GLARGINE 100 [IU]/ML
12 INJECTION, SOLUTION SUBCUTANEOUS AT BEDTIME
Qty: 0 | Refills: 0 | Status: DISCONTINUED | OUTPATIENT
Start: 2019-01-02 | End: 2019-01-03

## 2019-01-02 RX ORDER — SODIUM CHLORIDE 9 MG/ML
1000 INJECTION, SOLUTION INTRAVENOUS
Qty: 0 | Refills: 0 | Status: DISCONTINUED | OUTPATIENT
Start: 2019-01-02 | End: 2019-01-03

## 2019-01-02 RX ORDER — METOPROLOL TARTRATE 50 MG
12.5 TABLET ORAL AT BEDTIME
Qty: 0 | Refills: 0 | Status: DISCONTINUED | OUTPATIENT
Start: 2019-01-02 | End: 2019-01-03

## 2019-01-02 RX ORDER — METOPROLOL TARTRATE 50 MG
25 TABLET ORAL
Qty: 0 | Refills: 0 | Status: DISCONTINUED | OUTPATIENT
Start: 2019-01-02 | End: 2019-01-02

## 2019-01-02 RX ADMIN — Medication 40 MILLIGRAM(S): at 17:29

## 2019-01-02 RX ADMIN — Medication 4 UNIT(S): at 17:27

## 2019-01-02 RX ADMIN — Medication 40 MILLIGRAM(S): at 06:12

## 2019-01-02 RX ADMIN — SIMVASTATIN 10 MILLIGRAM(S): 20 TABLET, FILM COATED ORAL at 23:09

## 2019-01-02 RX ADMIN — Medication 12.5 MILLIGRAM(S): at 23:09

## 2019-01-02 RX ADMIN — CLOPIDOGREL BISULFATE 75 MILLIGRAM(S): 75 TABLET, FILM COATED ORAL at 11:19

## 2019-01-02 RX ADMIN — Medication 25 MILLIGRAM(S): at 06:12

## 2019-01-02 RX ADMIN — PANTOPRAZOLE SODIUM 40 MILLIGRAM(S): 20 TABLET, DELAYED RELEASE ORAL at 06:12

## 2019-01-02 RX ADMIN — Medication 6: at 11:45

## 2019-01-02 RX ADMIN — Medication 4 UNIT(S): at 11:45

## 2019-01-02 RX ADMIN — INSULIN GLARGINE 12 UNIT(S): 100 INJECTION, SOLUTION SUBCUTANEOUS at 23:09

## 2019-01-02 NOTE — PROGRESS NOTE ADULT - ASSESSMENT
GI BLEED/ HX OF PNA/ EMPYEMA s/p ABX/ CHF/ PLEURAL EFFUSION    - TRANSFUSE KEEP HB > 8  - GI F/UP  - RECALL AS NEEDED/ OP F/UP  SPOKE WITH SON AT BEDSIDE

## 2019-01-02 NOTE — PROGRESS NOTE ADULT - ASSESSMENT
-Type II NSTEMI secondary to severe Anemia.   -S/P TAVR  -Permanent AF Not on A/C secondary to previous bleeding. Rate controlled. On Metoprolol   - CAD S/P BMS in [place] prior to her TAVR . RCA was occluded LAD was ok . Has increased Troponin thought to be type II NSTEMI   -Anemia - severe . S/P transfusion. EGD was negative . Had AVMS cauterized in colon . Hemoglbon is stable     -History of HFpEF . Back on Lasix   -S/P recent Pseudomonas empyema.  - Pauses of 3.8 seconds in the face of AF . Ventricular response for the most part is controlled. These occurred at sleep and are less than 5 seconds. Metoprolol is needed for rate control.        Plan:   Maintain Hemoglobin >8 grams    Maintain Metoprolol for rate control of AF . EP consult called  . Pauses at night are not more than 5 seconds. Metoprolol is needed for rate control.   Continue current meds   DM control as per

## 2019-01-02 NOTE — CONSULT NOTE ADULT - ATTENDING COMMENTS
3.8 seconds pause during sleep  no symptoms  recommend decrease metoprolol pm to 12.5 mg  no indication for ppm at this time  reconsult if needed

## 2019-01-02 NOTE — PROGRESS NOTE ADULT - SUBJECTIVE AND OBJECTIVE BOX
patient had a 3.4 sec pause on tele was asymptomatic   no chest pain and no sob     Vital Signs Last 24 Hrs  T(C): 35.6 (02 Jan 2019 05:12), Max: 37.1 (01 Jan 2019 13:51)  T(F): 96 (02 Jan 2019 05:12), Max: 98.8 (01 Jan 2019 13:51)  HR: 84 (02 Jan 2019 05:12) (73 - 84)  BP: 158/68 (02 Jan 2019 05:12) (120/58 - 158/68)  BP(mean): --  RR: 18 (02 Jan 2019 05:12) (18 - 18)  SpO2: --    PHYSICAL EXAM:  GENERAL: NAD, well-developed  HEAD:  Atraumatic, Normocephalic  EYES: EOMI, PERRLA, conjunctiva and sclera clear  NECK: Supple, No JVD  Pulm: Clear to auscultation bilaterally; No wheeze  CV: Regular rate and rhythm; No murmurs, rubs, or gallops  GI: Soft, Nontender, Nondistended; Bowel sounds present  EXTREMITIES:  2+ Peripheral Pulses, No clubbing, cyanosis, or edema  PSYCH: AAOx3  NEUROLOGY: non-focal  SKIN: No rashes or lesions                          9.9    7.81  )-----------( 173      ( 02 Jan 2019 06:10 )             31.6     01-02    136  |  100  |  30<H>  ----------------------------<  212<H>  4.5   |  24  |  1.1    Ca    8.4<L>      02 Jan 2019 06:10  Mg     3.7     01-01        PT/INR - ( 02 Jan 2019 06:10 )   PT: 12.40 sec;   INR: 1.08 ratio         PTT - ( 02 Jan 2019 06:10 )  PTT:26.5 sec      MEDICATIONS  (STANDING):  clopidogrel Tablet 75 milliGRAM(s) Oral daily  furosemide    Tablet 40 milliGRAM(s) Oral two times a day  metoprolol tartrate 25 milliGRAM(s) Oral two times a day  pantoprazole    Tablet 40 milliGRAM(s) Oral before breakfast  simvastatin 10 milliGRAM(s) Oral at bedtime    MEDICATIONS  (PRN):

## 2019-01-02 NOTE — CONSULT NOTE ADULT - ASSESSMENT
1. Afib with sinus pause  - Pt is on Metoprolol 25 BID for rate control due to permanent afib.  - no AC due to bleeding risk  - Pt had a 3.8 second pause overnight  - 1. Afib with sinus pause  - Pt is on Metoprolol 25 BID for rate control due to permanent afib.  - no AC due to bleeding risk  - Pt had a 3.8 second pause overnight in AFib.  Pt was asymptomatic  - Recommend decrease Metoprolol to 12.5 at HS only  - monitor on tele again overnight.  If no further pauses tonight, then possible discharge tomorrow on decreased dose of BB.  - If she continues to have pauses or pauses become >5 sec , then pt may need PPM 1. Afib with sinus pause  - Pt is on Metoprolol 25 BID for rate control due to permanent afib.  - no AC due to bleeding risk  - Pt had a 3.8 second pause overnight in AFib.  Pt was asymptomatic  - Recommend decrease Metoprolol to 25 mg in am and 12.5 at HS only  - monitor on tele again overnight.  If no further pauses tonight, then possible discharge tomorrow on decreased dose of BB.  - If she continues to have pauses or pauses become >5 sec , then pt may need PPM

## 2019-01-02 NOTE — PROGRESS NOTE ADULT - SUBJECTIVE AND OBJECTIVE BOX
OVERNIGHT EVENTS: sitting on chair, feels well, no bloody BM    Vital Signs Last 24 Hrs  T(C): 35.6 (02 Jan 2019 05:12), Max: 37.1 (01 Jan 2019 13:51)  T(F): 96 (02 Jan 2019 05:12), Max: 98.8 (01 Jan 2019 13:51)  HR: 84 (02 Jan 2019 05:12) (73 - 84)  BP: 158/68 (02 Jan 2019 05:12) (120/58 - 158/68)  RR: 18 (02 Jan 2019 05:12) (18 - 18)  SpO2: 94%    PHYSICAL EXAMINATION:    GENERAL: The patient is awake and alert in no apparent distress.     HEENT: Head is normocephalic and atraumatic. Extraocular muscles are intact. Mucous membranes are moist.    NECK: Supple.    LUNGS: DEC BS R SIDE    HEART: Regular rate and rhythm without murmur.    ABDOMEN: Soft, nontender, and nondistended.      EXTREMITIES: Without any cyanosis, clubbing, rash, lesions or edema.    NEUROLOGIC: Grossly intact.    SKIN: No ulceration or induration present.      LABS:                        9.9    7.81  )-----------( 173      ( 02 Jan 2019 06:10 )             31.6     01-02    136  |  100  |  30<H>  ----------------------------<  212<H>  4.5   |  24  |  1.1    Ca    8.4<L>      02 Jan 2019 06:10  Mg     3.7     01-01      PT/INR - ( 02 Jan 2019 06:10 )   PT: 12.40 sec;   INR: 1.08 ratio         PTT - ( 02 Jan 2019 06:10 )  PTT:26.5 sec                        MICROBIOLOGY:      MEDICATIONS  (STANDING):  clopidogrel Tablet 75 milliGRAM(s) Oral daily  furosemide    Tablet 40 milliGRAM(s) Oral two times a day  metoprolol tartrate 25 milliGRAM(s) Oral two times a day  pantoprazole    Tablet 40 milliGRAM(s) Oral before breakfast  simvastatin 10 milliGRAM(s) Oral at bedtime    MEDICATIONS  (PRN):      RADIOLOGY & ADDITIONAL STUDIES:

## 2019-01-02 NOTE — CONSULT NOTE ADULT - SUBJECTIVE AND OBJECTIVE BOX
Patient is a 89y old  Female who presents with a chief complaint of UGIB (2019 11:44)        HPI:    89 y/o F with PMHx severe pulm htn, COPD on 1L NC (only uses it at bedtime), DMII,  HTN, Afib not on a/c due to GIB (, had egd+colo+capsule done but all workup neg, xarelto was stopped at that time), TAVR ( at [place]), CAD s/p PCI x1 (), arthritis, presenting for outpt bloodwork finding of low hgb of 6. Pt had fall 2 wks ago, ct of left hip done with acp but no result, cbc done yesterday showed hgb 6. Pt was told to come to er bc she is high risk. She states that she has been having 2-3 episodes of dark colored stools daily for the past 2 wks. She also endorses more generalized weakness and family members noted that her pallor was poor. Pt denies any chest pain, no recent illness, no fevers, +chills. Pt currently taking plavix right now. Pt was hemodynamically stable on arrival to the ED, hgb was 5.8, CE was elevated to 0.26, ekg showed afib but no ischemic changes, Cr was 1.6 (baseline 1).     Pt was admitted to telemetry.  GI workup for anemia was done which showed AVMs which were cauterized.  Pt was to be discharged home but had a 3.8 second pause on tele at 6 am today.  Pt states she was sleeping at the time and therefore had no symptoms.  She states she falls a lot but denies ever passing out.      PAST MEDICAL & SURGICAL HISTORY:  Pulmonary hypertension  COPD (chronic obstructive pulmonary disease)  GI bleed  Aortic valve replaced  CAD (coronary artery disease)  Hypertension  GERD (gastroesophageal reflux disease)  Diabetes  Arthritis  S/P TAVR (transcatheter aortic valve replacement)      PREVIOUS DIAGNOSTIC TESTING:      ECHO  FINDINGS:   Transthoracic Echocardiogram (19 @ 10:23) >  Summary:   1. LV Ejection Fraction by Moy's Method with a biplane EF of 59 %.   2. Normal left ventricular size and wall thicknesses, with normal   systolic function.   3. Mild to moderate mitral annular calcification.   4. Moderate-severe tricuspid regurgitation.   5. Bioprosthesis in the aortic position.   6. Mild aortic regurgitation.   7. Pulmonic valve regurgitation.   8. Estimated pulmonary artery systolic pressure is 67.9 mmHg assuming a   right atrial pressure of 10 mmHg, which is consistent with severe   pulmonary hypertension.      MEDICATIONS  (STANDING):  clopidogrel Tablet 75 milliGRAM(s) Oral daily  dextrose 5%. 1000 milliLiter(s) (50 mL/Hr) IV Continuous <Continuous>  dextrose 50% Injectable 12.5 Gram(s) IV Push once  dextrose 50% Injectable 25 Gram(s) IV Push once  dextrose 50% Injectable 25 Gram(s) IV Push once  furosemide    Tablet 40 milliGRAM(s) Oral two times a day  insulin glargine Injectable (LANTUS) 12 Unit(s) SubCutaneous at bedtime  insulin lispro (HumaLOG) corrective regimen sliding scale   SubCutaneous three times a day before meals  insulin lispro Injectable (HumaLOG) 4 Unit(s) SubCutaneous three times a day before meals  metoprolol tartrate 25 milliGRAM(s) Oral two times a day  pantoprazole    Tablet 40 milliGRAM(s) Oral before breakfast  simvastatin 10 milliGRAM(s) Oral at bedtime    MEDICATIONS  (PRN):  dextrose 40% Gel 15 Gram(s) Oral once PRN Blood Glucose LESS THAN 70 milliGRAM(s)/deciliter  glucagon  Injectable 1 milliGRAM(s) IntraMuscular once PRN Glucose LESS THAN 70 milligrams/deciliter      FAMILY HISTORY:      SOCIAL HISTORY:    CIGARETTES: denies    ALCOHOL:denies    Past Surgical History:    Allergies:    penicillins (Other)      REVIEW OF SYSTEMS:    CONSTITUTIONAL: No fever, weight loss, chills, shakes, or fatigue  EYES: No eye pain, visual disturbances, or discharge  ENMT:  No difficulty hearing, tinnitus, vertigo; No sinus or throat pain  NECK: No pain or stiffness  BREASTS: No pain, masses, or nipple discharge  RESPIRATORY: No cough, wheezing, hemoptysis, or shortness of breath  CARDIOVASCULAR: No chest pain, dyspnea, palpitations, dizziness, syncope, paroxysmal nocturnal dyspnea, orthopnea, or arm or leg swelling  GASTROINTESTINAL: No abdominal  or epigastric pain, nausea, vomiting, hematemesis, diarrhea, constipation, melena or bright red blood.  GENITOURINARY: No dysuria, nocturia, hematuria, or urinary incontinence  NEUROLOGICAL: No headaches, memory loss, slurred speech, limb weakness, loss of strength, numbness, or tremors  SKIN: No itching, burning, rashes, or lesions   LYMPH NODES: No enlarged glands  ENDOCRINE: No heat or cold intolerance, or hair loss  MUSCULOSKELETAL: No joint pain or swelling, muscle, back, or extremity pain  PSYCHIATRIC: No depression, anxiety, or difficulty sleeping  HEME/LYMPH: No easy bruising or bleeding gums  ALLERY AND IMMUNOLOGIC: No hives or rash.      Vital Signs Last 24 Hrs  T(C): 36.5 (2019 13:38), Max: 36.5 (2019 13:38)  T(F): 97.7 (2019 13:38), Max: 97.7 (2019 13:38)  HR: 68 (2019 13:38) (68 - 84)  BP: 115/59 (2019 13:38) (115/59 - 158/68)  BP(mean): --  RR: 18 (2019 13:38) (18 - 18)  SpO2: --    PHYSICAL EXAM:        GENERAL: In no apparent distress, well nourished, and hydrated.  HEAD:  Atraumatic, Normocephalic  EYES: EOMI, PERRLA, conjunctiva and sclera clear  ENMT: No tonsillar erythema, exudates, or enlargements; ist mucous membranes, Good dentition, No lesions  NECK: Supple and normal thyroid.  No JVD or carotid bruit.  Carotid pulse is 2+ bilaterally.  HEART: Irreg Irreg No murmurs, rubs, or gallops.  PULMONARY: Clear to auscultation and perfusion.  No rales, wheezing, or rhonchi bilaterally.  ABDOMEN: Soft, Nontender, Nondistended; Bowel sounds present  EXTREMITIES:  2+ Peripheral Pulses, No clubbing, cyanosis, or edema  LYMPH: No lymphadenopathy noted  NEUROLOGICAL: Grossly nonfocal      INTERPRETATION OF TELEMETRY: Afib wiht 3.8 second pause    ECG:  < from: 12 Lead ECG (.18 @ 11:07) >  Ventricular Rate 77 BPM    Atrial Rate 43 BPM    QRS Duration 94 ms    Q-T Interval 340 ms    QTC Calculation(Bezet) 384 ms    R Axis 9 degrees    T Axis 117 degrees    Diagnosis Line Atrial fibrillation  Nonspecific ST and T wave abnormality  Abnormal ECG      I&O's Detail      LABS:                        9.9    7.81  )-----------( 173      ( 2019 06:10 )             31.6     01-02    136  |  100  |  30<H>  ----------------------------<  212<H>  4.5   |  24  |  1.1    Ca    8.4<L>      2019 06:10  Mg     3.7               PT/INR - ( 2019 06:10 )   PT: 12.40 sec;   INR: 1.08 ratio         PTT - ( 2019 06:10 )  PTT:26.5 sec    BNP  I&O's Detail    Daily     Daily Weight in k.7 (2019 05:12)    RADIOLOGY & ADDITIONAL STUDIES:

## 2019-01-02 NOTE — PROGRESS NOTE ADULT - SUBJECTIVE AND OBJECTIVE BOX
The patient is resting comfortably . Had AVM's cauterized. Had pauses up to 3.8 seconds during sleep in the face of AF . No symptoms.     PHYSICAL EXAM:  Vital Signs Last 24 Hrs  T(C): 35.6 (2019 05:12), Max: 37.1 (2019 13:51)  T(F): 96 (2019 05:12), Max: 98.8 (2019 13:51)  HR: 84 (2019 05:12) (73 - 84)  BP: 158/68 (2019 05:12) (120/58 - 158/68)  BP(mean): --  RR: 18 (2019 05:12) (18 - 18)  SpO2: --  Daily     Daily Weight in k.7 (2019 05:12)  I&O's Detail    GENERAL: NAD, well-groomed, well-developed  HEAD:  Atraumatic, Normocephalic  NECK: Supple,   NERVOUS SYSTEM:  Alert & Oriented X3, Good concentration  CHEST/LUNG: Clear to percussion bilaterally; No rales, rhonchi, wheezing, or rubs  HEART: S1S2 irregular iregular   ABDOMEN: Soft, Nontender, Nondistended; Bowel sounds present  EXTREMITIES: trace to 1+ edema         LABS:                        9.9    7.81  )-----------( 173      ( 2019 06:10 )             31.6         136  |  100  |  30<H>  ----------------------------<  212<H>  4.5   |  24  |  1.1    Ca    8.4<L>      2019 06:10  Mg     3.7           PT/INR - ( 2019 06:10 )   PT: 12.40 sec;   INR: 1.08 ratio         PTT - ( 2019 06:10 )  PTT:26.5 sec  MEDICATIONS  (STANDING):  clopidogrel Tablet 75 milliGRAM(s) Oral daily  dextrose 5%. 1000 milliLiter(s) (50 mL/Hr) IV Continuous <Continuous>  dextrose 50% Injectable 12.5 Gram(s) IV Push once  dextrose 50% Injectable 25 Gram(s) IV Push once  dextrose 50% Injectable 25 Gram(s) IV Push once  furosemide    Tablet 40 milliGRAM(s) Oral two times a day  insulin glargine Injectable (LANTUS) 12 Unit(s) SubCutaneous at bedtime  insulin lispro (HumaLOG) corrective regimen sliding scale   SubCutaneous three times a day before meals  insulin lispro Injectable (HumaLOG) 4 Unit(s) SubCutaneous three times a day before meals  metoprolol tartrate 25 milliGRAM(s) Oral two times a day  pantoprazole    Tablet 40 milliGRAM(s) Oral before breakfast  simvastatin 10 milliGRAM(s) Oral at bedtime    MEDICATIONS  (PRN):  dextrose 40% Gel 15 Gram(s) Oral once PRN Blood Glucose LESS THAN 70 milliGRAM(s)/deciliter  glucagon  Injectable 1 milliGRAM(s) IntraMuscular once PRN Glucose LESS THAN 70 milligrams/deciliter

## 2019-01-02 NOTE — PROGRESS NOTE ADULT - ASSESSMENT
MUKESH POLLACK 89y Female  MRN#: [id number]   CODE STATUS:FULLCODE      SUBJECTIVE  Patient is a 89y old Female who presents with a chief complaint of UGIB (02 Jan 2019 08:10)  Currently admitted to medicine with the primary diagnosis of GI bleed  Today is hospital day 6d, and this morning she is resting comfortably in bed and reports no overnight events.   tolerating regular food, normal stool color, denies chest pain or sob, slept well at night, feels ok to go home     OBJECTIVE  PAST MEDICAL & SURGICAL HISTORY  Pulmonary hypertension  COPD (chronic obstructive pulmonary disease)  GI bleed  Aortic valve replaced  CAD (coronary artery disease)  Hypertension  GERD (gastroesophageal reflux disease)  Diabetes  Arthritis  S/P TAVR (transcatheter aortic valve replacement)    ALLERGIES:  penicillins (Other)    MEDICATIONS:  STANDING MEDICATIONS  clopidogrel Tablet 75 milliGRAM(s) Oral daily  furosemide    Tablet 40 milliGRAM(s) Oral two times a day  pantoprazole    Tablet 40 milliGRAM(s) Oral before breakfast  simvastatin 10 milliGRAM(s) Oral at bedtime    PRN MEDICATIONS      VITAL SIGNS: Last 24 Hours  T(C): 35.6 (02 Jan 2019 05:12), Max: 37.1 (01 Jan 2019 13:51)  T(F): 96 (02 Jan 2019 05:12), Max: 98.8 (01 Jan 2019 13:51)  HR: 84 (02 Jan 2019 05:12) (73 - 84)  BP: 158/68 (02 Jan 2019 05:12) (120/58 - 158/68)  BP(mean): --  RR: 18 (02 Jan 2019 05:12) (18 - 18)  SpO2: --    LABS:                        9.9    7.81  )-----------( 173      ( 02 Jan 2019 06:10 )             31.6     01-02    136  |  100  |  30<H>  ----------------------------<  212<H>  4.5   |  24  |  1.1    Ca    8.4<L>      02 Jan 2019 06:10  Mg     3.7     01-01      PT/INR - ( 02 Jan 2019 06:10 )   PT: 12.40 sec;   INR: 1.08 ratio         PTT - ( 02 Jan 2019 06:10 )  PTT:26.5 sec    PHYSICAL EXAM:    GEN: No acute distress  HEENT: NCAT  Pulm Clear to auscultation bilaterally   Cv: S1/S2 present. no murmurs.   GI: Soft, non-tender, non-distended.  EXT: venous stasis b/l LE, no edema  NEURO: AAOX3    ASSESSMENT & PLAN   89 year old female with PMH of copd, AS s/p TAVR presented with anemia 2/2 LGIB     # Pause on tele   - d/c bb, monitor for 24 hours   - Dr Reeves will follow     # acute blood loss anemia 2/2 lower GI bleeding   - Hb is stable this morning, s/p 4 unites this admission  - plavix restarted, no clinical signs of active bleed, tolerating regular diet  - 2 IVs, active T/s, CBC q12   - EGD angioectsaia in stomach and treated with thermal therapy , s/p colonoscopy today; AVN , s/p APC  - had egd+colo+capsule in 2013 for gi bleed, neg at that time  - PO Protonix   - cardio evaluated pt: patient is high risk undergoing low risk procedure    # elevated CE likely 2/2 Type 2 NSTEMI most likely secondary to blood loss,   - no chest pain, acute ischemic changes    - .26 > .26 > .09    #HAL, resolved   - prerenal   - Cr 1.6 > 1.4 > 1.1  - consider restarting acei now that hal resolved (although she is not hypertensive now)  - will trend bmp and tx prbc    # COPD, stable  -1L prn and at night time,  -duonebs prn    # CAD s/p PCI, s/p TAVR, Afib:   - hold all a/c, stent done in 2013, c/w lasix PO 40mg q12 considering cxr pos for bilateral pleural effusions and getting prbc  -if pt becomes hypotensive then stop lasix  -c/w statin  -not on any full a/c blood thinners due to prior hx of GI bleed  -rate control for now with metoprolol    # DMII, controlled    check FS,   start insulin > 180      # HTN, controlled    normotensive right now, hold acei    # hypomagnesemia   replete, repeat Umu     DVT PPx: scd only   GI PPx: on protonix gtt    PT/Rehab and possible SNF once medically cleared 24-48 hours MUKESH POLLACK 89y Female  MRN#: [id number]   CODE STATUS:FULLCODE      SUBJECTIVE  Patient is a 89y old Female who presents with a chief complaint of UGIB (02 Jan 2019 08:10)  Currently admitted to medicine with the primary diagnosis of GI bleed  Today is hospital day 6d, and this morning she is resting comfortably in bed and reports no overnight events.   tolerating regular food, normal stool color, denies chest pain or sob, slept well at night, feels ok to go home     OBJECTIVE  PAST MEDICAL & SURGICAL HISTORY  Pulmonary hypertension  COPD (chronic obstructive pulmonary disease)  GI bleed  Aortic valve replaced  CAD (coronary artery disease)  Hypertension  GERD (gastroesophageal reflux disease)  Diabetes  Arthritis  S/P TAVR (transcatheter aortic valve replacement)    ALLERGIES:  penicillins (Other)    MEDICATIONS:  STANDING MEDICATIONS  clopidogrel Tablet 75 milliGRAM(s) Oral daily  furosemide    Tablet 40 milliGRAM(s) Oral two times a day  pantoprazole    Tablet 40 milliGRAM(s) Oral before breakfast  simvastatin 10 milliGRAM(s) Oral at bedtime    PRN MEDICATIONS      VITAL SIGNS: Last 24 Hours  T(C): 35.6 (02 Jan 2019 05:12), Max: 37.1 (01 Jan 2019 13:51)  T(F): 96 (02 Jan 2019 05:12), Max: 98.8 (01 Jan 2019 13:51)  HR: 84 (02 Jan 2019 05:12) (73 - 84)  BP: 158/68 (02 Jan 2019 05:12) (120/58 - 158/68)  BP(mean): --  RR: 18 (02 Jan 2019 05:12) (18 - 18)  SpO2: --    LABS:                        9.9    7.81  )-----------( 173      ( 02 Jan 2019 06:10 )             31.6     01-02    136  |  100  |  30<H>  ----------------------------<  212<H>  4.5   |  24  |  1.1    Ca    8.4<L>      02 Jan 2019 06:10  Mg     3.7     01-01      PT/INR - ( 02 Jan 2019 06:10 )   PT: 12.40 sec;   INR: 1.08 ratio         PTT - ( 02 Jan 2019 06:10 )  PTT:26.5 sec    PHYSICAL EXAM:    GEN: No acute distress  HEENT: NCAT  Pulm Clear to auscultation bilaterally   Cv: S1/S2 present. no murmurs.   GI: Soft, non-tender, non-distended.  EXT: venous stasis b/l LE, no edema  NEURO: AAOX3    ASSESSMENT & PLAN   89 year old female with PMH of copd, AS s/p TAVR presented with anemia 2/2 LGIB     # 3 seconds Pause on tele   - d/c bb, monitor for 24 hours   - Dr Reeves will follow     # acute blood loss anemia 2/2 lower GI bleeding   - Hb is stable this morning, s/p 4 unites this admission  - plavix restarted, no clinical signs of active bleed, tolerating regular diet  - 2 IVs, active T/s, CBC q12   - EGD angioectsaia in stomach and treated with thermal therapy , s/p colonoscopy today; AVN , s/p APC  - had egd+colo+capsule in 2013 for gi bleed, neg at that time  - PO Protonix   - cardio evaluated pt: patient is high risk undergoing low risk procedure    # elevated CE likely 2/2 Type 2 NSTEMI most likely secondary to blood loss,   - no chest pain, acute ischemic changes    - .26 > .26 > .09    #HAL, resolved   - prerenal   - Cr 1.6 > 1.4 > 1.1  - consider restarting acei now that hal resolved (although she is not hypertensive now)  - will trend bmp and tx prbc    # COPD, stable  -1L prn and at night time,  -duonebs prn    # CAD s/p PCI, s/p TAVR, Afib:   - hold all a/c, stent done in 2013, c/w lasix PO 40mg q12 considering cxr pos for bilateral pleural effusions and getting prbc  -if pt becomes hypotensive then stop lasix  -c/w statin  -not on any full a/c blood thinners due to prior hx of GI bleed  -rate control for now with metoprolol    # DMII, controlled    check FS,   start insulin > 180      # HTN, controlled    normotensive right now, hold acei    # hypomagnesemia   replete, repeat Umu     DVT PPx: scd only   GI PPx: on protonix gtt    PT/Rehab and possible SNF once medically cleared 24-48 hours MUKESH POLLACK 89y Female  MRN#: [id number]   CODE STATUS:FULLCODE      SUBJECTIVE  Patient is a 89y old Female who presents with a chief complaint of UGIB (02 Jan 2019 08:10)  Currently admitted to medicine with the primary diagnosis of GI bleed  Today is hospital day 6d, and this morning she is resting comfortably in bed and reports no overnight events.   tolerating regular food, normal stool color, denies chest pain or sob, slept well at night, feels ok to go home     OBJECTIVE  PAST MEDICAL & SURGICAL HISTORY  Pulmonary hypertension  COPD (chronic obstructive pulmonary disease)  GI bleed  Aortic valve replaced  CAD (coronary artery disease)  Hypertension  GERD (gastroesophageal reflux disease)  Diabetes  Arthritis  S/P TAVR (transcatheter aortic valve replacement)    ALLERGIES:  penicillins (Other)    MEDICATIONS:  STANDING MEDICATIONS  clopidogrel Tablet 75 milliGRAM(s) Oral daily  furosemide    Tablet 40 milliGRAM(s) Oral two times a day  pantoprazole    Tablet 40 milliGRAM(s) Oral before breakfast  simvastatin 10 milliGRAM(s) Oral at bedtime    PRN MEDICATIONS      VITAL SIGNS: Last 24 Hours  T(C): 35.6 (02 Jan 2019 05:12), Max: 37.1 (01 Jan 2019 13:51)  T(F): 96 (02 Jan 2019 05:12), Max: 98.8 (01 Jan 2019 13:51)  HR: 84 (02 Jan 2019 05:12) (73 - 84)  BP: 158/68 (02 Jan 2019 05:12) (120/58 - 158/68)  BP(mean): --  RR: 18 (02 Jan 2019 05:12) (18 - 18)  SpO2: --    LABS:                        9.9    7.81  )-----------( 173      ( 02 Jan 2019 06:10 )             31.6     01-02    136  |  100  |  30<H>  ----------------------------<  212<H>  4.5   |  24  |  1.1    Ca    8.4<L>      02 Jan 2019 06:10  Mg     3.7     01-01      PT/INR - ( 02 Jan 2019 06:10 )   PT: 12.40 sec;   INR: 1.08 ratio         PTT - ( 02 Jan 2019 06:10 )  PTT:26.5 sec    PHYSICAL EXAM:    GEN: No acute distress  HEENT: NCAT  Pulm Clear to auscultation bilaterally   Cv: S1/S2 present. no murmurs.   GI: Soft, non-tender, non-distended.  EXT: venous stasis b/l LE, no edema  NEURO: AAOX3    ASSESSMENT & PLAN   89 year old female with PMH of copd, AS s/p TAVR presented with anemia 2/2 LGIB     # 3 seconds Pause on tele   - resume bb as per doctor ke, monitor for 24 hours   - EP consult  - Dr Reeves will follow     # acute blood loss anemia 2/2 lower GI bleeding   - Hb is stable this morning, s/p 4 unites this admission  - plavix restarted, no clinical signs of active bleed, tolerating regular diet  - 2 IVs, active T/s, CBC q12   - EGD angioectsaia in stomach and treated with thermal therapy , s/p colonoscopy today; AVN , s/p APC  - had egd+colo+capsule in 2013 for gi bleed, neg at that time  - PO Protonix   - cardio evaluated pt: patient is high risk undergoing low risk procedure    # elevated CE likely 2/2 Type 2 NSTEMI most likely secondary to blood loss,   - no chest pain, acute ischemic changes    - .26 > .26 > .09    #HAL, resolved   - prerenal   - Cr 1.6 > 1.4 > 1.1  - consider restarting acei now that hal resolved (although she is not hypertensive now)  - will trend bmp and tx prbc    # COPD, stable  -1L prn and at night time,  -duonebs prn    # CAD s/p PCI, s/p TAVR, Afib:   - hold all a/c, stent done in 2013, c/w lasix PO 40mg q12 considering cxr pos for bilateral pleural effusions and getting prbc  -if pt becomes hypotensive then stop lasix  -c/w statin  -not on any full a/c blood thinners due to prior hx of GI bleed  -rate control for now with metoprolol    # DMII, controlled    check FS,   start insulin > 180      # HTN, controlled    normotensive right now, hold acei    # hypomagnesemia   replete, repeat Umu     DVT PPx: scd only   GI PPx: on protonix gtt    PT/Rehab and possible SNF once medically cleared 24-48 hours

## 2019-01-03 VITALS
SYSTOLIC BLOOD PRESSURE: 161 MMHG | TEMPERATURE: 98 F | DIASTOLIC BLOOD PRESSURE: 70 MMHG | RESPIRATION RATE: 18 BRPM | HEART RATE: 95 BPM

## 2019-01-03 LAB
ANION GAP SERPL CALC-SCNC: 13 MMOL/L — SIGNIFICANT CHANGE UP (ref 7–14)
APTT BLD: 26.7 SEC — LOW (ref 27–39.2)
BASOPHILS # BLD AUTO: 0.04 K/UL — SIGNIFICANT CHANGE UP (ref 0–0.2)
BASOPHILS NFR BLD AUTO: 0.5 % — SIGNIFICANT CHANGE UP (ref 0–1)
BUN SERPL-MCNC: 30 MG/DL — HIGH (ref 10–20)
CALCIUM SERPL-MCNC: 8.8 MG/DL — SIGNIFICANT CHANGE UP (ref 8.5–10.1)
CHLORIDE SERPL-SCNC: 102 MMOL/L — SIGNIFICANT CHANGE UP (ref 98–110)
CO2 SERPL-SCNC: 26 MMOL/L — SIGNIFICANT CHANGE UP (ref 17–32)
CREAT SERPL-MCNC: 1.1 MG/DL — SIGNIFICANT CHANGE UP (ref 0.7–1.5)
EOSINOPHIL # BLD AUTO: 0.29 K/UL — SIGNIFICANT CHANGE UP (ref 0–0.7)
EOSINOPHIL NFR BLD AUTO: 3.9 % — SIGNIFICANT CHANGE UP (ref 0–8)
ESTIMATED AVERAGE GLUCOSE: 131 MG/DL — HIGH (ref 68–114)
GLUCOSE BLDC GLUCOMTR-MCNC: 146 MG/DL — HIGH (ref 70–99)
GLUCOSE BLDC GLUCOMTR-MCNC: 185 MG/DL — HIGH (ref 70–99)
GLUCOSE SERPL-MCNC: 167 MG/DL — HIGH (ref 70–99)
HBA1C BLD-MCNC: 6.2 % — HIGH (ref 4–5.6)
HCT VFR BLD CALC: 31.8 % — LOW (ref 37–47)
HGB BLD-MCNC: 10 G/DL — LOW (ref 12–16)
IMM GRANULOCYTES NFR BLD AUTO: 0.5 % — HIGH (ref 0.1–0.3)
INR BLD: 1.07 RATIO — SIGNIFICANT CHANGE UP (ref 0.65–1.3)
LYMPHOCYTES # BLD AUTO: 1.09 K/UL — LOW (ref 1.2–3.4)
LYMPHOCYTES # BLD AUTO: 14.6 % — LOW (ref 20.5–51.1)
MCHC RBC-ENTMCNC: 27.2 PG — SIGNIFICANT CHANGE UP (ref 27–31)
MCHC RBC-ENTMCNC: 31.4 G/DL — LOW (ref 32–37)
MCV RBC AUTO: 86.6 FL — SIGNIFICANT CHANGE UP (ref 81–99)
MONOCYTES # BLD AUTO: 0.65 K/UL — HIGH (ref 0.1–0.6)
MONOCYTES NFR BLD AUTO: 8.7 % — SIGNIFICANT CHANGE UP (ref 1.7–9.3)
NEUTROPHILS # BLD AUTO: 5.34 K/UL — SIGNIFICANT CHANGE UP (ref 1.4–6.5)
NEUTROPHILS NFR BLD AUTO: 71.8 % — SIGNIFICANT CHANGE UP (ref 42.2–75.2)
NRBC # BLD: 0 /100 WBCS — SIGNIFICANT CHANGE UP (ref 0–0)
PLATELET # BLD AUTO: 179 K/UL — SIGNIFICANT CHANGE UP (ref 130–400)
POTASSIUM SERPL-MCNC: 4.5 MMOL/L — SIGNIFICANT CHANGE UP (ref 3.5–5)
POTASSIUM SERPL-SCNC: 4.5 MMOL/L — SIGNIFICANT CHANGE UP (ref 3.5–5)
PROTHROM AB SERPL-ACNC: 12.3 SEC — SIGNIFICANT CHANGE UP (ref 9.95–12.87)
RBC # BLD: 3.67 M/UL — LOW (ref 4.2–5.4)
RBC # FLD: 15.2 % — HIGH (ref 11.5–14.5)
SODIUM SERPL-SCNC: 141 MMOL/L — SIGNIFICANT CHANGE UP (ref 135–146)
WBC # BLD: 7.45 K/UL — SIGNIFICANT CHANGE UP (ref 4.8–10.8)
WBC # FLD AUTO: 7.45 K/UL — SIGNIFICANT CHANGE UP (ref 4.8–10.8)

## 2019-01-03 RX ORDER — CLOPIDOGREL BISULFATE 75 MG/1
1 TABLET, FILM COATED ORAL
Qty: 0 | Refills: 0 | COMMUNITY
Start: 2019-01-03

## 2019-01-03 RX ORDER — METOPROLOL TARTRATE 50 MG
1 TABLET ORAL
Qty: 0 | Refills: 0 | COMMUNITY

## 2019-01-03 RX ORDER — ACETAMINOPHEN 500 MG
650 TABLET ORAL EVERY 6 HOURS
Qty: 0 | Refills: 0 | Status: DISCONTINUED | OUTPATIENT
Start: 2019-01-03 | End: 2019-01-03

## 2019-01-03 RX ORDER — METOPROLOL TARTRATE 50 MG
12.5 TABLET ORAL
Qty: 0 | Refills: 0 | COMMUNITY

## 2019-01-03 RX ORDER — METOPROLOL TARTRATE 50 MG
12.5 TABLET ORAL
Qty: 0 | Refills: 0 | Status: DISCONTINUED | OUTPATIENT
Start: 2019-01-03 | End: 2019-01-03

## 2019-01-03 RX ORDER — METOPROLOL TARTRATE 50 MG
12.5 TABLET ORAL
Qty: 750 | Refills: 0 | OUTPATIENT
Start: 2019-01-03 | End: 2019-02-01

## 2019-01-03 RX ORDER — CLOPIDOGREL BISULFATE 75 MG/1
1 TABLET, FILM COATED ORAL
Qty: 0 | Refills: 0 | COMMUNITY

## 2019-01-03 RX ADMIN — Medication 4 UNIT(S): at 07:59

## 2019-01-03 RX ADMIN — Medication 40 MILLIGRAM(S): at 06:15

## 2019-01-03 RX ADMIN — CLOPIDOGREL BISULFATE 75 MILLIGRAM(S): 75 TABLET, FILM COATED ORAL at 12:00

## 2019-01-03 RX ADMIN — PANTOPRAZOLE SODIUM 40 MILLIGRAM(S): 20 TABLET, DELAYED RELEASE ORAL at 06:15

## 2019-01-03 RX ADMIN — Medication 4 UNIT(S): at 12:02

## 2019-01-03 RX ADMIN — Medication 650 MILLIGRAM(S): at 12:00

## 2019-01-03 RX ADMIN — Medication 650 MILLIGRAM(S): at 13:49

## 2019-01-03 RX ADMIN — Medication 1: at 12:02

## 2019-01-03 NOTE — PROGRESS NOTE ADULT - PROVIDER SPECIALTY LIST ADULT
Cardiology
Cardiology
Hospitalist
Internal Medicine
Pulmonology
Pulmonology
Internal Medicine
9

## 2019-01-03 NOTE — PROGRESS NOTE ADULT - ASSESSMENT
acute blood loss anemia  due to bleeding AVM and possible diverticulosis:   HGB stable   no more evidence of GIB   resumed plavix   AVM treated by GI, GI FU outpatient.     #Afib:   not on anticoagulation given history of GIB   EP eval appreciated yesterday- no more pauses overnight, metoprolol dose decreased.     #DM: dc home with  metformin and glimipiride- home meds.      # elevated CE likely 2/2 Type 2 NSTEMI most likely secondary to blood loss,   echo normal EF     #HAL, resolved     # COPD, stable  -1L prn and at night time,  -duonebs prn    # CAD s/p PCI, s/p TAVR,:  on plavix   stable c/w current meds   not on AC because of bleeding       # HTN, controlled, restar ACEI on discharge. HAL better.     dispo: medically stable for discharge home today.

## 2019-01-03 NOTE — PROGRESS NOTE ADULT - SUBJECTIVE AND OBJECTIVE BOX
MUKESH POLLACK MRN-[id number]    Hospitalist Note    Overnight events/Updates: patient seen sitting in chair in am, pleasant mood, denies cp, sob. ready to go home     Vital Signs Last 24 Hrs  T(C): 36.7 (03 Jan 2019 13:03), Max: 36.7 (03 Jan 2019 13:03)  T(F): 98.1 (03 Jan 2019 13:03), Max: 98.1 (03 Jan 2019 13:03)  HR: 95 (03 Jan 2019 13:03) (83 - 95)  BP: 161/70 (03 Jan 2019 13:03) (157/64 - 165/76)  BP(mean): --  RR: 18 (03 Jan 2019 13:03) (18 - 18)  SpO2: 99% (03 Jan 2019 10:00) (99% - 99%)    Physical Examination:  General: Awake, alert  , NAD.  HEENT: PERRLA, EOMI. NO JVD.  CVS:  S1 & S2 appreciated, regular rate and rhythm, no murmurs.   Lungs: clear to auscultation, no wheezing, no rales.   Abdominal Examination: soft, nontender, nondistended, +ve BS.  Neuro:    no focal deficits.   Extremity Examination: No edema peripherally.      ROS:   No chest pain, no shortness of breath.  All other systems reviewed and are within normal limits except for the complaints in the HPI.    MEDICATIONS  (STANDING):  clopidogrel Tablet 75 milliGRAM(s) Oral daily  dextrose 5%. 1000 milliLiter(s) (50 mL/Hr) IV Continuous <Continuous>  dextrose 50% Injectable 12.5 Gram(s) IV Push once  dextrose 50% Injectable 25 Gram(s) IV Push once  dextrose 50% Injectable 25 Gram(s) IV Push once  furosemide    Tablet 40 milliGRAM(s) Oral two times a day  insulin glargine Injectable (LANTUS) 12 Unit(s) SubCutaneous at bedtime  insulin lispro (HumaLOG) corrective regimen sliding scale   SubCutaneous three times a day before meals  insulin lispro Injectable (HumaLOG) 4 Unit(s) SubCutaneous three times a day before meals  metoprolol tartrate 12.5 milliGRAM(s) Oral at bedtime  pantoprazole    Tablet 40 milliGRAM(s) Oral before breakfast  simvastatin 10 milliGRAM(s) Oral at bedtime    MEDICATIONS  (PRN):  acetaminophen   Tablet .. 650 milliGRAM(s) Oral every 6 hours PRN Mild Pain (1 - 3)  dextrose 40% Gel 15 Gram(s) Oral once PRN Blood Glucose LESS THAN 70 milliGRAM(s)/deciliter  glucagon  Injectable 1 milliGRAM(s) IntraMuscular once PRN Glucose LESS THAN 70 milligrams/deciliter                            10.0   7.45  )-----------( 179      ( 03 Jan 2019 05:56 )             31.8     01-03    141  |  102  |  30<H>  ----------------------------<  167<H>  4.5   |  26  |  1.1    Ca    8.8      03 Jan 2019 05:56        Case discussed with housestamao Schaefer [id number]

## 2019-01-08 DIAGNOSIS — K64.8 OTHER HEMORRHOIDS: ICD-10-CM

## 2019-01-08 DIAGNOSIS — K21.9 GASTRO-ESOPHAGEAL REFLUX DISEASE WITHOUT ESOPHAGITIS: ICD-10-CM

## 2019-01-08 DIAGNOSIS — R29.6 REPEATED FALLS: ICD-10-CM

## 2019-01-08 DIAGNOSIS — K64.4 RESIDUAL HEMORRHOIDAL SKIN TAGS: ICD-10-CM

## 2019-01-08 DIAGNOSIS — Z87.891 PERSONAL HISTORY OF NICOTINE DEPENDENCE: ICD-10-CM

## 2019-01-08 DIAGNOSIS — Z79.84 LONG TERM (CURRENT) USE OF ORAL HYPOGLYCEMIC DRUGS: ICD-10-CM

## 2019-01-08 DIAGNOSIS — I25.10 ATHEROSCLEROTIC HEART DISEASE OF NATIVE CORONARY ARTERY WITHOUT ANGINA PECTORIS: ICD-10-CM

## 2019-01-08 DIAGNOSIS — I21.A1 MYOCARDIAL INFARCTION TYPE 2: ICD-10-CM

## 2019-01-08 DIAGNOSIS — Z95.3 PRESENCE OF XENOGENIC HEART VALVE: ICD-10-CM

## 2019-01-08 DIAGNOSIS — K92.2 GASTROINTESTINAL HEMORRHAGE, UNSPECIFIED: ICD-10-CM

## 2019-01-08 DIAGNOSIS — E11.65 TYPE 2 DIABETES MELLITUS WITH HYPERGLYCEMIA: ICD-10-CM

## 2019-01-08 DIAGNOSIS — D62 ACUTE POSTHEMORRHAGIC ANEMIA: ICD-10-CM

## 2019-01-08 DIAGNOSIS — K55.21 ANGIODYSPLASIA OF COLON WITH HEMORRHAGE: ICD-10-CM

## 2019-01-08 DIAGNOSIS — I27.20 PULMONARY HYPERTENSION, UNSPECIFIED: ICD-10-CM

## 2019-01-08 DIAGNOSIS — I11.0 HYPERTENSIVE HEART DISEASE WITH HEART FAILURE: ICD-10-CM

## 2019-01-08 DIAGNOSIS — N17.9 ACUTE KIDNEY FAILURE, UNSPECIFIED: ICD-10-CM

## 2019-01-08 DIAGNOSIS — K22.2 ESOPHAGEAL OBSTRUCTION: ICD-10-CM

## 2019-01-08 DIAGNOSIS — K57.30 DIVERTICULOSIS OF LARGE INTESTINE WITHOUT PERFORATION OR ABSCESS WITHOUT BLEEDING: ICD-10-CM

## 2019-01-08 DIAGNOSIS — K31.811 ANGIODYSPLASIA OF STOMACH AND DUODENUM WITH BLEEDING: ICD-10-CM

## 2019-01-08 DIAGNOSIS — E83.42 HYPOMAGNESEMIA: ICD-10-CM

## 2019-01-08 DIAGNOSIS — K29.70 GASTRITIS, UNSPECIFIED, WITHOUT BLEEDING: ICD-10-CM

## 2019-01-08 DIAGNOSIS — I48.2 CHRONIC ATRIAL FIBRILLATION: ICD-10-CM

## 2019-01-08 DIAGNOSIS — I50.32 CHRONIC DIASTOLIC (CONGESTIVE) HEART FAILURE: ICD-10-CM

## 2019-01-08 DIAGNOSIS — I45.5 OTHER SPECIFIED HEART BLOCK: ICD-10-CM

## 2019-01-08 DIAGNOSIS — J44.9 CHRONIC OBSTRUCTIVE PULMONARY DISEASE, UNSPECIFIED: ICD-10-CM

## 2019-01-08 DIAGNOSIS — Z91.81 HISTORY OF FALLING: ICD-10-CM

## 2019-01-08 DIAGNOSIS — Z88.0 ALLERGY STATUS TO PENICILLIN: ICD-10-CM

## 2019-01-16 PROBLEM — J44.9 CHRONIC OBSTRUCTIVE PULMONARY DISEASE, UNSPECIFIED: Chronic | Status: ACTIVE | Noted: 2018-12-27

## 2019-01-16 PROBLEM — I27.20 PULMONARY HYPERTENSION, UNSPECIFIED: Chronic | Status: ACTIVE | Noted: 2018-12-27

## 2019-01-16 PROBLEM — K92.2 GASTROINTESTINAL HEMORRHAGE, UNSPECIFIED: Chronic | Status: ACTIVE | Noted: 2018-12-27

## 2019-01-16 PROBLEM — Z95.2 PRESENCE OF PROSTHETIC HEART VALVE: Chronic | Status: ACTIVE | Noted: 2018-12-27

## 2019-01-30 ENCOUNTER — APPOINTMENT (OUTPATIENT)
Dept: CARDIOLOGY | Facility: CLINIC | Age: 84
End: 2019-01-30

## 2019-01-30 VITALS — DIASTOLIC BLOOD PRESSURE: 70 MMHG | SYSTOLIC BLOOD PRESSURE: 140 MMHG | HEART RATE: 90 BPM

## 2019-01-30 VITALS — HEIGHT: 60 IN | HEART RATE: 90 BPM | BODY MASS INDEX: 23.56 KG/M2 | WEIGHT: 120 LBS

## 2019-01-30 RX ORDER — PANTOPRAZOLE 40 MG/1
40 TABLET, DELAYED RELEASE ORAL
Qty: 90 | Refills: 0 | Status: ACTIVE | COMMUNITY
Start: 2018-12-13

## 2019-01-30 NOTE — ASSESSMENT
[FreeTextEntry1] : The patient had GI bleeding from AVM's in lower and upper GI track. Both were treated locally . She has a perisent cough. She is to see pulmonary . She has AF chronically , not on A/C secondary to falling n anemia and GI bleeding. The patient had pause at night while sleeping and beta blockers were adjusted. The patient has no symptoms.

## 2019-01-30 NOTE — HISTORY OF PRESENT ILLNESS
[FreeTextEntry1] : The patient was admitted to [facility] with severe anemia. Found to have gastric and colonic AVM\par s . These were cauterized. . No active bleeding noted. She has not had blood work done.During the hospitalization there was a 3.8 second pause on telemetry  during sleep. in the face of AF not thought to be significant. Seen by EP however who had decreased her beta blocker

## 2019-01-30 NOTE — REVIEW OF SYSTEMS
[Fever] : no fever [Headache] : no headache [Chills] : no chills [Feeling Fatigued] : feeling fatigued [Shortness Of Breath] : no shortness of breath [Palpitations] : no palpitations [Joint Pain] : joint pain [Joint Swelling] : joint swelling [Joint Stiffness] : joint stiffness [Itching] : itching [Change In Color Of Skin] : change in skin color [Skin Lesions] : skin lesion(s): [Negative] : Heme/Lymph

## 2019-01-30 NOTE — PHYSICAL EXAM
[General Appearance - Well Developed] : well developed [Normal Appearance] : normal appearance [Well Groomed] : well groomed [General Appearance - Well Nourished] : well nourished [No Deformities] : no deformities [General Appearance - In No Acute Distress] : no acute distress [Normal Conjunctiva] : the conjunctiva exhibited no abnormalities [Eyelids - No Xanthelasma] : the eyelids demonstrated no xanthelasmas [Normal Oral Mucosa] : normal oral mucosa [No Oral Pallor] : no oral pallor [No Oral Cyanosis] : no oral cyanosis [Respiration, Rhythm And Depth] : normal respiratory rhythm and effort [Exaggerated Use Of Accessory Muscles For Inspiration] : no accessory muscle use [Auscultation Breath Sounds / Voice Sounds] : lungs were clear to auscultation bilaterally [FreeTextEntry1] : Clear  [Abdomen Soft] : soft [Abdomen Tenderness] : non-tender [Abdomen Mass (___ Cm)] : no abdominal mass palpated [Nail Clubbing] : no clubbing of the fingernails [Cyanosis, Localized] : no localized cyanosis [Petechial Hemorrhages (___cm)] : no petechial hemorrhages [] : no ischemic changes [Oriented To Time, Place, And Person] : oriented to person, place, and time [Affect] : the affect was normal [Mood] : the mood was normal [No Anxiety] : not feeling anxious [Irregularly Irregular] : irregularly irregular [II] : a grade 2 [1+] : right 1+ [___ +] : bilateral [unfilled]U+ pitting edema to the ankles [Rt] : no varicose veins of the right leg [Lt] : no varicose veins of the left leg

## 2019-04-18 ENCOUNTER — APPOINTMENT (OUTPATIENT)
Dept: CARDIOLOGY | Facility: CLINIC | Age: 84
End: 2019-04-18
Payer: MEDICARE

## 2019-04-18 VITALS
SYSTOLIC BLOOD PRESSURE: 120 MMHG | HEIGHT: 60 IN | BODY MASS INDEX: 23.56 KG/M2 | DIASTOLIC BLOOD PRESSURE: 52 MMHG | WEIGHT: 120 LBS | HEART RATE: 76 BPM

## 2019-04-18 PROCEDURE — 93000 ELECTROCARDIOGRAM COMPLETE: CPT

## 2019-04-18 PROCEDURE — 99214 OFFICE O/P EST MOD 30 MIN: CPT | Mod: 25

## 2019-04-18 RX ORDER — ACETAMINOPHEN 500 MG/1
500 TABLET, COATED ORAL
Refills: 0 | Status: ACTIVE | COMMUNITY

## 2019-04-18 NOTE — REASON FOR VISIT
[Follow-Up - Clinic] : a clinic follow-up of [Aortic Stenosis] : aortic stenosis [Atrial Fibrillation] : atrial fibrillation [Heart Failure] : congestive heart failure [Coronary Artery Disease] : coronary artery disease [Hyperlipidemia] : hyperlipidemia [Hypertension] : hypertension

## 2019-04-18 NOTE — PHYSICAL EXAM
[General Appearance - Well Developed] : well developed [Normal Appearance] : normal appearance [Well Groomed] : well groomed [General Appearance - Well Nourished] : well nourished [No Deformities] : no deformities [General Appearance - In No Acute Distress] : no acute distress [Normal Conjunctiva] : the conjunctiva exhibited no abnormalities [Eyelids - No Xanthelasma] : the eyelids demonstrated no xanthelasmas [Normal Oral Mucosa] : normal oral mucosa [No Oral Pallor] : no oral pallor [No Oral Cyanosis] : no oral cyanosis [Respiration, Rhythm And Depth] : normal respiratory rhythm and effort [Exaggerated Use Of Accessory Muscles For Inspiration] : no accessory muscle use [Auscultation Breath Sounds / Voice Sounds] : lungs were clear to auscultation bilaterally [Abdomen Soft] : soft [Abdomen Tenderness] : non-tender [Abdomen Mass (___ Cm)] : no abdominal mass palpated [Cyanosis, Localized] : no localized cyanosis [Nail Clubbing] : no clubbing of the fingernails [Petechial Hemorrhages (___cm)] : no petechial hemorrhages [] : no ischemic changes [Oriented To Time, Place, And Person] : oriented to person, place, and time [Affect] : the affect was normal [Mood] : the mood was normal [No Anxiety] : not feeling anxious [Irregularly Irregular] : irregularly irregular [II] : a grade 2 [1+] : right 1+ [___ +] : bilateral [unfilled]U+ pitting edema to the ankles [FreeTextEntry1] : No JVD  [Rt] : no varicose veins of the right leg [Lt] : no varicose veins of the left leg

## 2019-04-18 NOTE — ASSESSMENT
[FreeTextEntry1] : The pateint is imporved. She has mild edema at this time but her weight is less. She is tolerating high dos LAsix . She has had TAVR .She had permanent AF but not on A/C secondary to bleeding . Her anemia is improved with iron infusions. . AF rate is controlled .

## 2019-04-18 NOTE — REVIEW OF SYSTEMS
[Feeling Fatigued] : feeling fatigued [Joint Pain] : joint pain [Joint Swelling] : joint swelling [Joint Stiffness] : joint stiffness [Itching] : itching [Skin Lesions] : skin lesion(s): [Negative] : Heme/Lymph [Headache] : no headache [Fever] : no fever [Shortness Of Breath] : no shortness of breath [Chills] : no chills [Change In Color Of Skin] : change in skin color [Palpitations] : no palpitations

## 2019-04-18 NOTE — HISTORY OF PRESENT ILLNESS
[FreeTextEntry1] : The patient is actually doing better . Anemia had improved with iron infusions. She is taking Lasix 80 mg alternating with 120 mg . BP and BUN and creat are stable. SOB has improved. She has had colonic AVM's which were cauterized

## 2019-08-20 ENCOUNTER — APPOINTMENT (OUTPATIENT)
Dept: CARDIOLOGY | Facility: CLINIC | Age: 84
End: 2019-08-20
Payer: MEDICARE

## 2019-08-20 ENCOUNTER — OTHER (OUTPATIENT)
Age: 84
End: 2019-08-20

## 2019-08-20 VITALS
HEART RATE: 78 BPM | BODY MASS INDEX: 23.56 KG/M2 | HEIGHT: 60 IN | DIASTOLIC BLOOD PRESSURE: 50 MMHG | SYSTOLIC BLOOD PRESSURE: 104 MMHG | WEIGHT: 120 LBS

## 2019-08-20 PROCEDURE — 93000 ELECTROCARDIOGRAM COMPLETE: CPT

## 2019-08-20 PROCEDURE — 99214 OFFICE O/P EST MOD 30 MIN: CPT

## 2019-08-20 NOTE — REASON FOR VISIT
[Follow-Up - Clinic] : a clinic follow-up of [Atrial Fibrillation] : atrial fibrillation [Aortic Stenosis] : aortic stenosis [Coronary Artery Disease] : coronary artery disease [Hyperlipidemia] : hyperlipidemia [Heart Failure] : congestive heart failure [Hypertension] : hypertension

## 2019-08-20 NOTE — REVIEW OF SYSTEMS
[Feeling Fatigued] : feeling fatigued [Joint Pain] : joint pain [Joint Swelling] : joint swelling [Joint Stiffness] : joint stiffness [Itching] : itching [Skin Lesions] : skin lesion(s): [Negative] : Heme/Lymph [Fever] : no fever [Headache] : no headache [Chills] : no chills [Palpitations] : no palpitations [Shortness Of Breath] : no shortness of breath [Change In Color Of Skin] : change in skin color

## 2019-08-20 NOTE — ASSESSMENT
[FreeTextEntry1] : The patient continues to do better. Her anemia has remained unchanged after having iron infusions. AF rate is controlled. No Lightheadedness.

## 2019-08-20 NOTE — PHYSICAL EXAM
[General Appearance - Well Developed] : well developed [Normal Appearance] : normal appearance [Well Groomed] : well groomed [General Appearance - Well Nourished] : well nourished [No Deformities] : no deformities [General Appearance - In No Acute Distress] : no acute distress [Normal Conjunctiva] : the conjunctiva exhibited no abnormalities [Eyelids - No Xanthelasma] : the eyelids demonstrated no xanthelasmas [Normal Oral Mucosa] : normal oral mucosa [No Oral Pallor] : no oral pallor [No Oral Cyanosis] : no oral cyanosis [Respiration, Rhythm And Depth] : normal respiratory rhythm and effort [Exaggerated Use Of Accessory Muscles For Inspiration] : no accessory muscle use [Auscultation Breath Sounds / Voice Sounds] : lungs were clear to auscultation bilaterally [Abdomen Soft] : soft [Abdomen Tenderness] : non-tender [Abdomen Mass (___ Cm)] : no abdominal mass palpated [Nail Clubbing] : no clubbing of the fingernails [Cyanosis, Localized] : no localized cyanosis [Petechial Hemorrhages (___cm)] : no petechial hemorrhages [] : no ischemic changes [Oriented To Time, Place, And Person] : oriented to person, place, and time [Affect] : the affect was normal [Mood] : the mood was normal [No Anxiety] : not feeling anxious [Irregularly Irregular] : irregularly irregular [II] : a grade 2 [1+] : right 1+ [___ +] : bilateral [unfilled]U+ pitting edema to the ankles [FreeTextEntry1] : minimal rales at bases  [Rt] : no varicose veins of the right leg [Lt] : no varicose veins of the left leg

## 2019-08-28 ENCOUNTER — INPATIENT (INPATIENT)
Facility: HOSPITAL | Age: 84
LOS: 7 days | Discharge: HOME HEALTH | End: 2019-09-05
Attending: INTERNAL MEDICINE | Admitting: INTERNAL MEDICINE
Payer: MEDICARE

## 2019-08-28 VITALS
RESPIRATION RATE: 20 BRPM | WEIGHT: 119.93 LBS | DIASTOLIC BLOOD PRESSURE: 67 MMHG | TEMPERATURE: 99 F | HEIGHT: 61 IN | OXYGEN SATURATION: 97 % | HEART RATE: 105 BPM | SYSTOLIC BLOOD PRESSURE: 150 MMHG

## 2019-08-28 DIAGNOSIS — Z95.2 PRESENCE OF PROSTHETIC HEART VALVE: Chronic | ICD-10-CM

## 2019-08-28 LAB
ALBUMIN SERPL ELPH-MCNC: 3.7 G/DL — SIGNIFICANT CHANGE UP (ref 3.5–5.2)
ALBUMIN SERPL ELPH-MCNC: 4.1 G/DL — SIGNIFICANT CHANGE UP (ref 3.5–5.2)
ALP SERPL-CCNC: 110 U/L — SIGNIFICANT CHANGE UP (ref 30–115)
ALP SERPL-CCNC: 116 U/L — HIGH (ref 30–115)
ALT FLD-CCNC: 16 U/L — SIGNIFICANT CHANGE UP (ref 0–41)
ALT FLD-CCNC: 17 U/L — SIGNIFICANT CHANGE UP (ref 0–41)
ANION GAP SERPL CALC-SCNC: 21 MMOL/L — HIGH (ref 7–14)
ANION GAP SERPL CALC-SCNC: 23 MMOL/L — HIGH (ref 7–14)
ANION GAP SERPL CALC-SCNC: 24 MMOL/L — HIGH (ref 7–14)
APPEARANCE UR: ABNORMAL
AST SERPL-CCNC: 24 U/L — SIGNIFICANT CHANGE UP (ref 0–41)
AST SERPL-CCNC: 24 U/L — SIGNIFICANT CHANGE UP (ref 0–41)
BACTERIA # UR AUTO: NEGATIVE — SIGNIFICANT CHANGE UP
BASE EXCESS BLDV CALC-SCNC: -7.8 MMOL/L — LOW (ref -2–2)
BASOPHILS # BLD AUTO: 0.04 K/UL — SIGNIFICANT CHANGE UP (ref 0–0.2)
BASOPHILS NFR BLD AUTO: 0.4 % — SIGNIFICANT CHANGE UP (ref 0–1)
BILIRUB DIRECT SERPL-MCNC: <0.2 MG/DL — SIGNIFICANT CHANGE UP (ref 0–0.2)
BILIRUB INDIRECT FLD-MCNC: >0 MG/DL — LOW (ref 0.2–1.2)
BILIRUB SERPL-MCNC: 0.2 MG/DL — SIGNIFICANT CHANGE UP (ref 0.2–1.2)
BILIRUB SERPL-MCNC: 0.2 MG/DL — SIGNIFICANT CHANGE UP (ref 0.2–1.2)
BILIRUB SERPL-MCNC: 0.3 MG/DL — SIGNIFICANT CHANGE UP (ref 0.2–1.2)
BILIRUB UR-MCNC: NEGATIVE — SIGNIFICANT CHANGE UP
BLD GP AB SCN SERPL QL: SIGNIFICANT CHANGE UP
BUN SERPL-MCNC: 80 MG/DL — CRITICAL HIGH (ref 10–20)
BUN SERPL-MCNC: 81 MG/DL — CRITICAL HIGH (ref 10–20)
BUN SERPL-MCNC: 86 MG/DL — CRITICAL HIGH (ref 10–20)
CA-I SERPL-SCNC: 0.84 MMOL/L — LOW (ref 1.12–1.3)
CALCIUM SERPL-MCNC: 6 MG/DL — LOW (ref 8.5–10.1)
CALCIUM SERPL-MCNC: 6.1 MG/DL — LOW (ref 8.5–10.1)
CALCIUM SERPL-MCNC: 6.2 MG/DL — LOW (ref 8.5–10.1)
CHLORIDE SERPL-SCNC: 94 MMOL/L — LOW (ref 98–110)
CHLORIDE SERPL-SCNC: 96 MMOL/L — LOW (ref 98–110)
CHLORIDE SERPL-SCNC: 98 MMOL/L — SIGNIFICANT CHANGE UP (ref 98–110)
CHLORIDE UR-SCNC: 44 — SIGNIFICANT CHANGE UP
CK MB CFR SERPL CALC: 5.8 NG/ML — SIGNIFICANT CHANGE UP (ref 0.6–6.3)
CO2 SERPL-SCNC: 15 MMOL/L — LOW (ref 17–32)
CO2 SERPL-SCNC: 16 MMOL/L — LOW (ref 17–32)
CO2 SERPL-SCNC: 17 MMOL/L — SIGNIFICANT CHANGE UP (ref 17–32)
COLOR SPEC: SIGNIFICANT CHANGE UP
CREAT SERPL-MCNC: 4.7 MG/DL — CRITICAL HIGH (ref 0.7–1.5)
CREAT SERPL-MCNC: 5 MG/DL — CRITICAL HIGH (ref 0.7–1.5)
CREAT SERPL-MCNC: 5.3 MG/DL — CRITICAL HIGH (ref 0.7–1.5)
DIFF PNL FLD: ABNORMAL
EOSINOPHIL # BLD AUTO: 0.08 K/UL — SIGNIFICANT CHANGE UP (ref 0–0.7)
EOSINOPHIL NFR BLD AUTO: 0.7 % — SIGNIFICANT CHANGE UP (ref 0–8)
EPI CELLS # UR: 3 /HPF — SIGNIFICANT CHANGE UP (ref 0–5)
GAS PNL BLDV: 134 MMOL/L — LOW (ref 136–145)
GAS PNL BLDV: SIGNIFICANT CHANGE UP
GLUCOSE BLDC GLUCOMTR-MCNC: 110 MG/DL — HIGH (ref 70–99)
GLUCOSE BLDC GLUCOMTR-MCNC: 118 MG/DL — HIGH (ref 70–99)
GLUCOSE BLDC GLUCOMTR-MCNC: 120 MG/DL — HIGH (ref 70–99)
GLUCOSE BLDC GLUCOMTR-MCNC: 180 MG/DL — HIGH (ref 70–99)
GLUCOSE BLDC GLUCOMTR-MCNC: 68 MG/DL — LOW (ref 70–99)
GLUCOSE BLDC GLUCOMTR-MCNC: 77 MG/DL — SIGNIFICANT CHANGE UP (ref 70–99)
GLUCOSE BLDC GLUCOMTR-MCNC: 79 MG/DL — SIGNIFICANT CHANGE UP (ref 70–99)
GLUCOSE BLDC GLUCOMTR-MCNC: 96 MG/DL — SIGNIFICANT CHANGE UP (ref 70–99)
GLUCOSE SERPL-MCNC: 102 MG/DL — HIGH (ref 70–99)
GLUCOSE SERPL-MCNC: 180 MG/DL — HIGH (ref 70–99)
GLUCOSE SERPL-MCNC: 74 MG/DL — SIGNIFICANT CHANGE UP (ref 70–99)
GLUCOSE UR QL: NEGATIVE — SIGNIFICANT CHANGE UP
HCO3 BLDV-SCNC: 19 MMOL/L — LOW (ref 22–29)
HCT VFR BLD CALC: 31.6 % — LOW (ref 37–47)
HCT VFR BLDA CALC: 27.7 % — LOW (ref 34–44)
HGB BLD CALC-MCNC: 9.1 G/DL — LOW (ref 14–18)
HGB BLD-MCNC: 9.7 G/DL — LOW (ref 12–16)
HYALINE CASTS # UR AUTO: 21 /LPF — HIGH (ref 0–7)
IMM GRANULOCYTES NFR BLD AUTO: 1 % — HIGH (ref 0.1–0.3)
INR BLD: 1.33 RATIO — HIGH (ref 0.65–1.3)
KETONES UR-MCNC: NEGATIVE — SIGNIFICANT CHANGE UP
LACTATE BLDV-MCNC: 3.4 MMOL/L — HIGH (ref 0.5–1.6)
LACTATE SERPL-SCNC: 1.6 MMOL/L — SIGNIFICANT CHANGE UP (ref 0.5–2.2)
LACTATE SERPL-SCNC: 3.6 MMOL/L — HIGH (ref 0.5–2.2)
LACTATE SERPL-SCNC: 3.7 MMOL/L — HIGH (ref 0.5–2.2)
LEUKOCYTE ESTERASE UR-ACNC: NEGATIVE — SIGNIFICANT CHANGE UP
LIDOCAIN IGE QN: 29 U/L — SIGNIFICANT CHANGE UP (ref 7–60)
LYMPHOCYTES # BLD AUTO: 0.66 K/UL — LOW (ref 1.2–3.4)
LYMPHOCYTES # BLD AUTO: 6.2 % — LOW (ref 20.5–51.1)
MAGNESIUM SERPL-MCNC: 0.5 MG/DL — LOW (ref 1.8–2.4)
MAGNESIUM SERPL-MCNC: 1.3 MG/DL — LOW (ref 1.8–2.4)
MCHC RBC-ENTMCNC: 27.7 PG — SIGNIFICANT CHANGE UP (ref 27–31)
MCHC RBC-ENTMCNC: 30.7 G/DL — LOW (ref 32–37)
MCV RBC AUTO: 90.3 FL — SIGNIFICANT CHANGE UP (ref 81–99)
MONOCYTES # BLD AUTO: 0.7 K/UL — HIGH (ref 0.1–0.6)
MONOCYTES NFR BLD AUTO: 6.6 % — SIGNIFICANT CHANGE UP (ref 1.7–9.3)
NEUTROPHILS # BLD AUTO: 9.08 K/UL — HIGH (ref 1.4–6.5)
NEUTROPHILS NFR BLD AUTO: 85.1 % — HIGH (ref 42.2–75.2)
NITRITE UR-MCNC: NEGATIVE — SIGNIFICANT CHANGE UP
NRBC # BLD: 0 /100 WBCS — SIGNIFICANT CHANGE UP (ref 0–0)
PCO2 BLDV: 41 MMHG — SIGNIFICANT CHANGE UP (ref 41–51)
PH BLDV: 7.27 — SIGNIFICANT CHANGE UP (ref 7.26–7.43)
PH UR: 6 — SIGNIFICANT CHANGE UP (ref 5–8)
PLATELET # BLD AUTO: 272 K/UL — SIGNIFICANT CHANGE UP (ref 130–400)
PO2 BLDV: 16 MMHG — LOW (ref 20–40)
POTASSIUM BLDV-SCNC: 4.8 MMOL/L — SIGNIFICANT CHANGE UP (ref 3.3–5.6)
POTASSIUM SERPL-MCNC: 4.5 MMOL/L — SIGNIFICANT CHANGE UP (ref 3.5–5)
POTASSIUM SERPL-MCNC: 4.8 MMOL/L — SIGNIFICANT CHANGE UP (ref 3.5–5)
POTASSIUM SERPL-MCNC: 4.8 MMOL/L — SIGNIFICANT CHANGE UP (ref 3.5–5)
POTASSIUM SERPL-SCNC: 4.5 MMOL/L — SIGNIFICANT CHANGE UP (ref 3.5–5)
POTASSIUM SERPL-SCNC: 4.8 MMOL/L — SIGNIFICANT CHANGE UP (ref 3.5–5)
POTASSIUM SERPL-SCNC: 4.8 MMOL/L — SIGNIFICANT CHANGE UP (ref 3.5–5)
POTASSIUM UR-SCNC: 15 MMOL/L — SIGNIFICANT CHANGE UP
PROT SERPL-MCNC: 6.5 G/DL — SIGNIFICANT CHANGE UP (ref 6–8)
PROT SERPL-MCNC: 6.9 G/DL — SIGNIFICANT CHANGE UP (ref 6–8)
PROT UR-MCNC: ABNORMAL
PROTHROM AB SERPL-ACNC: 15.2 SEC — HIGH (ref 9.95–12.87)
RBC # BLD: 3.5 M/UL — LOW (ref 4.2–5.4)
RBC # FLD: 14.5 % — SIGNIFICANT CHANGE UP (ref 11.5–14.5)
RBC CASTS # UR COMP ASSIST: 2 /HPF — SIGNIFICANT CHANGE UP (ref 0–4)
SAO2 % BLDV: 16 % — SIGNIFICANT CHANGE UP
SODIUM SERPL-SCNC: 134 MMOL/L — LOW (ref 135–146)
SODIUM SERPL-SCNC: 135 MMOL/L — SIGNIFICANT CHANGE UP (ref 135–146)
SODIUM SERPL-SCNC: 135 MMOL/L — SIGNIFICANT CHANGE UP (ref 135–146)
SODIUM UR-SCNC: 56 MMOL/L — SIGNIFICANT CHANGE UP
SP GR SPEC: 1.01 — SIGNIFICANT CHANGE UP (ref 1.01–1.02)
TROPONIN T SERPL-MCNC: 0.05 NG/ML — CRITICAL HIGH
TROPONIN T SERPL-MCNC: 0.06 NG/ML — CRITICAL HIGH
UROBILINOGEN FLD QL: SIGNIFICANT CHANGE UP
WBC # BLD: 10.67 K/UL — SIGNIFICANT CHANGE UP (ref 4.8–10.8)
WBC # FLD AUTO: 10.67 K/UL — SIGNIFICANT CHANGE UP (ref 4.8–10.8)
WBC UR QL: 2 /HPF — SIGNIFICANT CHANGE UP (ref 0–5)

## 2019-08-28 PROCEDURE — 74176 CT ABD & PELVIS W/O CONTRAST: CPT | Mod: 26

## 2019-08-28 PROCEDURE — 93010 ELECTROCARDIOGRAM REPORT: CPT

## 2019-08-28 PROCEDURE — 71045 X-RAY EXAM CHEST 1 VIEW: CPT | Mod: 26

## 2019-08-28 PROCEDURE — 99291 CRITICAL CARE FIRST HOUR: CPT

## 2019-08-28 RX ORDER — CALCIUM GLUCONATE 100 MG/ML
1 VIAL (ML) INTRAVENOUS ONCE
Refills: 0 | Status: DISCONTINUED | OUTPATIENT
Start: 2019-08-28 | End: 2019-08-29

## 2019-08-28 RX ORDER — INSULIN LISPRO 100/ML
VIAL (ML) SUBCUTANEOUS
Refills: 0 | Status: DISCONTINUED | OUTPATIENT
Start: 2019-08-28 | End: 2019-09-05

## 2019-08-28 RX ORDER — IOHEXOL 300 MG/ML
30 INJECTION, SOLUTION INTRAVENOUS ONCE
Refills: 0 | Status: COMPLETED | OUTPATIENT
Start: 2019-08-28 | End: 2019-08-28

## 2019-08-28 RX ORDER — FERROUS SULFATE 325(65) MG
325 TABLET ORAL DAILY
Refills: 0 | Status: DISCONTINUED | OUTPATIENT
Start: 2019-08-28 | End: 2019-09-05

## 2019-08-28 RX ORDER — MAGNESIUM SULFATE 500 MG/ML
2 VIAL (ML) INJECTION ONCE
Refills: 0 | Status: COMPLETED | OUTPATIENT
Start: 2019-08-28 | End: 2019-08-28

## 2019-08-28 RX ORDER — HEPARIN SODIUM 5000 [USP'U]/ML
5000 INJECTION INTRAVENOUS; SUBCUTANEOUS EVERY 8 HOURS
Refills: 0 | Status: DISCONTINUED | OUTPATIENT
Start: 2019-08-28 | End: 2019-09-05

## 2019-08-28 RX ORDER — BUDESONIDE AND FORMOTEROL FUMARATE DIHYDRATE 160; 4.5 UG/1; UG/1
2 AEROSOL RESPIRATORY (INHALATION)
Refills: 0 | Status: DISCONTINUED | OUTPATIENT
Start: 2019-08-28 | End: 2019-08-30

## 2019-08-28 RX ORDER — ONDANSETRON 8 MG/1
4 TABLET, FILM COATED ORAL ONCE
Refills: 0 | Status: COMPLETED | OUTPATIENT
Start: 2019-08-28 | End: 2019-08-28

## 2019-08-28 RX ORDER — SODIUM CHLORIDE 9 MG/ML
1000 INJECTION, SOLUTION INTRAVENOUS ONCE
Refills: 0 | Status: COMPLETED | OUTPATIENT
Start: 2019-08-28 | End: 2019-08-28

## 2019-08-28 RX ORDER — SODIUM CHLORIDE 9 MG/ML
1000 INJECTION, SOLUTION INTRAVENOUS
Refills: 0 | Status: DISCONTINUED | OUTPATIENT
Start: 2019-08-28 | End: 2019-08-29

## 2019-08-28 RX ORDER — PANTOPRAZOLE SODIUM 20 MG/1
40 TABLET, DELAYED RELEASE ORAL
Refills: 0 | Status: DISCONTINUED | OUTPATIENT
Start: 2019-08-28 | End: 2019-09-05

## 2019-08-28 RX ORDER — SODIUM CHLORIDE 9 MG/ML
1000 INJECTION, SOLUTION INTRAVENOUS
Refills: 0 | Status: DISCONTINUED | OUTPATIENT
Start: 2019-08-28 | End: 2019-09-05

## 2019-08-28 RX ORDER — SODIUM CHLORIDE 9 MG/ML
1000 INJECTION INTRAMUSCULAR; INTRAVENOUS; SUBCUTANEOUS ONCE
Refills: 0 | Status: COMPLETED | OUTPATIENT
Start: 2019-08-28 | End: 2019-08-28

## 2019-08-28 RX ORDER — CHLORHEXIDINE GLUCONATE 213 G/1000ML
1 SOLUTION TOPICAL
Refills: 0 | Status: DISCONTINUED | OUTPATIENT
Start: 2019-08-28 | End: 2019-09-05

## 2019-08-28 RX ORDER — METOPROLOL TARTRATE 50 MG
12.5 TABLET ORAL
Refills: 0 | Status: DISCONTINUED | OUTPATIENT
Start: 2019-08-28 | End: 2019-08-29

## 2019-08-28 RX ORDER — SODIUM CHLORIDE 9 MG/ML
1000 INJECTION, SOLUTION INTRAVENOUS
Refills: 0 | Status: DISCONTINUED | OUTPATIENT
Start: 2019-08-28 | End: 2019-08-28

## 2019-08-28 RX ORDER — ONDANSETRON 8 MG/1
4 TABLET, FILM COATED ORAL ONCE
Refills: 0 | Status: DISCONTINUED | OUTPATIENT
Start: 2019-08-28 | End: 2019-08-28

## 2019-08-28 RX ORDER — SIMVASTATIN 20 MG/1
10 TABLET, FILM COATED ORAL AT BEDTIME
Refills: 0 | Status: DISCONTINUED | OUTPATIENT
Start: 2019-08-28 | End: 2019-09-05

## 2019-08-28 RX ORDER — GLUCAGON INJECTION, SOLUTION 0.5 MG/.1ML
1 INJECTION, SOLUTION SUBCUTANEOUS ONCE
Refills: 0 | Status: DISCONTINUED | OUTPATIENT
Start: 2019-08-28 | End: 2019-09-05

## 2019-08-28 RX ORDER — DEXTROSE 50 % IN WATER 50 %
12.5 SYRINGE (ML) INTRAVENOUS ONCE
Refills: 0 | Status: DISCONTINUED | OUTPATIENT
Start: 2019-08-28 | End: 2019-09-05

## 2019-08-28 RX ORDER — CLOPIDOGREL BISULFATE 75 MG/1
75 TABLET, FILM COATED ORAL DAILY
Refills: 0 | Status: DISCONTINUED | OUTPATIENT
Start: 2019-08-28 | End: 2019-09-05

## 2019-08-28 RX ORDER — DEXTROSE 50 % IN WATER 50 %
25 SYRINGE (ML) INTRAVENOUS ONCE
Refills: 0 | Status: DISCONTINUED | OUTPATIENT
Start: 2019-08-28 | End: 2019-09-05

## 2019-08-28 RX ORDER — DEXTROSE 50 % IN WATER 50 %
15 SYRINGE (ML) INTRAVENOUS ONCE
Refills: 0 | Status: DISCONTINUED | OUTPATIENT
Start: 2019-08-28 | End: 2019-09-05

## 2019-08-28 RX ADMIN — HEPARIN SODIUM 5000 UNIT(S): 5000 INJECTION INTRAVENOUS; SUBCUTANEOUS at 21:32

## 2019-08-28 RX ADMIN — SODIUM CHLORIDE 1000 MILLILITER(S): 9 INJECTION, SOLUTION INTRAVENOUS at 04:45

## 2019-08-28 RX ADMIN — BUDESONIDE AND FORMOTEROL FUMARATE DIHYDRATE 2 PUFF(S): 160; 4.5 AEROSOL RESPIRATORY (INHALATION) at 21:30

## 2019-08-28 RX ADMIN — SODIUM CHLORIDE 100 MILLILITER(S): 9 INJECTION, SOLUTION INTRAVENOUS at 11:15

## 2019-08-28 RX ADMIN — SODIUM CHLORIDE 50 MILLILITER(S): 9 INJECTION, SOLUTION INTRAVENOUS at 22:02

## 2019-08-28 RX ADMIN — SIMVASTATIN 10 MILLIGRAM(S): 20 TABLET, FILM COATED ORAL at 21:31

## 2019-08-28 RX ADMIN — SODIUM CHLORIDE 2000 MILLILITER(S): 9 INJECTION, SOLUTION INTRAVENOUS at 03:22

## 2019-08-28 RX ADMIN — Medication 12.5 MILLIGRAM(S): at 20:50

## 2019-08-28 RX ADMIN — IOHEXOL 30 MILLILITER(S): 300 INJECTION, SOLUTION INTRAVENOUS at 02:52

## 2019-08-28 RX ADMIN — SODIUM CHLORIDE 1000 MILLILITER(S): 9 INJECTION, SOLUTION INTRAVENOUS at 05:08

## 2019-08-28 RX ADMIN — ONDANSETRON 4 MILLIGRAM(S): 8 TABLET, FILM COATED ORAL at 19:37

## 2019-08-28 RX ADMIN — ONDANSETRON 4 MILLIGRAM(S): 8 TABLET, FILM COATED ORAL at 07:43

## 2019-08-28 RX ADMIN — SODIUM CHLORIDE 1000 MILLILITER(S): 9 INJECTION INTRAMUSCULAR; INTRAVENOUS; SUBCUTANEOUS at 14:51

## 2019-08-28 RX ADMIN — Medication 50 GRAM(S): at 21:31

## 2019-08-28 RX ADMIN — ONDANSETRON 4 MILLIGRAM(S): 8 TABLET, FILM COATED ORAL at 02:51

## 2019-08-28 NOTE — CONSULT NOTE ADULT - ASSESSMENT
89 y/o F with HAL presented to hospital for weakness, n/v, diarrhea, decreased oral intake.  PMH Pulm HTN, Hx of empyema, AFIB [no a/c for hx of GIB], DM, HTN, DLD, CAD, s/p TAVR, CHF [diastolic]    # HAL : baseline cr. ~ 1.15 %   - likely prerenal due to diarrhea plus lasix plus lisinopril   - serum creatinine improved since admission.   - UOP noted, non-oliguric   - UA noted for some proteinuria   - hypocalcemia noted, check Ph, Mg. give single dose of 1 gm Ca gluconate IV. if Mg level low, replenish Magnesium before Ca supplement. monitor Ca level   - VBG noted for high AG metabolic acidosis and respiratory acidosis.   - continue IV hydration with Ns or LR at 100 cc/hr   - Strict I/O   - check urine creatinine, Na, Urea, Pr/Cr ratio   - Repeat BMP, trend creatinine   - no hydro on CT   - hold lasix, lisinopril, metformin for now.   - BP noted, keep meds current, monitor BP   - Hb noted, check iron stores, monitor h/h.   - Avoid nephrotoxins and hypotension   - no acute indication for RRT at the moment   - will follow

## 2019-08-28 NOTE — ED ADULT NURSE NOTE - OBJECTIVE STATEMENT
BIBA C/O 89yf complains of nausea, vomiting and decreased appetite since friday. As per patient family patient has AMS since friday

## 2019-08-28 NOTE — H&P ADULT - ASSESSMENT
Pre renal HAL:   likely from ?Gastroenteritis  baseline cr 1.15  creatinine on admission 5.3  on LR at 75 cc/hr  creatinine improving  lactate still elevated, follow up 8 pm lactate  follow up urine lytes  urine output  Hypocalcemia with corrected calcium of 6.4; no tetany, will replete calcium after magnesium result.  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  Patient was receiving iron transfusion as Outpatient    Afib   rate controlled on metoprolol  not on anticoagulation dut to h/o GI bleed    Diastolic CHF:  lasix held for now due to HAL  patient has marked limitation in physical activity Pre renal HAL:   likely from ?Gastroenteritis  baseline cr 1.15  creatinine on admission 5.3  on LR at 75 cc/hr  creatinine improving  lactate still elevated, follow up 8 pm lactate  follow up urine lytes  strict intake and output  Hypocalcemia with corrected calcium of 6.4; no tetany, will replete calcium after magnesium result.  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  Patient was receiving iron transfusion as Outpatient  no BRBPR or Angie now.    Afib   rate controlled on metoprolol  not on anticoagulation dut to h/o GI bleed    Diastolic CHF:  lasix held for now due to HAL  patient has marked limitation in physical activity     h/o copd/empyema  symbicort  stable    Diet: CC/renal    Dvt ppx: heparin sq Pre renal HAL:   likely from ?Gastroenteritis  baseline cr 1.15  creatinine on admission 5.3  on LR at 75 cc/hr  creatinine improving  lactate still elevated, follow up 8 pm lactate  follow up urine lytes  strict intake and output  Hypocalcemia with corrected calcium of 6.4; no tetany, will replete calcium after magnesium result.  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  Patient was receiving iron transfusion as Outpatient  no BRBPR or Angie now.    Afib   rate controlled on metoprolol  not on anticoagulation dut to h/o GI bleed    Diastolic CHF:  lasix held for now due to HAL  patient has marked limitation in physical activity     h/o copd/empyema  symbicort  stable  cxr shows b/l pleural effusion stable from before    Diet: CC/renal    Dvt ppx: heparin sq

## 2019-08-28 NOTE — CONSULT NOTE ADULT - SUBJECTIVE AND OBJECTIVE BOX
Patient is a 89y old  Female who presents with a chief complaint of weakness    HPI:      Allergies    penicillins (Other)    Intolerances      Home Medications:  clopidogrel 75 mg oral tablet: 1 tab(s) orally once a day (2019 10:11)  Fish Oil oral capsule: 4000 milligram(s) orally once a day (27 Dec 2018 17:08)  glimepiride 2 mg oral tablet: 1 tab(s) orally once a day (27 Dec 2018 17:08)  Iron Chews: 325 milligram(s) orally once a day (27 Dec 2018 17:08)  Lasix 40 mg oral tablet: 1 tab(s) orally 2 times a day, 3x/day every other day  (27 Dec 2018 17:08)  lisinopril 10 mg oral tablet: 1 tab(s) orally once a day (27 Dec 2018 17:08)  metFORMIN 1000 mg oral tablet, extended release: orally 2 times a day (27 Dec 2018 17:08)  pantoprazole 40 mg oral delayed release tablet: 1 tab(s) orally once a day (before a meal) (31 Dec 2018 18:52)  simvastatin 10 mg oral tablet: 1 tab(s) orally once a day (at bedtime) (31 Dec 2018 18:52)      SOCIAL HX:     Smoking          ETOH/Illicit drugs          Occupation    PAST MEDICAL & SURGICAL HISTORY:  Pulmonary hypertension  COPD (chronic obstructive pulmonary disease)  GI bleed  Aortic valve replaced  CAD (coronary artery disease)  Hypertension  GERD (gastroesophageal reflux disease)  Diabetes  Arthritis  S/P TAVR (transcatheter aortic valve replacement)      FAMILY HISTORY:  :    No known cardiovascular or pulmonary family history     ROS:  See HPI     PHYSICAL EXAM    ICU Vital Signs Last 24 Hrs  T(C): 36.6 (28 Aug 2019 07:46), Max: 37.1 (28 Aug 2019 01:30)  T(F): 97.8 (28 Aug 2019 07:46), Max: 98.7 (28 Aug 2019 01:30)  HR: 99 (28 Aug 2019 07:46) (98 - 105)  BP: 143/65 (28 Aug 2019 07:46) (143/65 - 150/67)  RR: 18 (28 Aug 2019 07:46) (18 - 20)  SpO2: 99% (28 Aug 2019 07:46) (97% - 99%)      General: Not in distress  HEENT:  BRYAN              Lymphatic system: No LN  Lungs: Bilateral BS  Cardiovascular: Regular  Gastrointestinal: Soft, Positive BS  Musculoskeletal: No clubbing.  Moves all extremities.    Skin: Warm.  Intact  Neurological: No motor or sensory deficit         LABS:                          9.7    10.67 )-----------( 272      ( 28 Aug 2019 02:25 )             31.6                                                   135  |  94<L>  |  86<HH>  ----------------------------<  180<H>  4.5   |  17  |  5.3<HH>    Ca    6.1<L>      28 Aug 2019 02:25    TPro  6.9  /  Alb  4.1  /  TBili  0.2  /  DBili  <0.2  /  AST  24  /  ALT  17  /  AlkPhos  116<H>        PT/INR - ( 28 Aug 2019 02:25 )   PT: 15.20 sec;   INR: 1.33 ratio                                                Urinalysis Basic - ( 28 Aug 2019 05:30 )    Color: Light Yellow / Appearance: Slightly Turbid / S.014 / pH: x  Gluc: x / Ketone: Negative  / Bili: Negative / Urobili: <2 mg/dL   Blood: x / Protein: 100 mg/dL / Nitrite: Negative   Leuk Esterase: Negative / RBC: 2 /HPF / WBC 2 /HPF   Sq Epi: x / Non Sq Epi: 3 /HPF / Bacteria: Negative        CARDIAC MARKERS ( 28 Aug 2019 02:25 )  x     / 0.06 ng/mL / x     / x     / x                                                LIVER FUNCTIONS - ( 28 Aug 2019 02:25 )  Alb: 4.1 g/dL / Pro: 6.9 g/dL / ALK PHOS: 116 U/L / ALT: 17 U/L / AST: 24 U/L / GGT: x                                                  CXR: bibasilar opacities    < from: Transthoracic Echocardiogram (19 @ 10:23) >  Summary:   1. LV Ejection Fraction by Moy's Method with a biplane EF of 59 %.   2. Normal left ventricular size and wall thicknesses, with normal   systolic function.   3. Mild to moderate mitral annular calcification.   4. Moderate-severe tricuspid regurgitation.   5. Bioprosthesis in the aortic position.   6. Mild aortic regurgitation.   7. Pulmonic valve regurgitation.   8. Estimated pulmonary artery systolic pressure is 67.9 mmHg assuming a   right atrial pressure of 10 mmHg, which is consistent with severe   pulmonary hypertension.   9. LA volume Index is 41.2 ml/m² ml/m2.  10. Peak aortic valve gradient is 19.9 mmHg and the mean gradient is 8.0   mmHg, which is probably normal in the setting of a prosthetic aortic   valve.    < end of copied text >      MEDICATIONS  (STANDING):    MEDICATIONS  (PRN): Patient is a 89y old  Female who presents with a chief complaint of weakness    HPI:    88 yo F good functional status, Pulm HTN, Hx of empyema, AFIB [no a/c for hx of GIB], DM, HTN, DLD, CAD, s/p TAVR, CHF [diastolic] present to ED for weakness. Hx per son, 5 days ago she began feeling unwell, not wanting to eat, watery nonbloody diarrhea daily with NBNB vomiting. She continues to take her medications during this time including lasix 40mg daily. She also been acting slightly confused, at baseline she reads books and is alert and oriented. Denies fever chills, abdominal pain, dyspnea or chest pain. Reports grandchildren sick with similar symptoms 2 weeks ago.    Allergies    penicillins (Other)    Intolerances      Home Medications:  clopidogrel 75 mg oral tablet: 1 tab(s) orally once a day (2019 10:11)  Fish Oil oral capsule: 4000 milligram(s) orally once a day (27 Dec 2018 17:08)  glimepiride 2 mg oral tablet: 1 tab(s) orally once a day (27 Dec 2018 17:08)  Iron Chews: 325 milligram(s) orally once a day (27 Dec 2018 17:08)  Lasix 40 mg oral tablet: 1 tab(s) orally 2 times a day, 3x/day every other day  (27 Dec 2018 17:08)  lisinopril 10 mg oral tablet: 1 tab(s) orally once a day (27 Dec 2018 17:08)  metFORMIN 1000 mg oral tablet, extended release: orally 2 times a day (27 Dec 2018 17:08)  pantoprazole 40 mg oral delayed release tablet: 1 tab(s) orally once a day (before a meal) (31 Dec 2018 18:52)  simvastatin 10 mg oral tablet: 1 tab(s) orally once a day (at bedtime) (31 Dec 2018 18:52)      SOCIAL HX:     Smoking   former quit 10 yrs ago [89 PYs]       ETOH/Illicit drugs  denies          PAST MEDICAL & SURGICAL HISTORY:  Pulmonary hypertension  COPD (chronic obstructive pulmonary disease)  GI bleed  Aortic valve replaced  CAD (coronary artery disease)  Hypertension  GERD (gastroesophageal reflux disease)  Diabetes  Arthritis  S/P TAVR (transcatheter aortic valve replacement)      FAMILY HISTORY:  :    No known cardiovascular or pulmonary family history     ROS:  See HPI     PHYSICAL EXAM    ICU Vital Signs Last 24 Hrs  T(C): 36.6 (28 Aug 2019 07:46), Max: 37.1 (28 Aug 2019 01:30)  T(F): 97.8 (28 Aug 2019 07:46), Max: 98.7 (28 Aug 2019 01:30)  HR: 99 (28 Aug 2019 07:46) (98 - 105)  BP: 143/65 (28 Aug 2019 07:46) (143/65 - 150/67)  RR: 18 (28 Aug 2019 07:46) (18 - 20)  SpO2: 99% (28 Aug 2019 07:46) (97% - 99%)      General: Not in distress  HEENT:  BRYAN   dry mucous membranes           Lymphatic system: No LN  Lungs: Bilateral BS CTA  Cardiovascular: Regular  Gastrointestinal: Soft, Positive BS  Musculoskeletal: No clubbing.  Moves all extremities. pedal edema bilateral   Skin: Warm.  Intact  Neurological: No motor or sensory deficit alert not oriented        LABS:                          9.7    10.67 )-----------( 272      ( 28 Aug 2019 02:25 )             31.6                                                   135  |  94<L>  |  86<HH>  ----------------------------<  180<H>  4.5   |  17  |  5.3<HH>    Ca    6.1<L>      28 Aug 2019 02:25    TPro  6.9  /  Alb  4.1  /  TBili  0.2  /  DBili  <0.2  /  AST  24  /  ALT  17  /  AlkPhos  116<H>        PT/INR - ( 28 Aug 2019 02:25 )   PT: 15.20 sec;   INR: 1.33 ratio                                                Urinalysis Basic - ( 28 Aug 2019 05:30 )    Color: Light Yellow / Appearance: Slightly Turbid / S.014 / pH: x  Gluc: x / Ketone: Negative  / Bili: Negative / Urobili: <2 mg/dL   Blood: x / Protein: 100 mg/dL / Nitrite: Negative   Leuk Esterase: Negative / RBC: 2 /HPF / WBC 2 /HPF   Sq Epi: x / Non Sq Epi: 3 /HPF / Bacteria: Negative        CARDIAC MARKERS ( 28 Aug 2019 02:25 )  x     / 0.06 ng/mL / x     / x     / x                                                LIVER FUNCTIONS - ( 28 Aug 2019 02:25 )  Alb: 4.1 g/dL / Pro: 6.9 g/dL / ALK PHOS: 116 U/L / ALT: 17 U/L / AST: 24 U/L / GGT: x                                                  CXR: bibasilar opacities    < from: Transthoracic Echocardiogram (19 @ 10:23) >  Summary:   1. LV Ejection Fraction by Moy's Method with a biplane EF of 59 %.   2. Normal left ventricular size and wall thicknesses, with normal   systolic function.   3. Mild to moderate mitral annular calcification.   4. Moderate-severe tricuspid regurgitation.   5. Bioprosthesis in the aortic position.   6. Mild aortic regurgitation.   7. Pulmonic valve regurgitation.   8. Estimated pulmonary artery systolic pressure is 67.9 mmHg assuming a   right atrial pressure of 10 mmHg, which is consistent with severe   pulmonary hypertension.   9. LA volume Index is 41.2 ml/m² ml/m2.  10. Peak aortic valve gradient is 19.9 mmHg and the mean gradient is 8.0   mmHg, which is probably normal in the setting of a prosthetic aortic   valve.    < end of copied text >      MEDICATIONS  (STANDING):    MEDICATIONS  (PRN):

## 2019-08-28 NOTE — ED PROVIDER NOTE - PHYSICAL EXAMINATION
VITAL SIGNS: I have reviewed nursing notes and confirm.  CONSTITUTIONAL: elderly f, nad  SKIN: Skin exam is warm and dry, no acute rash.  HEAD: Normocephalic; atraumatic.  EYES: PERRL, EOM intact; conjunctiva and sclera clear.  ENT: No nasal discharge; airway clear.  NECK: Supple; non tender.  CARD: S1, S2 normal; no murmurs, gallops, or rubs. tachy 100s, irreg rhythm.  RESP: No wheezes, rales or rhonchi.  ABD: Normal bowel sounds; distended, +generalized ttp; poss hepatomegaly, no palpable splenomegaly, no rgr.  BACK: non-tender, no CVAT  EXT: Normal ROM. No clubbing, cyanosis or edema. DPI.  NEURO: Alert, oriented. Grossly unremarkable. No focal deficits.  PSYCH: Cooperative, appropriate.

## 2019-08-28 NOTE — CONSULT NOTE ADULT - ASSESSMENT
IMPRESSION:      PLAN:    CNS: Spontaneous awakening trial    HEENT:  Oral care    PULMONARY:  HOB @ 45 degrees    CARDIOVASCULAR:    GI: GI prophylaxis         Feeding     RENAL:  F/u  lytes.  Correct as needed. accurate I/O    INFECTIOUS DISEASE:    HEMATOLOGICAL:  DVT prophylaxis.    ENDOCRINE:  Follow up FS.  Insulin protocol if needed.    MUSCULOSKELETAL:    LINES/NEWBY:    CODE STATUS: FULL CODE    DISPOSITION: Pt requires continued monitoring in the MICU IMPRESSION:    HAL oliguric likely prerenal  Probable gastroenteritis  H/O HFpEF  Pulm HTN group 2  AFIB not on A/C [hx of GIB]    PLAN:    CNS: no sedatives    HEENT:  Oral care    PULMONARY:  HOB @ 45 degrees, CXR tomorrow    CARDIOVASCULAR: I>O, hold diuretics, LR 75cc/h, repeat lactate    GI: PO diet    RENAL:  F/u  lytes.  Correct as needed. accurate I/O, renal bladder sono, urine lytes, repeat BMP now, renal eval    INFECTIOUS DISEASE: no abx    HEMATOLOGICAL:  DVT prophylaxis.    ENDOCRINE:  Follow up FS.  Insulin protocol if needed.    MUSCULOSKELETAL: OOB to chair    LINES/WYNN: keep wynn, no central lines    CODE STATUS: FULL CODE    DISPOSITION: Transfer to medical floor, recall ICU if any change in status

## 2019-08-28 NOTE — H&P ADULT - HISTORY OF PRESENT ILLNESS
10 y/o F with PMHx severe pulm htn, COPD on 1L NC (only uses it at bedtime, DM II, HTN, Diastolic CHF, Afib not on a/c due to GIB (2013, had egd+colo+capsule done but all workup neg, xarelto was stopped at that time), TAVR (2013 at [place]), CAD s/p PCI x1 (2013) comes to the ED for Hypoglycemia of 40s on finger stick in the morning.  Sh 89 y/o F with PMHx severe pulm htn, COPD, DM II, HTN, Diastolic CHF, Afib not on a/c due to GIB (2013, had egd+colo+capsule endoscopy done but all workup neg, xarelto was stopped at that time), TAVR (2013 at [place]), CAD s/p PCI x1 (2013) comes to the ED for Hypoglycemia of 40s on finger stick in the morning.  She has been feeling lethargic for last 2-3 days, decreased appetite.  Loose stool for 1 day, vomiting+, no blood in vomiting or stool.  bloating+    No headache, chest pain, palpitation, abnormal body movement or edema.    Patient was taking daily metformin and glimepiride despite decreased oral intake.

## 2019-08-28 NOTE — ED PROVIDER NOTE - PROGRESS NOTE DETAILS
d/w ICU fellow Dr. Garcia, will come assess pt in ED for final recs re: icu d/w icu fellow Dr. Manrique, approved for ICU (Att Dr. Alejandra)

## 2019-08-28 NOTE — ED ADULT NURSE NOTE - NSIMPLEMENTINTERV_GEN_ALL_ED
Implemented All Fall with Harm Risk Interventions:  [place] to call system. Call bell, personal items and telephone within reach. Instruct patient to call for assistance. Room bathroom lighting operational. Non-slip footwear when patient is off stretcher. Physically safe environment: no spills, clutter or unnecessary equipment. Stretcher in lowest position, wheels locked, appropriate side rails in place. Provide visual cue, wrist band, yellow gown, etc. Monitor gait and stability. Monitor for mental status changes and reorient to person, place, and time. Review medications for side effects contributing to fall risk. Reinforce activity limits and safety measures with patient and family. Provide visual clues: red socks.

## 2019-08-28 NOTE — ED PROVIDER NOTE - OBJECTIVE STATEMENT
89y f h/o htn, pulm htn, AF no AC 2/2 h/o gib, anemia, copd no O2, s/p tavr, dm on glimepiride & metformin, htn, cad/stents on plavix, gerd p/w NV x 1d. Son @ bedside, endorses pt with incr generalized weakness over last 4d, accomp by loose stools & decr po intake. Today dvlpd nausea w/sev episodes nbnb vomiting, accomp by generalized abd tenderness. Was hypoglycemic in AM to 40s, given honey by family, then 60s/70s later in evening. Takes glimepiride & metformin, but has had decr po inake. Denies f/c, cp/sob, flank pain, urinary sx, rash. No sick contacts, recent travel or abx. PMD [facility]. 89y f h/o htn, pulm htn, AF no AC 2/2 h/o gib, anemia, copd no O2, s/p tavr, dm on glimepiride & metformin, htn, cad/stents on plavix, gerd p/w NV x 1d. Son @ bedside, endorses pt with incr generalized weakness over last 4d, accomp by loose stools & decr po intake. Today dvlpd nausea w/sev episodes nbnb vomiting, accomp by generalized abd tenderness. Was hypoglycemic in AM to 40s, given honey by family, then 60s/70s later in evening. Takes glimepiride & metformin, but has had decr po inake. Denies f/c, cp/sob, melena, brbpr, flank pain, urinary sx, rash. No sick contacts, recent travel or abx. PMD [facility]. 89y f h/o htn, pulm htn, AF no AC 2/2 h/o gib, anemia, copd no O2, s/p tavr, dm on glimepiride & metformin, htn, cad/stents on plavix, gerd p/w NV x 1d. Son @ bedside, endorses pt with incr generalized weakness over last 4d, accomp by loose stools & decr po intake. Today dvlpd nausea w/sev episodes nbnb vomiting, accomp by generalized abd tenderness. Was hypoglycemic in AM to 40s, given honey by family, then 60s/70s later in evening. Takes glimepiride & metformin, but has had decr po inake. Denies f/c, cp/sob, melena, brbpr, back pain, flank pain, urinary sx, rash, focal numbness or weakness. No sick contacts, recent travel or abx. PMD [facility].

## 2019-08-28 NOTE — CONSULT NOTE ADULT - SUBJECTIVE AND OBJECTIVE BOX
NEPHROLOGY CONSULTATION NOTE    89 yo F good functional status, Pulm HTN, Hx of empyema, AFIB [no a/c for hx of GIB], DM, HTN, DLD, CAD, s/p TAVR, CHF [diastolic] present to ED for weakness. Hx per son, 5 days ago she began feeling unwell, not wanting to eat, watery nonbloody diarrhea daily with NBNB vomiting. She continues to take her medications during this time including lasix 40mg daily. She also been acting slightly confused, at baseline she reads books and is alert and oriented. Denies fever chills, abdominal pain, dyspnea or chest pain. Reports grandchildren sick with similar symptoms 2 weeks ago. (19)    PAST MEDICAL & SURGICAL HISTORY:  Pulmonary hypertension  COPD (chronic obstructive pulmonary disease)  GI bleed  Aortic valve replaced  CAD (coronary artery disease)  Hypertension  GERD (gastroesophageal reflux disease)  Diabetes  Arthritis  S/P TAVR (transcatheter aortic valve replacement)    Allergies:  penicillins (Other)    Home Medications:  clopidogrel 75 mg oral tablet: 1 tab(s) orally once a day (2019 10:11)  Fish Oil oral capsule: 4000 milligram(s) orally once a day (27 Dec 2018 17:08)  glimepiride 2 mg oral tablet: 1 tab(s) orally once a day (27 Dec 2018 17:08)  Iron Chews: 325 milligram(s) orally once a day (27 Dec 2018 17:08)  Lasix 40 mg oral tablet: 1 tab(s) orally 2 times a day, 3x/day every other day  (27 Dec 2018 17:08)  lisinopril 10 mg oral tablet: 1 tab(s) orally once a day (27 Dec 2018 17:08)  metFORMIN 1000 mg oral tablet, extended release: orally 2 times a day (27 Dec 2018 17:08)  pantoprazole 40 mg oral delayed release tablet: 1 tab(s) orally once a day (before a meal) (31 Dec 2018 18:52)  simvastatin 10 mg oral tablet: 1 tab(s) orally once a day (at bedtime) (31 Dec 2018 18:52)    Hospital Medications:   MEDICATIONS  (STANDING):  chlorhexidine 4% Liquid 1 Application(s) Topical <User Schedule>  lactated ringers. 1000 milliLiter(s) (100 mL/Hr) IV Continuous <Continuous>      SOCIAL HISTORY:  Denies ETOH,Smoking,   FAMILY HISTORY:        REVIEW OF SYSTEMS:    All other review of systems is negative unless indicated above.    VITALS:  T(F): 96.6 (19 @ 15:38), Max: 98.7 (19 01:30)  HR: 96 (19 15:38)  BP: 147/63 (19 @ 15:38)  RR: 18 (19 15:38)  SpO2: 98% (19 15:38)    :  -   15:54  --------------------------------------------------------  IN: 0 mL / OUT: 200 mL / NET: -200 mL      Height (cm): 154.94 (:30)  Weight (kg): 54.4 (:30)  BMI (kg/m2): 22.7 (:30)  BSA (m2): 1.52 (:30)    19 @ :  -  19 15:54  --------------------------------------------------------  IN: 0 mL / OUT: 200 mL / NET: -200 mL      I&O's Detail    28 Aug 2019 07:  -  28 Aug 2019 15:54  --------------------------------------------------------  IN:  Total IN: 0 mL    OUT:    Indwelling Catheter - Urethral: 200 mL  Total OUT: 200 mL    Total NET: -200 mL            PHYSICAL EXAM:  Constitutional: NAD  Respiratory: CTAB, no wheezes, rales or rhonchi  Cardiovascular: S1, S2, RRR  Gastrointestinal: BS+, soft, NT/ND  Extremities: No peripheral edema  Neurological: A/O x 2  : +wynn.     Vascular Access:    LABS:      134<L>  |  96<L>  |  80<HH>  ----------------------------<  74  4.8   |  15<L>  |  4.7<HH>    Ca    6.2<L>      28 Aug 2019 11:23    TPro  6.5  /  Alb  3.7  /  TBili  0.3  /  DBili      /  AST  24  /  ALT  16  /  AlkPhos  110      Creatinine Trend: 4.7 <--, 5.3 <--                        9.7    10.67 )-----------( 272      ( 28 Aug 2019 02:25 )             31.6     Troponin T, Serum: 0.05: Critical value: ng/mL (19 @ 11:23)    Blood Gas Calcium, Ionized - Venous: 0.84 mmoL/L (19 @ 06:26)  Blood Gas Venous - Lactate: 3.4 mmoL/L (19 @ 06:26)  Blood Gas Profile - Venous (19 @ 06:26)    pH, Venous: 7.27    pCO2, Venous: 41 mmHg    pO2, Venous: 16 mmHg    HCO3, Venous: 19 mmoL/L    Base Excess, Venous: -7.8 mmoL/L    Oxygen Saturation, Venous: 16 %        Urine Studies:  Urinalysis Basic - ( 28 Aug 2019 05:30 )    Color: Light Yellow / Appearance: Slightly Turbid / S.014 / pH:   Gluc:  / Ketone: Negative  / Bili: Negative / Urobili: <2 mg/dL   Blood:  / Protein: 100 mg/dL / Nitrite: Negative   Leuk Esterase: Negative / RBC: 2 /HPF / WBC 2 /HPF   Sq Epi:  / Non Sq Epi: 3 /HPF / Bacteria: Negative      RADIOLOGY & ADDITIONAL STUDIES:  < from: CT Abdomen and Pelvis w/ Oral Cont (19 @ 05:04) >    KIDNEYS: No hydronephrosis bilaterally.     IMPRESSION:    1.  No bowel obstruction. Small abdominopelvic ascites without definite   evidence of any focal abdominopelvic inflammatory process.    2.  Small bilateral pleural effusions.    3.  Additional nonacute/stable findings as described above.    < end of copied text >    < from: Xray Chest 1 View-PORTABLE IMMEDIATE (19 @ 03:39) >  Impression:      Small bilateral pleural effusions.    < end of copied text >

## 2019-08-28 NOTE — H&P ADULT - NSHPPHYSICALEXAM_GEN_ALL_CORE
General: pallor+    Chest: clear b/l, no wheeze or crackles    CVS: s1s2 heard, rate controlled    GI: mild distension+, BS+, non tender    Neuro: awake and alert, no focal weakness.    Extremity: moves all extremity, chronic dermatitis in b/l LE, trace pedal edema

## 2019-08-28 NOTE — H&P ADULT - NSICDXPASTMEDICALHX_GEN_ALL_CORE_FT
PAST MEDICAL HISTORY:  Aortic valve replaced     Arthritis     CAD (coronary artery disease)     COPD (chronic obstructive pulmonary disease)     Diabetes     GERD (gastroesophageal reflux disease)     GI bleed     Hypertension     Pulmonary hypertension

## 2019-08-28 NOTE — ED PROVIDER NOTE - NS ED ROS FT
Constitutional: No fever, chills, unintended weight loss.  Eyes:  No visual changes, eye pain or discharge.  ENMT:  No hearing changes, pain, no sore throat or runny nose, no difficulty swallowing  Cardiac:  No chest pain, SOB or edema. No chest pain with exertion.  Respiratory:  No cough or respiratory distress. No hemoptysis. No history of asthma or RAD.  GI: see hpi  :  No dysuria, frequency or burning.  MS:  No myalgia, muscle weakness, joint pain or back pain.  Neuro:  No headache or weakness.  No LOC.  Skin:  No skin rash.   Endocrine: No history of thyroid disease, +dm

## 2019-08-28 NOTE — PATIENT PROFILE ADULT - VISION (WITH CORRECTIVE LENSES IF THE PATIENT USUALLY WEARS THEM):
Partially impaired: cannot see medication labels or newsprint, but can see obstacles in path, and the surrounding layout; can count fingers at arm's length/one pair of reading glasses at home with family

## 2019-08-28 NOTE — ED PROVIDER NOTE - CLINICAL SUMMARY MEDICAL DECISION MAKING FREE TEXT BOX
weakness, anorexia, nv, abd pain, recent hypoglycemia on SALEH - ED w/u sig for HAL, lactic acidosis, Tn 0.06, ekg AF no acute ischemia, sm vol ascites on CT AP otherwise no acute findings, repeat FS nl in ED - minimal LA improvement after 2L LR bolus - ICU consulted, admitted to unit for further mgmt

## 2019-08-28 NOTE — H&P ADULT - NSHPLABSRESULTS_GEN_ALL_CORE
9.7    10.67 )-----------( 272      ( 28 Aug 2019 02:25 )             31.6                 134<L>  |  96<L>  |  80<HH>  ----------------------------<  74  4.8   |  15<L>  |  4.7<HH>    Ca    6.2<L>      28 Aug 2019 11:23    TPro  6.5  /  Alb  3.7  /  TBili  0.3  /  DBili  x   /  AST  24  /  ALT  16  /  AlkPhos  110      LIVER FUNCTIONS - ( 28 Aug 2019 11:23 )  Alb: 3.7 g/dL / Pro: 6.5 g/dL / ALK PHOS: 110 U/L / ALT: 16 U/L / AST: 24 U/L / GGT: x       PT/INR - ( 28 Aug 2019 02:25 )   PT: 15.20 sec;   INR: 1.33 ratio        CARDIAC MARKERS ( 28 Aug 2019 11:23 )  x     / 0.05 ng/mL / x     / x     / 5.8 ng/mL  CARDIAC MARKERS ( 28 Aug 2019 02:25 )  x     / 0.06 ng/mL / x     / x     / x            Urinalysis Basic - ( 28 Aug 2019 05:30 )    Color: Light Yellow / Appearance: Slightly Turbid / S.014 / pH: x  Gluc: x / Ketone: Negative  / Bili: Negative / Urobili: <2 mg/dL   Blood: x / Protein: 100 mg/dL / Nitrite: Negative   Leuk Esterase: Negative / RBC: 2 /HPF / WBC 2 /HPF   Sq Epi: x / Non Sq Epi: 3 /HPF / Bacteria: Negative      < from: 12 Lead ECG (19 @ 03:24) >    Ventricular Rate 99 BPM    Atrial Rate 113 BPM    QRS Duration 88 ms    Q-T Interval 348 ms    QTC Calculation(Bezet) 446 ms    R Axis 2 degrees    T Axis 81 degrees    Diagnosis Line Atrial fibrillation  Abnormal ECG      < from: CT Abdomen and Pelvis w/ Oral Cont (19 @ 05:04) >    1.  No bowel obstruction. Small abdominopelvic ascites without definite   evidence of any focal abdominopelvic inflammatory process.    2.  Small bilateral pleural effusions.    3.  Additional nonacute/stable findings as described above.

## 2019-08-28 NOTE — ED PROVIDER NOTE - CARE PLAN
Principal Discharge DX:	HAL (acute kidney injury)  Secondary Diagnosis:	Dehydration  Secondary Diagnosis:	Ascites  Secondary Diagnosis:	Vomiting

## 2019-08-28 NOTE — ED ADULT TRIAGE NOTE - CHIEF COMPLAINT QUOTE
BIBA C/O abd pain, nausea, vomiting and decreased appetite since Friday. As per family AMS since Friday. FS 76 at home, EMS gave 1 amp of dextrose.  in triage.

## 2019-08-28 NOTE — H&P ADULT - ATTENDING COMMENTS
A 89 yo female with PMH of  HTN, DM type 2, COPD, pulmonary HTN, HFpEF, AFib, TAVR, CAD s/p PCI came to ED c/o lethargy and weakness, she has poor appetite, she was not eating well and her blood sugar was low 40 in AM, she also had mild diarrhea and vomiting, no abdominal pain, she feels like gas sensation in  her belly, she denies any urinary symptoms. In the ED her vital signs /70, , tep 97, labs showed Cr 5.3, Mg 0.5, calcium 6.1    PHYSICAL EXAM:  GENERAL: NAD, well-developed  HEAD:  Atraumatic, Normocephalic  EYES: EOMI, PERRLA, conjunctiva and sclera clear  NECK: Supple, No JVD  CHEST/LUNG: Clear to auscultation bilaterally; No wheeze  HEART: Irregular rate and rhythm; S1 S2  ABDOMEN: Soft, Nontender, Nondistended; Bowel sounds present  EXTREMITIES:  2+ Peripheral Pulses, No clubbing, cyanosis, or edema  PSYCH: AAOx1  NEUROLOGY: non-focal      A/p:   Acute Kidney Injury:   likely from Pre-renal azotemia., diuretics.   Hold Lasix and Lisinopril  Continue IV fluid     Hypomagnesemia and Hypocalcemia:   likely from diarrhea and/or diuretics.   Diuretics on hold, continue with   Continue with Mg supplement.    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  Patient was receiving iron transfusion as Outpatient    Persistent Atrial fibrillation:   rate controlled on metoprolol  not on anticoagulation dut to h/o GI bleed    Chronic HFpEF:   lasix and Lisinopril on hold due to HAL.

## 2019-08-29 ENCOUNTER — TRANSCRIPTION ENCOUNTER (OUTPATIENT)
Age: 84
End: 2019-08-29

## 2019-08-29 LAB
ALBUMIN SERPL ELPH-MCNC: 3.5 G/DL — SIGNIFICANT CHANGE UP (ref 3.5–5.2)
ALP SERPL-CCNC: 99 U/L — SIGNIFICANT CHANGE UP (ref 30–115)
ALT FLD-CCNC: 15 U/L — SIGNIFICANT CHANGE UP (ref 0–41)
ANION GAP SERPL CALC-SCNC: 21 MMOL/L — HIGH (ref 7–14)
ANION GAP SERPL CALC-SCNC: 22 MMOL/L — HIGH (ref 7–14)
AST SERPL-CCNC: 24 U/L — SIGNIFICANT CHANGE UP (ref 0–41)
BASOPHILS # BLD AUTO: 0.05 K/UL — SIGNIFICANT CHANGE UP (ref 0–0.2)
BASOPHILS NFR BLD AUTO: 0.6 % — SIGNIFICANT CHANGE UP (ref 0–1)
BILIRUB SERPL-MCNC: 0.3 MG/DL — SIGNIFICANT CHANGE UP (ref 0.2–1.2)
BUN SERPL-MCNC: 79 MG/DL — CRITICAL HIGH (ref 10–20)
BUN SERPL-MCNC: 83 MG/DL — CRITICAL HIGH (ref 10–20)
CALCIUM SERPL-MCNC: 6.5 MG/DL — LOW (ref 8.5–10.1)
CALCIUM SERPL-MCNC: 6.7 MG/DL — LOW (ref 8.5–10.1)
CHLORIDE SERPL-SCNC: 94 MMOL/L — LOW (ref 98–110)
CHLORIDE SERPL-SCNC: 98 MMOL/L — SIGNIFICANT CHANGE UP (ref 98–110)
CO2 SERPL-SCNC: 16 MMOL/L — LOW (ref 17–32)
CO2 SERPL-SCNC: 17 MMOL/L — SIGNIFICANT CHANGE UP (ref 17–32)
CREAT ?TM UR-MCNC: 38 MG/DL — SIGNIFICANT CHANGE UP
CREAT SERPL-MCNC: 4.8 MG/DL — CRITICAL HIGH (ref 0.7–1.5)
CREAT SERPL-MCNC: 5.1 MG/DL — CRITICAL HIGH (ref 0.7–1.5)
CULTURE RESULTS: NO GROWTH — SIGNIFICANT CHANGE UP
EOSINOPHIL # BLD AUTO: 0.08 K/UL — SIGNIFICANT CHANGE UP (ref 0–0.7)
EOSINOPHIL NFR BLD AUTO: 0.9 % — SIGNIFICANT CHANGE UP (ref 0–8)
ESTIMATED AVERAGE GLUCOSE: 154 MG/DL — HIGH (ref 68–114)
GLUCOSE BLDC GLUCOMTR-MCNC: 121 MG/DL — HIGH (ref 70–99)
GLUCOSE BLDC GLUCOMTR-MCNC: 169 MG/DL — HIGH (ref 70–99)
GLUCOSE BLDC GLUCOMTR-MCNC: 195 MG/DL — HIGH (ref 70–99)
GLUCOSE BLDC GLUCOMTR-MCNC: 215 MG/DL — HIGH (ref 70–99)
GLUCOSE BLDC GLUCOMTR-MCNC: 217 MG/DL — HIGH (ref 70–99)
GLUCOSE BLDC GLUCOMTR-MCNC: 234 MG/DL — HIGH (ref 70–99)
GLUCOSE BLDC GLUCOMTR-MCNC: 240 MG/DL — HIGH (ref 70–99)
GLUCOSE SERPL-MCNC: 186 MG/DL — HIGH (ref 70–99)
GLUCOSE SERPL-MCNC: 79 MG/DL — SIGNIFICANT CHANGE UP (ref 70–99)
HBA1C BLD-MCNC: 7 % — HIGH (ref 4–5.6)
HCT VFR BLD CALC: 30.9 % — LOW (ref 37–47)
HGB BLD-MCNC: 9.8 G/DL — LOW (ref 12–16)
IMM GRANULOCYTES NFR BLD AUTO: 0.9 % — HIGH (ref 0.1–0.3)
LYMPHOCYTES # BLD AUTO: 0.72 K/UL — LOW (ref 1.2–3.4)
LYMPHOCYTES # BLD AUTO: 8.1 % — LOW (ref 20.5–51.1)
MAGNESIUM SERPL-MCNC: 1.5 MG/DL — LOW (ref 1.8–2.4)
MAGNESIUM SERPL-MCNC: 1.9 MG/DL — SIGNIFICANT CHANGE UP (ref 1.8–2.4)
MCHC RBC-ENTMCNC: 28.1 PG — SIGNIFICANT CHANGE UP (ref 27–31)
MCHC RBC-ENTMCNC: 31.7 G/DL — LOW (ref 32–37)
MCV RBC AUTO: 88.5 FL — SIGNIFICANT CHANGE UP (ref 81–99)
MONOCYTES # BLD AUTO: 0.7 K/UL — HIGH (ref 0.1–0.6)
MONOCYTES NFR BLD AUTO: 7.9 % — SIGNIFICANT CHANGE UP (ref 1.7–9.3)
NEUTROPHILS # BLD AUTO: 7.25 K/UL — HIGH (ref 1.4–6.5)
NEUTROPHILS NFR BLD AUTO: 81.6 % — HIGH (ref 42.2–75.2)
NRBC # BLD: 0 /100 WBCS — SIGNIFICANT CHANGE UP (ref 0–0)
PH UR: 5.5 — SIGNIFICANT CHANGE UP (ref 5–8)
PHOSPHATE SERPL-MCNC: 5.1 MG/DL — HIGH (ref 2.1–4.9)
PLATELET # BLD AUTO: 292 K/UL — SIGNIFICANT CHANGE UP (ref 130–400)
POTASSIUM SERPL-MCNC: 4.8 MMOL/L — SIGNIFICANT CHANGE UP (ref 3.5–5)
POTASSIUM SERPL-MCNC: 5 MMOL/L — SIGNIFICANT CHANGE UP (ref 3.5–5)
POTASSIUM SERPL-SCNC: 4.8 MMOL/L — SIGNIFICANT CHANGE UP (ref 3.5–5)
POTASSIUM SERPL-SCNC: 5 MMOL/L — SIGNIFICANT CHANGE UP (ref 3.5–5)
PROT ?TM UR-MCNC: 86 MG/DLG/24H — SIGNIFICANT CHANGE UP
PROT SERPL-MCNC: 6 G/DL — SIGNIFICANT CHANGE UP (ref 6–8)
PROT/CREAT UR-RTO: 2.3 RATIO — HIGH (ref 0–0.2)
RBC # BLD: 3.49 M/UL — LOW (ref 4.2–5.4)
RBC # FLD: 14.6 % — HIGH (ref 11.5–14.5)
SODIUM SERPL-SCNC: 131 MMOL/L — LOW (ref 135–146)
SODIUM SERPL-SCNC: 137 MMOL/L — SIGNIFICANT CHANGE UP (ref 135–146)
SPECIMEN SOURCE: SIGNIFICANT CHANGE UP
UUN UR-MCNC: 268 MG/DL — SIGNIFICANT CHANGE UP
WBC # BLD: 8.88 K/UL — SIGNIFICANT CHANGE UP (ref 4.8–10.8)
WBC # FLD AUTO: 8.88 K/UL — SIGNIFICANT CHANGE UP (ref 4.8–10.8)

## 2019-08-29 PROCEDURE — 71045 X-RAY EXAM CHEST 1 VIEW: CPT | Mod: 26

## 2019-08-29 PROCEDURE — 99223 1ST HOSP IP/OBS HIGH 75: CPT | Mod: AI

## 2019-08-29 PROCEDURE — 76775 US EXAM ABDO BACK WALL LIM: CPT | Mod: 26

## 2019-08-29 RX ORDER — CALCIUM GLUCONATE 100 MG/ML
1 VIAL (ML) INTRAVENOUS ONCE
Refills: 0 | Status: COMPLETED | OUTPATIENT
Start: 2019-08-29 | End: 2019-08-29

## 2019-08-29 RX ORDER — SODIUM CHLORIDE 9 MG/ML
1000 INJECTION, SOLUTION INTRAVENOUS
Refills: 0 | Status: DISCONTINUED | OUTPATIENT
Start: 2019-08-29 | End: 2019-08-30

## 2019-08-29 RX ORDER — MAGNESIUM SULFATE 500 MG/ML
2 VIAL (ML) INJECTION ONCE
Refills: 0 | Status: COMPLETED | OUTPATIENT
Start: 2019-08-29 | End: 2019-08-29

## 2019-08-29 RX ORDER — MAGNESIUM SULFATE 500 MG/ML
1 VIAL (ML) INJECTION ONCE
Refills: 0 | Status: COMPLETED | OUTPATIENT
Start: 2019-08-29 | End: 2019-08-29

## 2019-08-29 RX ADMIN — Medication 2: at 13:45

## 2019-08-29 RX ADMIN — HEPARIN SODIUM 5000 UNIT(S): 5000 INJECTION INTRAVENOUS; SUBCUTANEOUS at 18:30

## 2019-08-29 RX ADMIN — SIMVASTATIN 10 MILLIGRAM(S): 20 TABLET, FILM COATED ORAL at 21:32

## 2019-08-29 RX ADMIN — Medication 100 GRAM(S): at 09:58

## 2019-08-29 RX ADMIN — Medication 200 GRAM(S): at 10:00

## 2019-08-29 RX ADMIN — PANTOPRAZOLE SODIUM 40 MILLIGRAM(S): 20 TABLET, DELAYED RELEASE ORAL at 05:05

## 2019-08-29 RX ADMIN — HEPARIN SODIUM 5000 UNIT(S): 5000 INJECTION INTRAVENOUS; SUBCUTANEOUS at 21:32

## 2019-08-29 RX ADMIN — BUDESONIDE AND FORMOTEROL FUMARATE DIHYDRATE 2 PUFF(S): 160; 4.5 AEROSOL RESPIRATORY (INHALATION) at 10:01

## 2019-08-29 RX ADMIN — CLOPIDOGREL BISULFATE 75 MILLIGRAM(S): 75 TABLET, FILM COATED ORAL at 12:29

## 2019-08-29 RX ADMIN — HEPARIN SODIUM 5000 UNIT(S): 5000 INJECTION INTRAVENOUS; SUBCUTANEOUS at 05:05

## 2019-08-29 RX ADMIN — Medication 2: at 18:30

## 2019-08-29 RX ADMIN — Medication 50 GRAM(S): at 04:43

## 2019-08-29 RX ADMIN — BUDESONIDE AND FORMOTEROL FUMARATE DIHYDRATE 2 PUFF(S): 160; 4.5 AEROSOL RESPIRATORY (INHALATION) at 20:11

## 2019-08-29 RX ADMIN — CHLORHEXIDINE GLUCONATE 1 APPLICATION(S): 213 SOLUTION TOPICAL at 05:06

## 2019-08-29 RX ADMIN — Medication 325 MILLIGRAM(S): at 12:29

## 2019-08-29 RX ADMIN — Medication 12.5 MILLIGRAM(S): at 05:05

## 2019-08-29 RX ADMIN — SODIUM CHLORIDE 75 MILLILITER(S): 9 INJECTION, SOLUTION INTRAVENOUS at 08:24

## 2019-08-29 RX ADMIN — Medication 200 GRAM(S): at 04:43

## 2019-08-29 NOTE — PROGRESS NOTE ADULT - ASSESSMENT
Pre renal HAL  -Likely secondary to diarrhea, vomiting and decreased PO intake.   -Her baseline cr 1.15  -creatinine on admission 5.3  -She patient was on LR 50 cc/ hour. She was hypotensive this morning so we increased the rate to 75 cc/ hr  -creatinine is improving, Today it is 4.8  -We will follow up with the urine electrolytes  -strict intake and output  -Hypocalcemia with corrected calcium of 6.0 on admission. Magnesium was 0.5 on admission. The patient was given 2 grams of calcium and 2 grams of magnesium.   -HAGMA on VBG (24)  -Strict Input/Output  -lasix, lisinopril, metformin were held in the ED  -follow up with Nephrology    Loose stool:  -The patient reported that she has been having loose stool over the last few days, She denied any recent antibiotics use.    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  -Patient was receiving iron transfusion as Outpatient  -no Bright red blood per rectumor Angie now.    Afib   rate controlled on metoprolol  not on anticoagulation due to h/o GI bleed    Diastolic CHF:  lasix held for now due to HAL  patient has marked limitation in physical activity     History of COPD  symbicort  stable  cxr shows b/l pleural effusion stable from before    Diet: CC/renal  Full code  Dvt ppx: heparin sq

## 2019-08-29 NOTE — DISCHARGE NOTE NURSING/CASE MANAGEMENT/SOCIAL WORK - PATIENT PORTAL LINK FT
You can access the FollowMyHealth Patient Portal offered by [facility] by registering at the following website: [email or web address]. By joining [facility]’s FollowMyHealth portal, you will also be able to view your health information using other applications (apps) compatible with our system.

## 2019-08-29 NOTE — DISCHARGE NOTE NURSING/CASE MANAGEMENT/SOCIAL WORK - NSDCPEWEB_GEN_ALL_CORE
[facility] for Tobacco Control website --- [email or web address] --- [email or web address]

## 2019-08-29 NOTE — PROGRESS NOTE ADULT - SUBJECTIVE AND OBJECTIVE BOX
Nephrology progress note    Patient is seen and examined, events over the last 24 h noted .  no new complaints.  n/v/d and appetite improved.    Allergies:  penicillins (Other)    Hospital Medications:   MEDICATIONS  (STANDING):  buDESOnide 160 MICROgram(s)/formoterol 4.5 MICROgram(s) Inhaler 2 Puff(s) Inhalation two times a day  chlorhexidine 4% Liquid 1 Application(s) Topical <User Schedule>  clopidogrel Tablet 75 milliGRAM(s) Oral daily  ferrous    sulfate 325 milliGRAM(s) Oral daily  heparin  Injectable 5000 Unit(s) SubCutaneous every 8 hours  insulin lispro (HumaLOG) corrective regimen sliding scale   SubCutaneous three times a day before meals  lactated ringers. 1000 milliLiter(s) (75 mL/Hr) IV Continuous <Continuous>  pantoprazole    Tablet 40 milliGRAM(s) Oral before breakfast  simvastatin 10 milliGRAM(s) Oral at bedtime        VITALS:  T(F): 97.2 (19 @ 14:46), Max: 97.2 (19 @ 20:20)  HR: 93 (19 @ 14:46)  BP: 107/53 (19 @ 14:46)  RR: 18 (19 @ 14:46)  SpO2: 98% (19 @ 15:38)       @ 07:  -   @ 07:00  --------------------------------------------------------  IN: 350 mL / OUT: 450 mL / NET: -100 mL     @ 07:01  -   @ 15:17  --------------------------------------------------------  IN: 0 mL / OUT: 500 mL / NET: -500 mL      Height (cm): 147.3 ( @ 17:42)  Weight (kg): 55 ( @ 17:42)  BMI (kg/m2): 25.3 ( @ 17:42)  BSA (m2): 1.47 ( @ 17:42)    PHYSICAL EXAM:  Constitutional: NAD  Respiratory: CTAB, no wheezes, rales or rhonchi  Cardiovascular: S1, S2, RRR  Gastrointestinal: BS+, soft, NT/ND  Extremities: No peripheral edema  :  + wynn.       LABS:      137  |  98  |  83<HH>  ----------------------------<  79  4.8   |  17  |  4.8<HH>    Ca    6.5<L>      29 Aug 2019 06:19  Phos  5.1       Mg     1.5         TPro  6.0  /  Alb  3.5  /  TBili  0.3  /  DBili      /  AST  24  /  ALT  15  /  AlkPhos  99                            9.8    8.88  )-----------( 292      ( 29 Aug 2019 06:19 )             30.9       Urine Studies:  Urinalysis Basic - ( 28 Aug 2019 05:30 )    Color: Light Yellow / Appearance: Slightly Turbid / S.014 / pH:   Gluc:  / Ketone: Negative  / Bili: Negative / Urobili: <2 mg/dL   Blood:  / Protein: 100 mg/dL / Nitrite: Negative   Leuk Esterase: Negative / RBC: 2 /HPF / WBC 2 /HPF   Sq Epi:  / Non Sq Epi: 3 /HPF / Bacteria: Negative      Chloride, Random Urine: 44 ( @ 23:21)  Potassium, Random Urine: 15 mmol/L ( @ 23:21)  Sodium, Random Urine: 56.0 mmoL/L ( @ 23:21)  Protein/Creatinine Ratio Calculation: 2.3 Ratio ( @ 23:21)  Creatinine, Random Urine: 38 mg/dL ( @ 23:21)    RADIOLOGY & ADDITIONAL STUDIES:  < from: US Renal (19 @ 11:04) >  IMPRESSION:  No stones or hydronephrosis.    Bilateral pleural effusions and mild ascites.    < end of copied text >

## 2019-08-29 NOTE — PROGRESS NOTE ADULT - ATTENDING COMMENTS
Pt seen and examined  overall improved but cr stable  Agree w/ IVF   hold lisinopril and diuretics  will follow

## 2019-08-29 NOTE — CONSULT NOTE ADULT - ASSESSMENT
IMPRESSION: Rehab of   debility, acute renal insuff, dehydration, anorexia; improving    PRECAUTIONS: [x  ] Cardiac  [  ] Respiratory  [  ] Seizures [  ] Contact Isolation  [  ] Droplet Isolation  [  ] Other    Weight Bearing Status:     RECOMMENDATION:    Out of Bed to Chair     DVT/Decubiti Prophylaxis    REHAB PLAN:     [xx   ] Bedside P/T 3-5 times a week   [   ]   Bedside O/T  2-3 times a week             [   ] No Rehab Therapy Indicated                   [   ]  Speech Therapy   Conditioning/ROM                                    ADL  Bed Mobility                                               Conditioning/ROM  Transfers                                                     Bed Mobility  Sitting /Standing Balance                         Transfers                                        Gait Training                                               Sitting/Standing Balance  Stair Training [   ]Applicable                    Home equipment Eval                                                                        Splinting  [   ] Only      GOALS:   ADL   [ x  ]   Independent                    Transfers  [ x  ] Independent                          Ambulation  [ x  ] Independent     [ x   ] With device                            [   ]  CG                                                         [   ]  CG                                                                  [   ] CG                            [    ] Min A                                                   [   ] Min A                                                              [   ] Min  A          DISCHARGE PLAN:   [   ]  Good candidate for Intensive Rehabilitation/Hospital based-[facility]A [facility]                                             Will tolerate 3hrs Intensive Rehab Daily                                       [    ]  Short Term Rehab in Skilled Nursing Facility                                       [ xx   ]  Home with Outpatient or  services                                         [    ]  Possible Candidate for Intensive Hospital based Rehab

## 2019-08-29 NOTE — DISCHARGE NOTE NURSING/CASE MANAGEMENT/SOCIAL WORK - NSDCPEEMAIL_GEN_ALL_CORE
[facility] for Tobacco Control email [email or web address]

## 2019-08-29 NOTE — CONSULT NOTE ADULT - SUBJECTIVE AND OBJECTIVE BOX
HPI:  89 y/o F with PMHx severe pulm htn, COPD, DM II, HTN, Diastolic CHF, Afib not on a/c due to GIB (, had egd+colo+capsule endoscopy done but all workup neg, xarelto was stopped at that time), TAVR ( at [place]), CAD s/p PCI x1 () comes to the ED for Hypoglycemia of 40s on finger stick in the morning.  She has been feeling lethargic for last 2-3 days, decreased appetite.  Loose stool for 1 day, vomiting+, no blood in vomiting or stool.  bloating+    No headache, chest pain, palpitation, abnormal body movement or edema.    Patient was taking daily metformin and glimepiride despite decreased oral intake. (28 Aug 2019 17:19)      PAST MEDICAL & SURGICAL HISTORY:  Pulmonary hypertension  COPD (chronic obstructive pulmonary disease)  GI bleed  Aortic valve replaced  CAD (coronary artery disease)  Hypertension  GERD (gastroesophageal reflux disease)  Diabetes  Arthritis  S/P TAVR (transcatheter aortic valve replacement)      Hospital Course:  He is deconditioned and weak. He is amb. 30 ft with RW with min assist. with PT. Renal function is improving. Appetite is starting to come back.  TODAY'S SUBJECTIVE & REVIEW OF SYMPTOMS:     Constitutional WNL   Cardio WNL   Resp WNL   GI WNL  Heme WNL  Endo WNL  Skin WNL  MSK WNL  Neuro WNL  Cognitive WNL  Psych WNL      MEDICATIONS  (STANDING):  buDESOnide 160 MICROgram(s)/formoterol 4.5 MICROgram(s) Inhaler 2 Puff(s) Inhalation two times a day  chlorhexidine 4% Liquid 1 Application(s) Topical <User Schedule>  clopidogrel Tablet 75 milliGRAM(s) Oral daily  dextrose 5%. 1000 milliLiter(s) (50 mL/Hr) IV Continuous <Continuous>  dextrose 50% Injectable 12.5 Gram(s) IV Push once  dextrose 50% Injectable 25 Gram(s) IV Push once  dextrose 50% Injectable 25 Gram(s) IV Push once  ferrous    sulfate 325 milliGRAM(s) Oral daily  heparin  Injectable 5000 Unit(s) SubCutaneous every 8 hours  insulin lispro (HumaLOG) corrective regimen sliding scale   SubCutaneous three times a day before meals  lactated ringers. 1000 milliLiter(s) (75 mL/Hr) IV Continuous <Continuous>  pantoprazole    Tablet 40 milliGRAM(s) Oral before breakfast  simvastatin 10 milliGRAM(s) Oral at bedtime    MEDICATIONS  (PRN):  dextrose 40% Gel 15 Gram(s) Oral once PRN Blood Glucose LESS THAN 70 milliGRAM(s)/deciliter  glucagon  Injectable 1 milliGRAM(s) IntraMuscular once PRN Glucose LESS THAN 70 milligrams/deciliter      FAMILY HISTORY:      Allergies    penicillins (Other)    Intolerances        SOCIAL HISTORY:    [  ] Etoh  [  ] Smoking  [  ] Substance abuse     Home Environment:  [ x ] Home Alone  [ x ] Lives with Family-dtr lives in next door [facility] but she has advanced COPD  [  ] Home Health Aid    Dwelling:  [x  ] [facility]ment-[facility]  [  ] Private House  [  ] Adult Home  [  ] Skilled Nursing Facility      [  ] Short Term  [  ] Long Term  [  ] Stairs       Elevator [  ]    FUNCTIONAL STATUS PTA: (Check all that apply)  Ambulation: [x   ]Independent    [  ] Dependent     [  ] Non-Ambulatory  Assistive Device: [  ] SA Cane  [  ]  Q Cane  [x  ] Walker  [  ]  Wheelchair  ADL : [  ] Independent  [  ]  Dependent       Vital Signs Last 24 Hrs  T(C): 36.2 (29 Aug 2019 14:46), Max: 36.2 (28 Aug 2019 20:20)  T(F): 97.2 (29 Aug 2019 14:46), Max: 97.2 (28 Aug 2019 20:20)  HR: 93 (29 Aug 2019 14:46) (83 - 94)  BP: 107/53 (29 Aug 2019 14:46) (94/45 - 128/61)  BP(mean): --  RR: 18 (29 Aug 2019 14:46) (16 - 18)  SpO2: --      PHYSICAL EXAM: Alert & Oriented X3  GENERAL: NAD, well-groomed, well-developed  HEAD:  Atraumatic, Normocephalic  EYES: EOMI, PERRLA, conjunctiva and sclera clear  NECK: Supple, No JVD, Normal thyroid  CHEST/LUNG: Clear to percussion bilaterally; No rales, rhonchi, wheezing, or rubs  HEART: Regular rate and rhythm; No murmurs, rubs, or gallops  ABDOMEN: Soft, Nontender, Nondistended; Bowel sounds present  EXTREMITIES:  2+ Peripheral Pulses, No clubbing, cyanosis, or edema    NERVOUS SYSTEM:  Cranial Nerves 2-12 intact [  ] Abnormal  [  ]  ROM: WFL all extremities [  ]  Abnormal [  ]  Motor Strength: WFL all extremities  [  ]  Abnormal [x  ]  5-/5 LE's  Sensation: intact to light touch [  ] Abnormal [  ]  Reflexes: Symmetric [  ]  Abnormal [  ]    FUNCTIONAL STATUS:  Bed Mobility: Independent [  ]  Supervision [  ]  Needs Assistance [  ]  N/A [  ]  Transfers: Independent [  ]  Supervision [  ]  Needs Assistance [  ]  N/A [  ]   Ambulation: Independent [  ]  Supervision [  ]  Needs Assistance [  ]  N/A [  ]  ADL: Independent [  ] Requires Assistance [  ] N/A [  ]      LABS:                        9.8    8.88  )-----------( 292      ( 29 Aug 2019 06:19 )             30.9         137  |  98  |  83<HH>  ----------------------------<  79  4.8   |  17  |  4.8<HH>    Ca    6.5<L>      29 Aug 2019 06:19  Phos  5.1       Mg     1.5         TPro  6.0  /  Alb  3.5  /  TBili  0.3  /  DBili  x   /  AST  24  /  ALT  15  /  AlkPhos  99      PT/INR - ( 28 Aug 2019 02:25 )   PT: 15.20 sec;   INR: 1.33 ratio           Urinalysis Basic - ( 28 Aug 2019 05:30 )    Color: Light Yellow / Appearance: Slightly Turbid / S.014 / pH: x  Gluc: x / Ketone: Negative  / Bili: Negative / Urobili: <2 mg/dL   Blood: x / Protein: 100 mg/dL / Nitrite: Negative   Leuk Esterase: Negative / RBC: 2 /HPF / WBC 2 /HPF   Sq Epi: x / Non Sq Epi: 3 /HPF / Bacteria: Negative        RADIOLOGY & ADDITIONAL STUDIES:    Assesment:

## 2019-08-29 NOTE — PROGRESS NOTE ADULT - SUBJECTIVE AND OBJECTIVE BOX
24H events:    Patient is a 89y old Female who presents with a chief complaint of HAL (28 Aug 2019 17:19)    Primary diagnosis of HAL (acute kidney injury)     Today is hospital day 1d.     PAST MEDICAL & SURGICAL HISTORY  Pulmonary hypertension  COPD (chronic obstructive pulmonary disease)  GI bleed  Aortic valve replaced  CAD (coronary artery disease)  Hypertension  GERD (gastroesophageal reflux disease)  Diabetes  Arthritis  S/P TAVR (transcatheter aortic valve replacement)    SOCIAL HISTORY:  Negative for smoking/alcohol/drug use.     ALLERGIES:  penicillins (Other)    MEDICATIONS:  STANDING MEDICATIONS  buDESOnide 160 MICROgram(s)/formoterol 4.5 MICROgram(s) Inhaler 2 Puff(s) Inhalation two times a day  calcium gluconate IVPB 1 Gram(s) IV Intermittent once  chlorhexidine 4% Liquid 1 Application(s) Topical <User Schedule>  clopidogrel Tablet 75 milliGRAM(s) Oral daily  dextrose 5%. 1000 milliLiter(s) IV Continuous <Continuous>  dextrose 50% Injectable 12.5 Gram(s) IV Push once  dextrose 50% Injectable 25 Gram(s) IV Push once  dextrose 50% Injectable 25 Gram(s) IV Push once  ferrous    sulfate 325 milliGRAM(s) Oral daily  heparin  Injectable 5000 Unit(s) SubCutaneous every 8 hours  insulin lispro (HumaLOG) corrective regimen sliding scale   SubCutaneous three times a day before meals  lactated ringers. 1000 milliLiter(s) IV Continuous <Continuous>  magnesium sulfate  IVPB 1 Gram(s) IV Intermittent once  pantoprazole    Tablet 40 milliGRAM(s) Oral before breakfast  simvastatin 10 milliGRAM(s) Oral at bedtime    PRN MEDICATIONS  dextrose 40% Gel 15 Gram(s) Oral once PRN  glucagon  Injectable 1 milliGRAM(s) IntraMuscular once PRN    VITALS:   T(F): 97.2  HR: 83  BP: 94/45  RR: 16  SpO2: 98%    LABS:                        9.8    8.88  )-----------( 292      ( 29 Aug 2019 06:19 )             30.9     08-    137  |  98  |  83<HH>  ----------------------------<  79  4.8   |  17  |  4.8<HH>    Ca    6.5<L>      29 Aug 2019 06:19  Phos  5.1     08-  Mg     1.5     -    TPro  6.0  /  Alb  3.5  /  TBili  0.3  /  DBili  x   /  AST  24  /  ALT  15  /  AlkPhos  99      PT/INR - ( 28 Aug 2019 02:25 )   PT: 15.20 sec;   INR: 1.33 ratio           Urinalysis Basic - ( 28 Aug 2019 05:30 )    Color: Light Yellow / Appearance: Slightly Turbid / S.014 / pH: x  Gluc: x / Ketone: Negative  / Bili: Negative / Urobili: <2 mg/dL   Blood: x / Protein: 100 mg/dL / Nitrite: Negative   Leuk Esterase: Negative / RBC: 2 /HPF / WBC 2 /HPF   Sq Epi: x / Non Sq Epi: 3 /HPF / Bacteria: Negative        Lactate, Blood: 1.6 mmol/L (19 @ 22:36)  Troponin T, Serum: 0.05 ng/mL <HH> (19 @ 11:23)  Lactate, Blood: 3.6 mmol/L <H> (19 @ 11:23)      CARDIAC MARKERS ( 28 Aug 2019 11:23 )  x     / 0.05 ng/mL / x     / x     / 5.8 ng/mL  CARDIAC MARKERS ( 28 Aug 2019 02:25 )  x     / 0.06 ng/mL / x     / x     / x            PHYSICAL EXAM:    General: pallor+  Chest: clear b/l, no wheeze or crackles  CVS: s1s2 heard, rate controlled  GI: mild distension+, BS+, non tender  Neuro: awake and alert, no focal weakness.  Extremity: moves all extremity, chronic dermatitis in b/l LE, trace pedal edema

## 2019-08-29 NOTE — PROGRESS NOTE ADULT - ASSESSMENT
89 y/o F with HAL presented to hospital for weakness, n/v, diarrhea, decreased oral intake.  PMH Pulm HTN, Hx of empyema, AFIB [no a/c for hx of GIB], DM, HTN, DLD, CAD, s/p TAVR, CHF [diastolic]    # HAL : baseline cr. ~ 1.15 %   - likely prerenal due to diarrhea plus lasix plus lisinopril   - serum creatinine stable   - UOP noted, non-oliguric   - 2.8 gm proteinuria noted.   - hypocalcemia, hypomagnesemia noted, s/p IV supplements, levels improved on repeat labs. can tolerate PO intake now, will prefer oral replenishment if needed. monitor Ca level.   - Ph noted, no binders   - continue IV hydration with LR at 75 cc/hr for 12 hours.   - encourage oral intake.   - Strict I/O   - Repeat BMP, trend creatinine   - no hydro   - keep lasix, lisinopril, metformin on hold   - BP noted, keep meds current, monitor BP   - Hb noted, check iron stores, monitor h/h.   - Avoid nephrotoxins and hypotension   - no acute indication for RRT at the moment   - will follow

## 2019-08-29 NOTE — PHYSICAL THERAPY INITIAL EVALUATION ADULT - GENERAL OBSERVATIONS, REHAB EVAL
130-200 pm Chart reviewed. Pt. seen out of bed in bedside chair , in No apparent distress, + wynn catheter, IV lock, daughter at bedside

## 2019-08-30 LAB
ANION GAP SERPL CALC-SCNC: 21 MMOL/L — HIGH (ref 7–14)
APPEARANCE UR: ABNORMAL
BACTERIA # UR AUTO: ABNORMAL
BILIRUB UR-MCNC: NEGATIVE — SIGNIFICANT CHANGE UP
BUN SERPL-MCNC: 78 MG/DL — CRITICAL HIGH (ref 10–20)
CALCIUM SERPL-MCNC: 6.9 MG/DL — LOW (ref 8.5–10.1)
CHLORIDE SERPL-SCNC: 98 MMOL/L — SIGNIFICANT CHANGE UP (ref 98–110)
CK SERPL-CCNC: 93 U/L — SIGNIFICANT CHANGE UP (ref 0–225)
CO2 SERPL-SCNC: 17 MMOL/L — SIGNIFICANT CHANGE UP (ref 17–32)
COLOR SPEC: SIGNIFICANT CHANGE UP
CREAT SERPL-MCNC: 4.8 MG/DL — CRITICAL HIGH (ref 0.7–1.5)
DIFF PNL FLD: ABNORMAL
EPI CELLS # UR: 3 /HPF — SIGNIFICANT CHANGE UP (ref 0–5)
GLUCOSE BLDC GLUCOMTR-MCNC: 126 MG/DL — HIGH (ref 70–99)
GLUCOSE BLDC GLUCOMTR-MCNC: 174 MG/DL — HIGH (ref 70–99)
GLUCOSE BLDC GLUCOMTR-MCNC: 180 MG/DL — HIGH (ref 70–99)
GLUCOSE BLDC GLUCOMTR-MCNC: 270 MG/DL — HIGH (ref 70–99)
GLUCOSE BLDC GLUCOMTR-MCNC: 316 MG/DL — HIGH (ref 70–99)
GLUCOSE SERPL-MCNC: 113 MG/DL — HIGH (ref 70–99)
GLUCOSE UR QL: NEGATIVE — SIGNIFICANT CHANGE UP
HCT VFR BLD CALC: 28.8 % — LOW (ref 37–47)
HGB BLD-MCNC: 9.3 G/DL — LOW (ref 12–16)
HYALINE CASTS # UR AUTO: NEGATIVE /LPF — SIGNIFICANT CHANGE UP
KETONES UR-MCNC: NEGATIVE — SIGNIFICANT CHANGE UP
LEUKOCYTE ESTERASE UR-ACNC: ABNORMAL
MCHC RBC-ENTMCNC: 28.4 PG — SIGNIFICANT CHANGE UP (ref 27–31)
MCHC RBC-ENTMCNC: 32.3 G/DL — SIGNIFICANT CHANGE UP (ref 32–37)
MCV RBC AUTO: 87.8 FL — SIGNIFICANT CHANGE UP (ref 81–99)
NITRITE UR-MCNC: NEGATIVE — SIGNIFICANT CHANGE UP
NRBC # BLD: 0 /100 WBCS — SIGNIFICANT CHANGE UP (ref 0–0)
PH UR: 6 — SIGNIFICANT CHANGE UP (ref 5–8)
PLATELET # BLD AUTO: 256 K/UL — SIGNIFICANT CHANGE UP (ref 130–400)
POTASSIUM SERPL-MCNC: 4.3 MMOL/L — SIGNIFICANT CHANGE UP (ref 3.5–5)
POTASSIUM SERPL-SCNC: 4.3 MMOL/L — SIGNIFICANT CHANGE UP (ref 3.5–5)
PROT UR-MCNC: ABNORMAL
RBC # BLD: 3.28 M/UL — LOW (ref 4.2–5.4)
RBC # FLD: 14.5 % — SIGNIFICANT CHANGE UP (ref 11.5–14.5)
RBC CASTS # UR COMP ASSIST: SIGNIFICANT CHANGE UP /HPF (ref 0–4)
SODIUM SERPL-SCNC: 136 MMOL/L — SIGNIFICANT CHANGE UP (ref 135–146)
SP GR SPEC: 1.01 — LOW (ref 1.01–1.02)
UROBILINOGEN FLD QL: SIGNIFICANT CHANGE UP
WBC # BLD: 8.21 K/UL — SIGNIFICANT CHANGE UP (ref 4.8–10.8)
WBC # FLD AUTO: 8.21 K/UL — SIGNIFICANT CHANGE UP (ref 4.8–10.8)
WBC UR QL: 66 /HPF — HIGH (ref 0–5)

## 2019-08-30 PROCEDURE — 99233 SBSQ HOSP IP/OBS HIGH 50: CPT

## 2019-08-30 RX ORDER — SODIUM CHLORIDE 9 MG/ML
1000 INJECTION, SOLUTION INTRAVENOUS
Refills: 0 | Status: DISCONTINUED | OUTPATIENT
Start: 2019-08-30 | End: 2019-08-30

## 2019-08-30 RX ORDER — SODIUM CHLORIDE 9 MG/ML
1000 INJECTION INTRAMUSCULAR; INTRAVENOUS; SUBCUTANEOUS
Refills: 0 | Status: DISCONTINUED | OUTPATIENT
Start: 2019-08-30 | End: 2019-08-30

## 2019-08-30 RX ADMIN — HEPARIN SODIUM 5000 UNIT(S): 5000 INJECTION INTRAVENOUS; SUBCUTANEOUS at 22:34

## 2019-08-30 RX ADMIN — SODIUM CHLORIDE 75 MILLILITER(S): 9 INJECTION, SOLUTION INTRAVENOUS at 10:45

## 2019-08-30 RX ADMIN — HEPARIN SODIUM 5000 UNIT(S): 5000 INJECTION INTRAVENOUS; SUBCUTANEOUS at 06:16

## 2019-08-30 RX ADMIN — PANTOPRAZOLE SODIUM 40 MILLIGRAM(S): 20 TABLET, DELAYED RELEASE ORAL at 06:16

## 2019-08-30 RX ADMIN — Medication 325 MILLIGRAM(S): at 11:40

## 2019-08-30 RX ADMIN — HEPARIN SODIUM 5000 UNIT(S): 5000 INJECTION INTRAVENOUS; SUBCUTANEOUS at 13:18

## 2019-08-30 RX ADMIN — Medication 3: at 17:33

## 2019-08-30 RX ADMIN — CLOPIDOGREL BISULFATE 75 MILLIGRAM(S): 75 TABLET, FILM COATED ORAL at 11:40

## 2019-08-30 RX ADMIN — SIMVASTATIN 10 MILLIGRAM(S): 20 TABLET, FILM COATED ORAL at 22:34

## 2019-08-30 RX ADMIN — Medication 1: at 12:18

## 2019-08-30 RX ADMIN — CHLORHEXIDINE GLUCONATE 1 APPLICATION(S): 213 SOLUTION TOPICAL at 06:16

## 2019-08-30 RX ADMIN — SODIUM CHLORIDE 75 MILLILITER(S): 9 INJECTION INTRAMUSCULAR; INTRAVENOUS; SUBCUTANEOUS at 08:31

## 2019-08-30 NOTE — PROGRESS NOTE ADULT - ASSESSMENT
A 89 yo female with PMH of  HTN, DM type 2, COPD, pulmonary HTN, HFpEF, AFib, TAVR, CAD s/p PCI came to ED c/o lethargy and weakness, she has poor appetite, she was not eating well and her blood sugar was low 40 in AM, she also had mild diarrhea and vomiting, no abdominal pain, she feels like gas sensation in  her belly, she denies any urinary symptoms. In the ED her vital signs /70, , tep 97, labs showed Cr 5.3, Mg 0.5, calcium 6.1    A/p:   Acute Kidney Injury on CKD stage 3  likely from Pre-renal azotemia, diuretics and diarrhea   Hold Lasix and Lisinopril  Abdomen US showed no hydronephrosis, keep العراقي in todya, may remove it tomorrow.   Continue IV fluid, Strict I/Os.   Nephrology follow up    Hypomagnesemia and Hypocalcemia:   likely from diarrhea and/or diuretics.   Diuretics on hold, continue with   Continue with Mg supplement.    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  Continue ferrous sulfate. Check iron studies.   Patient was receiving iron transfusion as Outpatient    Persistent Atrial fibrillation:   rate controlled on metoprolol  not on anticoagulation dut to h/o GI bleed    Chronic HFpEF:   lasix and Lisinopril on hold due to HAL.      #Progress Note Handoff:  Pending (specify):  improving Cr,   Family discussion: with her daughter at bedside, patient will need nephrology follow up,   Disposition: Home vs STR

## 2019-08-30 NOTE — PROGRESS NOTE ADULT - SUBJECTIVE AND OBJECTIVE BOX
Patient is a 89y old Female who presents with a chief complaint of HAL (29 Aug 2019 15:43)    Primary diagnosis of HAL (acute kidney injury)     Today is hospital day 2d.     PAST MEDICAL & SURGICAL HISTORY  Pulmonary hypertension  COPD (chronic obstructive pulmonary disease)  GI bleed  Aortic valve replaced  CAD (coronary artery disease)  Hypertension  GERD (gastroesophageal reflux disease)  Diabetes  Arthritis  S/P TAVR (transcatheter aortic valve replacement)    SOCIAL HISTORY:  Negative for smoking/alcohol/drug use.     ALLERGIES:  penicillins (Other)    MEDICATIONS:  STANDING MEDICATIONS  chlorhexidine 4% Liquid 1 Application(s) Topical <User Schedule>  clopidogrel Tablet 75 milliGRAM(s) Oral daily  dextrose 5%. 1000 milliLiter(s) IV Continuous <Continuous>  dextrose 50% Injectable 12.5 Gram(s) IV Push once  dextrose 50% Injectable 25 Gram(s) IV Push once  dextrose 50% Injectable 25 Gram(s) IV Push once  ferrous    sulfate 325 milliGRAM(s) Oral daily  heparin  Injectable 5000 Unit(s) SubCutaneous every 8 hours  insulin lispro (HumaLOG) corrective regimen sliding scale   SubCutaneous three times a day before meals  pantoprazole    Tablet 40 milliGRAM(s) Oral before breakfast  simvastatin 10 milliGRAM(s) Oral at bedtime  sodium chloride 0.9%. 1000 milliLiter(s) IV Continuous <Continuous>    PRN MEDICATIONS  dextrose 40% Gel 15 Gram(s) Oral once PRN  glucagon  Injectable 1 milliGRAM(s) IntraMuscular once PRN    VITALS:   T(F): 96.1  HR: 96  BP: 106/44  RR: 18  SpO2: --    LABS:                        9.3    8.21  )-----------( 256      ( 30 Aug 2019 06:36 )             28.8     08-30    136  |  98  |  78<HH>  ----------------------------<  113<H>  4.3   |  17  |  4.8<HH>    Ca    6.9<L>      30 Aug 2019 06:36  Phos  5.1     08-29  Mg     1.9     08-29    TPro  6.0  /  Alb  3.5  /  TBili  0.3  /  DBili  x   /  AST  24  /  ALT  15  /  AlkPhos  99  08-29              Culture - Urine (collected 28 Aug 2019 05:30)  Source: .Urine Clean Catch (Midstream)  Final Report (29 Aug 2019 16:45):    No growth      CARDIAC MARKERS ( 28 Aug 2019 11:23 )  x     / 0.05 ng/mL / x     / x     / 5.8 ng/mL      RADIOLOGY:    EXAM:  US KIDNEY(S)            PROCEDURE DATE:  08/29/2019            INTERPRETATION:  CLINICAL HISTORY: Renal failure.    COMPARISON: Correlation made with CT of the abdomen and pelvis from one   day earlier.    PROCEDURE: Retroperitoneal Ultrasound was performed.    FINDINGS:    RIGHT KIDNEY: Normal in echogenicity, size measuring 11.3 cm in length.   No evidence of hydronephrosis, calculus or solid mass. Vascular flow is   demonstrated at the hilum.    LEFT KIDNEY: Normal in echogenicity, size measuring 9.6 cm in length. No   evidence of hydronephrosis, calculus or solid mass. Vascular flow is   demonstrated at the hilum.     URINARY BLADDER: العراقي catheter in place, which limits evaluation.    OTHER: Bilateral pleural effusions and mildascites.    IMPRESSION:  No stones or hydronephrosis.      PHYSICAL EXAM:    General: pallor+  Chest: clear b/l, no wheeze or crackles  CVS: s1s2 heard, rate controlled  GI: mild distension+, BS+, non tender  Neuro: awake and alert, no focal weakness.  Extremity: moves all extremity, chronic dermatitis in b/l LE, trace pedal edema

## 2019-08-30 NOTE — DIETITIAN INITIAL EVALUATION ADULT. - NAME AND PHONE
RD to monitor energy intake, diet order, body composition, NFPF, renal profile. Pt at risk f/u 4 days. MANPREET Marrufo [phone]

## 2019-08-30 NOTE — PROGRESS NOTE ADULT - ASSESSMENT
88 y/o F with HAL presented to hospital for weakness, n/v, diarrhea, decreased oral intake.  PMH Pulm HTN, Hx of empyema, AFIB [no a/c for hx of GIB], DM, HTN, DLD, CAD, s/p TAVR, CHF [diastolic]    # HAL : baseline cr. ~ 1.15 %   - likely prerenal due to diarrhea plus lasix plus lisinopril   - serum creatinine stable   - urine collection in bag noted. please document urine output.   - UA noted for 2.8 gm proteinuria, moderate blood but only 2 RBCs.   - repeat UA, if RBC positive on repeat UA, check ANCA, GLEN, dsDNA, C3/C4, HepB/C, SPEP/UPEP, light chain, IF.   - check CPK   - hypocalcemia, hypomagnesemia noted, s/p IV supplements, levels improved on repeat labs. can tolerate PO intake now, will prefer oral replenishment if needed. monitor Ca level.   - Ph noted, no binders   - continue IV hydration with LR at 75 cc/hr for 12 hours.   - encourage oral intake.   - Strict I/O   - Repeat BMP, trend creatinine   - no hydro   - keep lasix, lisinopril, metformin on hold   - BP noted, keep meds current, monitor BP, keep MAP > 65   - Hb noted, check iron stores, monitor h/h.   - Avoid nephrotoxins and hypotension   - no acute indication for RRT at the moment   - will follow

## 2019-08-30 NOTE — PROGRESS NOTE ADULT - ASSESSMENT
Pre renal HAL  -Likely secondary to diarrhea, vomiting and decreased PO intake.   -Her baseline cr 1.15  -creatinine on admission 5.3  -She patient is on LR 75 cc/ hr. We will stop after 10 hours.  -creatinine is improving, Today it is 4.8, Stable   -We will follow up with the urine electrolytes  -strict intake and output  -Hypocalcemia with corrected calcium of 6.0 on admission. Magnesium was 0.5 on admission. The patient was given 2 grams of calcium and 2 grams of magnesium.   -HAGMA on VBG (24)  -Strict Input/Output  -lasix, lisinopril, metformin were held in the ED, we will continue with holding these medications  -We are following up with the nephrology recommendations    Loose stool:  -The patient reported that she has been having loose stool over the last few days, She denied any recent antibiotics use.    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  -Patient was receiving iron transfusion as Outpatient  -no Bright red blood per rectumor Angie now.    Afib   rate controlled on metoprolol  not on anticoagulation due to h/o GI bleed    Diastolic CHF:  lasix held for now due to HAL  patient has marked limitation in physical activity     History of COPD  symbicort  stable  cxr shows b/l pleural effusion stable from before    Diet: CC/renal  Full code  Dvt ppx: heparin sq

## 2019-08-30 NOTE — PROGRESS NOTE ADULT - ATTENDING COMMENTS
Pt seen examined  Agree with above  Pt with GILLIAN  on CKD 3, severe PAH, TAVR, ascites, DM, proteinuria 2 g/, acidosis, on LR    Gillian - intravascualar volume depletion (diarrhea)?)  slowly improving creat  monitor closely volume status, O2 Sat, Is and Os  Acidosis - start Na bicarb 650 po tid  Hypocalcemia - check 25 VD, iPTH   Proteinuria - likely due to DM  Will follow

## 2019-08-30 NOTE — PROGRESS NOTE ADULT - SUBJECTIVE AND OBJECTIVE BOX
MUKESH POLLACK  89y  Female      Patient is a 89y old  Female who presents with a chief complaint of HAL (30 Aug 2019 12:18)      INTERVAL HPI/OVERNIGHT EVENTS:  She c/o gas sensation in her abdomen, no diarrhea, still with العراقي, no fever.   Vital Signs Last 24 Hrs  T(C): 36.1 (30 Aug 2019 14:15), Max: 36.3 (29 Aug 2019 21:14)  T(F): 97 (30 Aug 2019 14:15), Max: 97.3 (29 Aug 2019 21:14)  HR: 99 (30 Aug 2019 14:15) (91 - 99)  BP: 121/55 (30 Aug 2019 14:15) (104/51 - 121/55)  BP(mean): --  RR: 18 (30 Aug 2019 14:15) (18 - 18)  SpO2: --      19 @ 07:01  -  19 @ 07:00  --------------------------------------------------------  IN: 0 mL / OUT: 1440 mL / NET: -1440 mL    19 @ 07:01  -  19 @ 16:04  --------------------------------------------------------  IN: 210 mL / OUT: 0 mL / NET: 210 mL            Consultant(s) Notes Reviewed:  [x ] YES  [ ] NO          MEDICATIONS  (STANDING):  chlorhexidine 4% Liquid 1 Application(s) Topical <User Schedule>  clopidogrel Tablet 75 milliGRAM(s) Oral daily  dextrose 5%. 1000 milliLiter(s) (50 mL/Hr) IV Continuous <Continuous>  dextrose 50% Injectable 12.5 Gram(s) IV Push once  dextrose 50% Injectable 25 Gram(s) IV Push once  dextrose 50% Injectable 25 Gram(s) IV Push once  ferrous    sulfate 325 milliGRAM(s) Oral daily  heparin  Injectable 5000 Unit(s) SubCutaneous every 8 hours  insulin lispro (HumaLOG) corrective regimen sliding scale   SubCutaneous three times a day before meals  lactated ringers. 1000 milliLiter(s) (75 mL/Hr) IV Continuous <Continuous>  pantoprazole    Tablet 40 milliGRAM(s) Oral before breakfast  simvastatin 10 milliGRAM(s) Oral at bedtime    MEDICATIONS  (PRN):  dextrose 40% Gel 15 Gram(s) Oral once PRN Blood Glucose LESS THAN 70 milliGRAM(s)/deciliter  glucagon  Injectable 1 milliGRAM(s) IntraMuscular once PRN Glucose LESS THAN 70 milligrams/deciliter      LABS                          9.3    8.21  )-----------( 256      ( 30 Aug 2019 06:36 )             28.8         136  |  98  |  78<HH>  ----------------------------<  113<H>  4.3   |  17  |  4.8<HH>    Ca    6.9<L>      30 Aug 2019 06:36  Phos  5.1       Mg     1.9         TPro  6.0  /  Alb  3.5  /  TBili  0.3  /  DBili  x   /  AST  24  /  ALT  15  /  AlkPhos  99        Urinalysis Basic - ( 30 Aug 2019 13:54 )    Color: Light Yellow / Appearance: Slightly Turbid / S.009 / pH: x  Gluc: x / Ketone: Negative  / Bili: Negative / Urobili: <2 mg/dL   Blood: x / Protein: 30 mg/dL / Nitrite: Negative   Leuk Esterase: Large / RBC: x / WBC x   Sq Epi: x / Non Sq Epi: x / Bacteria: x        Lactate Trend   @ 22:36 Lactate:1.6    @ 11:23 Lactate:3.6    @ 02:25 Lactate:3.7     CARDIAC MARKERS ( 30 Aug 2019 13:50 )  x     / x     / 93 U/L / x     / x          CAPILLARY BLOOD GLUCOSE      POCT Blood Glucose.: 174 mg/dL (30 Aug 2019 11:47)      Culture - Urine (collected 19 @ 05:30)  Source: .Urine Clean Catch (Midstream)  Final Report (19 @ 16:45):    No growth        RADIOLOGY & ADDITIONAL TESTS:    Imaging Personally Reviewed:  [ ] YES  [ ] NO    HEALTH ISSUES - PROBLEM Dx:        PHYSICAL EXAM:  GENERAL: NAD, well-developed  HEAD:  Atraumatic, Normocephalic  EYES: EOMI, PERRLA, conjunctiva and sclera clear  NECK: Supple, No JVD  CHEST/LUNG: Clear to auscultation bilaterally; No wheeze  HEART: Irregular rate and rhythm; S1 S2  ABDOMEN: Soft, Nontender, Nondistended; Bowel sounds present  EXTREMITIES:  2+ Peripheral Pulses, No clubbing, cyanosis, or edema  PSYCH: AAOx1  NEUROLOGY: non-focal

## 2019-08-30 NOTE — DIETITIAN INITIAL EVALUATION ADULT. - ENERGY NEEDS
estimated calorie needs: 1045 - 1132 kcals/day (MSJ x 1.2 - 1.3 AF)  estimated protein need: 55 - 66 gms/day (1.0 - 1.2 gm/kg, CKD considered )  estimated fluid needs: 1ml/kcal or per LIP

## 2019-08-30 NOTE — DIETITIAN INITIAL EVALUATION ADULT. - PHYSICAL APPEARANCE
BMI 25.4 Pt reports -121# her lowest weight recently was 115#. No unintentional weight loss./overweight overweight/BMI 25.4 IBW: 95#. Stage I noted. Pt reports -121# her lowest weight recently was 115#. No unintentional weight loss. Observed slight temporal wasting - possibly age -related.

## 2019-08-30 NOTE — PROGRESS NOTE ADULT - SUBJECTIVE AND OBJECTIVE BOX
Nephrology progress note    Patient is seen and examined, events over the last 24 h noted .  no new complaints.    Allergies:  penicillins (Other)    Hospital Medications:   MEDICATIONS  (STANDING):  chlorhexidine 4% Liquid 1 Application(s) Topical <User Schedule>  clopidogrel Tablet 75 milliGRAM(s) Oral daily  dextrose 5%. 1000 milliLiter(s) (50 mL/Hr) IV Continuous <Continuous>  ferrous    sulfate 325 milliGRAM(s) Oral daily  heparin  Injectable 5000 Unit(s) SubCutaneous every 8 hours  insulin lispro (HumaLOG) corrective regimen sliding scale   SubCutaneous three times a day before meals  lactated ringers. 1000 milliLiter(s) (75 mL/Hr) IV Continuous <Continuous>  pantoprazole    Tablet 40 milliGRAM(s) Oral before breakfast  simvastatin 10 milliGRAM(s) Oral at bedtime        VITALS:  T(F): 96.1 (19 @ 05:30), Max: 97.3 (19 @ 21:14)  HR: 96 (19 @ 05:30)  BP: 106/44 (19 @ 05:30)  RR: 18 (19 @ 05:30)       @ 07:  -   @ 07:00  --------------------------------------------------------  IN: 350 mL / OUT: 450 mL / NET: -100 mL     @ 07:  -   @ 07:00  --------------------------------------------------------  IN: 0 mL / OUT: 1440 mL / NET: -1440 mL     @ 07:  -   @ 12:19  --------------------------------------------------------  IN: 210 mL / OUT: 0 mL / NET: 210 mL          PHYSICAL EXAM:  Constitutional: NAD  Respiratory: CTAB, no wheezes, rales or rhonchi  Cardiovascular: S1, S2, RRR  Gastrointestinal: BS+, soft, NT/ND  Extremities: No peripheral edema  :  + wynn.       LABS:      136  |  98  |  78<HH>  ----------------------------<  113<H>  4.3   |  17  |  4.8<HH>    Creatinine Trend: 4.8<--, 5.1<--, 4.8<--, 5.0<--, 4.7<--, 5.3<--    Ca    6.9<L>      30 Aug 2019 06:36  Phos  5.1       Mg     1.9         TPro  6.0  /  Alb  3.5  /  TBili  0.3  /  DBili      /  AST  24  /  ALT  15  /  AlkPhos  99                            9.3    8.21  )-----------( 256      ( 30 Aug 2019 06:36 )             28.8       Urine Studies:  Urinalysis Basic - ( 28 Aug 2019 05:30 )    Color: Light Yellow / Appearance: Slightly Turbid / S.014 / pH:   Gluc:  / Ketone: Negative  / Bili: Negative / Urobili: <2 mg/dL   Blood:  / Protein: 100 mg/dL / Nitrite: Negative   Leuk Esterase: Negative / RBC: 2 /HPF / WBC 2 /HPF   Sq Epi:  / Non Sq Epi: 3 /HPF / Bacteria: Negative      Chloride, Random Urine: 44 ( @ 23:21)  Potassium, Random Urine: 15 mmol/L ( @ 23:21)  Sodium, Random Urine: 56.0 mmoL/L ( @ 23:21)  Protein/Creatinine Ratio Calculation: 2.3 Ratio ( @ 23:21)  Creatinine, Random Urine: 38 mg/dL ( @ 23:21)    RADIOLOGY & ADDITIONAL STUDIES:  < from: US Renal (19 @ 11:04) >  IMPRESSION:  No stones or hydronephrosis.    Bilateral pleural effusions and mild ascites.    < end of copied text > Nephrology progress note    Patient is seen and examined, events over the last 24 h noted .  no new complaints.    Allergies:  penicillins (Other)    Hospital Medications:   MEDICATIONS  (STANDING):  chlorhexidine 4% Liquid 1 Application(s) Topical <User Schedule>  clopidogrel Tablet 75 milliGRAM(s) Oral daily  dextrose 5%. 1000 milliLiter(s) (50 mL/Hr) IV Continuous <Continuous>  ferrous    sulfate 325 milliGRAM(s) Oral daily  heparin  Injectable 5000 Unit(s) SubCutaneous every 8 hours  insulin lispro (HumaLOG) corrective regimen sliding scale   SubCutaneous three times a day before meals  lactated ringers. 1000 milliLiter(s) (75 mL/Hr) IV Continuous <Continuous>  pantoprazole    Tablet 40 milliGRAM(s) Oral before breakfast  simvastatin 10 milliGRAM(s) Oral at bedtime        VITALS:  T(F): 96.1 (19 @ 05:30), Max: 97.3 (19 @ 21:14)  HR: 96 (19 @ 05:30)  BP: 106/44 (19 @ 05:30)  RR: 18 (19 @ 05:30)       @ 07:  -   @ 07:00  --------------------------------------------------------  IN: 350 mL / OUT: 450 mL / NET: -100 mL     @ 07:  -   @ 07:00  --------------------------------------------------------  IN: 0 mL / OUT: 1440 mL / NET: -1440 mL     @ 07:  -   @ 12:19  --------------------------------------------------------  IN: 210 mL / OUT: 0 mL / NET: 210 mL          PHYSICAL EXAM:  Constitutional: NAD  Respiratory: CTAB, no wheezes, rales or rhonchi  Cardiovascular: S1, S2, RRR  Gastrointestinal: BS+, soft, NT distended  Extremities: No peripheral edema  :  + wynn.       LABS:      136  |  98  |  78<HH>  ----------------------------<  113<H>  4.3   |  17  |  4.8<HH>    Creatinine Trend: 4.8<--, 5.1<--, 4.8<--, 5.0<--, 4.7<--, 5.3<--    Ca    6.9<L>      30 Aug 2019 06:36  Phos  5.1       Mg     1.9         TPro  6.0  /  Alb  3.5  /  TBili  0.3  /  DBili      /  AST  24  /  ALT  15  /  AlkPhos  99                            9.3    8.21  )-----------( 256      ( 30 Aug 2019 06:36 )             28.8       Urine Studies:  Urinalysis Basic - ( 28 Aug 2019 05:30 )    Color: Light Yellow / Appearance: Slightly Turbid / S.014 / pH:   Gluc:  / Ketone: Negative  / Bili: Negative / Urobili: <2 mg/dL   Blood:  / Protein: 100 mg/dL / Nitrite: Negative   Leuk Esterase: Negative / RBC: 2 /HPF / WBC 2 /HPF   Sq Epi:  / Non Sq Epi: 3 /HPF / Bacteria: Negative      Chloride, Random Urine: 44 ( @ 23:21)  Potassium, Random Urine: 15 mmol/L ( @ 23:21)  Sodium, Random Urine: 56.0 mmoL/L ( @ 23:21)  Protein/Creatinine Ratio Calculation: 2.3 Ratio ( @ 23:21)  Creatinine, Random Urine: 38 mg/dL ( @ 23:21)    RADIOLOGY & ADDITIONAL STUDIES:  < from: US Renal (19 @ 11:04) >  IMPRESSION:  No stones or hydronephrosis.    Bilateral pleural effusions and mild ascites.    < end of copied text >

## 2019-08-30 NOTE — DIETITIAN INITIAL EVALUATION ADULT. - FACTORS AFF FOOD INTAKE
Pt w/ dentures - has been tolerating food texture so far. Observed pt eating 1/4 sandwich - pt c/o too much food on meal tray. Pt had poor po x few days PTA but now appetite has improved. Followed DM diet at home. Pt's family gave her ensure when she wasn't eating - pt dislikes. Denies other nutrition supplements. Pt w/ dentures - has been tolerating food texture so far. Observed pt eating 1/4 sandwich - pt c/o too much food on meal tray. Pt had poor po x few days PTA but now appetite has improved. Followed DM diet at home. Pt's family gave her ensure when she wasn't eating - pt dislikes. Denies other nutrition supplements. Reviewed menu w/ pt's family

## 2019-08-30 NOTE — DIETITIAN INITIAL EVALUATION ADULT. - OTHER INFO
Pt p/w Pre renal HAL -Likely secondary to diarrhea, vomiting and decreased PO intake. creatinine is improving, Today it is 4.8, Stable. Hypoglycemia -patient was taking metformin and glimeperide even with poor oral intake for last 2-3 days. Diastolic CHF. Normocytic Anemia

## 2019-08-31 LAB
ANION GAP SERPL CALC-SCNC: 18 MMOL/L — HIGH (ref 7–14)
BUN SERPL-MCNC: 69 MG/DL — CRITICAL HIGH (ref 10–20)
CALCIUM SERPL-MCNC: 7.5 MG/DL — LOW (ref 8.5–10.1)
CHLORIDE SERPL-SCNC: 102 MMOL/L — SIGNIFICANT CHANGE UP (ref 98–110)
CO2 SERPL-SCNC: 19 MMOL/L — SIGNIFICANT CHANGE UP (ref 17–32)
CREAT SERPL-MCNC: 4.1 MG/DL — CRITICAL HIGH (ref 0.7–1.5)
GLUCOSE BLDC GLUCOMTR-MCNC: 142 MG/DL — HIGH (ref 70–99)
GLUCOSE BLDC GLUCOMTR-MCNC: 172 MG/DL — HIGH (ref 70–99)
GLUCOSE BLDC GLUCOMTR-MCNC: 195 MG/DL — HIGH (ref 70–99)
GLUCOSE SERPL-MCNC: 204 MG/DL — HIGH (ref 70–99)
HCT VFR BLD CALC: 29.3 % — LOW (ref 37–47)
HGB BLD-MCNC: 9.2 G/DL — LOW (ref 12–16)
MCHC RBC-ENTMCNC: 27.6 PG — SIGNIFICANT CHANGE UP (ref 27–31)
MCHC RBC-ENTMCNC: 31.4 G/DL — LOW (ref 32–37)
MCV RBC AUTO: 88 FL — SIGNIFICANT CHANGE UP (ref 81–99)
NRBC # BLD: 0 /100 WBCS — SIGNIFICANT CHANGE UP (ref 0–0)
PLATELET # BLD AUTO: 247 K/UL — SIGNIFICANT CHANGE UP (ref 130–400)
POTASSIUM SERPL-MCNC: 4.8 MMOL/L — SIGNIFICANT CHANGE UP (ref 3.5–5)
POTASSIUM SERPL-SCNC: 4.8 MMOL/L — SIGNIFICANT CHANGE UP (ref 3.5–5)
RBC # BLD: 3.33 M/UL — LOW (ref 4.2–5.4)
RBC # FLD: 14.3 % — SIGNIFICANT CHANGE UP (ref 11.5–14.5)
SODIUM SERPL-SCNC: 139 MMOL/L — SIGNIFICANT CHANGE UP (ref 135–146)
WBC # BLD: 9.05 K/UL — SIGNIFICANT CHANGE UP (ref 4.8–10.8)
WBC # FLD AUTO: 9.05 K/UL — SIGNIFICANT CHANGE UP (ref 4.8–10.8)

## 2019-08-31 PROCEDURE — 74019 RADEX ABDOMEN 2 VIEWS: CPT | Mod: 26

## 2019-08-31 PROCEDURE — 70450 CT HEAD/BRAIN W/O DYE: CPT | Mod: 26

## 2019-08-31 PROCEDURE — 99233 SBSQ HOSP IP/OBS HIGH 50: CPT

## 2019-08-31 RX ORDER — CALCIUM CARBONATE 500(1250)
2 TABLET ORAL EVERY 8 HOURS
Refills: 0 | Status: DISCONTINUED | OUTPATIENT
Start: 2019-08-31 | End: 2019-09-05

## 2019-08-31 RX ORDER — SODIUM CHLORIDE 9 MG/ML
1000 INJECTION, SOLUTION INTRAVENOUS
Refills: 0 | Status: DISCONTINUED | OUTPATIENT
Start: 2019-08-31 | End: 2019-09-03

## 2019-08-31 RX ORDER — SODIUM BICARBONATE 1 MEQ/ML
650 SYRINGE (ML) INTRAVENOUS EVERY 8 HOURS
Refills: 0 | Status: DISCONTINUED | OUTPATIENT
Start: 2019-08-31 | End: 2019-09-05

## 2019-08-31 RX ADMIN — Medication 2 TABLET(S): at 21:17

## 2019-08-31 RX ADMIN — HEPARIN SODIUM 5000 UNIT(S): 5000 INJECTION INTRAVENOUS; SUBCUTANEOUS at 05:11

## 2019-08-31 RX ADMIN — Medication 650 MILLIGRAM(S): at 21:16

## 2019-08-31 RX ADMIN — CLOPIDOGREL BISULFATE 75 MILLIGRAM(S): 75 TABLET, FILM COATED ORAL at 12:09

## 2019-08-31 RX ADMIN — Medication 2 TABLET(S): at 13:47

## 2019-08-31 RX ADMIN — SIMVASTATIN 10 MILLIGRAM(S): 20 TABLET, FILM COATED ORAL at 21:17

## 2019-08-31 RX ADMIN — HEPARIN SODIUM 5000 UNIT(S): 5000 INJECTION INTRAVENOUS; SUBCUTANEOUS at 21:16

## 2019-08-31 RX ADMIN — Medication 1: at 12:09

## 2019-08-31 RX ADMIN — Medication 325 MILLIGRAM(S): at 12:09

## 2019-08-31 RX ADMIN — Medication 650 MILLIGRAM(S): at 13:47

## 2019-08-31 RX ADMIN — HEPARIN SODIUM 5000 UNIT(S): 5000 INJECTION INTRAVENOUS; SUBCUTANEOUS at 13:47

## 2019-08-31 RX ADMIN — Medication 2: at 08:19

## 2019-08-31 RX ADMIN — PANTOPRAZOLE SODIUM 40 MILLIGRAM(S): 20 TABLET, DELAYED RELEASE ORAL at 05:11

## 2019-08-31 RX ADMIN — SODIUM CHLORIDE 75 MILLILITER(S): 9 INJECTION, SOLUTION INTRAVENOUS at 09:32

## 2019-08-31 RX ADMIN — CHLORHEXIDINE GLUCONATE 1 APPLICATION(S): 213 SOLUTION TOPICAL at 05:11

## 2019-08-31 NOTE — PROGRESS NOTE ADULT - SUBJECTIVE AND OBJECTIVE BOX
24H events:    Patient is a 89y old Female who presents with a chief complaint of HAL (31 Aug 2019 07:57)    Primary diagnosis of HAL (acute kidney injury)     Today is hospital day 3d.     PAST MEDICAL & SURGICAL HISTORY  Pulmonary hypertension  COPD (chronic obstructive pulmonary disease)  GI bleed  Aortic valve replaced  CAD (coronary artery disease)  Hypertension  GERD (gastroesophageal reflux disease)  Diabetes  Arthritis  S/P TAVR (transcatheter aortic valve replacement)    SOCIAL HISTORY:  Negative for smoking/alcohol/drug use.     ALLERGIES:  penicillins (Other)    MEDICATIONS:  STANDING MEDICATIONS  chlorhexidine 4% Liquid 1 Application(s) Topical <User Schedule>  clopidogrel Tablet 75 milliGRAM(s) Oral daily  dextrose 5%. 1000 milliLiter(s) IV Continuous <Continuous>  dextrose 50% Injectable 12.5 Gram(s) IV Push once  dextrose 50% Injectable 25 Gram(s) IV Push once  dextrose 50% Injectable 25 Gram(s) IV Push once  ferrous    sulfate 325 milliGRAM(s) Oral daily  heparin  Injectable 5000 Unit(s) SubCutaneous every 8 hours  insulin lispro (HumaLOG) corrective regimen sliding scale   SubCutaneous three times a day before meals  lactated ringers. 1000 milliLiter(s) IV Continuous <Continuous>  pantoprazole    Tablet 40 milliGRAM(s) Oral before breakfast  simvastatin 10 milliGRAM(s) Oral at bedtime    PRN MEDICATIONS  dextrose 40% Gel 15 Gram(s) Oral once PRN  glucagon  Injectable 1 milliGRAM(s) IntraMuscular once PRN    VITALS:   T(F): 96  HR: 89  BP: 149/67  RR: 18  SpO2: 97%    LABS:                        9.2    9.05  )-----------( 247      ( 31 Aug 2019 07:04 )             29.3         139  |  102  |  69<HH>  ----------------------------<  204<H>  4.8   |  19  |  4.1<HH>    Ca    7.5<L>      31 Aug 2019 07:04  Mg     1.9     08        Urinalysis Basic - ( 30 Aug 2019 13:54 )    Color: Light Yellow / Appearance: Slightly Turbid / S.009 / pH: x  Gluc: x / Ketone: Negative  / Bili: Negative / Urobili: <2 mg/dL   Blood: x / Protein: 30 mg/dL / Nitrite: Negative   Leuk Esterase: Large / RBC: 6-10. /HPF / WBC 66 /HPF   Sq Epi: x / Non Sq Epi: 3 /HPF / Bacteria: Many        Creatine Kinase, Serum: 93 U/L (19 @ 13:50)      CARDIAC MARKERS ( 30 Aug 2019 13:50 )  x     / x     / 93 U/L / x     / x          PHYSICAL EXAM:      General: NAD  Chest: clear bilateral, no wheeze or crackles  CVS: s1s2 heard, rate controlled  GI: mild distension+, BS+, non tender  Neuro: awake and alert, no focal weakness.  Extremity: moves all extremity, chronic dermatitis in b/l LE, trace pedal edema

## 2019-08-31 NOTE — PROGRESS NOTE ADULT - SUBJECTIVE AND OBJECTIVE BOX
seen and examined  no distress  lying comfortable       Standing Inpatient Medications  chlorhexidine 4% Liquid 1 Application(s) Topical <User Schedule>  clopidogrel Tablet 75 milliGRAM(s) Oral daily  dextrose 5%. 1000 milliLiter(s) IV Continuous <Continuous>  dextrose 50% Injectable 12.5 Gram(s) IV Push once  dextrose 50% Injectable 25 Gram(s) IV Push once  dextrose 50% Injectable 25 Gram(s) IV Push once  ferrous    sulfate 325 milliGRAM(s) Oral daily  heparin  Injectable 5000 Unit(s) SubCutaneous every 8 hours  insulin lispro (HumaLOG) corrective regimen sliding scale   SubCutaneous three times a day before meals  pantoprazole    Tablet 40 milliGRAM(s) Oral before breakfast  simvastatin 10 milliGRAM(s) Oral at bedtime      VITALS/PHYSICAL EXAM  --------------------------------------------------------------------------------  T(C): 35.6 (08-31-19 @ 06:00), Max: 37.2 (08-30-19 @ 21:20)  HR: 89 (08-31-19 @ 06:51) (89 - 101)  BP: 149/67 (08-31-19 @ 06:51) (121/55 - 161/68)  RR: 18 (08-31-19 @ 06:00) (18 - 18)  SpO2: 97% (08-31-19 @ 06:51) (97% - 98%)  Wt(kg): --  Height (cm): 147.32 (08-30-19 @ 15:38)      08-30-19 @ 07:01  -  08-31-19 @ 07:00  --------------------------------------------------------  IN: 1155 mL / OUT: 2350 mL / NET: -1195 mL      Physical Exam:  	Gen: NAD  	Pulm: decrease BS  B/L  	CV: S1S2; no rub  	Abd:  soft, nontender/nondistended  	LE: no edema      LABS/STUDIES  --------------------------------------------------------------------------------              9.2    9.05  >-----------<  247      [08-31-19 @ 07:04]              29.3     136  |  98  |  78  ----------------------------<  113      [08-30-19 @ 06:36]  4.3   |  17  |  4.8        Ca     6.9     [08-30-19 @ 06:36]      Mg     1.9     [08-29-19 @ 17:17]      CK 93      [08-30-19 @ 13:50]    Creatinine Trend:  SCr 4.8 [08-30 @ 06:36]  SCr 5.1 [08-29 @ 17:17]  SCr 4.8 [08-29 @ 06:19]  SCr 5.0 [08-28 @ 22:36]  SCr 4.7 [08-28 @ 11:23]    Urinalysis - [08-30-19 @ 13:54]      Color Light Yellow / Appearance Slightly Turbid / SG 1.009 / pH 6.0      Gluc Negative / Ketone Negative  / Bili Negative / Urobili <2 mg/dL       Blood Small / Protein 30 mg/dL / Leuk Est Large / Nitrite Negative      RBC 6-10. / WBC 66 / Hyaline Negative / Gran  / Sq Epi  / Non Sq Epi 3 / Bacteria Many    Urine Creatinine 38      [08-28-19 @ 23:21]  Urine Protein 86      [08-28-19 @ 23:21]  Urine Sodium 56.0      [08-28-19 @ 23:21]  Urine Urea Nitrogen 268      [08-28-19 @ 23:21]  Urine Potassium 15      [08-28-19 @ 23:21]  Urine Chloride 44      [08-28-19 @ 23:21]    HbA1c 7.0      [08-29-19 @ 06:19] seen and examined  no distress  lying comfortable       Standing Inpatient Medications  chlorhexidine 4% Liquid 1 Application(s) Topical <User Schedule>  clopidogrel Tablet 75 milliGRAM(s) Oral daily  dextrose 5%. 1000 milliLiter(s) IV Continuous <Continuous>  dextrose 50% Injectable 12.5 Gram(s) IV Push once  dextrose 50% Injectable 25 Gram(s) IV Push once  dextrose 50% Injectable 25 Gram(s) IV Push once  ferrous    sulfate 325 milliGRAM(s) Oral daily  heparin  Injectable 5000 Unit(s) SubCutaneous every 8 hours  insulin lispro (HumaLOG) corrective regimen sliding scale   SubCutaneous three times a day before meals  pantoprazole    Tablet 40 milliGRAM(s) Oral before breakfast  simvastatin 10 milliGRAM(s) Oral at bedtime      VITALS/PHYSICAL EXAM  --------------------------------------------------------------------------------  T(C): 35.6 (08-31-19 @ 06:00), Max: 37.2 (08-30-19 @ 21:20)  HR: 89 (08-31-19 @ 06:51) (89 - 101)  BP: 149/67 (08-31-19 @ 06:51) (121/55 - 161/68)  RR: 18 (08-31-19 @ 06:00) (18 - 18)  SpO2: 97% (08-31-19 @ 06:51) (97% - 98%)  Wt(kg): --  Height (cm): 147.32 (08-30-19 @ 15:38)      08-30-19 @ 07:01  -  08-31-19 @ 07:00  --------------------------------------------------------  IN: 1155 mL / OUT: 2350 mL / NET: -1195 mL      Physical Exam:  	Gen: NAD  	Pulm: decrease BS  B/L  	CV: S1S2; no rub  	Abd:  soft, nontender/nondistended  	LE: no edema      LABS/STUDIES  --------------------------------------------------------------------------------              9.2    9.05  >-----------<  247      [08-31-19 @ 07:04]              29.3     136  |  98  |  78  ----------------------------<  113      [08-30-19 @ 06:36]  4.3   |  17  |  4.8        Ca     6.9     [08-30-19 @ 06:36]      Mg     1.9     [08-29-19 @ 17:17]  < from: US Renal (08.29.19 @ 11:04) >    No stones or hydronephrosis.    Bilateral pleural effusions and mild ascites.      CK 93      [08-30-19 @ 13:50]    Creatinine Trend:  SCr 4.8 [08-30 @ 06:36]  SCr 5.1 [08-29 @ 17:17]  SCr 4.8 [08-29 @ 06:19]  SCr 5.0 [08-28 @ 22:36]  SCr 4.7 [08-28 @ 11:23]    Urinalysis - [08-30-19 @ 13:54]      Color Light Yellow / Appearance Slightly Turbid / SG 1.009 / pH 6.0      Gluc Negative / Ketone Negative  / Bili Negative / Urobili <2 mg/dL       Blood Small / Protein 30 mg/dL / Leuk Est Large / Nitrite Negative      RBC 6-10. / WBC 66 / Hyaline Negative / Gran  / Sq Epi  / Non Sq Epi 3 / Bacteria Many    Urine Creatinine 38      [08-28-19 @ 23:21]  Urine Protein 86      [08-28-19 @ 23:21]  Urine Sodium 56.0      [08-28-19 @ 23:21]  Urine Urea Nitrogen 268      [08-28-19 @ 23:21]  Urine Potassium 15      [08-28-19 @ 23:21]  Urine Chloride 44      [08-28-19 @ 23:21]    HbA1c 7.0      [08-29-19 @ 06:19]

## 2019-08-31 NOTE — PROGRESS NOTE ADULT - ATTENDING COMMENTS
A 89 yo female with PMH of  HTN, DM type 2, COPD, pulmonary HTN, HFpEF, AFib, TAVR, CAD s/p PCI came to ED c/o lethargy and weakness, she has poor appetite, she was not eating well and her blood sugar was low 40 in AM, she also had mild diarrhea and vomiting, no abdominal pain, she feels like gas sensation in  her belly, she denies any urinary symptoms. In the ED her vital signs /70, , tep 97, labs showed Cr 5.3, Mg 0.5, calcium 6.1    A/p:     #Acute Kidney Injury on  ? CKD stage 3 (baseline Cr around January was 1.1)  likely from Pre-renal azotemia, diuretics and diarrhea   Holding Lasix and Lisinopril  Abdomen US showed no hydronephrosis,   c/w LR @ 75. c/w Wynn. monitor Cr.     #transient blood in wynn bag.   resolved spontaneously. monitor. if it recurs, consult urology.   Most recent UA only shows 6-10 RBCs, + for WBC, LE. - for nitrites. no urinary symptoms, hence don't treat for UTI.     #Reports visual hallucinations per family :   Baseline mental status is good. Even now AAOx3.   Patient feels she may have also had vertigo ?   Check stat CT head to r/o stroke.      #Hypomagnesemia and Hypocalcemia:   likely from diarrhea and/or diuretics.   Diuretics on hold, continue with   Continue with Mg supplement.    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  Continue ferrous sulfate. Check iron studies.   Patient was receiving iron transfusion as Outpatient    #Persistent Atrial fibrillation:   rate controlled on metoprolol  not on anticoagulation dut to h/o GI bleed    #Chronic HFpEF:   lasix and Lisinopril on hold due to HAL.      #Progress Note Handoff:  Pending (specify):  improving Cr,   Family discussion: with her daughter at bedside, patient will need nephrology follow up,   Disposition: Home vs STR A 89 yo female with PMH of  HTN, DM type 2, COPD, pulmonary HTN, HFpEF, AFib, TAVR, CAD s/p PCI came to ED c/o lethargy and weakness, she has poor appetite, she was not eating well and her blood sugar was low 40 in AM, she also had mild diarrhea and vomiting, no abdominal pain, she feels like gas sensation in  her belly, she denies any urinary symptoms. In the ED her vital signs /70, , tep 97, labs showed Cr 5.3, Mg 0.5, calcium 6.1    A/p:     #Acute Kidney Injury on  ? CKD stage 3 (baseline Cr around January was 1.1)  likely from Pre-renal azotemia, diuretics and diarrhea   Holding Lasix and Lisinopril  Abdomen US showed no hydronephrosis,   c/w LR @ 75. c/w Wynn. monitor Cr.     #transient blood in wynn bag.   resolved spontaneously. monitor. if it recurs, consult urology.   Most recent UA only shows 6-10 RBCs, + for WBC, LE. - for nitrites. no urinary symptoms, hence don't treat for UTI.     #Reports visual hallucinations per family :   Baseline mental status is good. Even now AAOx3.   Patient feels she may have also had vertigo ?   Check stat CT head to r/o stroke.      #Abdomen shows gassy distension : upright KUB.     #Hypomagnesemia and Hypocalcemia:   likely from diarrhea and/or diuretics.   Diuretics on hold, continue with   Continue with Mg supplement.    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  Continue ferrous sulfate. Check iron studies.   Patient was receiving iron transfusion as Outpatient    #Persistent Atrial fibrillation:   rate controlled on metoprolol  not on anticoagulation dut to h/o GI bleed    #Chronic HFpEF:   lasix and Lisinopril on hold due to HAL.      #Progress Note Handoff:  Pending (specify):  improving Cr,   Family discussion: with her daughter at bedside, patient will need nephrology follow up,   Disposition: Home vs STR

## 2019-08-31 NOTE — PROGRESS NOTE ADULT - ASSESSMENT
Pt with HAL  on CKD 3, severe PAH, TAVR, ascites, DM, proteinuria 2 g/, acidosis  # ? etiology of HAL  # creatinine stable  # no hydro on sono , non oliguric  # start sodium bicarbonate 650 q 8  # d/c feso4  # check c3, c4 , GLEN, DNA, K/L ratio, IF, hepatitis profile ANCA   #hypocalcemia, check vit D levels, PTH , start calcium carbonate 2 tablets po q 8  # no acute indication for RRT  # will follow

## 2019-08-31 NOTE — PROGRESS NOTE ADULT - ASSESSMENT
Pre renal HAL  -Likely secondary to diarrhea, vomiting and decreased PO intake.   -Her baseline cr 1.15  -creatinine on admission 5.3  -She patient is on LR 75 cc/ hr.   -As per nephrology recommendations, the patient was started on NaHCO3 650 q 8hrs and Calcium carbonate 500 2 tablet PO TID  -creatinine is improving, Today it is 4.1, Slowly improving  -Hypocalcemia with corrected calcium of 6.0 on admission. Magnesium was 0.5 on admission. The patient was given 2 grams of calcium and 2 grams of magnesium.   -HAGMA on VBG (24) on admission.  -Strict Input/Output  -lasix, lisinopril, metformin were held in the ED, we will continue with holding these medications  -We are following up with the nephrology recommendation    Loose stool:  -The patient reported that she has been having loose stool over the last few days, She denied any recent antibiotics use.    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  -Patient was receiving iron transfusion as Outpatient  -no Bright red blood per rectumor Angie now.    Afib   rate controlled on metoprolol  not on anticoagulation due to h/o GI bleed    Diastolic CHF:  lasix held for now due to HAL  patient has marked limitation in physical activity     History of COPD  symbicort  stable  cxr shows b/l pleural effusion stable from before    Diet: CC/renal  Full code  Dvt ppx: heparin sq

## 2019-09-01 LAB
ANION GAP SERPL CALC-SCNC: 18 MMOL/L — HIGH (ref 7–14)
BUN SERPL-MCNC: 60 MG/DL — HIGH (ref 10–20)
CALCIUM SERPL-MCNC: 8.2 MG/DL — LOW (ref 8.5–10.1)
CHLORIDE SERPL-SCNC: 104 MMOL/L — SIGNIFICANT CHANGE UP (ref 98–110)
CO2 SERPL-SCNC: 21 MMOL/L — SIGNIFICANT CHANGE UP (ref 17–32)
CREAT SERPL-MCNC: 3 MG/DL — HIGH (ref 0.7–1.5)
FERRITIN SERPL-MCNC: 378 NG/ML — HIGH (ref 15–150)
GLUCOSE BLDC GLUCOMTR-MCNC: 184 MG/DL — HIGH (ref 70–99)
GLUCOSE BLDC GLUCOMTR-MCNC: 201 MG/DL — HIGH (ref 70–99)
GLUCOSE BLDC GLUCOMTR-MCNC: 205 MG/DL — HIGH (ref 70–99)
GLUCOSE BLDC GLUCOMTR-MCNC: 218 MG/DL — HIGH (ref 70–99)
GLUCOSE SERPL-MCNC: 242 MG/DL — HIGH (ref 70–99)
HCT VFR BLD CALC: 30.7 % — LOW (ref 37–47)
HGB BLD-MCNC: 9.7 G/DL — LOW (ref 12–16)
MCHC RBC-ENTMCNC: 28.4 PG — SIGNIFICANT CHANGE UP (ref 27–31)
MCHC RBC-ENTMCNC: 31.6 G/DL — LOW (ref 32–37)
MCV RBC AUTO: 89.8 FL — SIGNIFICANT CHANGE UP (ref 81–99)
NRBC # BLD: 0 /100 WBCS — SIGNIFICANT CHANGE UP (ref 0–0)
PLATELET # BLD AUTO: 260 K/UL — SIGNIFICANT CHANGE UP (ref 130–400)
POTASSIUM SERPL-MCNC: 5.2 MMOL/L — HIGH (ref 3.5–5)
POTASSIUM SERPL-SCNC: 5.2 MMOL/L — HIGH (ref 3.5–5)
RBC # BLD: 3.42 M/UL — LOW (ref 4.2–5.4)
RBC # FLD: 14.9 % — HIGH (ref 11.5–14.5)
SODIUM SERPL-SCNC: 143 MMOL/L — SIGNIFICANT CHANGE UP (ref 135–146)
WBC # BLD: 9.08 K/UL — SIGNIFICANT CHANGE UP (ref 4.8–10.8)
WBC # FLD AUTO: 9.08 K/UL — SIGNIFICANT CHANGE UP (ref 4.8–10.8)

## 2019-09-01 PROCEDURE — 99233 SBSQ HOSP IP/OBS HIGH 50: CPT

## 2019-09-01 RX ORDER — LANOLIN ALCOHOL/MO/W.PET/CERES
3 CREAM (GRAM) TOPICAL ONCE
Refills: 0 | Status: COMPLETED | OUTPATIENT
Start: 2019-09-01 | End: 2019-09-01

## 2019-09-01 RX ADMIN — Medication 325 MILLIGRAM(S): at 12:11

## 2019-09-01 RX ADMIN — Medication 650 MILLIGRAM(S): at 21:43

## 2019-09-01 RX ADMIN — Medication 2: at 08:09

## 2019-09-01 RX ADMIN — Medication 3 MILLIGRAM(S): at 21:42

## 2019-09-01 RX ADMIN — SIMVASTATIN 10 MILLIGRAM(S): 20 TABLET, FILM COATED ORAL at 21:43

## 2019-09-01 RX ADMIN — CLOPIDOGREL BISULFATE 75 MILLIGRAM(S): 75 TABLET, FILM COATED ORAL at 12:11

## 2019-09-01 RX ADMIN — Medication 2 TABLET(S): at 05:46

## 2019-09-01 RX ADMIN — PANTOPRAZOLE SODIUM 40 MILLIGRAM(S): 20 TABLET, DELAYED RELEASE ORAL at 05:46

## 2019-09-01 RX ADMIN — Medication 650 MILLIGRAM(S): at 05:45

## 2019-09-01 RX ADMIN — HEPARIN SODIUM 5000 UNIT(S): 5000 INJECTION INTRAVENOUS; SUBCUTANEOUS at 21:43

## 2019-09-01 RX ADMIN — Medication 2 TABLET(S): at 21:43

## 2019-09-01 RX ADMIN — HEPARIN SODIUM 5000 UNIT(S): 5000 INJECTION INTRAVENOUS; SUBCUTANEOUS at 14:19

## 2019-09-01 RX ADMIN — Medication 2 TABLET(S): at 14:19

## 2019-09-01 RX ADMIN — CHLORHEXIDINE GLUCONATE 1 APPLICATION(S): 213 SOLUTION TOPICAL at 05:45

## 2019-09-01 RX ADMIN — Medication 650 MILLIGRAM(S): at 14:19

## 2019-09-01 RX ADMIN — Medication 1: at 17:21

## 2019-09-01 RX ADMIN — Medication 2: at 12:10

## 2019-09-01 RX ADMIN — HEPARIN SODIUM 5000 UNIT(S): 5000 INJECTION INTRAVENOUS; SUBCUTANEOUS at 05:46

## 2019-09-01 NOTE — CONSULT NOTE ADULT - ASSESSMENT
89 y/oRHF with PMHx severe pulm htn, COPD, DM II, HTN, Diastolic CHF, Afib not on a/c due to GIB (all workup neg), TAVR (2013 at [facility]), CAD s/p PCI x1 (2013) comes to the ED for decreased appetite, Hypoglycemia of 40s. Patient is currently being taken care of on 3a for HAL and dehydration. As of last night patient was noted to have visual hallucinations where she was seeing people near her bed and felt that her room was upside down. . At baseline patient is very sharp as per family and ambulates with a walker.       R/o seizure      Plan  REEG

## 2019-09-01 NOTE — PROGRESS NOTE ADULT - SUBJECTIVE AND OBJECTIVE BOX
S : no new events/complaints      All other pertinent ROS negative.      08-31-19 @ 07:01 - 09-01-19 @ 07:00  --------------------------------------------------------  IN: 600 mL / OUT: 1400 mL / NET: -800 mL    09-01-19 @ 07:01 - 09-01-19 @ 17:58  --------------------------------------------------------  IN: 450 mL / OUT: 475 mL / NET: -25 mL      Vital Signs Last 24 Hrs  T(C): 36.2 (01 Sep 2019 13:12), Max: 36.2 (01 Sep 2019 13:12)  T(F): 97.2 (01 Sep 2019 13:12), Max: 97.2 (01 Sep 2019 13:12)  HR: 97 (01 Sep 2019 13:12) (97 - 111)  BP: 151/667 (01 Sep 2019 13:12) (151/667 - 169/83)  BP(mean): --  RR: 18 (01 Sep 2019 13:12) (18 - 18)  SpO2: 96% (01 Sep 2019 06:00) (90% - 96%)  PHYSICAL EXAM:    no change from prior    MEDICATIONS:  MEDICATIONS  (STANDING):  calcium carbonate    500 mG (Tums) Chewable 2 Tablet(s) Chew every 8 hours  chlorhexidine 4% Liquid 1 Application(s) Topical <User Schedule>  clopidogrel Tablet 75 milliGRAM(s) Oral daily  dextrose 5%. 1000 milliLiter(s) (50 mL/Hr) IV Continuous <Continuous>  dextrose 50% Injectable 12.5 Gram(s) IV Push once  dextrose 50% Injectable 25 Gram(s) IV Push once  dextrose 50% Injectable 25 Gram(s) IV Push once  ferrous    sulfate 325 milliGRAM(s) Oral daily  heparin  Injectable 5000 Unit(s) SubCutaneous every 8 hours  insulin lispro (HumaLOG) corrective regimen sliding scale   SubCutaneous three times a day before meals  lactated ringers. 1000 milliLiter(s) (75 mL/Hr) IV Continuous <Continuous>  melatonin 3 milliGRAM(s) Oral once  pantoprazole    Tablet 40 milliGRAM(s) Oral before breakfast  simvastatin 10 milliGRAM(s) Oral at bedtime  sodium bicarbonate 650 milliGRAM(s) Oral every 8 hours      LABS: All Labs Reviewed:                        9.7    9.08  )-----------( 260      ( 01 Sep 2019 07:50 )             30.7     09-01    143  |  104  |  60<H>  ----------------------------<  242<H>  5.2<H>   |  21  |  3.0<H>    Ca    8.2<L>      01 Sep 2019 07:50            Blood Culture: 08-28 @ 05:30  Organism --  Gram Stain Blood -- Gram Stain --  Specimen Source .Urine Clean Catch (Midstream)  Culture-Blood --        Radiology: reviewed

## 2019-09-01 NOTE — PROGRESS NOTE ADULT - ASSESSMENT
HAL on CKD 3 cardiac noted   creat 3.0 significantly improved   last calcium 8.2 noted   basic workup in progress

## 2019-09-01 NOTE — CONSULT NOTE ADULT - ATTENDING COMMENTS
Above note reviewed  discussed w/ fellow  Cr improving w/ IVf  likely prereanl  Hypona noted  cont IVf   will follow  plan as above
Pt examined and is back to her baseline.  High functioning works Dialoggy puzzles.   May have been metabolic factors clearing (constipation/dehydration).

## 2019-09-01 NOTE — PROGRESS NOTE ADULT - SUBJECTIVE AND OBJECTIVE BOX
ARIN FOLLOW UP NOTE  --------------------------------------------------------------------------------  Chief Complaint:    24 hour events/subjective:        PAST HISTORY  --------------------------------------------------------------------------------  No significant changes to PMH, PSH, FHx, SHx, unless otherwise noted    ALLERGIES & MEDICATIONS  --------------------------------------------------------------------------------  Allergies    penicillins (Other)    Intolerances      Standing Inpatient Medications  calcium carbonate    500 mG (Tums) Chewable 2 Tablet(s) Chew every 8 hours  chlorhexidine 4% Liquid 1 Application(s) Topical <User Schedule>  clopidogrel Tablet 75 milliGRAM(s) Oral daily  dextrose 5%. 1000 milliLiter(s) IV Continuous <Continuous>  dextrose 50% Injectable 12.5 Gram(s) IV Push once  dextrose 50% Injectable 25 Gram(s) IV Push once  dextrose 50% Injectable 25 Gram(s) IV Push once  ferrous    sulfate 325 milliGRAM(s) Oral daily  heparin  Injectable 5000 Unit(s) SubCutaneous every 8 hours  insulin lispro (HumaLOG) corrective regimen sliding scale   SubCutaneous three times a day before meals  lactated ringers. 1000 milliLiter(s) IV Continuous <Continuous>  pantoprazole    Tablet 40 milliGRAM(s) Oral before breakfast  simvastatin 10 milliGRAM(s) Oral at bedtime  sodium bicarbonate 650 milliGRAM(s) Oral every 8 hours    PRN Inpatient Medications  dextrose 40% Gel 15 Gram(s) Oral once PRN  glucagon  Injectable 1 milliGRAM(s) IntraMuscular once PRN      REVIEW OF SYSTEMS  --------------------------------------------------------------------------------  Gen: No weight changes, fatigue, fevers/chills, weakness  Skin: No rashes  Head/Eyes/Ears/Mouth: No headache; Normal hearing; Normal vision w/o blurriness; No sinus pain/discomfort, sore throat  Respiratory: No dyspnea, cough, wheezing, hemoptysis  CV: No chest pain, PND, orthopnea  GI: No abdominal pain, diarrhea, constipation, nausea, vomiting, melena, hematochezia  : No increased frequency, dysuria, hematuria, nocturia  MSK: No joint pain/swelling; no back pain; no edema  Neuro: No dizziness/lightheadedness, weakness, seizures, numbness, tingling  Heme: No easy bruising or bleeding  Endo: No heat/cold intolerance  Psych: No significant nervousness, anxiety, stress, depression    All other systems were reviewed and are negative, except as noted.    VITALS/PHYSICAL EXAM  --------------------------------------------------------------------------------  T(C): 35.7 (09-01-19 @ 06:00), Max: 36.2 (08-31-19 @ 13:06)  HR: 105 (09-01-19 @ 06:00) (101 - 111)  BP: 167/79 (09-01-19 @ 06:00) (144/60 - 169/83)  RR: 18 (09-01-19 @ 06:00) (18 - 18)  SpO2: 96% (09-01-19 @ 06:00) (90% - 96%)  Wt(kg): --  Height (cm): 147.32 (08-30-19 @ 15:38)      08-31-19 @ 07:01  -  09-01-19 @ 07:00  --------------------------------------------------------  IN: 600 mL / OUT: 1400 mL / NET: -800 mL      Physical Exam:  	Gen: NAD, well-appearing  	HEENT: PERRL, supple neck, clear oropharynx  	Pulm: CTA B/L  	CV: RRR, S1S2; no rub  	Back: No spinal or CVA tenderness; no sacral edema  	Abd: +BS, soft, nontender/nondistended  	: No suprapubic tenderness  	UE: Warm, FROM, no clubbing, intact strength; no edema; no asterixis  	LE: Warm, FROM, no clubbing, intact strength; no edema  	Neuro: No focal deficits, intact gait  	Psych: Normal affect and mood  	Skin: Warm, without rashes  	Vascular access:    LABS/STUDIES  --------------------------------------------------------------------------------              9.7    9.08  >-----------<  260      [09-01-19 @ 07:50]              30.7     143  |  104  |  60  ----------------------------<  242      [09-01-19 @ 07:50]  5.2   |  21  |  3.0        Ca     8.2     [09-01-19 @ 07:50]          CK 93      [08-30-19 @ 13:50]    Creatinine Trend:  SCr 3.0 [09-01 @ 07:50]  SCr 4.1 [08-31 @ 07:04]  SCr 4.8 [08-30 @ 06:36]  SCr 5.1 [08-29 @ 17:17]  SCr 4.8 [08-29 @ 06:19]    Urinalysis - [08-30-19 @ 13:54]      Color Light Yellow / Appearance Slightly Turbid / SG 1.009 / pH 6.0      Gluc Negative / Ketone Negative  / Bili Negative / Urobili <2 mg/dL       Blood Small / Protein 30 mg/dL / Leuk Est Large / Nitrite Negative      RBC 6-10. / WBC 66 / Hyaline Negative / Gran  / Sq Epi  / Non Sq Epi 3 / Bacteria Many    Urine Creatinine 38      [08-28-19 @ 23:21]  Urine Protein 86      [08-28-19 @ 23:21]  Urine Sodium 56.0      [08-28-19 @ 23:21]  Urine Urea Nitrogen 268      [08-28-19 @ 23:21]  Urine Potassium 15      [08-28-19 @ 23:21]  Urine Chloride 44      [08-28-19 @ 23:21]    Ferritin 378      [08-31-19 @ 07:04]  HbA1c 7.0      [08-29-19 @ 06:19]

## 2019-09-01 NOTE — PROGRESS NOTE ADULT - ASSESSMENT
A 89 yo female with PMH of  HTN, DM type 2, COPD, pulmonary HTN, HFpEF, AFib, TAVR, CAD s/p PCI came to ED c/o lethargy and weakness, she has poor appetite, she was not eating well and her blood sugar was low 40 in AM, she also had mild diarrhea and vomiting, no abdominal pain, she feels like gas sensation in  her belly, she denies any urinary symptoms. In the ED her vital signs /70, , tep 97, labs showed Cr 5.3, Mg 0.5, calcium 6.1    A/p:     #Acute Kidney Injury on  ? CKD stage 3 (baseline Cr around January was 1.1)  likely from Pre-renal azotemia, diuretics and diarrhea   Holding Lasix and Lisinopril  Abdomen US showed no hydronephrosis,   c/w LR @ 75. c/w Wynn. monitor Cr. Improving gradually.    #transient blood in wynn bag.   resolved spontaneously. monitor. if it recurs, consult urology.   Most recent UA only shows 6-10 RBCs, + for WBC, LE. - for nitrites. no urinary symptoms, hence don't treat for UTI.     #Reports visual hallucinations per family :   Baseline mental status is good. Even now AAOx3.   Patient feels she may have also had vertigo ?   Check stat CT head to r/o stroke.      #Abdomen shows gassy distension : upright KUB WNL.     #Hypomagnesemia and Hypocalcemia:   likely from diarrhea and/or diuretics.   Diuretics on hold, continue with   Continue with Mg supplement.    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  Continue ferrous sulfate. Check iron studies.   Patient was receiving iron transfusion as Outpatient    #Persistent Atrial fibrillation:   rate controlled on metoprolol  not on anticoagulation dut to h/o GI bleed    #Chronic HFpEF:   lasix and Lisinopril on hold due to HAL.      #Progress Note Handoff:  Pending (specify):  improving Cr,   Family discussion: with her daughter at bedside, patient will need nephrology follow up,   Disposition: Home vs STR . A 89 yo female with PMH of  HTN, DM type 2, COPD, pulmonary HTN, HFpEF, AFib, TAVR, CAD s/p PCI came to ED c/o lethargy and weakness, she has poor appetite, she was not eating well and her blood sugar was low 40 in AM, she also had mild diarrhea and vomiting, no abdominal pain, she feels like gas sensation in  her belly, she denies any urinary symptoms. In the ED her vital signs /70, , tep 97, labs showed Cr 5.3, Mg 0.5, calcium 6.1    A/p:     #Acute Kidney Injury on  ? CKD stage 3 (baseline Cr around January was 1.1)  likely from Pre-renal azotemia, diuretics and diarrhea   Holding Lasix and Lisinopril  Abdomen US showed no hydronephrosis,   c/w LR @ 75. c/w Wynn. monitor Cr. Improving gradually.    #transient blood in wynn bag.   resolved spontaneously. monitor. if it recurs, consult urology.   Most recent UA only shows 6-10 RBCs, + for WBC, LE. - for nitrites. no urinary symptoms, hence don't treat for UTI.     #Reports visual hallucinations since around 8/30 :   Baseline mental status is good. Even now AAOx3.   CT head ruled out stroke.    Neuro consult.  May just be 2/2 change in environment. No h/o dementia though.   She is on no neurotoxic meds.    #Abdomen shows gassy distension : upright KUB WNL.     #Hypomagnesemia and Hypocalcemia:   likely from diarrhea and/or diuretics.   Diuretics on hold, continue with   Continue with Mg supplement.    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  Continue ferrous sulfate. Check iron studies.   Patient was receiving iron transfusion as Outpatient    #Persistent Atrial fibrillation:   rate controlled on metoprolol  not on anticoagulation dut to h/o GI bleed    #Chronic HFpEF:   lasix and Lisinopril on hold due to HAL.      #Progress Note Handoff:  Pending (specify):  improving Cr,   Family discussion: with her daughter at bedside, patient will need nephrology follow up,   Disposition: Home vs STR .

## 2019-09-01 NOTE — CONSULT NOTE ADULT - SUBJECTIVE AND OBJECTIVE BOX
CC: Visual hallucination        HPI:  89 y/o right handed Female with PMHx severe pulm htn, COPD, DM II, HTN, Diastolic CHF, Afib not on a/c due to GIB (2013, had egd+colo+capsule endoscopy done but all workup neg), TAVR (2013 at [facility]), CAD s/p PCI x1 (2013) comes to the ED for decreased appetite, Hypoglycemia of 40s on finger stick in the morning. Patient is currently being taken care of on 3a for HAL and dehydration. As of last night patient was noted to have visual hallucinations where she was seeing people near her bed and felt that her room was upside down. Since this morning patient did not have any of these episodes. At baseline patient is very sharp as per family and ambulates with a walker.           Home Medications:  clopidogrel 75 mg oral tablet: 1 tab(s) orally once a day  Glimepiride 2 mg oral tablet: 1 tab(s) orally once a day   Iron Chews: 325 milligram(s) orally once a day  Lasix 40 mg oral tablet: 1 tab(s) orally 2 times a day, 3x/day every other day  lisinopril 10 mg oral tablet: 1 tab(s) orally once a day  metFORMIN 1000 mg oral tablet, extended release: orally 2 times a day   pantoprazole 40 mg oral delayed release tablet: 1 tab(s) orally once a day (before a meal)  Simvastatin 10 mg oral tablet: 1 tab(s) orally once a day (at bedtime)      Social history  Denies smoking alcohol or drug use    Neuro Exam:  Orientation: oriented to person, place time and situation   Cranial Nerves: visual acuity intact bilaterally, visual fields full to confrontation, pupils equal round and reactive to light, extraocular motion intact, facial sensation intact symmetrically, face symmetrical, hearing was intact bilaterally, tongue and palate midline, head turning and shoulder shrug symmetric and there was no tongue deviation with protrusion.   Motor: 5 /5 upper extremity             4+/5  Lower extremity             Rigid tone of lower extremities right>left  Sensory exam: intact.   Coordination:. No dysmetria or limb ataxia  dtr: 2+/4 upper extremities        0/4 lower extremities   Plantar responses normal on the right, normal on the left.      NIHSS:     Allergies    penicillins (Other)    Intolerances      MEDICATIONS  (STANDING):  calcium carbonate    500 mG (Tums) Chewable 2 Tablet(s) Chew every 8 hours  chlorhexidine 4% Liquid 1 Application(s) Topical <User Schedule>  clopidogrel Tablet 75 milliGRAM(s) Oral daily  dextrose 5%. 1000 milliLiter(s) (50 mL/Hr) IV Continuous <Continuous>  dextrose 50% Injectable 12.5 Gram(s) IV Push once  dextrose 50% Injectable 25 Gram(s) IV Push once  dextrose 50% Injectable 25 Gram(s) IV Push once  ferrous    sulfate 325 milliGRAM(s) Oral daily  heparin  Injectable 5000 Unit(s) SubCutaneous every 8 hours  insulin lispro (HumaLOG) corrective regimen sliding scale   SubCutaneous three times a day before meals  lactated ringers. 1000 milliLiter(s) (75 mL/Hr) IV Continuous <Continuous>  pantoprazole    Tablet 40 milliGRAM(s) Oral before breakfast  simvastatin 10 milliGRAM(s) Oral at bedtime  sodium bicarbonate 650 milliGRAM(s) Oral every 8 hours      MEDICATIONS  (PRN):  dextrose 40% Gel 15 Gram(s) Oral once PRN Blood Glucose LESS THAN 70 milliGRAM(s)/deciliter  glucagon  Injectable 1 milliGRAM(s) IntraMuscular once PRN Glucose LESS THAN 70 milligrams/deciliter      LABS:                        9.7    9.08  )-----------( 260      ( 01 Sep 2019 07:50 )             30.7     09-01    143  |  104  |  60<H>  ----------------------------<  242<H>  5.2<H>   |  21  |  3.0<H>    Ca    8.2<L>      01 Sep 2019 07:50        Hemoglobin A1C, Whole Blood: 7.0 % (08-29 @ 06:19)        Neuro Imaging:  < from: CT Head No Cont (08.31.19 @ 17:22) >  FINDINGS:    The cortical sulci and ventricular system demonstrate parenchymal volume   loss.     No acute intracranial hemorrhage, mass effect, midline shift, or   extra-axial fluid collection.    Gray-white differentiation is grossly well maintained.     There is hypo-attenuation of the tariq-ventricular and subcortical white   matter, consistent with chronic small vessel ischemic changes.    Beam hardening artifact is noted overlying the brain stem and posterior   fossa which is inherent to CT in this location.    The paranasal sinuses and mastoid air cells are well aerated.      IMPRESSION:    1.  Motion artifact limits evaluation of fine detail.    2.  No CT evidence of acute intracranial pathology.    3.  Chronic microvascular ischemic changes.    4.  If the patient continues to be symptomatic follow-up MRI of the brain   may be helpful for further evaluation.          EEG:     Echo:   Carotid Doppler: N/A  Telemetry:

## 2019-09-02 LAB
ANION GAP SERPL CALC-SCNC: 13 MMOL/L — SIGNIFICANT CHANGE UP (ref 7–14)
BUN SERPL-MCNC: 52 MG/DL — HIGH (ref 10–20)
CALCIUM SERPL-MCNC: 8.4 MG/DL — SIGNIFICANT CHANGE UP (ref 8.4–10.5)
CALCIUM SERPL-MCNC: 9.4 MG/DL — SIGNIFICANT CHANGE UP (ref 8.5–10.1)
CHLORIDE SERPL-SCNC: 105 MMOL/L — SIGNIFICANT CHANGE UP (ref 98–110)
CO2 SERPL-SCNC: 24 MMOL/L — SIGNIFICANT CHANGE UP (ref 17–32)
CREAT SERPL-MCNC: 2.3 MG/DL — HIGH (ref 0.7–1.5)
GLUCOSE BLDC GLUCOMTR-MCNC: 188 MG/DL — HIGH (ref 70–99)
GLUCOSE BLDC GLUCOMTR-MCNC: 213 MG/DL — HIGH (ref 70–99)
GLUCOSE BLDC GLUCOMTR-MCNC: 213 MG/DL — HIGH (ref 70–99)
GLUCOSE BLDC GLUCOMTR-MCNC: 235 MG/DL — HIGH (ref 70–99)
GLUCOSE SERPL-MCNC: 178 MG/DL — HIGH (ref 70–99)
HAV IGM SER-ACNC: SIGNIFICANT CHANGE UP
HBV CORE IGM SER-ACNC: SIGNIFICANT CHANGE UP
HBV SURFACE AG SER-ACNC: SIGNIFICANT CHANGE UP
HCT VFR BLD CALC: 30.4 % — LOW (ref 37–47)
HCV AB S/CO SERPL IA: 0.14 S/CO — SIGNIFICANT CHANGE UP (ref 0–0.99)
HCV AB SERPL-IMP: SIGNIFICANT CHANGE UP
HGB BLD-MCNC: 9.5 G/DL — LOW (ref 12–16)
MCHC RBC-ENTMCNC: 27.9 PG — SIGNIFICANT CHANGE UP (ref 27–31)
MCHC RBC-ENTMCNC: 31.3 G/DL — LOW (ref 32–37)
MCV RBC AUTO: 89.1 FL — SIGNIFICANT CHANGE UP (ref 81–99)
NRBC # BLD: 0 /100 WBCS — SIGNIFICANT CHANGE UP (ref 0–0)
PLATELET # BLD AUTO: 279 K/UL — SIGNIFICANT CHANGE UP (ref 130–400)
POTASSIUM SERPL-MCNC: 4.9 MMOL/L — SIGNIFICANT CHANGE UP (ref 3.5–5)
POTASSIUM SERPL-SCNC: 4.9 MMOL/L — SIGNIFICANT CHANGE UP (ref 3.5–5)
PTH-INTACT FLD-MCNC: 168 PG/ML — HIGH (ref 15–65)
RBC # BLD: 3.41 M/UL — LOW (ref 4.2–5.4)
RBC # FLD: 15 % — HIGH (ref 11.5–14.5)
SODIUM SERPL-SCNC: 142 MMOL/L — SIGNIFICANT CHANGE UP (ref 135–146)
WBC # BLD: 8.79 K/UL — SIGNIFICANT CHANGE UP (ref 4.8–10.8)
WBC # FLD AUTO: 8.79 K/UL — SIGNIFICANT CHANGE UP (ref 4.8–10.8)

## 2019-09-02 PROCEDURE — 99222 1ST HOSP IP/OBS MODERATE 55: CPT

## 2019-09-02 PROCEDURE — 99233 SBSQ HOSP IP/OBS HIGH 50: CPT

## 2019-09-02 RX ORDER — LANOLIN ALCOHOL/MO/W.PET/CERES
3 CREAM (GRAM) TOPICAL AT BEDTIME
Refills: 0 | Status: DISCONTINUED | OUTPATIENT
Start: 2019-09-02 | End: 2019-09-05

## 2019-09-02 RX ADMIN — HEPARIN SODIUM 5000 UNIT(S): 5000 INJECTION INTRAVENOUS; SUBCUTANEOUS at 05:35

## 2019-09-02 RX ADMIN — SIMVASTATIN 10 MILLIGRAM(S): 20 TABLET, FILM COATED ORAL at 21:11

## 2019-09-02 RX ADMIN — Medication 3 MILLIGRAM(S): at 21:11

## 2019-09-02 RX ADMIN — HEPARIN SODIUM 5000 UNIT(S): 5000 INJECTION INTRAVENOUS; SUBCUTANEOUS at 14:59

## 2019-09-02 RX ADMIN — Medication 2 TABLET(S): at 05:35

## 2019-09-02 RX ADMIN — Medication 2: at 17:26

## 2019-09-02 RX ADMIN — Medication 2: at 12:10

## 2019-09-02 RX ADMIN — CLOPIDOGREL BISULFATE 75 MILLIGRAM(S): 75 TABLET, FILM COATED ORAL at 11:22

## 2019-09-02 RX ADMIN — Medication 2 TABLET(S): at 14:59

## 2019-09-02 RX ADMIN — CHLORHEXIDINE GLUCONATE 1 APPLICATION(S): 213 SOLUTION TOPICAL at 05:35

## 2019-09-02 RX ADMIN — Medication 650 MILLIGRAM(S): at 05:35

## 2019-09-02 RX ADMIN — Medication 650 MILLIGRAM(S): at 14:58

## 2019-09-02 RX ADMIN — HEPARIN SODIUM 5000 UNIT(S): 5000 INJECTION INTRAVENOUS; SUBCUTANEOUS at 21:11

## 2019-09-02 RX ADMIN — Medication 650 MILLIGRAM(S): at 21:11

## 2019-09-02 RX ADMIN — Medication 2 TABLET(S): at 21:11

## 2019-09-02 RX ADMIN — Medication 1: at 08:32

## 2019-09-02 RX ADMIN — Medication 325 MILLIGRAM(S): at 11:23

## 2019-09-02 RX ADMIN — SODIUM CHLORIDE 75 MILLILITER(S): 9 INJECTION, SOLUTION INTRAVENOUS at 18:03

## 2019-09-02 RX ADMIN — SODIUM CHLORIDE 75 MILLILITER(S): 9 INJECTION, SOLUTION INTRAVENOUS at 05:34

## 2019-09-02 RX ADMIN — PANTOPRAZOLE SODIUM 40 MILLIGRAM(S): 20 TABLET, DELAYED RELEASE ORAL at 05:35

## 2019-09-02 NOTE — PROGRESS NOTE ADULT - ASSESSMENT
Assessment and Plan:     DVT ppx:    GI ppx:     Code Status:     DISPO: Assessment and Plan:     89 yo female w/ PMH of  HTN, DM II, COPD, pulmonary HTN, HFpEF, AFib, TAVR, CAD s/p PCI came to ED c/o lethargy and weakness in the setting of poor appetite & dec PO intake, vomiting, diarrhea found to have Cr of 5.3, Mg .5, Ca 6.1 admitted for HAL.    #Acute Kidney Injury on CKD stage 3 (baseline Cr 1.1)  - AM Cr 2.3, improving from admission level  - likely pre-renal (combo of dec intake, diuretics & diarrhea)  - continue holding lasix & lisinopril  - US showed no hydronephrosis   - c/w LR @ 75.   - will d/c wynn & do TOV    #Transient hematuria in wynn bag  - resolved spontaneously. monitor. if it recurs, consult urology.   - Most recent UA only shows 6-10 RBCs, + for WBC, LE. - for nitrites   - no urinary symptoms, hence hold off UTI tx    #Visual hallucinations, resolved  - back to baseline mental status, A & O x 3  - neuro recs appreciated. will order REEG    - CT head ruled out stroke (no acute pathology)   - May just be 2/2 change in environment. No h/o dementia though  - per son, this has happened on previous admission & w/up at that time was negative    #Abdomen shows gassy distension : upright KUB WNL    #Hypomagnesemia and Hypocalcemia:   - likely from diarrhea and/or diuretics.   - Diuretics on hold   - Continue with Mg supplement    #Normocytic Anemia  - h/o recurrent GI bleed secondary to AVMs in the colonic mucosa  - Continue ferrous sulfate. Check iron studies   - Patient was receiving iron transfusion as Outpatient    #Persistent Atrial fibrillation:   - rate controlled on metoprolol  - not on anticoagulation due to hx of GI bleed    #Chronic HFpEF:   - lasix and Lisinopril on hold due to HAL  - no signs of overload in LE    GI: Protonix  DVT: Hep subq   Code Status: Full   Dispo: pending EEG, anticipate home tomorrow

## 2019-09-02 NOTE — PROGRESS NOTE ADULT - ASSESSMENT
Pt with HAL  on CKD 3, severe PAH, TAVR, ascites, DM, proteinuria 2 g/, acidosis  # AK improving  # creatinine stable  # no hydro on sono , non oliguric  # cont sodium bicarbonate 650 q 8  # d/c feso4  # check c3, c4 , GLEN, DNA, K/L ratio, IF, hepatitis profile ANCA   #hypocalcemia, check vit D levels, PTH , cont  calcium carbonate 2 tablets po q 8  # no acute indication for RRT  # will follow

## 2019-09-02 NOTE — PROGRESS NOTE ADULT - ATTENDING COMMENTS
A 89 yo female with PMH of  HTN, DM type 2, COPD, pulmonary HTN, HFpEF, AFib, TAVR, CAD s/p PCI came to ED c/o lethargy and weakness, she has poor appetite, she was not eating well and her blood sugar was low 40 in AM, she also had mild diarrhea and vomiting, no abdominal pain, she feels like gas sensation in  her belly, she denies any urinary symptoms. In the ED her vital signs /70, , tep 97, labs showed Cr 5.3, Mg 0.5, calcium 6.1    A/p:     #Acute Kidney Injury on  ? CKD stage 3 (baseline Cr around January was 1.1)  likely from Pre-renal azotemia, diuretics and diarrhea   Holding Lasix and Lisinopril  Abdomen US showed no hydronephrosis,   c/w LR @ 75. Cr Improving steadily.  anticipate for d/c tomorrow  FAMILY WANTS HOME VN OR HHA.   passed TOV.       #Reports visual hallucinations since around 8/30 :   Baseline mental status is good. Even now AAOx3.   CT head ruled out stroke.    Neuro consult. EEG  May just be 2/2 change in environment. No h/o dementia though.   She is on no neurotoxic meds.    #Abdomen shows gassy distension : upright KUB WNL.     #Hypomagnesemia and Hypocalcemia:   likely from diarrhea and/or diuretics.   Diuretics on hold, continue with   Continue with Mg supplement.    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  Continue ferrous sulfate. Check iron studies.   Patient was receiving iron transfusion as Outpatient    #Persistent Atrial fibrillation:   rate controlled on metoprolol  not on anticoagulation dut to h/o GI bleed    #Chronic HFpEF:   lasix and Lisinopril on hold due to HAL.      #Progress Note Handoff:  Pending (specify):  improving Cr,   Family discussion: with her daughter at bedside, patient will need nephrology follow up,   Disposition: Home vs STR .

## 2019-09-02 NOTE — PROGRESS NOTE ADULT - SUBJECTIVE AND OBJECTIVE BOX
Hospital Day:  5d    Subjective:    Patient is a 89y old  Female who presents with a chief complaint of decreased po intake admitted for HAL. Over night no acute events. This morning resting comfortably in chair. No complaints. Feels much improved, PO intake has improved. Denies fever, chills, SOB, CP, abdominal pain, n/v, bowel or urinary sxs.       Past Medical Hx:   Pulmonary hypertension  COPD (chronic obstructive pulmonary disease)  GI bleed  Aortic valve replaced  CAD (coronary artery disease)  Hypertension  GERD (gastroesophageal reflux disease)  Diabetes  Arthritis    Past Sx:  S/P TAVR (transcatheter aortic valve replacement)    Allergies:  penicillins (Other)    Current Meds:   Standng Meds:  calcium carbonate    500 mG (Tums) Chewable 2 Tablet(s) Chew every 8 hours  chlorhexidine 4% Liquid 1 Application(s) Topical <User Schedule>  clopidogrel Tablet 75 milliGRAM(s) Oral daily  dextrose 5%. 1000 milliLiter(s) (50 mL/Hr) IV Continuous <Continuous>  dextrose 50% Injectable 12.5 Gram(s) IV Push once  dextrose 50% Injectable 25 Gram(s) IV Push once  dextrose 50% Injectable 25 Gram(s) IV Push once  ferrous    sulfate 325 milliGRAM(s) Oral daily  heparin  Injectable 5000 Unit(s) SubCutaneous every 8 hours  insulin lispro (HumaLOG) corrective regimen sliding scale   SubCutaneous three times a day before meals  lactated ringers. 1000 milliLiter(s) (75 mL/Hr) IV Continuous <Continuous>  pantoprazole    Tablet 40 milliGRAM(s) Oral before breakfast  simvastatin 10 milliGRAM(s) Oral at bedtime  sodium bicarbonate 650 milliGRAM(s) Oral every 8 hours    PRN Meds:  dextrose 40% Gel 15 Gram(s) Oral once PRN Blood Glucose LESS THAN 70 milliGRAM(s)/deciliter  glucagon  Injectable 1 milliGRAM(s) IntraMuscular once PRN Glucose LESS THAN 70 milligrams/deciliter    HOME MEDICATIONS:  clopidogrel 75 mg oral tablet: 1 tab(s) orally once a day  Fish Oil oral capsule: 4000 milligram(s) orally once a day  glimepiride 2 mg oral tablet: 1 tab(s) orally once a day  Iron Chews: 325 milligram(s) orally once a day  Lasix 40 mg oral tablet: 1 tab(s) orally 2 times a day, 3x/day every other day   lisinopril 10 mg oral tablet: 1 tab(s) orally once a day  metFORMIN 1000 mg oral tablet, extended release: orally 2 times a day  pantoprazole 40 mg oral delayed release tablet: 1 tab(s) orally once a day (before a meal)  simvastatin 10 mg oral tablet: 1 tab(s) orally once a day (at bedtime)      Vital Signs:   T(F): 97 (19 @ 13:34), Max: 97.7 (19 @ 20:18)  HR: 107 (19 @ 13:34) (107 - 108)  BP: 156/67 (19 @ 13:34) (144/65 - 176/77)  RR: 18 (19 @ 13:34) (16 - 18)  SpO2: --      19 @ 07:01  -  19 @ 07:00  --------------------------------------------------------  IN: 900 mL / OUT: 975 mL / NET: -75 mL    19 @ 07:01  -  19 @ 13:36  --------------------------------------------------------  IN: 525 mL / OUT: 0 mL / NET: 525 mL        Physical Exam:   GENERAL: NAD  HEENT: NCAT  CHEST/LUNG: CTA bl  HEART: Regular rate and rhythm; s1 s2 appreciated, No murmurs, rubs, or gallops  ABDOMEN: Soft, Nontender, Nondistended; Bowel sounds present  EXTREMITIES: No LE edema b/l  NERVOUS SYSTEM:  Alert & Oriented X3        Labs:                         9.5    8.79  )-----------( 279      ( 02 Sep 2019 06:12 )             30.4       02 Sep 2019 06:12    142    |  105    |  52     ----------------------------<  178    4.9     |  24     |  2.3      Ca    9.4        02 Sep 2019 06:12    Hemoglobin A1C, Whole Blood: 7.0 % (19 @ 06:19)      Troponin --, CKMB --, CK 93 19 @ 13:50    Iron --, TIBC --, %Sat -- Ferritin 378 19 @ 07:04      Urinalysis Basic - ( 30 Aug 2019 13:54 )    Color: Light Yellow / Appearance: Slightly Turbid / S.009 / pH: x  Gluc: x / Ketone: Negative  / Bili: Negative / Urobili: <2 mg/dL   Blood: x / Protein: 30 mg/dL / Nitrite: Negative   Leuk Esterase: Large / RBC: 6-10. /HPF / WBC 66 /HPF   Sq Epi: x / Non Sq Epi: 3 /HPF / Bacteria: Many            Radiology:

## 2019-09-02 NOTE — PROGRESS NOTE ADULT - SUBJECTIVE AND OBJECTIVE BOX
Nephrology progress note    Patient was seen and examined, events over the last 24 h noted .  Cr improved    Allergies:  penicillins (Other)    Hospital Medications:   MEDICATIONS  (STANDING):  calcium carbonate    500 mG (Tums) Chewable 2 Tablet(s) Chew every 8 hours  chlorhexidine 4% Liquid 1 Application(s) Topical <User Schedule>  clopidogrel Tablet 75 milliGRAM(s) Oral daily  dextrose 5%. 1000 milliLiter(s) (50 mL/Hr) IV Continuous <Continuous>  dextrose 50% Injectable 12.5 Gram(s) IV Push once  dextrose 50% Injectable 25 Gram(s) IV Push once  dextrose 50% Injectable 25 Gram(s) IV Push once  ferrous    sulfate 325 milliGRAM(s) Oral daily  heparin  Injectable 5000 Unit(s) SubCutaneous every 8 hours  insulin lispro (HumaLOG) corrective regimen sliding scale   SubCutaneous three times a day before meals  lactated ringers. 1000 milliLiter(s) (75 mL/Hr) IV Continuous <Continuous>  pantoprazole    Tablet 40 milliGRAM(s) Oral before breakfast  simvastatin 10 milliGRAM(s) Oral at bedtime  sodium bicarbonate 650 milliGRAM(s) Oral every 8 hours        VITALS:  T(F): 96.1 (19 @ 05:49), Max: 97.7 (19 @ 20:18)  HR: 107 (19 @ 05:49)  BP: 144/65 (19 @ 05:49)  RR: 18 (19 @ 05:49)  SpO2: --  Wt(kg): --     @ :  -   @ 07:00  --------------------------------------------------------  IN: 600 mL / OUT: 1400 mL / NET: -800 mL     @ 07:  -   @ 07:00  --------------------------------------------------------  IN: 900 mL / OUT: 975 mL / NET: -75 mL          PHYSICAL EXAM:  	Gen: NAD  	Pulm: decrease BS  B/L  	CV: S1S2; no rub  	Abd:  soft, nontender/nondistended  	LE: no edema    LABS:      142  |  105  |  52<H>  ----------------------------<  178<H>  4.9   |  24  |  2.3<H>    Ca    9.4      02 Sep 2019 06:12                            9.5    8.79  )-----------( 279      ( 02 Sep 2019 06:12 )             30.4       Urine Studies:  Urinalysis Basic - ( 30 Aug 2019 13:54 )    Color: Light Yellow / Appearance: Slightly Turbid / S.009 / pH:   Gluc:  / Ketone: Negative  / Bili: Negative / Urobili: <2 mg/dL   Blood:  / Protein: 30 mg/dL / Nitrite: Negative   Leuk Esterase: Large / RBC: 6-10. /HPF / WBC 66 /HPF   Sq Epi:  / Non Sq Epi: 3 /HPF / Bacteria: Many      Chloride, Random Urine: 44 ( @ 23:21)  Potassium, Random Urine: 15 mmol/L ( @ 23:21)  Sodium, Random Urine: 56.0 mmoL/L ( @ 23:21)  Protein/Creatinine Ratio Calculation: 2.3 Ratio ( @ 23:21)  Creatinine, Random Urine: 38 mg/dL ( @ 23:21)    RADIOLOGY & ADDITIONAL STUDIES:

## 2019-09-03 LAB
ALBUMIN SERPL ELPH-MCNC: 3.5 G/DL — SIGNIFICANT CHANGE UP (ref 3.5–5.2)
ALP SERPL-CCNC: 109 U/L — SIGNIFICANT CHANGE UP (ref 30–115)
ALT FLD-CCNC: 14 U/L — SIGNIFICANT CHANGE UP (ref 0–41)
ANION GAP SERPL CALC-SCNC: 12 MMOL/L — SIGNIFICANT CHANGE UP (ref 7–14)
APTT BLD: 30 SEC — SIGNIFICANT CHANGE UP (ref 27–39.2)
AST SERPL-CCNC: 20 U/L — SIGNIFICANT CHANGE UP (ref 0–41)
AUTO DIFF PNL BLD: ABNORMAL
BASOPHILS # BLD AUTO: 0.03 K/UL — SIGNIFICANT CHANGE UP (ref 0–0.2)
BASOPHILS NFR BLD AUTO: 0.4 % — SIGNIFICANT CHANGE UP (ref 0–1)
BILIRUB SERPL-MCNC: 0.3 MG/DL — SIGNIFICANT CHANGE UP (ref 0.2–1.2)
BUN SERPL-MCNC: 44 MG/DL — HIGH (ref 10–20)
C-ANCA SER-ACNC: NEGATIVE — SIGNIFICANT CHANGE UP
C3 SERPL-MCNC: 120 MG/DL — SIGNIFICANT CHANGE UP (ref 81–157)
C4 SERPL-MCNC: 36 MG/DL — SIGNIFICANT CHANGE UP (ref 13–39)
CALCIUM SERPL-MCNC: 9.3 MG/DL — SIGNIFICANT CHANGE UP (ref 8.5–10.1)
CHLORIDE SERPL-SCNC: 107 MMOL/L — SIGNIFICANT CHANGE UP (ref 98–110)
CO2 SERPL-SCNC: 23 MMOL/L — SIGNIFICANT CHANGE UP (ref 17–32)
CREAT SERPL-MCNC: 2 MG/DL — HIGH (ref 0.7–1.5)
D DIMER BLD IA.RAPID-MCNC: 1531 NG/ML DDU — HIGH (ref 0–230)
EOSINOPHIL # BLD AUTO: 0.29 K/UL — SIGNIFICANT CHANGE UP (ref 0–0.7)
EOSINOPHIL NFR BLD AUTO: 3.7 % — SIGNIFICANT CHANGE UP (ref 0–8)
GLUCOSE BLDC GLUCOMTR-MCNC: 171 MG/DL — HIGH (ref 70–99)
GLUCOSE BLDC GLUCOMTR-MCNC: 197 MG/DL — HIGH (ref 70–99)
GLUCOSE BLDC GLUCOMTR-MCNC: 244 MG/DL — HIGH (ref 70–99)
GLUCOSE BLDC GLUCOMTR-MCNC: 247 MG/DL — HIGH (ref 70–99)
GLUCOSE SERPL-MCNC: 174 MG/DL — HIGH (ref 70–99)
HCT VFR BLD CALC: 30.8 % — LOW (ref 37–47)
HGB BLD-MCNC: 9.3 G/DL — LOW (ref 12–16)
IMM GRANULOCYTES NFR BLD AUTO: 0.6 % — HIGH (ref 0.1–0.3)
INR BLD: 1.12 RATIO — SIGNIFICANT CHANGE UP (ref 0.65–1.3)
LYMPHOCYTES # BLD AUTO: 0.83 K/UL — LOW (ref 1.2–3.4)
LYMPHOCYTES # BLD AUTO: 10.6 % — LOW (ref 20.5–51.1)
MAGNESIUM SERPL-MCNC: 1.3 MG/DL — LOW (ref 1.8–2.4)
MCHC RBC-ENTMCNC: 27.9 PG — SIGNIFICANT CHANGE UP (ref 27–31)
MCHC RBC-ENTMCNC: 30.2 G/DL — LOW (ref 32–37)
MCV RBC AUTO: 92.5 FL — SIGNIFICANT CHANGE UP (ref 81–99)
MONOCYTES # BLD AUTO: 0.64 K/UL — HIGH (ref 0.1–0.6)
MONOCYTES NFR BLD AUTO: 8.2 % — SIGNIFICANT CHANGE UP (ref 1.7–9.3)
NEUTROPHILS # BLD AUTO: 5.97 K/UL — SIGNIFICANT CHANGE UP (ref 1.4–6.5)
NEUTROPHILS NFR BLD AUTO: 76.5 % — HIGH (ref 42.2–75.2)
NRBC # BLD: 0 /100 WBCS — SIGNIFICANT CHANGE UP (ref 0–0)
P-ANCA SER-ACNC: NEGATIVE — SIGNIFICANT CHANGE UP
PLATELET # BLD AUTO: 277 K/UL — SIGNIFICANT CHANGE UP (ref 130–400)
POTASSIUM SERPL-MCNC: 5.2 MMOL/L — HIGH (ref 3.5–5)
POTASSIUM SERPL-SCNC: 5.2 MMOL/L — HIGH (ref 3.5–5)
PROT SERPL-MCNC: 6.2 G/DL — SIGNIFICANT CHANGE UP (ref 6–8)
PROTHROM AB SERPL-ACNC: 12.9 SEC — HIGH (ref 9.95–12.87)
RBC # BLD: 3.33 M/UL — LOW (ref 4.2–5.4)
RBC # FLD: 15.3 % — HIGH (ref 11.5–14.5)
SODIUM SERPL-SCNC: 142 MMOL/L — SIGNIFICANT CHANGE UP (ref 135–146)
VIT D25+D1,25 OH+D1,25 PNL SERPL-MCNC: 32.4 PG/ML — SIGNIFICANT CHANGE UP (ref 19.9–79.3)
WBC # BLD: 7.81 K/UL — SIGNIFICANT CHANGE UP (ref 4.8–10.8)
WBC # FLD AUTO: 7.81 K/UL — SIGNIFICANT CHANGE UP (ref 4.8–10.8)

## 2019-09-03 PROCEDURE — 99233 SBSQ HOSP IP/OBS HIGH 50: CPT

## 2019-09-03 PROCEDURE — 78582 LUNG VENTILAT&PERFUS IMAGING: CPT | Mod: 26

## 2019-09-03 PROCEDURE — 93010 ELECTROCARDIOGRAM REPORT: CPT

## 2019-09-03 PROCEDURE — 93970 EXTREMITY STUDY: CPT | Mod: 26

## 2019-09-03 PROCEDURE — 71045 X-RAY EXAM CHEST 1 VIEW: CPT | Mod: 26

## 2019-09-03 RX ORDER — FUROSEMIDE 40 MG
40 TABLET ORAL ONCE
Refills: 0 | Status: COMPLETED | OUTPATIENT
Start: 2019-09-03 | End: 2019-09-03

## 2019-09-03 RX ORDER — HYDRALAZINE HCL 50 MG
50 TABLET ORAL ONCE
Refills: 0 | Status: COMPLETED | OUTPATIENT
Start: 2019-09-03 | End: 2019-09-03

## 2019-09-03 RX ORDER — MAGNESIUM SULFATE 500 MG/ML
2 VIAL (ML) INJECTION ONCE
Refills: 0 | Status: COMPLETED | OUTPATIENT
Start: 2019-09-03 | End: 2019-09-03

## 2019-09-03 RX ORDER — NYSTATIN CREAM 100000 [USP'U]/G
1 CREAM TOPICAL
Refills: 0 | Status: DISCONTINUED | OUTPATIENT
Start: 2019-09-03 | End: 2019-09-05

## 2019-09-03 RX ORDER — METOPROLOL TARTRATE 50 MG
12.5 TABLET ORAL
Refills: 0 | Status: DISCONTINUED | OUTPATIENT
Start: 2019-09-03 | End: 2019-09-05

## 2019-09-03 RX ADMIN — Medication 650 MILLIGRAM(S): at 15:00

## 2019-09-03 RX ADMIN — Medication 650 MILLIGRAM(S): at 22:24

## 2019-09-03 RX ADMIN — Medication 3 MILLIGRAM(S): at 22:24

## 2019-09-03 RX ADMIN — Medication 1: at 08:09

## 2019-09-03 RX ADMIN — Medication 2 TABLET(S): at 05:57

## 2019-09-03 RX ADMIN — Medication 50 GRAM(S): at 14:59

## 2019-09-03 RX ADMIN — HEPARIN SODIUM 5000 UNIT(S): 5000 INJECTION INTRAVENOUS; SUBCUTANEOUS at 15:00

## 2019-09-03 RX ADMIN — Medication 2 TABLET(S): at 22:24

## 2019-09-03 RX ADMIN — NYSTATIN CREAM 1 APPLICATION(S): 100000 CREAM TOPICAL at 17:21

## 2019-09-03 RX ADMIN — Medication 50 MILLIGRAM(S): at 06:15

## 2019-09-03 RX ADMIN — Medication 2 TABLET(S): at 15:00

## 2019-09-03 RX ADMIN — Medication 40 MILLIGRAM(S): at 15:00

## 2019-09-03 RX ADMIN — HEPARIN SODIUM 5000 UNIT(S): 5000 INJECTION INTRAVENOUS; SUBCUTANEOUS at 05:57

## 2019-09-03 RX ADMIN — Medication 2: at 12:36

## 2019-09-03 RX ADMIN — Medication 2: at 17:20

## 2019-09-03 RX ADMIN — SIMVASTATIN 10 MILLIGRAM(S): 20 TABLET, FILM COATED ORAL at 22:24

## 2019-09-03 RX ADMIN — PANTOPRAZOLE SODIUM 40 MILLIGRAM(S): 20 TABLET, DELAYED RELEASE ORAL at 05:56

## 2019-09-03 RX ADMIN — Medication 325 MILLIGRAM(S): at 11:34

## 2019-09-03 RX ADMIN — Medication 12.5 MILLIGRAM(S): at 11:34

## 2019-09-03 RX ADMIN — HEPARIN SODIUM 5000 UNIT(S): 5000 INJECTION INTRAVENOUS; SUBCUTANEOUS at 22:23

## 2019-09-03 RX ADMIN — Medication 650 MILLIGRAM(S): at 05:56

## 2019-09-03 RX ADMIN — Medication 12.5 MILLIGRAM(S): at 17:20

## 2019-09-03 RX ADMIN — NYSTATIN CREAM 1 APPLICATION(S): 100000 CREAM TOPICAL at 05:57

## 2019-09-03 RX ADMIN — CLOPIDOGREL BISULFATE 75 MILLIGRAM(S): 75 TABLET, FILM COATED ORAL at 11:34

## 2019-09-03 NOTE — PROGRESS NOTE ADULT - SUBJECTIVE AND OBJECTIVE BOX
Nephrology progress note    Patient is seen and examined, events over the last 24 h noted .    Allergies:  penicillins (Other)    Hospital Medications:   MEDICATIONS  (STANDING):  calcium carbonate    500 mG (Tums) Chewable 2 Tablet(s) Chew every 8 hours  chlorhexidine 4% Liquid 1 Application(s) Topical <User Schedule>  clopidogrel Tablet 75 milliGRAM(s) Oral daily  dextrose 5%. 1000 milliLiter(s) (50 mL/Hr) IV Continuous <Continuous>  dextrose 50% Injectable 12.5 Gram(s) IV Push once  dextrose 50% Injectable 25 Gram(s) IV Push once  dextrose 50% Injectable 25 Gram(s) IV Push once  ferrous    sulfate 325 milliGRAM(s) Oral daily  heparin  Injectable 5000 Unit(s) SubCutaneous every 8 hours  insulin lispro (HumaLOG) corrective regimen sliding scale   SubCutaneous three times a day before meals  magnesium sulfate  IVPB 2 Gram(s) IV Intermittent once  melatonin 3 milliGRAM(s) Oral at bedtime  metoprolol succinate ER 12.5 milliGRAM(s) Oral two times a day  nystatin Powder 1 Application(s) Topical two times a day  pantoprazole    Tablet 40 milliGRAM(s) Oral before breakfast  simvastatin 10 milliGRAM(s) Oral at bedtime  sodium bicarbonate 650 milliGRAM(s) Oral every 8 hours        VITALS:  T(F): 96.2 (19 @ 06:00), Max: 97 (19 @ 13:34)  HR: 121 (19 @ 08:23)  BP: 120/53 (19 @ 08:23)  RR: 18 (19 @ 06:00)  SpO2: --  Wt(kg): --     @ :  -   @ 07:00  --------------------------------------------------------  IN: 900 mL / OUT: 975 mL / NET: -75 mL     @ 07:01  -   @ 07:00  --------------------------------------------------------  IN: 900 mL / OUT: 650 mL / NET: 250 mL          PHYSICAL EXAM:  Constitutional: NAD  HEENT: anicteric sclera, oropharynx clear, MMM  Neck: No JVD  Respiratory: CTAB, no wheezes, rales or rhonchi  Cardiovascular: S1, S2, RRR  Gastrointestinal: BS+, soft, NT/ND  Extremities: No cyanosis or clubbing. No peripheral edema  :  No wynn.   Skin: No rashes    LABS:      142  |  107  |  44<H>  ----------------------------<  174<H>  5.2<H>   |  23  |  2.0<H>  Creatinine Trend: 2.0<--, 2.3<--, 3.0<--, 4.1<--, 4.8<--, 5.1<--  Ca    9.3      03 Sep 2019 05:59  Mg     1.3         TPro  6.2  /  Alb  3.5  /  TBili  0.3  /  DBili      /  AST  20  /  ALT  14  /  AlkPhos  109                            9.3    7.81  )-----------( 277      ( 03 Sep 2019 05:59 )             30.8       Urine Studies:  Urinalysis Basic - ( 30 Aug 2019 13:54 )    Color: Light Yellow / Appearance: Slightly Turbid / S.009 / pH:   Gluc:  / Ketone: Negative  / Bili: Negative / Urobili: <2 mg/dL   Blood:  / Protein: 30 mg/dL / Nitrite: Negative   Leuk Esterase: Large / RBC: 6-10. /HPF / WBC 66 /HPF   Sq Epi:  / Non Sq Epi: 3 /HPF / Bacteria: Many      Chloride, Random Urine: 44 ( @ 23:21)  Potassium, Random Urine: 15 mmol/L ( @ 23:21)  Sodium, Random Urine: 56.0 mmoL/L ( @ 23:21)  Protein/Creatinine Ratio Calculation: 2.3 Ratio ( @ 23:21)  Creatinine, Random Urine: 38 mg/dL ( @ 23:21)    RADIOLOGY & ADDITIONAL STUDIES: Nephrology progress note    Patient is seen and examined, events over the last 24 h noted .  was having swelling lower ext this morning     Allergies:  penicillins (Other)    Hospital Medications:   MEDICATIONS  (STANDING):  calcium carbonate    500 mG (Tums) Chewable 2 Tablet(s) Chew every 8 hours  clopidogrel Tablet 75 milliGRAM(s) Oral daily  ferrous    sulfate 325 milliGRAM(s) Oral daily  heparin  Injectable 5000 Unit(s) SubCutaneous every 8 hours  insulin lispro (HumaLOG) corrective regimen sliding scale   SubCutaneous three times a day before meals  magnesium sulfate  IVPB 2 Gram(s) IV Intermittent once  melatonin 3 milliGRAM(s) Oral at bedtime  metoprolol succinate ER 12.5 milliGRAM(s) Oral two times a day  nystatin Powder 1 Application(s) Topical two times a day  pantoprazole    Tablet 40 milliGRAM(s) Oral before breakfast  simvastatin 10 milliGRAM(s) Oral at bedtime  sodium bicarbonate 650 milliGRAM(s) Oral every 8 hours        VITALS:  T(F): 96.2 (19 @ 06:00), Max: 97 (19 @ 13:34)  HR: 121 (19 @ 08:23)  BP: 120/53 (19 @ 08:23)  RR: 18 (19 @ 06:00)       @ 07:  -   @ 07:00  --------------------------------------------------------  IN: 900 mL / OUT: 975 mL / NET: -75 mL     @ 07:  -   @ 07:00  --------------------------------------------------------  IN: 900 mL / OUT: 650 mL / NET: 250 mL          PHYSICAL EXAM:  Constitutional: NAD  HEENT: anicteric sclera, oropharynx clear, MMM  Neck: No JVD  Respiratory: CTAB, no wheezes, rales or rhonchi  Cardiovascular: S1, S2, RRR  Gastrointestinal: BS+, soft, NT/ND  Extremities: No cyanosis or clubbing. plus one edema lower ext   :  No wynn.   Skin: No rashes    LABS:      142  |  107  |  44<H>  ----------------------------<  174<H>  5.2<H>   |  23  |  2.0<H>    Creatinine Trend: 2.0<--, 2.3<--, 3.0<--, 4.1<--, 4.8<--, 5.1<--    Ca    9.3      03 Sep 2019 05:59  Mg     1.3         TPro  6.2  /  Alb  3.5  /  TBili  0.3  /  DBili      /  AST  20  /  ALT  14  /  AlkPhos  109                            9.3    7.81  )-----------( 277      ( 03 Sep 2019 05:59 )             30.8       Urine Studies:  Urinalysis Basic - ( 30 Aug 2019 13:54 )    Color: Light Yellow / Appearance: Slightly Turbid / S.009 / pH:   Gluc:  / Ketone: Negative  / Bili: Negative / Urobili: <2 mg/dL   Blood:  / Protein: 30 mg/dL / Nitrite: Negative   Leuk Esterase: Large / RBC: 6-10. /HPF / WBC 66 /HPF   Sq Epi:  / Non Sq Epi: 3 /HPF / Bacteria: Many      Chloride, Random Urine: 44 ( @ 23:21)  Potassium, Random Urine: 15 mmol/L ( @ 23:21)  Sodium, Random Urine: 56.0 mmoL/L ( @ 23:21)  Protein/Creatinine Ratio Calculation: 2.3 Ratio ( @ 23:21)  Creatinine, Random Urine: 38 mg/dL ( @ 23:21)    RADIOLOGY & ADDITIONAL STUDIES:

## 2019-09-03 NOTE — PROGRESS NOTE ADULT - ASSESSMENT
Pt with HAL  on CKD 3, severe PAH, TAVR, ascites, DM, proteinuria 2 g/, acidosis  # HAL improving  # creatinine stable  # no hydro on sono , non oliguric  # cont sodium bicarbonate 650 q 8  # d/c feso4  # check c3, c4 , GLEN, DNA, K/L ratio, IF, hepatitis profile ANCA   #hypocalcemia, check vit D levels, PTH , cont  calcium carbonate 2 tablets po q 8  # will follow Pt with HAL  on CKD 3, severe PAH, TAVR, ascites, DM, proteinuria 2 g/, acidosis    # HAL improving no fluids no diuretics   # creatinine stable  # no hydro on sono , non oliguric  # cont sodium bicarbonate 650 q 8  # d/c feso4  # c3, c4  wnl , GLEN, DNA, K/L ratio, IF, hepatitis profile ANCA pending   #hypocalcemia, PTH noted high copuld be seen in patient with CKD  , cont  calcium carbonate 2 tablets po q 8  # will follow

## 2019-09-03 NOTE — PROGRESS NOTE ADULT - ATTENDING COMMENTS
A 89 yo female with PMH of  HTN, DM type 2, COPD, pulmonary HTN, HFpEF, AFib, TAVR, CAD s/p PCI came to ED c/o lethargy and weakness, she has poor appetite, she was not eating well and her blood sugar was low 40 in AM, she also had mild diarrhea and vomiting, no abdominal pain, she feels like gas sensation in  her belly, she denies any urinary symptoms. In the ED her vital signs /70, , tep 97, labs showed Cr 5.3, Mg 0.5, calcium 6.1    A/p:     #Acute Kidney Injury on  ? CKD stage 3 (baseline Cr around January was 1.1)  likely from Pre-renal azotemia, diuretics and diarrhea. Abdomen US showed no hydronephrosis,   improved tremendously with holding Lasix and Lisinopril and giving LR @ 75 over 3 days.  But today getting RAIN (hyopxic to 87s by walking short distances). At baseline walks well. CXR shows mild congestion.   Give PO lasix 40mg once today and reassess in AM.     #RAIN (since 9/2 night):   LE duplex shows a superficial thrombus in right calf, d dimer high but VQ scan low probability.   May consult Pulm but apparently this is not PE.     #Right calf superficial thrombus:  warm compresses TID. Repeat US in 3 weeks.   Please note patient had some bleeding problems from Xarelto around 2013 and hence it was taken off.   And Jan 2019 she had some AVMs cauterized here. so avoid anticoagulation unless absolutely necessary.    #Reports visual hallucinations since around 8/30 :   Baseline mental status is good. Even now AAOx3.   CT head ruled out stroke.    Neuro consult. EEG  May just be 2/2 change in environment. No h/o dementia though.   She is on no neurotoxic meds.  Now better.    #Abdomen shows gassy distension : upright KUB WNL.     #Hypomagnesemia and Hypocalcemia:   likely from diarrhea and/or diuretics.   Diuretics on hold, continue with   Continue with Mg supplement.    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  Continue ferrous sulfate. Check iron studies.   Patient was receiving iron transfusion as Outpatient    #Persistent Atrial fibrillation:   rate controlled on metoprolol  not on anticoagulation dut to h/o GI bleed    #Chronic HFpEF:   lasix and Lisinopril on hold due to HAL.      #Progress Note Handoff:  Pending (specify):  improving Cr,   Family discussion: with her daughter at bedside, patient will need nephrology follow up,   Disposition: eventual home w/ VN

## 2019-09-03 NOTE — PROGRESS NOTE ADULT - ASSESSMENT
Assessment and Plan:     89 yo female w/ PMH of  HTN, DM II, COPD, pulmonary HTN, HFpEF, AFib, TAVR, CAD s/p PCI came to ED c/o lethargy and weakness in the setting of poor appetite & dec PO intake, vomiting, diarrhea found to have Cr of 5.3, Mg .5, Ca 6.1 admitted for HAL.    # SOB with Hypoxia after ambulation  - episode of OSB overnight. AM CXR shows b/l blunting of CVA 2/2 to fluid  - O2 sat drops to 83% on 2L NC after ambulation, patient also tachycardic during this episode  - D dimer was 1531, duplex shows DVT in R posterior tibial vein w/ lesser saphenous thrombophlebitis  - will order v/q scan, echo & pulm consult     #Acute Kidney Injury on CKD stage 3 (baseline Cr 1.1)  - AM Cr 2.0, improving from admission level  - likely pre-renal (combo of dec intake, diuretics & diarrhea)  - continue holding lasix & lisinopril  - US showed no hydronephrosis   - pt passed TOV    #Transient hematuria in wynn bag  - resolved spontaneously. monitor. if it recurs, consult urology.   - Most recent UA only shows 6-10 RBCs, + for WBC, LE. - for nitrites   - no urinary symptoms, hence hold off UTI tx    #Visual hallucinations, resolved  - back to baseline mental status, A & O x 3  - neuro recs appreciated. EEG negative  - CT head ruled out stroke (no acute pathology)   - May just be 2/2 change in environment. No h/o dementia though  - per son, this has happened on previous admission & w/up at that time was negative    #Abdomen shows gassy distension : upright KUB WNL    #Hypomagnesemia and Hypocalcemia:   - likely from diarrhea and/or diuretics  - Diuretics on hold   - Continue with Mg supplement    #Normocytic Anemia  - h/o recurrent GI bleed secondary to AVMs in the colonic mucosa  - Continue ferrous sulfate. Check iron studies   - Patient was receiving iron transfusion as Outpatient    #Persistent Atrial fibrillation:   - tachycardic this AM, metoprolol was added at home dose  - not on anticoagulation due to hx of GI bleed    #Chronic HFpEF:   - lasix and Lisinopril on hold due to HAL  - no signs of overload in LE    GI: Protonix  DVT: Hep subq   Code Status: Full   Dispo: acute, from home

## 2019-09-03 NOTE — CHART NOTE - NSCHARTNOTEFT_GEN_A_CORE
Registered Dietitian Follow-Up     Patient Profile Reviewed                           Yes [x]   No []     Nutrition History Previously Obtained        Yes x[]  No []       Pertinent Subjective Information: Spoke w/ pt's daughter. Pt is eating good. Pt can benefit from food cut up as pt has had some difficulty chewing. Family is always present for meal times.      Pertinent Medical Interventions: Acute Kidney Injury on CKD stage 3 (baseline Cr 1.1). Transient hematuria in wynn bag. Visual hallucinations, resolved. Hypomagnesemia and Hypocalcemia - likely from diarrhea and/or diuretics. Dispo: pending EEG, anticipate home tomorrow     Diet order: CHO (evening snack)     Anthropometrics:  - Ht. 147.3 cm   - Wt. 55 kg (8/28) no new weights  - %wt change  - BMI 25.4  - IBW     Pertinent Lab Data: (9/3) H/H 9.3/30.8  K5.2  BUN 44  Cr2.0  glucose 174     Pertinent Meds: plavix, heparin, humalog, lasix, MgSO4, tums, sodium bicarb, iron, ppi, zocor     Physical Findings:  - Appearance: OOB chair  - GI function: LBM 9/1 no GI distress  - Tubes:  - Oral/Mouth cavity: dentures   - Skin: BS 17 skin intact      Nutrition Requirements  Weight Used: admission  55kg, needs cont from assessment 8/30     estimated calorie needs: 1045 - 1132 kcals/day (MSJ x 1.2 - 1.3 AF)  estimated protein need: 55 - 66 gms/day (1.0 - 1.2 gm/kg, CKD considered )  estimated fluid needs: 1ml/kcal or per LIP     Nutrient Intake: likely meeting needs        [] Previous Nutrition Diagnosis: Inadequate oral intake             [] Ongoing          [x] Resolved    [] No active nutrition diagnosis identified at this time     Nutrition Intervention meal/snack      Goal/Expected Outcome: Pt to consume >75% of meal tray in 7 dys      Indicator/Monitoring: RD to monitor energy intake, diet order, body composition, NFPF    Recommendation: Add Dysphagia 3 soft thin liquids to current diet order.

## 2019-09-03 NOTE — PROGRESS NOTE ADULT - SUBJECTIVE AND OBJECTIVE BOX
Hospital Day:  6d    Subjective:    Patient is a 89y old  Female who presents with a chief complaint of HAL (03 Sep 2019 10:24). Overnight had episode of SOB after walking to and from the bathroom. This morning SOB resolved but still requiring 2 L NC for hypoxia to low 80s. Denies fevers, chills, CP, abdominal pain, n/v, bowel or urinary sxs, or lower extremity pain or swelling.       Past Medical Hx:   Pulmonary hypertension  COPD (chronic obstructive pulmonary disease)  GI bleed  Aortic valve replaced  CAD (coronary artery disease)  Hypertension  GERD (gastroesophageal reflux disease)  Diabetes  Arthritis    Past Sx:  S/P TAVR (transcatheter aortic valve replacement)    Allergies:  penicillins (Other)    Current Meds:   Standng Meds:  calcium carbonate    500 mG (Tums) Chewable 2 Tablet(s) Chew every 8 hours  chlorhexidine 4% Liquid 1 Application(s) Topical <User Schedule>  clopidogrel Tablet 75 milliGRAM(s) Oral daily  dextrose 5%. 1000 milliLiter(s) (50 mL/Hr) IV Continuous <Continuous>  dextrose 50% Injectable 12.5 Gram(s) IV Push once  dextrose 50% Injectable 25 Gram(s) IV Push once  dextrose 50% Injectable 25 Gram(s) IV Push once  ferrous    sulfate 325 milliGRAM(s) Oral daily  heparin  Injectable 5000 Unit(s) SubCutaneous every 8 hours  insulin lispro (HumaLOG) corrective regimen sliding scale   SubCutaneous three times a day before meals  melatonin 3 milliGRAM(s) Oral at bedtime  metoprolol succinate ER 12.5 milliGRAM(s) Oral two times a day  nystatin Powder 1 Application(s) Topical two times a day  pantoprazole    Tablet 40 milliGRAM(s) Oral before breakfast  simvastatin 10 milliGRAM(s) Oral at bedtime  sodium bicarbonate 650 milliGRAM(s) Oral every 8 hours    PRN Meds:  dextrose 40% Gel 15 Gram(s) Oral once PRN Blood Glucose LESS THAN 70 milliGRAM(s)/deciliter  glucagon  Injectable 1 milliGRAM(s) IntraMuscular once PRN Glucose LESS THAN 70 milligrams/deciliter    HOME MEDICATIONS:  clopidogrel 75 mg oral tablet: 1 tab(s) orally once a day  Fish Oil oral capsule: 4000 milligram(s) orally once a day  glimepiride 2 mg oral tablet: 1 tab(s) orally once a day  Iron Chews: 325 milligram(s) orally once a day  Lasix 40 mg oral tablet: 1 tab(s) orally 2 times a day, 3x/day every other day   lisinopril 10 mg oral tablet: 1 tab(s) orally once a day  metFORMIN 1000 mg oral tablet, extended release: orally 2 times a day  pantoprazole 40 mg oral delayed release tablet: 1 tab(s) orally once a day (before a meal)  simvastatin 10 mg oral tablet: 1 tab(s) orally once a day (at bedtime)      Vital Signs:   T(F): 96.1 (09-03-19 @ 14:30), Max: 96.6 (09-02-19 @ 20:31)  HR: 103 (09-03-19 @ 14:30) (103 - 121)  BP: 131/59 (09-03-19 @ 14:30) (120/53 - 180/82)  RR: 18 (09-03-19 @ 14:30) (16 - 18)  SpO2: 97% (09-03-19 @ 09:00) (97% - 97%)      09-02-19 @ 07:01  -  09-03-19 @ 07:00  --------------------------------------------------------  IN: 900 mL / OUT: 650 mL / NET: 250 mL        Physical Exam:   GENERAL: NAD  HEENT: NCAT  CHEST/LUNG: CTAB  HEART: Regular rate and rhythm; s1 s2 appreciated, No murmurs, rubs, or gallops  ABDOMEN: Soft, Nontender, Nondistended; Bowel sounds present  EXTREMITIES: No LE edema b/l  NERVOUS SYSTEM:  Alert & Oriented X3        Labs:                         9.3    7.81  )-----------( 277      ( 03 Sep 2019 05:59 )             30.8     Neutophil% 76.5, Lymphocyte% 10.6, Monocyte% 8.2, Bands% 0.6 09-03-19 @ 05:59    03 Sep 2019 05:59    142    |  107    |  44     ----------------------------<  174    5.2     |  23     |  2.0      Ca    9.3        03 Sep 2019 05:59  Mg     1.3       03 Sep 2019 05:59    TPro  6.2    /  Alb  3.5    /  TBili  0.3    /  DBili  x      /  AST  20     /  ALT  14     /  AlkPhos  109    03 Sep 2019 05:59       pTT    30.0             ----< 1.12 INR  (09-03-19 @ 11:40)    12.90        PT      Hemoglobin A1C, Whole Blood: 7.0 % (08-29-19 @ 06:19)    Radiology:

## 2019-09-04 ENCOUNTER — TRANSCRIPTION ENCOUNTER (OUTPATIENT)
Age: 84
End: 2019-09-04

## 2019-09-04 LAB
ALBUMIN SERPL ELPH-MCNC: 3.4 G/DL — LOW (ref 3.5–5.2)
ALP SERPL-CCNC: 111 U/L — SIGNIFICANT CHANGE UP (ref 30–115)
ALT FLD-CCNC: 17 U/L — SIGNIFICANT CHANGE UP (ref 0–41)
ANION GAP SERPL CALC-SCNC: 12 MMOL/L — SIGNIFICANT CHANGE UP (ref 7–14)
APTT BLD: 30.5 SEC — SIGNIFICANT CHANGE UP (ref 27–39.2)
AST SERPL-CCNC: 24 U/L — SIGNIFICANT CHANGE UP (ref 0–41)
BASOPHILS # BLD AUTO: 0.04 K/UL — SIGNIFICANT CHANGE UP (ref 0–0.2)
BASOPHILS NFR BLD AUTO: 0.5 % — SIGNIFICANT CHANGE UP (ref 0–1)
BILIRUB SERPL-MCNC: 0.3 MG/DL — SIGNIFICANT CHANGE UP (ref 0.2–1.2)
BUN SERPL-MCNC: 44 MG/DL — HIGH (ref 10–20)
CALCIUM SERPL-MCNC: 9.3 MG/DL — SIGNIFICANT CHANGE UP (ref 8.5–10.1)
CHLORIDE SERPL-SCNC: 106 MMOL/L — SIGNIFICANT CHANGE UP (ref 98–110)
CO2 SERPL-SCNC: 25 MMOL/L — SIGNIFICANT CHANGE UP (ref 17–32)
CREAT SERPL-MCNC: 1.7 MG/DL — HIGH (ref 0.7–1.5)
EOSINOPHIL # BLD AUTO: 0.29 K/UL — SIGNIFICANT CHANGE UP (ref 0–0.7)
EOSINOPHIL NFR BLD AUTO: 3.8 % — SIGNIFICANT CHANGE UP (ref 0–8)
GLUCOSE BLDC GLUCOMTR-MCNC: 138 MG/DL — HIGH (ref 70–99)
GLUCOSE BLDC GLUCOMTR-MCNC: 182 MG/DL — HIGH (ref 70–99)
GLUCOSE BLDC GLUCOMTR-MCNC: 201 MG/DL — HIGH (ref 70–99)
GLUCOSE BLDC GLUCOMTR-MCNC: 210 MG/DL — HIGH (ref 70–99)
GLUCOSE BLDC GLUCOMTR-MCNC: 219 MG/DL — HIGH (ref 70–99)
GLUCOSE SERPL-MCNC: 180 MG/DL — HIGH (ref 70–99)
HCT VFR BLD CALC: 30 % — LOW (ref 37–47)
HGB BLD-MCNC: 9.2 G/DL — LOW (ref 12–16)
IMM GRANULOCYTES NFR BLD AUTO: 0.5 % — HIGH (ref 0.1–0.3)
INR BLD: 1.2 RATIO — SIGNIFICANT CHANGE UP (ref 0.65–1.3)
INTERPRETATION SERPL IFE-IMP: SIGNIFICANT CHANGE UP
LYMPHOCYTES # BLD AUTO: 0.77 K/UL — LOW (ref 1.2–3.4)
LYMPHOCYTES # BLD AUTO: 10.1 % — LOW (ref 20.5–51.1)
MAGNESIUM SERPL-MCNC: 1.6 MG/DL — LOW (ref 1.8–2.4)
MCHC RBC-ENTMCNC: 28.2 PG — SIGNIFICANT CHANGE UP (ref 27–31)
MCHC RBC-ENTMCNC: 30.7 G/DL — LOW (ref 32–37)
MCV RBC AUTO: 92 FL — SIGNIFICANT CHANGE UP (ref 81–99)
MONOCYTES # BLD AUTO: 0.56 K/UL — SIGNIFICANT CHANGE UP (ref 0.1–0.6)
MONOCYTES NFR BLD AUTO: 7.3 % — SIGNIFICANT CHANGE UP (ref 1.7–9.3)
NEUTROPHILS # BLD AUTO: 5.96 K/UL — SIGNIFICANT CHANGE UP (ref 1.4–6.5)
NEUTROPHILS NFR BLD AUTO: 77.8 % — HIGH (ref 42.2–75.2)
NRBC # BLD: 0 /100 WBCS — SIGNIFICANT CHANGE UP (ref 0–0)
PLATELET # BLD AUTO: 271 K/UL — SIGNIFICANT CHANGE UP (ref 130–400)
POTASSIUM SERPL-MCNC: 5.5 MMOL/L — HIGH (ref 3.5–5)
POTASSIUM SERPL-SCNC: 5.5 MMOL/L — HIGH (ref 3.5–5)
PROT SERPL-MCNC: 6.1 G/DL — SIGNIFICANT CHANGE UP (ref 6–8)
PROTHROM AB SERPL-ACNC: 13.8 SEC — HIGH (ref 9.95–12.87)
RBC # BLD: 3.26 M/UL — LOW (ref 4.2–5.4)
RBC # FLD: 15.1 % — HIGH (ref 11.5–14.5)
SODIUM SERPL-SCNC: 143 MMOL/L — SIGNIFICANT CHANGE UP (ref 135–146)
WBC # BLD: 7.66 K/UL — SIGNIFICANT CHANGE UP (ref 4.8–10.8)
WBC # FLD AUTO: 7.66 K/UL — SIGNIFICANT CHANGE UP (ref 4.8–10.8)

## 2019-09-04 PROCEDURE — 71045 X-RAY EXAM CHEST 1 VIEW: CPT | Mod: 26

## 2019-09-04 PROCEDURE — 93306 TTE W/DOPPLER COMPLETE: CPT | Mod: 26

## 2019-09-04 PROCEDURE — 99233 SBSQ HOSP IP/OBS HIGH 50: CPT

## 2019-09-04 RX ORDER — MAGNESIUM SULFATE 500 MG/ML
2 VIAL (ML) INJECTION ONCE
Refills: 0 | Status: COMPLETED | OUTPATIENT
Start: 2019-09-04 | End: 2019-09-04

## 2019-09-04 RX ORDER — LISINOPRIL 2.5 MG/1
1 TABLET ORAL
Qty: 0 | Refills: 0 | DISCHARGE

## 2019-09-04 RX ORDER — CALCIUM CARBONATE 500(1250)
2 TABLET ORAL
Qty: 84 | Refills: 0
Start: 2019-09-04 | End: 2019-09-17

## 2019-09-04 RX ORDER — GLYBURIDE 5 MG
1 TABLET ORAL
Qty: 30 | Refills: 0
Start: 2019-09-04 | End: 2019-10-03

## 2019-09-04 RX ORDER — FUROSEMIDE 40 MG
40 TABLET ORAL
Refills: 0 | Status: DISCONTINUED | OUTPATIENT
Start: 2019-09-04 | End: 2019-09-05

## 2019-09-04 RX ORDER — SODIUM BICARBONATE 1 MEQ/ML
1 SYRINGE (ML) INTRAVENOUS
Qty: 42 | Refills: 0
Start: 2019-09-04 | End: 2019-09-17

## 2019-09-04 RX ORDER — METFORMIN HYDROCHLORIDE 850 MG/1
0 TABLET ORAL
Qty: 0 | Refills: 0 | DISCHARGE

## 2019-09-04 RX ADMIN — Medication 40 MILLIGRAM(S): at 17:30

## 2019-09-04 RX ADMIN — Medication 2 TABLET(S): at 06:12

## 2019-09-04 RX ADMIN — HEPARIN SODIUM 5000 UNIT(S): 5000 INJECTION INTRAVENOUS; SUBCUTANEOUS at 14:38

## 2019-09-04 RX ADMIN — Medication 650 MILLIGRAM(S): at 06:12

## 2019-09-04 RX ADMIN — SIMVASTATIN 10 MILLIGRAM(S): 20 TABLET, FILM COATED ORAL at 21:17

## 2019-09-04 RX ADMIN — NYSTATIN CREAM 1 APPLICATION(S): 100000 CREAM TOPICAL at 17:30

## 2019-09-04 RX ADMIN — Medication 12.5 MILLIGRAM(S): at 06:12

## 2019-09-04 RX ADMIN — Medication 2 TABLET(S): at 21:18

## 2019-09-04 RX ADMIN — Medication 50 GRAM(S): at 14:38

## 2019-09-04 RX ADMIN — Medication 2 TABLET(S): at 14:38

## 2019-09-04 RX ADMIN — Medication 650 MILLIGRAM(S): at 21:17

## 2019-09-04 RX ADMIN — Medication 12.5 MILLIGRAM(S): at 17:30

## 2019-09-04 RX ADMIN — Medication 650 MILLIGRAM(S): at 14:38

## 2019-09-04 RX ADMIN — Medication 325 MILLIGRAM(S): at 12:42

## 2019-09-04 RX ADMIN — Medication 1: at 08:33

## 2019-09-04 RX ADMIN — CLOPIDOGREL BISULFATE 75 MILLIGRAM(S): 75 TABLET, FILM COATED ORAL at 12:42

## 2019-09-04 RX ADMIN — NYSTATIN CREAM 1 APPLICATION(S): 100000 CREAM TOPICAL at 06:13

## 2019-09-04 RX ADMIN — HEPARIN SODIUM 5000 UNIT(S): 5000 INJECTION INTRAVENOUS; SUBCUTANEOUS at 21:18

## 2019-09-04 RX ADMIN — Medication 2: at 12:43

## 2019-09-04 RX ADMIN — Medication 2: at 17:29

## 2019-09-04 RX ADMIN — Medication 3 MILLIGRAM(S): at 21:17

## 2019-09-04 RX ADMIN — PANTOPRAZOLE SODIUM 40 MILLIGRAM(S): 20 TABLET, DELAYED RELEASE ORAL at 06:12

## 2019-09-04 RX ADMIN — HEPARIN SODIUM 5000 UNIT(S): 5000 INJECTION INTRAVENOUS; SUBCUTANEOUS at 06:13

## 2019-09-04 NOTE — DISCHARGE NOTE PROVIDER - HOSPITAL COURSE
88 yo F presents with pre-renal HAL d/t diarrhea and decreased PO intake. Nephrology was consulted, pt was placed on IVF and HAL has steadily trended down throughout hospital course. Hospital course complicated by acute hypoxic respiratory failure likely d/t combined fluid overload and COPD, oxygenation improved at rest with diuresis, however still desaturates when ambulating on room air, home oxygen arranged upon discharge. 89 yo F presents with pre-renal HAL d/t diarrhea and decreased PO intake. Nephrology was consulted, pt was placed on IVF and Cr has steadily trended down throughout hospital course, nearly back to baseline level on day of discharge. Hospital course complicated by acute hypoxic respiratory failure likely d/t combined fluid overload and COPD, oxygenation improved at rest with diuresis, however still desaturates when ambulating on room air, home oxygen arranged upon discharge.

## 2019-09-04 NOTE — DISCHARGE NOTE PROVIDER - NSDCFUSCHEDAPPT_GEN_ALL_CORE_FT
MUKESH POLLACK ; 11/20/2019 ; [facility] [address] MUKESH Starkey ; 11/20/2019 ; [facility] [address] MUKESH POLLACK ; 11/20/2019 ; [facility] [address] MUKESH Starkey ; 11/20/2019 ; [facility] [address] MUKESH POLLACK ; 11/20/2019 ; [facility] [address] MUKESH Starkey ; 11/20/2019 ; [facility] [address] MUKESH POLLACK ; 11/20/2019 ; [facility] [address] MUKESH Starkey ; 11/20/2019 ; [facility] [address] MUKESH POLLACK ; 11/20/2019 ; [facility] [address] MUKESH Starkey ; 11/20/2019 ; [facility] [address]

## 2019-09-04 NOTE — PROGRESS NOTE ADULT - ASSESSMENT
Pt with HAL  on CKD 3, severe PAH, TAVR, ascites, DM, proteinuria 2 g/, acidosis    # HAL improving   # creatinine stable  # if edema worsens may need to use lasix   # no hydro on sono , non oliguric  # cont sodium bicarbonate 650 q 8  # d/c feso4  # c3, c4  wnl , GLEN, DNA, K/L ratio, IF, hepatitis profile ANCA pending   #hypocalcemia, PTH noted high could be seen in patient with CKD  , cont  calcium carbonate 2 tablets po q 8  # will follow

## 2019-09-04 NOTE — PROGRESS NOTE ADULT - SUBJECTIVE AND OBJECTIVE BOX
Hospital Day:  7d    Subjective:    Patient is a 89y old  Female who presents with a chief complaint of HAL (03 Sep 2019 17:26)      Past Medical Hx:   Pulmonary hypertension  COPD (chronic obstructive pulmonary disease)  GI bleed  Aortic valve replaced  CAD (coronary artery disease)  Hypertension  GERD (gastroesophageal reflux disease)  Diabetes  Arthritis    Past Sx:  S/P TAVR (transcatheter aortic valve replacement)    Allergies:  penicillins (Other)    Current Meds:   Standng Meds:  calcium carbonate    500 mG (Tums) Chewable 2 Tablet(s) Chew every 8 hours  chlorhexidine 4% Liquid 1 Application(s) Topical <User Schedule>  clopidogrel Tablet 75 milliGRAM(s) Oral daily  dextrose 5%. 1000 milliLiter(s) (50 mL/Hr) IV Continuous <Continuous>  dextrose 50% Injectable 12.5 Gram(s) IV Push once  dextrose 50% Injectable 25 Gram(s) IV Push once  dextrose 50% Injectable 25 Gram(s) IV Push once  ferrous    sulfate 325 milliGRAM(s) Oral daily  heparin  Injectable 5000 Unit(s) SubCutaneous every 8 hours  insulin lispro (HumaLOG) corrective regimen sliding scale   SubCutaneous three times a day before meals  melatonin 3 milliGRAM(s) Oral at bedtime  metoprolol succinate ER 12.5 milliGRAM(s) Oral two times a day  nystatin Powder 1 Application(s) Topical two times a day  pantoprazole    Tablet 40 milliGRAM(s) Oral before breakfast  simvastatin 10 milliGRAM(s) Oral at bedtime  sodium bicarbonate 650 milliGRAM(s) Oral every 8 hours    PRN Meds:  dextrose 40% Gel 15 Gram(s) Oral once PRN Blood Glucose LESS THAN 70 milliGRAM(s)/deciliter  glucagon  Injectable 1 milliGRAM(s) IntraMuscular once PRN Glucose LESS THAN 70 milligrams/deciliter    HOME MEDICATIONS:  clopidogrel 75 mg oral tablet: 1 tab(s) orally once a day  Fish Oil oral capsule: 4000 milligram(s) orally once a day  glimepiride 2 mg oral tablet: 1 tab(s) orally once a day  Iron Chews: 325 milligram(s) orally once a day  Lasix 40 mg oral tablet: 1 tab(s) orally 2 times a day, 3x/day every other day   lisinopril 10 mg oral tablet: 1 tab(s) orally once a day  metFORMIN 1000 mg oral tablet, extended release: orally 2 times a day  pantoprazole 40 mg oral delayed release tablet: 1 tab(s) orally once a day (before a meal)  simvastatin 10 mg oral tablet: 1 tab(s) orally once a day (at bedtime)      Vital Signs:   T(F): 96 (09-03-19 @ 22:09), Max: 96.1 (09-03-19 @ 14:30)  HR: 90 (09-03-19 @ 22:09) (90 - 121)  BP: 137/62 (09-03-19 @ 22:09) (120/53 - 137/62)  RR: 18 (09-03-19 @ 22:09) (18 - 18)  SpO2: 97% (09-03-19 @ 09:00) (97% - 97%)      09-03-19 @ 07:01  -  09-04-19 @ 07:00  --------------------------------------------------------  IN: 0 mL / OUT: 600 mL / NET: -600 mL        Physical Exam:   GENERAL: NAD  HEENT: NCAT  CHEST/LUNG: CTAB  HEART: Regular rate and rhythm; s1 s2 appreciated, No murmurs, rubs, or gallops  ABDOMEN: Soft, Nontender, Nondistended; Bowel sounds present  EXTREMITIES: No LE edema b/l  NERVOUS SYSTEM:  Alert & Oriented X3        Labs:                         9.2    7.66  )-----------( 271      ( 04 Sep 2019 05:55 )             30.0     Neutophil% 77.8, Lymphocyte% 10.1, Monocyte% 7.3, Bands% 0.5 09-04-19 @ 05:55    03 Sep 2019 05:59    142    |  107    |  44     ----------------------------<  174    5.2     |  23     |  2.0      Ca    9.3        04 Sep 2019 05:55  Mg     1.3       03 Sep 2019 05:59    TPro  6.2    /  Alb  3.5    /  TBili  0.3    /  DBili  x      /  AST  20     /  ALT  14     /  AlkPhos  109    03 Sep 2019 05:59       pTT    30.5             ----< 1.20 INR  (09-04-19 @ 05:55)    13.80        PT,    pTT    30.0             ----< 1.12 INR  (09-03-19 @ 11:40)    12.90        PT      Hemoglobin A1C, Whole Blood: 7.0 % (08-29-19 @ 06:19)      Radiology:    < from: [facility] Duplex Lower Ext Vein Scan, Bilat (09.03.19 @ 13:42) >  Impression:  Acute right posterior tibial vein DVT. Right lesser saphenous vein   superficial thrombophlebitis.  No evidence of thrombus left lower extremity.  < end of copied text >    < from: [facility] Pulmonary Ventilation/Perfusion Scan (09.03.19 @ 16:10) >  IMPRESSION:     LOW PROBABILITY FOR PULMONARY EMBOLISM.  SEGMENTAL IN CHARACTER PERFUSION DEFECTS IN THE RIGHT LOWER LOBE AND LEFT   LOWER LOBE COMPLETELY MATCHED BY VENTILATION SCAN ABNORMALITIES.  Spoke with NISHANT PRESLEY on 9/3/2019 4:28 PM with readback.  < end of copied text >    < from: Xray Chest 1 View-PORTABLE IMMEDIATE (09.03.19 @ 06:08) >  Impression:    Opacifications and effusions as described. Accounting for technique   without change.  < end of copied text > Hospital Day:  7d    Subjective:    Patient is a 89y old  Female who presents with a chief complaint of HAL. Overnight no acute events. This morning resting comfortably without complaints. ROS is + for SOB when ambulating.    Past Medical Hx:   Pulmonary hypertension  COPD (chronic obstructive pulmonary disease)  GI bleed  Aortic valve replaced  CAD (coronary artery disease)  Hypertension  GERD (gastroesophageal reflux disease)  Diabetes  Arthritis    Past Sx:  S/P TAVR (transcatheter aortic valve replacement)    Allergies:  penicillins (Other)    Current Meds:   Standng Meds:  calcium carbonate    500 mG (Tums) Chewable 2 Tablet(s) Chew every 8 hours  chlorhexidine 4% Liquid 1 Application(s) Topical <User Schedule>  clopidogrel Tablet 75 milliGRAM(s) Oral daily  dextrose 5%. 1000 milliLiter(s) (50 mL/Hr) IV Continuous <Continuous>  dextrose 50% Injectable 12.5 Gram(s) IV Push once  dextrose 50% Injectable 25 Gram(s) IV Push once  dextrose 50% Injectable 25 Gram(s) IV Push once  ferrous    sulfate 325 milliGRAM(s) Oral daily  heparin  Injectable 5000 Unit(s) SubCutaneous every 8 hours  insulin lispro (HumaLOG) corrective regimen sliding scale   SubCutaneous three times a day before meals  melatonin 3 milliGRAM(s) Oral at bedtime  metoprolol succinate ER 12.5 milliGRAM(s) Oral two times a day  nystatin Powder 1 Application(s) Topical two times a day  pantoprazole    Tablet 40 milliGRAM(s) Oral before breakfast  simvastatin 10 milliGRAM(s) Oral at bedtime  sodium bicarbonate 650 milliGRAM(s) Oral every 8 hours    PRN Meds:  dextrose 40% Gel 15 Gram(s) Oral once PRN Blood Glucose LESS THAN 70 milliGRAM(s)/deciliter  glucagon  Injectable 1 milliGRAM(s) IntraMuscular once PRN Glucose LESS THAN 70 milligrams/deciliter    HOME MEDICATIONS:  clopidogrel 75 mg oral tablet: 1 tab(s) orally once a day  Fish Oil oral capsule: 4000 milligram(s) orally once a day  glimepiride 2 mg oral tablet: 1 tab(s) orally once a day  Iron Chews: 325 milligram(s) orally once a day  Lasix 40 mg oral tablet: 1 tab(s) orally 2 times a day, 3x/day every other day   lisinopril 10 mg oral tablet: 1 tab(s) orally once a day  metFORMIN 1000 mg oral tablet, extended release: orally 2 times a day  pantoprazole 40 mg oral delayed release tablet: 1 tab(s) orally once a day (before a meal)  simvastatin 10 mg oral tablet: 1 tab(s) orally once a day (at bedtime)      Vital Signs:   T(F): 96 (09-03-19 @ 22:09), Max: 96.1 (09-03-19 @ 14:30)  HR: 90 (09-03-19 @ 22:09) (90 - 121)  BP: 137/62 (09-03-19 @ 22:09) (120/53 - 137/62)  RR: 18 (09-03-19 @ 22:09) (18 - 18)  SpO2: 97% (09-03-19 @ 09:00) (97% - 97%)      09-03-19 @ 07:01  -  09-04-19 @ 07:00  --------------------------------------------------------  IN: 0 mL / OUT: 600 mL / NET: -600 mL        Physical Exam:   GENERAL: NAD  HEENT: NCAT  CHEST/LUNG: CTA bl  HEART: Regular rate and rhythm; s1 s2 appreciated, No murmurs, rubs, or gallops  ABDOMEN: Soft, Nontender, Nondistended; Bowel sounds present  EXTREMITIES: No LE edema b/l  NERVOUS SYSTEM:  Alert & Oriented X3        Labs:                         9.2    7.66  )-----------( 271      ( 04 Sep 2019 05:55 )             30.0     Neutophil% 77.8, Lymphocyte% 10.1, Monocyte% 7.3, Bands% 0.5 09-04-19 @ 05:55    03 Sep 2019 05:59    142    |  107    |  44     ----------------------------<  174    5.2     |  23     |  2.0      Ca    9.3        04 Sep 2019 05:55  Mg     1.3       03 Sep 2019 05:59    TPro  6.2    /  Alb  3.5    /  TBili  0.3    /  DBili  x      /  AST  20     /  ALT  14     /  AlkPhos  109    03 Sep 2019 05:59       pTT    30.5             ----< 1.20 INR  (09-04-19 @ 05:55)    13.80        PT,    pTT    30.0             ----< 1.12 INR  (09-03-19 @ 11:40)    12.90        PT      Hemoglobin A1C, Whole Blood: 7.0 % (08-29-19 @ 06:19)      Radiology:    < from: [facility] Duplex Lower Ext Vein Scan, Bilat (09.03.19 @ 13:42) >  Impression:  Acute right posterior tibial vein DVT. Right lesser saphenous vein   superficial thrombophlebitis.  No evidence of thrombus left lower extremity.  < end of copied text >    < from: [facility] Pulmonary Ventilation/Perfusion Scan (09.03.19 @ 16:10) >  IMPRESSION:     LOW PROBABILITY FOR PULMONARY EMBOLISM.  SEGMENTAL IN CHARACTER PERFUSION DEFECTS IN THE RIGHT LOWER LOBE AND LEFT   LOWER LOBE COMPLETELY MATCHED BY VENTILATION SCAN ABNORMALITIES.  Spoke with NISHANT PRESLEY on 9/3/2019 4:28 PM with readback.  < end of copied text >    < from: Xray Chest 1 View-PORTABLE IMMEDIATE (09.03.19 @ 06:08) >  Impression:    Opacifications and effusions as described. Accounting for technique   without change.  < end of copied text > Hospital Day:  7d    Subjective:    Patient is a 89y old  Female who presents with a chief complaint of HAL. Overnight no acute events. This morning resting comfortably without complaints. ROS is + for SOB when ambulating.    Past Medical Hx:   Pulmonary hypertension  COPD (chronic obstructive pulmonary disease)  GI bleed  Aortic valve replaced  CAD (coronary artery disease)  Hypertension  GERD (gastroesophageal reflux disease)  Diabetes  Arthritis    Past Sx:  S/P TAVR (transcatheter aortic valve replacement)    Allergies:  penicillins (Other)    Current Meds:   Standng Meds:  calcium carbonate    500 mG (Tums) Chewable 2 Tablet(s) Chew every 8 hours  chlorhexidine 4% Liquid 1 Application(s) Topical <User Schedule>  clopidogrel Tablet 75 milliGRAM(s) Oral daily  dextrose 5%. 1000 milliLiter(s) (50 mL/Hr) IV Continuous <Continuous>  dextrose 50% Injectable 12.5 Gram(s) IV Push once  dextrose 50% Injectable 25 Gram(s) IV Push once  dextrose 50% Injectable 25 Gram(s) IV Push once  ferrous    sulfate 325 milliGRAM(s) Oral daily  heparin  Injectable 5000 Unit(s) SubCutaneous every 8 hours  insulin lispro (HumaLOG) corrective regimen sliding scale   SubCutaneous three times a day before meals  melatonin 3 milliGRAM(s) Oral at bedtime  metoprolol succinate ER 12.5 milliGRAM(s) Oral two times a day  nystatin Powder 1 Application(s) Topical two times a day  pantoprazole    Tablet 40 milliGRAM(s) Oral before breakfast  simvastatin 10 milliGRAM(s) Oral at bedtime  sodium bicarbonate 650 milliGRAM(s) Oral every 8 hours    PRN Meds:  dextrose 40% Gel 15 Gram(s) Oral once PRN Blood Glucose LESS THAN 70 milliGRAM(s)/deciliter  glucagon  Injectable 1 milliGRAM(s) IntraMuscular once PRN Glucose LESS THAN 70 milligrams/deciliter    HOME MEDICATIONS:  clopidogrel 75 mg oral tablet: 1 tab(s) orally once a day  Fish Oil oral capsule: 4000 milligram(s) orally once a day  glimepiride 2 mg oral tablet: 1 tab(s) orally once a day  Iron Chews: 325 milligram(s) orally once a day  Lasix 40 mg oral tablet: 1 tab(s) orally 2 times a day, 3x/day every other day   lisinopril 10 mg oral tablet: 1 tab(s) orally once a day  metFORMIN 1000 mg oral tablet, extended release: orally 2 times a day  pantoprazole 40 mg oral delayed release tablet: 1 tab(s) orally once a day (before a meal)  simvastatin 10 mg oral tablet: 1 tab(s) orally once a day (at bedtime)      Vital Signs:   T(F): 96 (09-03-19 @ 22:09), Max: 96.1 (09-03-19 @ 14:30)  HR: 90 (09-03-19 @ 22:09) (90 - 121)  BP: 137/62 (09-03-19 @ 22:09) (120/53 - 137/62)  RR: 18 (09-03-19 @ 22:09) (18 - 18)  SpO2: 97% (09-03-19 @ 09:00) (97% - 97%)      09-03-19 @ 07:01  -  09-04-19 @ 07:00  --------------------------------------------------------  IN: 0 mL / OUT: 600 mL / NET: -600 mL        Physical Exam:   GENERAL: NAD  HEENT: NCAT  CHEST/LUNG: CTA bl  HEART: Regular rate and rhythm; s1 s2 appreciated, No murmurs, rubs, or gallops  ABDOMEN: Soft, Nontender, Nondistended; Bowel sounds present  EXTREMITIES: pitting edema in LE b/l  NERVOUS SYSTEM:  Alert & Oriented X3        Labs:                         9.2    7.66  )-----------( 271      ( 04 Sep 2019 05:55 )             30.0     Neutophil% 77.8, Lymphocyte% 10.1, Monocyte% 7.3, Bands% 0.5 09-04-19 @ 05:55    03 Sep 2019 05:59    142    |  107    |  44     ----------------------------<  174    5.2     |  23     |  2.0      Ca    9.3        04 Sep 2019 05:55  Mg     1.3       03 Sep 2019 05:59    TPro  6.2    /  Alb  3.5    /  TBili  0.3    /  DBili  x      /  AST  20     /  ALT  14     /  AlkPhos  109    03 Sep 2019 05:59       pTT    30.5             ----< 1.20 INR  (09-04-19 @ 05:55)    13.80        PT,    pTT    30.0             ----< 1.12 INR  (09-03-19 @ 11:40)    12.90        PT      Hemoglobin A1C, Whole Blood: 7.0 % (08-29-19 @ 06:19)      Radiology:    < from: [facility] Duplex Lower Ext Vein Scan, Bilat (09.03.19 @ 13:42) >  Impression:  Acute right posterior tibial vein DVT. Right lesser saphenous vein   superficial thrombophlebitis.  No evidence of thrombus left lower extremity.  < end of copied text >    < from: [facility] Pulmonary Ventilation/Perfusion Scan (09.03.19 @ 16:10) >  IMPRESSION:     LOW PROBABILITY FOR PULMONARY EMBOLISM.  SEGMENTAL IN CHARACTER PERFUSION DEFECTS IN THE RIGHT LOWER LOBE AND LEFT   LOWER LOBE COMPLETELY MATCHED BY VENTILATION SCAN ABNORMALITIES.  Spoke with NISHANT PRESLEY on 9/3/2019 4:28 PM with readback.  < end of copied text >    < from: Xray Chest 1 View-PORTABLE IMMEDIATE (09.03.19 @ 06:08) >  Impression:    Opacifications and effusions as described. Accounting for technique   without change.  < end of copied text >

## 2019-09-04 NOTE — DISCHARGE NOTE PROVIDER - NSDCCPCAREPLAN_GEN_ALL_CORE_FT
PRINCIPAL DISCHARGE DIAGNOSIS  Diagnosis: HAL (acute kidney injury)  Assessment and Plan of Treatment: follow up with your nephrologist in 1-2 weeks and repeat lab work (BMP), continue sodium bicarbonate and calcium carbonate as directed      SECONDARY DISCHARGE DIAGNOSES  Diagnosis: DVT, lower extremity  Assessment and Plan of Treatment: follow up with your PMD    Diagnosis: Hyperkalemia  Assessment and Plan of Treatment: follow up with your PMD and repeat lab work (BMP) within 2 days, avoid high potassium food like banana and beans    Diagnosis: COPD with hypoxia  Assessment and Plan of Treatment: use home oxygen and maintain pulse ox between 88 and 92 PRINCIPAL DISCHARGE DIAGNOSIS  Diagnosis: HAL (acute kidney injury)  Assessment and Plan of Treatment: Follow up with your nephrologist and your primary care doctor in 1-2 weeks and repeat lab work basic metabolic panel to check your creatinine level, continue sodium bicarbonate and calcium carbonate as directed      SECONDARY DISCHARGE DIAGNOSES  Diagnosis: Type II diabetes mellitus  Assessment and Plan of Treatment: Your metformin medication was stopped as it can cause worsening renal function. Glyburide 1.5mg daily was added. Please continue very close fingertstick monitoring and out patient primary care physician controll of your diabetes.    Diagnosis: DVT, lower extremity  Assessment and Plan of Treatment: Follow up with your PMD in 1-2 wweeks    Diagnosis: Hyperkalemia  Assessment and Plan of Treatment: Follow up with your PMD and repeat lab work basic metabolic panel within 2 days to check your potassium level, avoid high potassium food like banana and beans    Diagnosis: COPD with hypoxia  Assessment and Plan of Treatment: Use home oxygen and maintain pulse ox between 88 and 92. Follow up with your primary care doctor & pulmonologist in 1-2 weeks. PRINCIPAL DISCHARGE DIAGNOSIS  Diagnosis: HAL (acute kidney injury)  Assessment and Plan of Treatment: Follow up with your nephrologist and your primary care doctor in 1-2 weeks and repeat lab work basic metabolic panel to check your creatinine level.      SECONDARY DISCHARGE DIAGNOSES  Diagnosis: Type II diabetes mellitus  Assessment and Plan of Treatment: Your metformin medication was stopped as it can cause worsening renal function. Glyburide 1.5mg daily was added. Please continue very close fingertstick monitoring and out patient primary care physician controll of your diabetes.    Diagnosis: DVT, lower extremity  Assessment and Plan of Treatment: Follow up with your PMD in 1-2 wweeks    Diagnosis: Hyperkalemia  Assessment and Plan of Treatment: Follow up with your PMD and repeat lab work basic metabolic panel within 2 days to check your potassium level, avoid high potassium food like banana and beans    Diagnosis: COPD with hypoxia  Assessment and Plan of Treatment: Use home oxygen and maintain pulse ox between 88 and 92. Follow up with your primary care doctor & pulmonologist in 1-2 weeks.

## 2019-09-04 NOTE — DISCHARGE NOTE PROVIDER - CARE PROVIDER_API CALL
Kaur Bone)  Internal Medicine  [address] [place] [address]  Phone: [phone]  Fax: [phone]  Follow Up Time:     Steven Alexander)  Internal Medicine; Nephrology  [address] [place] [address]  Phone: [phone]  Fax: [phone]  Follow Up Time: Kaur Bone)  Internal Medicine  [address] [place] [address]  Phone: [phone]  Fax: [phone]  Follow Up Time:     Steven Alexander)  Internal Medicine; Nephrology  [address] [place] [address]  Phone: [phone]  Fax: [phone]  Follow Up Time:     Mike Haney)  Critical Care Medicine; Internal Medicine; Pulmonary Disease; Sleep Medicine  [address] [place] [address]  Phone: [phone]  Fax: [phone]  Follow Up Time:

## 2019-09-04 NOTE — PROGRESS NOTE ADULT - ATTENDING COMMENTS
Patient was evaluated and examined by bedside, no c/o dyspnea, remains hypoxic requiring for patient to be on continuous oxygen tx   All labs, radiology studies, VS was reviewed  I agree with medical plan outlined by Medical resident as stated above.    A 89 yo female with PMH of  HTN, DM type 2, COPD, pulmonary HTN, HFpEF, AFib, TAVR, CAD s/p PCI came to ED c/o lethargy and weakness, she has poor appetite, she was not eating well and her blood sugar was low 40 in AM, she also had mild diarrhea and vomiting, no abdominal pain, she feels like gas sensation in  her belly, she denies any urinary symptoms. In the ED her vital signs /70, , tep 97, labs showed Cr 5.3, Mg 0.5, calcium 6.1    A/p:     #Acute Kidney Injury on  ? CKD stage 3 (baseline Cr around January was 1.1)  likely from Pre-renal azotemia, diuretics and diarrhea. Abdomen US showed no hydronephrosis,   improved tremendously with holding Lasix and Lisinopril and giving LR @ 75 over 3 days.  -due to worsening legs edema - patient was resumed on home lasix tx.  -continue to hold ace inhibitors    Acute hypoxic respiratory failure   most recent  CXR shows mild congestion.   -home oxygen tx. arrangement  -lasix tx.     #RAIN (since 9/2 night):   LE duplex shows a superficial thrombus in right calf, d dimer high but VQ scan low probability.       #Right calf superficial thrombus:  warm compresses TID. Repeat US in 3 weeks.   Please note patient had some bleeding problems from Xarelto around 2013 and hence it was taken off.   And Jan 2019 she had some AVMs cauterized here. so avoid anticoagulation unless absolutely necessary.    #Reports visual hallucinations since around 8/30 :   as per patient's family , she is mentating at baseline        #Hypomagnesemia and Hypocalcemia:   likely from diarrhea and/or diuretics.   Diuretics on hold, continue with   Continue with Mg supplement.    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  Continue ferrous sulfate. Check iron studies.   Patient was receiving iron transfusion as Outpatient    #Persistent Atrial fibrillation:   rate controlled on metoprolol  not on anticoagulation dut to h/o GI bleed    #Chronic HFpEF:   lasix and Lisinopril on hold due to HAL.      #Progress Note Handoff:  Pending (specify):   home oxygen arrangement  Family discussion: was d/w daughter by bedside   Disposition: eventual home w/ VN  in am once oxygen tx. delivered

## 2019-09-04 NOTE — DISCHARGE NOTE PROVIDER - CARE PROVIDERS DIRECT ADDRESSES
[email or web address]

## 2019-09-04 NOTE — PROGRESS NOTE ADULT - ASSESSMENT
Assessment and Plan:     89 yo female w/ PMH of  HTN, DM II, COPD, pulmonary HTN, HFpEF, AFib, TAVR, CAD s/p PCI came to ED c/o lethargy and weakness in the setting of poor appetite & dec PO intake, vomiting, diarrhea found to have Cr of 5.3, Mg .5, Ca 6.1 admitted for HAL. Hospital course complicated by SOB & hypoxia to 83% on RA & 2L NC found to have R posterior tibal vein DVT on duplex. PE w/up w/ VQ scan shows low probability for PE.     # SOB with Hypoxia after ambulation  - episode of SOB overnight on 9/3.  Yest AM CXR shows b/l blunting of CVA 2/2 to fluid  - O2 sat dropped to 83% on 2L NC after ambulation, patient also tachycardic during this episode  - D dimer was 1531, duplex shows DVT in R posterior tibial vein w/ lesser saphenous thrombophlebitis  - v/q scan is low probability for PE. Pending echo & pulm consult     #Acute Kidney Injury on CKD stage 3 (baseline Cr 1.1)  - Yest AM Cr 2.0, improving from admission level  - likely pre-renal (combo of dec intake, diuretics & diarrhea)  - continue holding lasix & lisinopril  - US showed no hydronephrosis   - pt passed TOV    #Transient hematuria in wynn bag  - resolved spontaneously. monitor. if it recurs, consult urology.   - Most recent UA only shows 6-10 RBCs, + for WBC, LE. - for nitrites   - no urinary symptoms, hence hold off UTI tx    #Visual hallucinations, resolved  - back to baseline mental status, A & O x 3  - neuro recs appreciated. EEG negative  - CT head ruled out stroke (no acute pathology)   - May just be 2/2 change in environment. No h/o dementia though  - per son, this has happened on previous admission & w/up at that time was negative    #Abdomen shows gassy distension : upright KUB WNL    #Hypomagnesemia and Hypocalcemia:   - likely from diarrhea and/or diuretics  - Diuretics on hold   - Continue with Mg supplement    #Normocytic Anemia  - h/o recurrent GI bleed secondary to AVMs in the colonic mucosa (last colonoscopy in Jan)  - Continue ferrous sulfate. Check iron studies   - Patient was receiving iron transfusion as outpatient    #Persistent Atrial fibrillation:   - tachycardic this AM, metoprolol was added at home dose  - not on anticoagulation due to hx of GI bleed    #Chronic HFpEF:   - lasix and Lisinopril on hold due to HAL  - no signs of overload in LE    GI: Protonix  DVT: Hep subq   Code Status: Full   Dispo: acute, from home (pending echo & pulm consult) Assessment and Plan:     89 yo female w/ PMH of  HTN, DM II, COPD, pulmonary HTN, HFpEF, AFib, TAVR, CAD s/p PCI came to ED c/o lethargy and weakness in the setting of poor appetite & dec PO intake, vomiting, diarrhea found to have Cr of 5.3, Mg .5, Ca 6.1 admitted for HAL. Hospital course complicated by SOB & hypoxia to 83% on RA & 2L NC found to have R posterior tibal vein DVT on duplex. PE w/up w/ VQ scan shows low probability for PE.     # SOB with Hypoxia after ambulation  - Yest AM CXR shows b/l blunting of CVA 2/2 to fluid  - O2 sat dropped to 87% on RA after ambulation, lips cyanotic per nurse  - previous episode day before accompanied by tachycardia  - D dimer was 1531, duplex shows DVT in R posterior tibial vein w/ lesser saphenous thrombophlebitis. Will not start AC given hx of bleed on AC & hx of colonic AVMs   - v/q scan is low probability for PE. Pending echo results  - patient will need home O2    Patient O2 sat is is 97% on room air at rest. Patient's O2 sat is 87% on room air upon ambulation. Patient's O2 sat is 94% on 4 LPM via nasal cannula upon ambulation. Patient has COPD and therefore needs home O2. Patient is aware they will be going home on oxygen. Patient was tested in a chronic, stable state.     #Acute Kidney Injury on CKD stage 3 (baseline Cr 1.1)  - AM Cr. 1.7, improving from admission level  - likely pre-renal (combo of dec intake, diuretics & diarrhea)  - continue holding lasix & lisinopril  - US showed no hydronephrosis   - pt passed TOV    #Transient hematuria in wynn bag  - resolved spontaneously. monitor. if it recurs, consult urology.   - Most recent UA only shows 6-10 RBCs, + for WBC, LE. - for nitrites   - no urinary symptoms, hence hold off UTI tx    #Visual hallucinations, resolved  - back to baseline mental status, A & O x 3  - neuro recs appreciated. EEG negative  - CT head ruled out stroke (no acute pathology)   - May just be 2/2 change in environment. No h/o dementia though  - per son, this has happened on previous admission & w/up at that time was negative    #Abdomen shows gassy distension : upright KUB WNL    #Hypomagnesemia and Hypocalcemia:   - likely from diarrhea and/or diuretics  - Diuretics on hold   - Continue with Mg supplement    #Normocytic Anemia  - h/o recurrent GI bleed secondary to AVMs in the colonic mucosa (last colonoscopy in Jan)  - Continue ferrous sulfate. Check iron studies   - Patient was receiving iron transfusion as outpatient    #Persistent Atrial fibrillation:   - tachycardic this AM, metoprolol was added at home dose  - not on anticoagulation due to hx of GI bleed    #Chronic HFpEF:   - lasix and Lisinopril on hold due to HAL  - no signs of overload in LE    GI: Protonix  DVT: Hep subq   Code Status: Full   Dispo: from home, pending home O2, home care set up Assessment and Plan:     89 yo female w/ PMH of  HTN, DM II, COPD, pulmonary HTN, HFpEF, AFib, TAVR, CAD s/p PCI came to ED c/o lethargy and weakness in the setting of poor appetite & dec PO intake, vomiting, diarrhea found to have Cr of 5.3, Mg .5, Ca 6.1 admitted for HAL. Hospital course complicated by SOB & hypoxia to 83% on RA & 2L NC found to have R posterior tibal vein DVT on duplex. PE w/up w/ VQ scan shows low probability for PE.     # SOB with Hypoxia after ambulation  - Yest AM CXR shows b/l blunting of CVA 2/2 to fluid  - O2 sat dropped to 87% on RA after ambulation, lips cyanotic per nurse  - previous episode day before accompanied by tachycardia  - D dimer was 1531, duplex shows DVT in R posterior tibial vein w/ lesser saphenous thrombophlebitis. Will not start AC given hx of bleed on AC & hx of colonic AVMs   - v/q scan is low probability for PE. Pending echo results  - patient will need home O2    Patient O2 sat is is 97% on room air at rest. Patient's O2 sat is 87% on room air upon ambulation. Patient's O2 sat is 94% on 4 LPM via nasal cannula upon ambulation. Patient has COPD and therefore needs home O2. Patient is aware they will be going home on oxygen. Patient was tested in a chronic, stable state.     #Acute Kidney Injury on CKD stage 3 (baseline Cr 1.1)  - AM Cr. 1.7, improving from admission level  - likely pre-renal (combo of dec intake, diuretics & diarrhea)  - continue holding lasix & lisinopril  - US showed no hydronephrosis   - pt passed TOV    #Transient hematuria in wynn bag  - resolved spontaneously. monitor. if it recurs, consult urology.   - Most recent UA only shows 6-10 RBCs, + for WBC, LE. - for nitrites   - no urinary symptoms, hence hold off UTI tx    #Visual hallucinations, resolved  - back to baseline mental status, A & O x 3  - neuro recs appreciated. EEG negative  - CT head ruled out stroke (no acute pathology)   - May just be 2/2 change in environment. No h/o dementia though  - per son, this has happened on previous admission & w/up at that time was negative    #Abdomen shows gassy distension : upright KUB WNL    #Hypomagnesemia and Hypocalcemia:   - likely from diarrhea and/or diuretics  - Diuretics on hold   - Continue with Mg supplement    #Normocytic Anemia  - h/o recurrent GI bleed secondary to AVMs in the colonic mucosa (last colonoscopy in Jan)  - Continue ferrous sulfate. Check iron studies   - Patient was receiving iron transfusion as outpatient    #Persistent Atrial fibrillation:   - tachycardic this AM, metoprolol was added at home dose  - not on anticoagulation due to hx of GI bleed    #Chronic HFpEF:   - lasix and Lisinopril on hold due to HAL  - no signs of overload in LE    GI: Protonix  DVT: Hep subq   Code Status: Full   Dispo: from home, pending home O2, home care set up will d/c when O2 in place at home

## 2019-09-04 NOTE — PROGRESS NOTE ADULT - SUBJECTIVE AND OBJECTIVE BOX
Nephrology progress note    Patient is seen and examined, events over the last 24 h noted .    Allergies:  penicillins (Other)    Hospital Medications:   MEDICATIONS  (STANDING):  calcium carbonate    500 mG (Tums) Chewable 2 Tablet(s) Chew every 8 hours  clopidogrel Tablet 75 milliGRAM(s) Oral daily  dextrose 5%. 1000 milliLiter(s) (50 mL/Hr) IV Continuous <Continuous>  dextrose 50% Injectable 12.5 Gram(s) IV Push once  dextrose 50% Injectable 25 Gram(s) IV Push once  dextrose 50% Injectable 25 Gram(s) IV Push once  ferrous    sulfate 325 milliGRAM(s) Oral daily  heparin  Injectable 5000 Unit(s) SubCutaneous every 8 hours  insulin lispro (HumaLOG) corrective regimen sliding scale   SubCutaneous three times a day before meals  melatonin 3 milliGRAM(s) Oral at bedtime  metoprolol succinate ER 12.5 milliGRAM(s) Oral two times a day  nystatin Powder 1 Application(s) Topical two times a day  pantoprazole    Tablet 40 milliGRAM(s) Oral before breakfast  simvastatin 10 milliGRAM(s) Oral at bedtime  sodium bicarbonate 650 milliGRAM(s) Oral every 8 hours        VITALS:  T(F): 96.1 (19 @ 06:00), Max: 96.1 (19 @ 14:30)  HR: 91 (19 @ 06:00)  BP: 133/64 (19 @ 06:00)  RR: 18 (19 @ 06:00)  SpO2: --  Wt(kg): --     @ :  -   @ 07:00  --------------------------------------------------------  IN: 900 mL / OUT: 650 mL / NET: 250 mL     @ 07:  -   @ 07:00  --------------------------------------------------------  IN: 480 mL / OUT: 1100 mL / NET: -620 mL     @ 07:01  -   @ 09:53  --------------------------------------------------------  IN: 0 mL / OUT: 200 mL / NET: -200 mL          PHYSICAL EXAM:  Constitutional: NAD  HEENT: anicteric sclera, oropharynx clear, MMM  Neck: No JVD  Respiratory: CTAB, no wheezes, rales or rhonchi  Cardiovascular: S1, S2, RRR  Gastrointestinal: BS+, soft, NT/ND  Extremities: No cyanosis or clubbing. No peripheral edema  :  No wynn.   Skin: No rashes    LABS:      143  |  106  |  44<H>  ----------------------------<  180<H>  5.5<H>   |  25  |  1.7<H>  Creatinine Trend: 1.7<--, 2.0<--, 2.3<--, 3.0<--, 4.1<--, 4.8<--  Potassium Trend: 5.5<--, 5.2<--, 4.9<--, 5.2<--, 4.8<--  Ca    9.3      04 Sep 2019 05:55  Mg     1.6         TPro  6.1  /  Alb  3.4<L>  /  TBili  0.3  /  DBili      /  AST  24  /  ALT  17  /  AlkPhos  111                            9.2    7.66  )-----------( 271      ( 04 Sep 2019 05:55 )             30.0       Urine Studies:  Urinalysis Basic - ( 30 Aug 2019 13:54 )    Color: Light Yellow / Appearance: Slightly Turbid / S.009 / pH:   Gluc:  / Ketone: Negative  / Bili: Negative / Urobili: <2 mg/dL   Blood:  / Protein: 30 mg/dL / Nitrite: Negative   Leuk Esterase: Large / RBC: 6-10. /HPF / WBC 66 /HPF   Sq Epi:  / Non Sq Epi: 3 /HPF / Bacteria: Many      Chloride, Random Urine: 44 ( @ 23:21)  Potassium, Random Urine: 15 mmol/L ( @ 23:21)  Sodium, Random Urine: 56.0 mmoL/L ( @ 23:21)  Protein/Creatinine Ratio Calculation: 2.3 Ratio ( @ 23:21)  Creatinine, Random Urine: 38 mg/dL ( @ 23:21)    RADIOLOGY & ADDITIONAL STUDIES: Nephrology progress note    Patient is seen and examined, events over the last 24 h noted .  denied chest pain no SOB no other complaints  still has edema lower ext     Allergies:  penicillins (Other)    Hospital Medications:   MEDICATIONS  (STANDING):  calcium carbonate    500 mG (Tums) Chewable 2 Tablet(s) Chew every 8 hours  clopidogrel Tablet 75 milliGRAM(s) Oral daily  ferrous    sulfate 325 milliGRAM(s) Oral daily  heparin  Injectable 5000 Unit(s) SubCutaneous every 8 hours  insulin lispro (HumaLOG) corrective regimen sliding scale   SubCutaneous three times a day before meals  melatonin 3 milliGRAM(s) Oral at bedtime  metoprolol succinate ER 12.5 milliGRAM(s) Oral two times a day  nystatin Powder 1 Application(s) Topical two times a day  pantoprazole    Tablet 40 milliGRAM(s) Oral before breakfast  simvastatin 10 milliGRAM(s) Oral at bedtime  sodium bicarbonate 650 milliGRAM(s) Oral every 8 hours        VITALS:  T(F): 96.1 (19 @ 06:00), Max: 96.1 (19 @ 14:30)  HR: 91 (19 @ 06:00)  BP: 133/64 (19 @ 06:00)  RR: 18 (19 @ 06:00)       @ :  -   @ 07:00  --------------------------------------------------------  IN: 900 mL / OUT: 650 mL / NET: 250 mL     @ 07:  -   @ 07:00  --------------------------------------------------------  IN: 480 mL / OUT: 1100 mL / NET: -620 mL     @ 07:01  -   @ 09:53  --------------------------------------------------------  IN: 0 mL / OUT: 200 mL / NET: -200 mL          PHYSICAL EXAM:  Constitutional: NAD  HEENT: anicteric sclera, oropharynx clear, MMM  Neck: No JVD  Respiratory: CTAB, no wheezes, rales or rhonchi  Cardiovascular: S1, S2, RRR  Gastrointestinal: BS+, soft, NT/ND  Extremities: No cyanosis or clubbing. plus one edema   :  No wynn.   Skin: No rashes    LABS:      143  |  106  |  44<H>  ----------------------------<  180<H>  5.5<H>   |  25  |  1.7<H>  Creatinine Trend: 1.7<--, 2.0<--, 2.3<--, 3.0<--, 4.1<--, 4.8<--  Potassium Trend: 5.5<--, 5.2<--, 4.9<--, 5.2<--, 4.8<--  Ca    9.3      04 Sep 2019 05:55  Mg     1.6         TPro  6.1  /  Alb  3.4<L>  /  TBili  0.3  /  DBili      /  AST  24  /  ALT  17  /  AlkPhos  111                            9.2    7.66  )-----------( 271      ( 04 Sep 2019 05:55 )             30.0       Urine Studies:  Urinalysis Basic - ( 30 Aug 2019 13:54 )    Color: Light Yellow / Appearance: Slightly Turbid / S.009 / pH:   Gluc:  / Ketone: Negative  / Bili: Negative / Urobili: <2 mg/dL   Blood:  / Protein: 30 mg/dL / Nitrite: Negative   Leuk Esterase: Large / RBC: 6-10. /HPF / WBC 66 /HPF   Sq Epi:  / Non Sq Epi: 3 /HPF / Bacteria: Many      Chloride, Random Urine: 44 ( @ 23:21)  Potassium, Random Urine: 15 mmol/L ( @ 23:21)  Sodium, Random Urine: 56.0 mmoL/L ( @ 23:21)  Protein/Creatinine Ratio Calculation: 2.3 Ratio ( @ 23:21)  Creatinine, Random Urine: 38 mg/dL ( @ 23:21)    RADIOLOGY & ADDITIONAL STUDIES:

## 2019-09-04 NOTE — DISCHARGE NOTE PROVIDER - PROVIDER TOKENS
PROVIDER:[TOKEN:[[id number]]],PROVIDER:[TOKEN:[[id number]]] PROVIDER:[TOKEN:[[id number]]],PROVIDER:[TOKEN:[[id number]]],PROVIDER:[TOKEN:[[id number]]]

## 2019-09-05 VITALS — HEART RATE: 85 BPM | DIASTOLIC BLOOD PRESSURE: 60 MMHG | SYSTOLIC BLOOD PRESSURE: 125 MMHG

## 2019-09-05 LAB
ALBUMIN SERPL ELPH-MCNC: 3.6 G/DL — SIGNIFICANT CHANGE UP (ref 3.5–5.2)
ALP SERPL-CCNC: 133 U/L — HIGH (ref 30–115)
ALT FLD-CCNC: 22 U/L — SIGNIFICANT CHANGE UP (ref 0–41)
ANA PAT FLD IF-IMP: ABNORMAL
ANA PATTERN 2: ABNORMAL
ANA TITR SER: ABNORMAL
ANION GAP SERPL CALC-SCNC: 11 MMOL/L — SIGNIFICANT CHANGE UP (ref 7–14)
ANION GAP SERPL CALC-SCNC: 13 MMOL/L — SIGNIFICANT CHANGE UP (ref 7–14)
ANTI NUCLEAR FACTOR TITER 2: ABNORMAL
AST SERPL-CCNC: 28 U/L — SIGNIFICANT CHANGE UP (ref 0–41)
BASOPHILS # BLD AUTO: 0.04 K/UL — SIGNIFICANT CHANGE UP (ref 0–0.2)
BASOPHILS NFR BLD AUTO: 0.4 % — SIGNIFICANT CHANGE UP (ref 0–1)
BILIRUB SERPL-MCNC: 0.3 MG/DL — SIGNIFICANT CHANGE UP (ref 0.2–1.2)
BUN SERPL-MCNC: 48 MG/DL — HIGH (ref 10–20)
BUN SERPL-MCNC: 48 MG/DL — HIGH (ref 10–20)
CALCIUM SERPL-MCNC: 9.2 MG/DL — SIGNIFICANT CHANGE UP (ref 8.5–10.1)
CALCIUM SERPL-MCNC: 9.2 MG/DL — SIGNIFICANT CHANGE UP (ref 8.5–10.1)
CHLORIDE SERPL-SCNC: 102 MMOL/L — SIGNIFICANT CHANGE UP (ref 98–110)
CHLORIDE SERPL-SCNC: 103 MMOL/L — SIGNIFICANT CHANGE UP (ref 98–110)
CO2 SERPL-SCNC: 23 MMOL/L — SIGNIFICANT CHANGE UP (ref 17–32)
CO2 SERPL-SCNC: 25 MMOL/L — SIGNIFICANT CHANGE UP (ref 17–32)
CREAT SERPL-MCNC: 1.9 MG/DL — HIGH (ref 0.7–1.5)
CREAT SERPL-MCNC: 2 MG/DL — HIGH (ref 0.7–1.5)
EOSINOPHIL # BLD AUTO: 0.15 K/UL — SIGNIFICANT CHANGE UP (ref 0–0.7)
EOSINOPHIL NFR BLD AUTO: 1.7 % — SIGNIFICANT CHANGE UP (ref 0–8)
GLUCOSE BLDC GLUCOMTR-MCNC: 174 MG/DL — HIGH (ref 70–99)
GLUCOSE BLDC GLUCOMTR-MCNC: 191 MG/DL — HIGH (ref 70–99)
GLUCOSE BLDC GLUCOMTR-MCNC: 199 MG/DL — HIGH (ref 70–99)
GLUCOSE SERPL-MCNC: 183 MG/DL — HIGH (ref 70–99)
GLUCOSE SERPL-MCNC: 223 MG/DL — HIGH (ref 70–99)
HCT VFR BLD CALC: 29.7 % — LOW (ref 37–47)
HGB BLD-MCNC: 8.9 G/DL — LOW (ref 12–16)
IMM GRANULOCYTES NFR BLD AUTO: 0.6 % — HIGH (ref 0.1–0.3)
LYMPHOCYTES # BLD AUTO: 0.79 K/UL — LOW (ref 1.2–3.4)
LYMPHOCYTES # BLD AUTO: 8.8 % — LOW (ref 20.5–51.1)
MAGNESIUM SERPL-MCNC: 1.8 MG/DL — SIGNIFICANT CHANGE UP (ref 1.8–2.4)
MCHC RBC-ENTMCNC: 27.8 PG — SIGNIFICANT CHANGE UP (ref 27–31)
MCHC RBC-ENTMCNC: 30 G/DL — LOW (ref 32–37)
MCV RBC AUTO: 92.8 FL — SIGNIFICANT CHANGE UP (ref 81–99)
MONOCYTES # BLD AUTO: 0.57 K/UL — SIGNIFICANT CHANGE UP (ref 0.1–0.6)
MONOCYTES NFR BLD AUTO: 6.3 % — SIGNIFICANT CHANGE UP (ref 1.7–9.3)
NEUTROPHILS # BLD AUTO: 7.4 K/UL — HIGH (ref 1.4–6.5)
NEUTROPHILS NFR BLD AUTO: 82.2 % — HIGH (ref 42.2–75.2)
NRBC # BLD: 0 /100 WBCS — SIGNIFICANT CHANGE UP (ref 0–0)
PLATELET # BLD AUTO: 234 K/UL — SIGNIFICANT CHANGE UP (ref 130–400)
POTASSIUM SERPL-MCNC: 5 MMOL/L — SIGNIFICANT CHANGE UP (ref 3.5–5)
POTASSIUM SERPL-MCNC: 5.9 MMOL/L — HIGH (ref 3.5–5)
POTASSIUM SERPL-MCNC: 6.2 MMOL/L — CRITICAL HIGH (ref 3.5–5)
POTASSIUM SERPL-SCNC: 5 MMOL/L — SIGNIFICANT CHANGE UP (ref 3.5–5)
POTASSIUM SERPL-SCNC: 5.9 MMOL/L — HIGH (ref 3.5–5)
POTASSIUM SERPL-SCNC: 6.2 MMOL/L — CRITICAL HIGH (ref 3.5–5)
PROT SERPL-MCNC: 6.4 G/DL — SIGNIFICANT CHANGE UP (ref 6–8)
RBC # BLD: 3.2 M/UL — LOW (ref 4.2–5.4)
RBC # FLD: 15.1 % — HIGH (ref 11.5–14.5)
SODIUM SERPL-SCNC: 137 MMOL/L — SIGNIFICANT CHANGE UP (ref 135–146)
SODIUM SERPL-SCNC: 140 MMOL/L — SIGNIFICANT CHANGE UP (ref 135–146)
WBC # BLD: 9 K/UL — SIGNIFICANT CHANGE UP (ref 4.8–10.8)
WBC # FLD AUTO: 9 K/UL — SIGNIFICANT CHANGE UP (ref 4.8–10.8)

## 2019-09-05 PROCEDURE — 93010 ELECTROCARDIOGRAM REPORT: CPT | Mod: 76

## 2019-09-05 PROCEDURE — 99233 SBSQ HOSP IP/OBS HIGH 50: CPT

## 2019-09-05 RX ORDER — MAGNESIUM SULFATE 500 MG/ML
2 VIAL (ML) INJECTION ONCE
Refills: 0 | Status: DISCONTINUED | OUTPATIENT
Start: 2019-09-05 | End: 2019-09-05

## 2019-09-05 RX ORDER — CALCIUM CARBONATE 500(1250)
1 TABLET ORAL EVERY 8 HOURS
Refills: 0 | Status: DISCONTINUED | OUTPATIENT
Start: 2019-09-05 | End: 2019-09-05

## 2019-09-05 RX ORDER — SODIUM POLYSTYRENE SULFONATE 4.1 MEQ/G
60 POWDER, FOR SUSPENSION ORAL ONCE
Refills: 0 | Status: COMPLETED | OUTPATIENT
Start: 2019-09-05 | End: 2019-09-05

## 2019-09-05 RX ADMIN — Medication 12.5 MILLIGRAM(S): at 17:09

## 2019-09-05 RX ADMIN — PANTOPRAZOLE SODIUM 40 MILLIGRAM(S): 20 TABLET, DELAYED RELEASE ORAL at 05:59

## 2019-09-05 RX ADMIN — Medication 2 TABLET(S): at 05:59

## 2019-09-05 RX ADMIN — Medication 650 MILLIGRAM(S): at 05:59

## 2019-09-05 RX ADMIN — NYSTATIN CREAM 1 APPLICATION(S): 100000 CREAM TOPICAL at 17:09

## 2019-09-05 RX ADMIN — Medication 40 MILLIGRAM(S): at 05:59

## 2019-09-05 RX ADMIN — SODIUM POLYSTYRENE SULFONATE 60 GRAM(S): 4.1 POWDER, FOR SUSPENSION ORAL at 10:48

## 2019-09-05 RX ADMIN — Medication 1: at 08:16

## 2019-09-05 RX ADMIN — Medication 1: at 11:47

## 2019-09-05 RX ADMIN — NYSTATIN CREAM 1 APPLICATION(S): 100000 CREAM TOPICAL at 06:06

## 2019-09-05 RX ADMIN — CLOPIDOGREL BISULFATE 75 MILLIGRAM(S): 75 TABLET, FILM COATED ORAL at 11:48

## 2019-09-05 RX ADMIN — HEPARIN SODIUM 5000 UNIT(S): 5000 INJECTION INTRAVENOUS; SUBCUTANEOUS at 06:00

## 2019-09-05 RX ADMIN — HEPARIN SODIUM 5000 UNIT(S): 5000 INJECTION INTRAVENOUS; SUBCUTANEOUS at 13:46

## 2019-09-05 RX ADMIN — Medication 1 TABLET(S): at 13:46

## 2019-09-05 RX ADMIN — Medication 1: at 17:08

## 2019-09-05 RX ADMIN — Medication 12.5 MILLIGRAM(S): at 05:59

## 2019-09-05 RX ADMIN — CHLORHEXIDINE GLUCONATE 1 APPLICATION(S): 213 SOLUTION TOPICAL at 05:59

## 2019-09-05 RX ADMIN — Medication 325 MILLIGRAM(S): at 11:48

## 2019-09-05 RX ADMIN — Medication 40 MILLIGRAM(S): at 17:09

## 2019-09-05 NOTE — PROGRESS NOTE ADULT - ASSESSMENT
Pt with HAL  on CKD 3, severe PAH, TAVR, ascites, DM, proteinuria 2 g/, acidosis    # HAL improving   # creatinine today noted   # continue same dose pf lasix   # no hydro on sono ,please document accurate UO   # d/c sodium bicarbonate 650 q 8  # d/c feso4  # w/up for GN negative so far   #calcium noted, decrease calcium carbonate to 1 tablet po q8  # K noted check EKG, low K diet , give d50, insulin  check repeat  , might need standing low dose kayexalate   # will follow      # no acute indication for RRT

## 2019-09-05 NOTE — PROGRESS NOTE ADULT - PROVIDER SPECIALTY LIST ADULT
Hospitalist
Internal Medicine
Nephrology
Hospitalist
Internal Medicine
Nephrology
Internal Medicine
Internal Medicine

## 2019-09-05 NOTE — PROGRESS NOTE ADULT - SUBJECTIVE AND OBJECTIVE BOX
seen and examined  no distress  lying comfortable       Standing Inpatient Medications  calcium carbonate    500 mG (Tums) Chewable 2 Tablet(s) Chew every 8 hours  chlorhexidine 4% Liquid 1 Application(s) Topical <User Schedule>  clopidogrel Tablet 75 milliGRAM(s) Oral daily  dextrose 5%. 1000 milliLiter(s) IV Continuous <Continuous>  dextrose 50% Injectable 12.5 Gram(s) IV Push once  dextrose 50% Injectable 25 Gram(s) IV Push once  dextrose 50% Injectable 25 Gram(s) IV Push once  ferrous    sulfate 325 milliGRAM(s) Oral daily  furosemide    Tablet 40 milliGRAM(s) Oral two times a day  heparin  Injectable 5000 Unit(s) SubCutaneous every 8 hours  insulin lispro (HumaLOG) corrective regimen sliding scale   SubCutaneous three times a day before meals  melatonin 3 milliGRAM(s) Oral at bedtime  metoprolol succinate ER 12.5 milliGRAM(s) Oral two times a day  nystatin Powder 1 Application(s) Topical two times a day  pantoprazole    Tablet 40 milliGRAM(s) Oral before breakfast  simvastatin 10 milliGRAM(s) Oral at bedtime  sodium bicarbonate 650 milliGRAM(s) Oral every 8 hours    PRN Inpatient Medications  dextrose 40% Gel 15 Gram(s) Oral once PRN  glucagon  Injectable 1 milliGRAM(s) IntraMuscular once PRN      VITALS/PHYSICAL EXAM  --------------------------------------------------------------------------------  T(C): 36.3 (09-05-19 @ 05:47), Max: 37.9 (09-04-19 @ 20:30)  HR: 80 (09-05-19 @ 05:47) (80 - 94)  BP: 132/61 (09-05-19 @ 05:47) (115/74 - 139/60)  RR: 18 (09-05-19 @ 05:47) (18 - 18)  SpO2: --  Wt(kg): --        09-04-19 @ 07:01  -  09-05-19 @ 07:00  --------------------------------------------------------  IN: 240 mL / OUT: 400 mL / NET: -160 mL    09-05-19 @ 07:01  -  09-05-19 @ 09:05  --------------------------------------------------------  IN: 210 mL / OUT: 0 mL / NET: 210 mL      Physical Exam:  	Gen: NAD  	Pulm: decrease BS  B/L  	CV:  S1S2; no rub  	Abd: +distended  	LE:  edema      LABS/STUDIES  --------------------------------------------------------------------------------              8.9    9.00  >-----------<  234      [09-05-19 @ 07:56]              29.7     137  |  103  |  48  ----------------------------<  183      [09-05-19 @ 07:56]  5.9   |  23  |  2.0        Ca     9.2     [09-05-19 @ 07:56]      Mg     1.8     [09-05-19 @ 07:56]    TPro  6.1  /  Alb  3.4  /  TBili  0.3  /  DBili  x   /  AST  24  /  ALT  17  /  AlkPhos  111  [09-04-19 @ 05:55]    PT/INR: PT 13.80, INR 1.20       [09-04-19 @ 05:55]  PTT: 30.5       [09-04-19 @ 05:55]      Creatinine Trend:  SCr 2.0 [09-05 @ 07:56]  SCr 1.7 [09-04 @ 05:55]  SCr 2.0 [09-03 @ 05:59]  SCr 2.3 [09-02 @ 06:12]  SCr 3.0 [09-01 @ 07:50]    Urinalysis - [08-30-19 @ 13:54]      Color Light Yellow / Appearance Slightly Turbid / SG 1.009 / pH 6.0      Gluc Negative / Ketone Negative  / Bili Negative / Urobili <2 mg/dL       Blood Small / Protein 30 mg/dL / Leuk Est Large / Nitrite Negative      RBC 6-10. / WBC 66 / Hyaline Negative / Gran  / Sq Epi  / Non Sq Epi 3 / Bacteria Many      Ferritin 378      [08-31-19 @ 07:04]  PTH -- (Ca 8.4)      [09-01-19 @ 07:50]   168  HbA1c 7.0      [08-29-19 @ 06:19]    HBsAg Nonreact      [09-01-19 @ 07:50]  HCV 0.14, Nonreact      [09-01-19 @ 07:50]    C3 Complement 120      [09-01-19 @ 07:50]  C4 Complement 36      [09-01-19 @ 07:50]  ANCA: cANCA Negative, pANCA Negative, atypical ANCA Indeterminate      [09-01-19 @ 07:50]  Immunofixation Serum:   No Monoclonal Band Identified    Reference Range: None Detected      [09-01-19 @ 07:50]

## 2019-09-05 NOTE — PROGRESS NOTE ADULT - SUBJECTIVE AND OBJECTIVE BOX
Hospital Day:  8d    Subjective:    Patient is a 89y old  Female who presents with a chief complaint of HAL. Overnight complained of chills and poor appetite. This morning resting comfortably and apetite has returned, eating breakfast. No complaints of fevers, chills, CP, abdominal pain, n/v, bowel or urinary sxs, denies muscle weakness. Some mild SOB at rest, on 3L NC.    Past Medical Hx:   Pulmonary hypertension  COPD (chronic obstructive pulmonary disease)  GI bleed  Aortic valve replaced  CAD (coronary artery disease)  Hypertension  GERD (gastroesophageal reflux disease)  Diabetes  Arthritis    Past Sx:  S/P TAVR (transcatheter aortic valve replacement)    Allergies:  penicillins (Other)    Current Meds:   Standng Meds:  calcium carbonate    500 mG (Tums) Chewable 1 Tablet(s) Chew every 8 hours  chlorhexidine 4% Liquid 1 Application(s) Topical <User Schedule>  clopidogrel Tablet 75 milliGRAM(s) Oral daily  dextrose 5%. 1000 milliLiter(s) (50 mL/Hr) IV Continuous <Continuous>  dextrose 50% Injectable 12.5 Gram(s) IV Push once  dextrose 50% Injectable 25 Gram(s) IV Push once  dextrose 50% Injectable 25 Gram(s) IV Push once  furosemide    Tablet 40 milliGRAM(s) Oral two times a day  heparin  Injectable 5000 Unit(s) SubCutaneous every 8 hours  insulin lispro (HumaLOG) corrective regimen sliding scale   SubCutaneous three times a day before meals  melatonin 3 milliGRAM(s) Oral at bedtime  metoprolol succinate ER 12.5 milliGRAM(s) Oral two times a day  nystatin Powder 1 Application(s) Topical two times a day  pantoprazole    Tablet 40 milliGRAM(s) Oral before breakfast  simvastatin 10 milliGRAM(s) Oral at bedtime    PRN Meds:  dextrose 40% Gel 15 Gram(s) Oral once PRN Blood Glucose LESS THAN 70 milliGRAM(s)/deciliter  glucagon  Injectable 1 milliGRAM(s) IntraMuscular once PRN Glucose LESS THAN 70 milligrams/deciliter    HOME MEDICATIONS:  clopidogrel 75 mg oral tablet: 1 tab(s) orally once a day  Fish Oil oral capsule: 4000 milligram(s) orally once a day  glimepiride 2 mg oral tablet: 1 tab(s) orally once a day  Iron Chews: 325 milligram(s) orally once a day  Lasix 40 mg oral tablet: 1 tab(s) orally 2 times a day, 3x/day every other day   pantoprazole 40 mg oral delayed release tablet: 1 tab(s) orally once a day (before a meal)  simvastatin 10 mg oral tablet: 1 tab(s) orally once a day (at bedtime)      Vital Signs:   T(F): 98.9 (09-05-19 @ 12:38), Max: 100.2 (09-04-19 @ 20:30)  HR: 85 (09-05-19 @ 16:56) (80 - 94)  BP: 125/60 (09-05-19 @ 16:56) (113/73 - 139/60)  RR: 18 (09-05-19 @ 12:38) (18 - 18)  SpO2: --      09-04-19 @ 07:01  -  09-05-19 @ 07:00  --------------------------------------------------------  IN: 240 mL / OUT: 400 mL / NET: -160 mL    09-05-19 @ 07:01  -  09-05-19 @ 17:49  --------------------------------------------------------  IN: 410 mL / OUT: 0 mL / NET: 410 mL        Physical Exam:   GENERAL: NAD  HEENT: NCAT  CHEST/LUNG: CTA bl  HEART: Regular rate and rhythm; s1 s2 appreciated, No murmurs, rubs, or gallops  ABDOMEN: Soft, Nontender, Nondistended; Bowel sounds present  EXTREMITIES: b/l pitting edema   NERVOUS SYSTEM:  Alert & Oriented X3        Labs:                         8.9    9.00  )-----------( 234      ( 05 Sep 2019 07:56 )             29.7     Neutophil% 82.2, Lymphocyte% 8.8, Monocyte% 6.3, Bands% 0.6 09-05-19 @ 07:56    05 Sep 2019 15:15    x      |  x      |  x      ----------------------------<  x      6.2     |  x      |  x        Ca    9.2        05 Sep 2019 07:56  Mg     1.8       05 Sep 2019 07:56    TPro  6.1    /  Alb  3.4    /  TBili  0.3    /  DBili  x      /  AST  24     /  ALT  17     /  AlkPhos  111    04 Sep 2019 05:55          Hemoglobin A1C, Whole Blood: 7.0 % (08-29-19 @ 06:19)      Radiology:

## 2019-09-05 NOTE — PROGRESS NOTE ADULT - ASSESSMENT
Assessment and Plan:    89 yo female w/ PMH of  HTN, DM II, COPD, pulmonary HTN, HFpEF, AFib, TAVR, CAD s/p PCI came to ED c/o lethargy and weakness in the setting of poor appetite & dec PO intake, vomiting, diarrhea found to have Cr of 5.3, Mg .5, Ca 6.1 admitted for HAL. Hospital course complicated by SOB & hypoxia to 83% on RA & 2L NC found to have R posterior tibial vein DVT on duplex. PE w/up w/ VQ scan shows low probability for PE. Now with hyperkalemia.     # Hyperkalemia  - AM K 5.9. EKG shows no acute changes, patient remains asymptomatic  - gave 60 g of Kayexalate   - repeat K shows slightly hemolyzed K at 6.2  - stat BMP was drawn to recheck levels, new EKG was ordered  - can d/c pt if K <5.5. Otherwise, patient will need insulin, d50 kayexalate & cannot be d/c'd home    # SOB with Hypoxia after ambulation  - minimal SOB on 3L NC at rest   - previously, O2 sat dropped to 87% on RA after ambulation, lips cyanotic per nurse, episode day before was with tachycardia  - D dimer was 1531, duplex shows DVT in R posterior tibial vein w/ lesser saphenous thrombophlebitis. Will not start AC given hx of bleed on AC & hx of colonic AVMs   - v/q scan is low probability for PE. Pending echo results  - patient will need home O2, pending delivery    #Acute Kidney Injury on CKD stage 3 (baseline Cr 1.1)  - AM Cr 2.0 , improving from admission level  - likely pre-renal (combo of dec intake, diuretics & diarrhea)  - continue holding lisinopril, restart lasix 40mg BID  - US showed no hydronephrosis   - pt passed TOV    #Transient hematuria in wynn bag  - resolved spontaneously. monitor. if it recurs, consult urology.   - Most recent UA only shows 6-10 RBCs, + for WBC, LE. - for nitrites   - no urinary symptoms, hence hold off UTI tx    #Visual hallucinations, resolved  - back to baseline mental status, A & O x 3  - neuro recs appreciated. EEG negative  - CT head ruled out stroke (no acute pathology)   - May just be 2/2 change in environment. No h/o dementia though  - per son, this has happened on previous admission & w/up at that time was negative    #Abdomen shows gassy distension : upright KUB WNL    #Hypomagnesemia and Hypocalcemia:   - likely from diarrhea and/or diuretics  - Diuretics on hold   - Continue with Mg supplement    #Normocytic Anemia  - h/o recurrent GI bleed secondary to AVMs in the colonic mucosa (last colonoscopy in Jan)  - Continue ferrous sulfate. Check iron studies outpt  - Patient was receiving iron transfusion as outpatient    #Persistent Atrial fibrillation:   - tachycardic this AM, metoprolol was added at home dose  - not on anticoagulation due to hx of GI bleed    #Chronic HFpEF:   - lasix and Lisinopril on hold due to HAL  - no signs of overload in LE    GI: Protonix  DVT: Hep subq   Code Status: Full   Dispo: from home, pending potassium wnl

## 2019-09-05 NOTE — PROGRESS NOTE ADULT - SUBJECTIVE AND OBJECTIVE BOX
MUKESH POLLACK  [facility] 026 [address] ([facility])            Patient was evaluated and examined  by bedside, no active complains, patient wants to go home.                REVIEW OF SYSTEMS:  please see pertinent positives mentioned above, all other 12 ROS negative      T(C): , Max: 37.9 (09-04-19 @ 20:30)  HR: 83 (09-05-19 @ 12:38)  BP: 113/73 (09-05-19 @ 12:38)  RR: 18 (09-05-19 @ 12:38)  SpO2: --  CAPILLARY BLOOD GLUCOSE      POCT Blood Glucose.: 191 mg/dL (05 Sep 2019 11:28)  POCT Blood Glucose.: 174 mg/dL (05 Sep 2019 07:55)  POCT Blood Glucose.: 138 mg/dL (04 Sep 2019 20:45)  POCT Blood Glucose.: 210 mg/dL (04 Sep 2019 16:57)      PHYSICAL EXAM:  General: NAD, AAOX3, patient is laying comfortably in bed  HEENT: AT, NC, Supple, NO JVD, NO CB  Lungs: good breath sounds B/L, no wheezing, no rhonchi  CVS: normal S1, S2, RRR, NO M/G/R  Abdomen: soft, bowel sounds present, non-tender, non-distended  Extremities: plus 2 pitting  edema of b/l lower ext., no clubbing, no cyanosis, positive peripheral pulses b/l  Neuro: no acute focal neurological deficits  Skin: no rash, no ecchymosis      LAB  CBC  Date: 09-05-19 @ 07:56  Mean cell Zystahsmto10.8  Mean cell Hemoglobin Conc30.0  Mean cell Volum 92.8  Platelet count-Automate 234  RBC Count 3.20  Red Cell Distrib Width15.1  WBC Count9.00  % Albumin, Urine--  Hematocrit 29.7  Hemoglobin 8.9  CBC  Date: 09-04-19 @ 05:55  Mean cell Nqiogarxud69.2  Mean cell Hemoglobin Conc30.7  Mean cell Volum 92.0  Platelet count-Automate 271  RBC Count 3.26  Red Cell Distrib Width15.1  WBC Count7.66  % Albumin, Urine--  Hematocrit 30.0  Hemoglobin 9.2  CBC  Date: 09-03-19 @ 05:59  Mean cell Nzquqsmofz06.9  Mean cell Hemoglobin Conc30.2  Mean cell Volum 92.5  Platelet count-Automate 277  RBC Count 3.33  Red Cell Distrib Width15.3  WBC Count7.81  % Albumin, Urine--  Hematocrit 30.8  Hemoglobin 9.3  CBC  Date: 09-02-19 @ 06:12  Mean cell Ditnqmmfns64.9  Mean cell Hemoglobin Conc31.3  Mean cell Volum 89.1  Platelet count-Automate 279  RBC Count 3.41  Red Cell Distrib Width15.0  WBC Count8.79  % Albumin, Urine--  Hematocrit 30.4  Hemoglobin 9.5  CBC  Date: 09-01-19 @ 07:50  Mean cell Deehzdjevu42.4  Mean cell Hemoglobin Conc31.6  Mean cell Volum 89.8  Platelet count-Automate 260  RBC Count 3.42  Red Cell Distrib Width14.9  WBC Count9.08  % Albumin, Urine--  Hematocrit 30.7  Hemoglobin 9.7  CBC  Date: 08-31-19 @ 07:04  Mean cell Zsunxhxwlc92.6  Mean cell Hemoglobin Conc31.4  Mean cell Volum 88.0  Platelet count-Automate 247  RBC Count 3.33  Red Cell Distrib Width14.3  WBC Count9.05  % Albumin, Urine--  Hematocrit 29.3  Hemoglobin 9.2  CBC  Date: 08-30-19 @ 06:36  Mean cell Tdtuihhlxn11.4  Mean cell Hemoglobin Conc32.3  Mean cell Volum 87.8  Platelet count-Automate 256  RBC Count 3.28  Red Cell Distrib Width14.5  WBC Count8.21  % Albumin, Urine--  Hematocrit 28.8  Hemoglobin 9.3    [facility]  09-05-19 @ 07:56  Blood Gas Arterial-Calcium,Ionized--  Blood Urea Nitrogen, Serum 48 mg/dL<H> [10 - 20]  Carbon Dioxide, Serum23 mmol/L [17 - 32]  Chloride, Rqeok181 mmol/L [98 - 110]  Creatinie, Serum2.0 mg/dL<H> [0.7 - 1.5]  Glucose, Moosa095 mg/dL<H> [70 - 99]  Potassium, Serum5.9 mmol/L<H> [3.5 - 5.0]  Sodium, Serum 137 mmol/L [135 - 146]  [facility]  09-04-19 @ 05:55  Blood Gas Arterial-Calcium,Ionized--  Blood Urea Nitrogen, Serum 44 mg/dL<H> [10 - 20]  Carbon Dioxide, Serum25 mmol/L [17 - 32]  Chloride, Ipwvx156 mmol/L [98 - 110]  Creatinie, Serum1.7 mg/dL<H> [0.7 - 1.5]  Glucose, Njcua695 mg/dL<H> [70 - 99]  Potassium, Serum5.5 mmol/L<H> [3.5 - 5.0]  Sodium, Serum 143 mmol/L [135 - 146]  [facility]  09-03-19 @ 05:59  Blood Gas Arterial-Calcium,Ionized--  Blood Urea Nitrogen, Serum 44 mg/dL<H> [10 - 20]  Carbon Dioxide, Serum23 mmol/L [17 - 32]  Chloride, Ooics358 mmol/L [98 - 110]  Creatinie, Serum2.0 mg/dL<H> [0.7 - 1.5]  Glucose, Uahok128 mg/dL<H> [70 - 99]  Potassium, Serum5.2 mmol/L<H> [3.5 - 5.0]  Sodium, Serum 142 mmol/L [135 - 146]  [facility]  09-02-19 @ 06:12  Blood Gas Arterial-Calcium,Ionized--  Blood Urea Nitrogen, Serum 52 mg/dL<H> [10 - 20]  Carbon Dioxide, Serum24 mmol/L [17 - 32]  Chloride, Vsrgd762 mmol/L [98 - 110]  Creatinie, Serum2.3 mg/dL<H> [0.7 - 1.5]  Glucose, Dhmiy233 mg/dL<H> [70 - 99]  Potassium, Serum4.9 mmol/L [3.5 - 5.0]  Sodium, Serum 142 mmol/L [135 - 146]  [facility]  09-01-19 @ 07:50  Blood Gas Arterial-Calcium,Ionized--  Blood Urea Nitrogen, Serum 60 mg/dL<H> [10 - 20]  Carbon Dioxide, Serum21 mmol/L [17 - 32]  Chloride, Tcnbr100 mmol/L [98 - 110]  Creatinie, Serum3.0 mg/dL<H> [0.7 - 1.5]  Glucose, Imisy701 mg/dL<H> [70 - 99]  Potassium, Serum5.2 mmol/L<H> [3.5 - 5.0]  Sodium, Serum 143 mmol/L [135 - 146]        PT/INR - ( 04 Sep 2019 05:55 )   PT: 13.80 sec;   INR: 1.20 ratio         PTT - ( 04 Sep 2019 05:55 )  PTT:30.5 sec      Microbiology:    Culture - Urine (collected 08-28-19 @ 05:30)  Source: .Urine Clean Catch (Midstream)  Final Report (08-29-19 @ 16:45):    No growth          Medications:  calcium carbonate    500 mG (Tums) Chewable 1 Tablet(s) Chew every 8 hours  chlorhexidine 4% Liquid 1 Application(s) Topical <User Schedule>  clopidogrel Tablet 75 milliGRAM(s) Oral daily  dextrose 40% Gel 15 Gram(s) Oral once PRN  dextrose 5%. 1000 milliLiter(s) IV Continuous <Continuous>  dextrose 50% Injectable 12.5 Gram(s) IV Push once  dextrose 50% Injectable 25 Gram(s) IV Push once  dextrose 50% Injectable 25 Gram(s) IV Push once  furosemide    Tablet 40 milliGRAM(s) Oral two times a day  glucagon  Injectable 1 milliGRAM(s) IntraMuscular once PRN  heparin  Injectable 5000 Unit(s) SubCutaneous every 8 hours  insulin lispro (HumaLOG) corrective regimen sliding scale   SubCutaneous three times a day before meals  melatonin 3 milliGRAM(s) Oral at bedtime  metoprolol succinate ER 12.5 milliGRAM(s) Oral two times a day  nystatin Powder 1 Application(s) Topical two times a day  pantoprazole    Tablet 40 milliGRAM(s) Oral before breakfast  simvastatin 10 milliGRAM(s) Oral at bedtime        Assessment and Plan:  A 89 yo female with PMH of  HTN, DM type 2, COPD, pulmonary HTN, HFpEF, AFib, TAVR, CAD s/p PCI came to ED c/o lethargy and weakness, she has poor appetite, she was not eating well and her blood sugar was low 40 in AM, she also had mild diarrhea and vomiting, no abdominal pain, she feels like gas sensation in  her belly, she denies any urinary symptoms. In the ED her vital signs /70, , tep 97, labs showed Cr 5.3, Mg 0.5, calcium 6.1    A/p:     #Acute Kidney Injury on  ? CKD stage 3 (baseline Cr around January was 1.1)  likely from Pre-renal azotemia, diuretics and diarrhea. Abdomen US showed no hydronephrosis,   improved tremendously with holding Lasix and Lisinopril and giving LR @ 75 over 3 days.  -due to worsening legs edema - patient was resumed on home lasix tx.  -continue to hold ace inhibitors due to hyperkalemia  -close outpatient renal function monitoring with renal team f/up     Acute hypoxic respiratory failure   most recent  CXR shows mild congestion.   -home oxygen tx. arrangement  -lasix tx.     #RAIN (since 9/2 night):   LE duplex shows a superficial thrombus in right calf, d dimer high but VQ scan low probability.       #Right calf superficial thrombus:  warm compresses TID. Repeat US in 3 weeks.   Please note patient had some bleeding problems from Xarelto around 2013 and hence it was taken off.   And Jan 2019 she had some AVMs cauterized here. so avoid anticoagulation unless absolutely necessary.    #Reports visual hallucinations since around 8/30 :   as per patient's family , she is mentating at baseline        #Hypomagnesemia and Hypocalcemia:   likely from diarrhea and/or diuretics.   supplemented.    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  Continue ferrous sulfate. Check iron studies.   Patient was receiving iron transfusion as Outpatient    #Persistent Atrial fibrillation:   rate controlled on metoprolol  not on anticoagulation dut to h/o GI bleed    #Chronic HFpEF:   lasix was resumed   Lisinopril on hold due to hyperkalemia       # Hyperkalemia due to CKD  -tx. with kayexalate   -next bmp at 4 pm today    #Progress Note Handoff:  Pending (specify):   BMP post kayexalate tx. to review potassium level  Family discussion: was d/w daughter by bedside   Disposition: patient anticipated for d/c home today w/ VN  once potassium level is reviewed if wnr.

## 2019-09-09 DIAGNOSIS — K55.20 ANGIODYSPLASIA OF COLON WITHOUT HEMORRHAGE: ICD-10-CM

## 2019-09-09 DIAGNOSIS — E86.0 DEHYDRATION: ICD-10-CM

## 2019-09-09 DIAGNOSIS — I80.01 PHLEBITIS AND THROMBOPHLEBITIS OF SUPERFICIAL VESSELS OF RIGHT LOWER EXTREMITY: ICD-10-CM

## 2019-09-09 DIAGNOSIS — E87.2 ACIDOSIS: ICD-10-CM

## 2019-09-09 DIAGNOSIS — J44.9 CHRONIC OBSTRUCTIVE PULMONARY DISEASE, UNSPECIFIED: ICD-10-CM

## 2019-09-09 DIAGNOSIS — E11.22 TYPE 2 DIABETES MELLITUS WITH DIABETIC CHRONIC KIDNEY DISEASE: ICD-10-CM

## 2019-09-09 DIAGNOSIS — N17.9 ACUTE KIDNEY FAILURE, UNSPECIFIED: ICD-10-CM

## 2019-09-09 DIAGNOSIS — R44.1 VISUAL HALLUCINATIONS: ICD-10-CM

## 2019-09-09 DIAGNOSIS — Z88.0 ALLERGY STATUS TO PENICILLIN: ICD-10-CM

## 2019-09-09 DIAGNOSIS — E87.1 HYPO-OSMOLALITY AND HYPONATREMIA: ICD-10-CM

## 2019-09-09 DIAGNOSIS — J96.01 ACUTE RESPIRATORY FAILURE WITH HYPOXIA: ICD-10-CM

## 2019-09-09 DIAGNOSIS — E11.649 TYPE 2 DIABETES MELLITUS WITH HYPOGLYCEMIA WITHOUT COMA: ICD-10-CM

## 2019-09-09 DIAGNOSIS — M19.90 UNSPECIFIED OSTEOARTHRITIS, UNSPECIFIED SITE: ICD-10-CM

## 2019-09-09 DIAGNOSIS — I82.441 ACUTE EMBOLISM AND THROMBOSIS OF RIGHT TIBIAL VEIN: ICD-10-CM

## 2019-09-09 DIAGNOSIS — E87.5 HYPERKALEMIA: ICD-10-CM

## 2019-09-09 DIAGNOSIS — I27.20 PULMONARY HYPERTENSION, UNSPECIFIED: ICD-10-CM

## 2019-09-09 DIAGNOSIS — I50.32 CHRONIC DIASTOLIC (CONGESTIVE) HEART FAILURE: ICD-10-CM

## 2019-09-09 DIAGNOSIS — I13.0 HYPERTENSIVE HEART AND CHRONIC KIDNEY DISEASE WITH HEART FAILURE AND STAGE 1 THROUGH STAGE 4 CHRONIC KIDNEY DISEASE, OR UNSPECIFIED CHRONIC KIDNEY DISEASE: ICD-10-CM

## 2019-09-09 DIAGNOSIS — I25.10 ATHEROSCLEROTIC HEART DISEASE OF NATIVE CORONARY ARTERY WITHOUT ANGINA PECTORIS: ICD-10-CM

## 2019-09-09 DIAGNOSIS — E83.51 HYPOCALCEMIA: ICD-10-CM

## 2019-09-09 DIAGNOSIS — N18.3 CHRONIC KIDNEY DISEASE, STAGE 3 (MODERATE): ICD-10-CM

## 2019-09-09 DIAGNOSIS — K21.9 GASTRO-ESOPHAGEAL REFLUX DISEASE WITHOUT ESOPHAGITIS: ICD-10-CM

## 2019-09-09 DIAGNOSIS — R18.8 OTHER ASCITES: ICD-10-CM

## 2019-09-09 DIAGNOSIS — K52.9 NONINFECTIVE GASTROENTERITIS AND COLITIS, UNSPECIFIED: ICD-10-CM

## 2019-09-09 DIAGNOSIS — I48.1 PERSISTENT ATRIAL FIBRILLATION: ICD-10-CM

## 2019-09-09 DIAGNOSIS — Z95.5 PRESENCE OF CORONARY ANGIOPLASTY IMPLANT AND GRAFT: ICD-10-CM

## 2019-09-09 DIAGNOSIS — D64.9 ANEMIA, UNSPECIFIED: ICD-10-CM

## 2019-10-02 ENCOUNTER — OUTPATIENT (OUTPATIENT)
Dept: OUTPATIENT SERVICES | Facility: HOSPITAL | Age: 84
LOS: 1 days | Discharge: HOME | End: 2019-10-02

## 2019-10-02 DIAGNOSIS — Z95.2 PRESENCE OF PROSTHETIC HEART VALVE: Chronic | ICD-10-CM

## 2019-10-03 DIAGNOSIS — E11.9 TYPE 2 DIABETES MELLITUS WITHOUT COMPLICATIONS: ICD-10-CM

## 2019-10-03 DIAGNOSIS — D50.8 OTHER IRON DEFICIENCY ANEMIAS: ICD-10-CM

## 2019-10-03 DIAGNOSIS — N18.3 CHRONIC KIDNEY DISEASE, STAGE 3 (MODERATE): ICD-10-CM

## 2019-10-30 ENCOUNTER — OUTPATIENT (OUTPATIENT)
Dept: OUTPATIENT SERVICES | Facility: HOSPITAL | Age: 84
LOS: 1 days | Discharge: HOME | End: 2019-10-30

## 2019-10-30 DIAGNOSIS — N18.3 CHRONIC KIDNEY DISEASE, STAGE 3 (MODERATE): ICD-10-CM

## 2019-10-30 DIAGNOSIS — Z95.2 PRESENCE OF PROSTHETIC HEART VALVE: Chronic | ICD-10-CM

## 2019-11-20 ENCOUNTER — APPOINTMENT (OUTPATIENT)
Dept: CARDIOLOGY | Facility: CLINIC | Age: 84
End: 2019-11-20
Payer: MEDICARE

## 2019-11-20 VITALS
HEIGHT: 60 IN | WEIGHT: 126 LBS | BODY MASS INDEX: 24.74 KG/M2 | HEART RATE: 89 BPM | DIASTOLIC BLOOD PRESSURE: 50 MMHG | SYSTOLIC BLOOD PRESSURE: 100 MMHG

## 2019-11-20 PROCEDURE — 93306 TTE W/DOPPLER COMPLETE: CPT

## 2019-11-20 PROCEDURE — 93000 ELECTROCARDIOGRAM COMPLETE: CPT

## 2019-11-20 PROCEDURE — 99214 OFFICE O/P EST MOD 30 MIN: CPT

## 2019-11-20 RX ORDER — FUROSEMIDE 40 MG/1
40 TABLET ORAL
Refills: 0 | Status: ACTIVE | COMMUNITY
Start: 2018-02-14

## 2019-11-20 RX ORDER — GLIMEPIRIDE 2 MG/1
2 TABLET ORAL
Qty: 30 | Refills: 0 | Status: DISCONTINUED | COMMUNITY
Start: 2018-10-06 | End: 2019-11-20

## 2019-11-20 NOTE — ASSESSMENT
[FreeTextEntry1] : The patient has multiple issues . She had severe dehydration in 8-19  after having diarrhea and not eating. Her creatinine was >5.0 She has AF . Unable to A/C secondary to severe anemia and bleeding . She has diastolic HF and has severe TR , She has had a TAVR which is functioning well.  . SHe is predominately wheelchair bound . SHe remains seveely anemic. For some reason her ioron infusions have stopped . They were talking about Procrit . She will follow up with renal and hematology

## 2019-11-20 NOTE — HISTORY OF PRESENT ILLNESS
[FreeTextEntry1] : The patient had diarrhea and was not eating  . She was admitted to [facility] with renal failure . She was givne IV Fluid. Her creatinine had improved . She was then noted to be volume overloaded . She is back on Diuretic with some improvement .She is anemic . Iron infusions have been stopped and she is iron def. . .

## 2019-11-20 NOTE — REASON FOR VISIT
[Follow-Up - Clinic] : a clinic follow-up of [Aortic Stenosis] : aortic stenosis [Atrial Fibrillation] : atrial fibrillation [Coronary Artery Disease] : coronary artery disease [Hyperlipidemia] : hyperlipidemia [Heart Failure] : congestive heart failure [Hypertension] : hypertension

## 2019-11-20 NOTE — PHYSICAL EXAM
[General Appearance - Well Developed] : well developed [Normal Appearance] : normal appearance [Well Groomed] : well groomed [General Appearance - Well Nourished] : well nourished [No Deformities] : no deformities [Normal Conjunctiva] : the conjunctiva exhibited no abnormalities [General Appearance - In No Acute Distress] : no acute distress [Normal Oral Mucosa] : normal oral mucosa [Eyelids - No Xanthelasma] : the eyelids demonstrated no xanthelasmas [No Oral Pallor] : no oral pallor [No Oral Cyanosis] : no oral cyanosis [Respiration, Rhythm And Depth] : normal respiratory rhythm and effort [Exaggerated Use Of Accessory Muscles For Inspiration] : no accessory muscle use [Auscultation Breath Sounds / Voice Sounds] : lungs were clear to auscultation bilaterally [Abdomen Soft] : soft [Abdomen Tenderness] : non-tender [Abdomen Mass (___ Cm)] : no abdominal mass palpated [Nail Clubbing] : no clubbing of the fingernails [Petechial Hemorrhages (___cm)] : no petechial hemorrhages [Cyanosis, Localized] : no localized cyanosis [] : no ischemic changes [Affect] : the affect was normal [Oriented To Time, Place, And Person] : oriented to person, place, and time [Mood] : the mood was normal [No Anxiety] : not feeling anxious [Irregularly Irregular] : irregularly irregular [II] : a grade 2 [1+] : right 1+ [___ +] : bilateral [unfilled]U+ pitting edema to the ankles [FreeTextEntry1] : minimal rales at bases  [Rt] : no varicose veins of the right leg [Lt] : no varicose veins of the left leg

## 2019-11-20 NOTE — REVIEW OF SYSTEMS
[Feeling Fatigued] : feeling fatigued [Joint Pain] : joint pain [Joint Swelling] : joint swelling [Joint Stiffness] : joint stiffness [Itching] : itching [Skin Lesions] : skin lesion(s): [Negative] : Heme/Lymph [Fever] : no fever [Headache] : no headache [Shortness Of Breath] : no shortness of breath [Palpitations] : no palpitations [Chills] : no chills [Change In Color Of Skin] : change in skin color

## 2019-11-24 LAB
ANION GAP SERPL CALC-SCNC: 12 MMOL/L
BASOPHILS # BLD AUTO: 0.05 K/UL
BASOPHILS NFR BLD AUTO: 0.5 %
BUN SERPL-MCNC: 41 MG/DL
CALCIUM SERPL-MCNC: 8.1 MG/DL
CHLORIDE SERPL-SCNC: 95 MMOL/L
CO2 SERPL-SCNC: 32 MMOL/L
CREAT SERPL-MCNC: 1.2 MG/DL
EOSINOPHIL # BLD AUTO: 0.06 K/UL
EOSINOPHIL NFR BLD AUTO: 0.6 %
GLUCOSE SERPL-MCNC: 180 MG/DL
HCT VFR BLD CALC: 27.5 %
HGB BLD-MCNC: 8.2 G/DL
IMM GRANULOCYTES NFR BLD AUTO: 0.6 %
LYMPHOCYTES # BLD AUTO: 0.85 K/UL
LYMPHOCYTES NFR BLD AUTO: 8.7 %
MAN DIFF?: NORMAL
MCHC RBC-ENTMCNC: 27.4 PG
MCHC RBC-ENTMCNC: 29.8 G/DL
MCV RBC AUTO: 92 FL
MONOCYTES # BLD AUTO: 0.64 K/UL
MONOCYTES NFR BLD AUTO: 6.5 %
NEUTROPHILS # BLD AUTO: 8.15 K/UL
NEUTROPHILS NFR BLD AUTO: 83.1 %
NT-PROBNP SERPL-MCNC: 8536 PG/ML
PLATELET # BLD AUTO: 283 K/UL
POTASSIUM SERPL-SCNC: 4.8 MMOL/L
RBC # BLD: 2.99 M/UL
RBC # FLD: 14.6 %
SODIUM SERPL-SCNC: 139 MMOL/L
WBC # FLD AUTO: 9.81 K/UL

## 2019-12-04 ENCOUNTER — OUTPATIENT (OUTPATIENT)
Dept: OUTPATIENT SERVICES | Facility: HOSPITAL | Age: 84
LOS: 1 days | Discharge: HOME | End: 2019-12-04

## 2019-12-04 ENCOUNTER — LABORATORY RESULT (OUTPATIENT)
Age: 84
End: 2019-12-04

## 2019-12-04 DIAGNOSIS — D64.9 ANEMIA, UNSPECIFIED: ICD-10-CM

## 2019-12-04 DIAGNOSIS — Z95.2 PRESENCE OF PROSTHETIC HEART VALVE: Chronic | ICD-10-CM

## 2019-12-18 ENCOUNTER — LABORATORY RESULT (OUTPATIENT)
Age: 84
End: 2019-12-18

## 2019-12-18 ENCOUNTER — OUTPATIENT (OUTPATIENT)
Dept: OUTPATIENT SERVICES | Facility: HOSPITAL | Age: 84
LOS: 1 days | Discharge: HOME | End: 2019-12-18

## 2019-12-18 DIAGNOSIS — Z95.2 PRESENCE OF PROSTHETIC HEART VALVE: Chronic | ICD-10-CM

## 2019-12-19 DIAGNOSIS — D64.9 ANEMIA, UNSPECIFIED: ICD-10-CM

## 2019-12-31 ENCOUNTER — OUTPATIENT (OUTPATIENT)
Dept: OUTPATIENT SERVICES | Facility: HOSPITAL | Age: 84
LOS: 1 days | Discharge: HOME | End: 2019-12-31

## 2019-12-31 ENCOUNTER — LABORATORY RESULT (OUTPATIENT)
Age: 84
End: 2019-12-31

## 2019-12-31 DIAGNOSIS — E87.2 ACIDOSIS: ICD-10-CM

## 2019-12-31 DIAGNOSIS — N17.9 ACUTE KIDNEY FAILURE, UNSPECIFIED: ICD-10-CM

## 2019-12-31 DIAGNOSIS — I13.0 HYPERTENSIVE HEART AND CHRONIC KIDNEY DISEASE WITH HEART FAILURE AND STAGE 1 THROUGH STAGE 4 CHRONIC KIDNEY DISEASE, OR UNSPECIFIED CHRONIC KIDNEY DISEASE: ICD-10-CM

## 2019-12-31 DIAGNOSIS — R18.8 OTHER ASCITES: ICD-10-CM

## 2019-12-31 DIAGNOSIS — Z95.2 PRESENCE OF PROSTHETIC HEART VALVE: Chronic | ICD-10-CM

## 2019-12-31 DIAGNOSIS — J96.01 ACUTE RESPIRATORY FAILURE WITH HYPOXIA: ICD-10-CM

## 2020-01-15 ENCOUNTER — LABORATORY RESULT (OUTPATIENT)
Age: 85
End: 2020-01-15

## 2020-01-17 ENCOUNTER — RESULT REVIEW (OUTPATIENT)
Age: 85
End: 2020-01-17

## 2020-01-18 ENCOUNTER — LABORATORY RESULT (OUTPATIENT)
Age: 85
End: 2020-01-18

## 2020-01-29 ENCOUNTER — LABORATORY RESULT (OUTPATIENT)
Age: 85
End: 2020-01-29

## 2020-02-12 ENCOUNTER — LABORATORY RESULT (OUTPATIENT)
Age: 85
End: 2020-02-12

## 2020-02-26 ENCOUNTER — LABORATORY RESULT (OUTPATIENT)
Age: 85
End: 2020-02-26

## 2020-03-09 ENCOUNTER — APPOINTMENT (OUTPATIENT)
Dept: CARDIOLOGY | Facility: CLINIC | Age: 85
End: 2020-03-09
Payer: MEDICARE

## 2020-03-09 VITALS — WEIGHT: 127 LBS | HEART RATE: 84 BPM | HEIGHT: 60 IN | BODY MASS INDEX: 24.94 KG/M2

## 2020-03-09 VITALS — DIASTOLIC BLOOD PRESSURE: 70 MMHG | SYSTOLIC BLOOD PRESSURE: 142 MMHG

## 2020-03-09 DIAGNOSIS — I25.10 ATHEROSCLEROTIC HEART DISEASE OF NATIVE CORONARY ARTERY W/OUT ANGINA PECTORIS: ICD-10-CM

## 2020-03-09 DIAGNOSIS — I70.90 UNSPECIFIED ATHEROSCLEROSIS: ICD-10-CM

## 2020-03-09 PROCEDURE — 93000 ELECTROCARDIOGRAM COMPLETE: CPT

## 2020-03-09 PROCEDURE — 99214 OFFICE O/P EST MOD 30 MIN: CPT

## 2020-03-09 RX ORDER — PYRITHIONE ZINC 1 G/ML
1000 SHAMPOO TOPICAL
Refills: 0 | Status: ACTIVE | COMMUNITY

## 2020-03-09 RX ORDER — GLIPIZIDE 5 MG/1
5 TABLET ORAL
Refills: 0 | Status: ACTIVE | COMMUNITY

## 2020-03-09 RX ORDER — METOLAZONE 2.5 MG/1
2.5 TABLET ORAL DAILY
Refills: 0 | Status: DISCONTINUED | COMMUNITY
End: 2020-03-09

## 2020-03-09 RX ORDER — METOPROLOL TARTRATE 25 MG/1
25 TABLET, FILM COATED ORAL
Qty: 90 | Refills: 3 | Status: ACTIVE | COMMUNITY
Start: 1900-01-01 | End: 1900-01-01

## 2020-03-09 RX ORDER — LISINOPRIL 10 MG/1
10 TABLET ORAL
Qty: 30 | Refills: 0 | Status: DISCONTINUED | COMMUNITY
Start: 2018-10-06 | End: 2020-03-09

## 2020-03-09 RX ORDER — FERROUS SULFATE 325(65) MG
325 (65 FE) TABLET ORAL
Refills: 0 | Status: ACTIVE | COMMUNITY

## 2020-03-09 NOTE — REVIEW OF SYSTEMS
[Feeling Fatigued] : feeling fatigued [Joint Pain] : joint pain [Joint Swelling] : joint swelling [Joint Stiffness] : joint stiffness [Itching] : itching [Skin Lesions] : skin lesion(s): [Negative] : Heme/Lymph [Fever] : no fever [Headache] : no headache [Chills] : no chills [Shortness Of Breath] : no shortness of breath [Palpitations] : no palpitations [Change In Color Of Skin] : change in skin color

## 2020-03-09 NOTE — ASSESSMENT
[FreeTextEntry1] : The patient had Metolazone stopped. Although her renal function has improved she is volume overloaded and has edema . Weight is mildly increased . She has had a TAVR in the past  which has been functioning well. She has permanent AF and rate is controlled.

## 2020-03-09 NOTE — REASON FOR VISIT
[Follow-Up - Clinic] : a clinic follow-up of [Aortic Stenosis] : aortic stenosis [Atrial Fibrillation] : atrial fibrillation [Coronary Artery Disease] : coronary artery disease [Heart Failure] : congestive heart failure [Hyperlipidemia] : hyperlipidemia [Hypertension] : hypertension

## 2020-03-09 NOTE — HISTORY OF PRESENT ILLNESS
[FreeTextEntry1] : The patient has been feeling better No further diarrhea  She is predominately wheel chair bound now . The patient had her Metolazone stopped and renal function has improved. . .

## 2020-03-09 NOTE — PROGRESS NOTE ADULT - ASSESSMENT
Posterior Vitreous Detachment (PVD) Counseling: I have discussed the diagnosis of PVD with the patient and the possibility of a retinal tear or detachment. The signs/symptoms of a retinal tear/detachment were reviewed to include but not limited to redness, discharge, pain, increase or change in flashes of light, increase or change in floaters, decreased vision, part of the vision missing, veils or any other ocular concerns. I have further explained not all patients who develop a tear or detachment have notable symptoms, therefore, compliance with return visits are necessary. The patient was instructed to contact us immediately if they noticed any of the signs or symptoms of retinal detachment as noted above as the prognosis for any potential treatment options may be time related. Return for follow-up as scheduled. # acute blood loss anemia   HGB stable   no more evidence of GIB   resumed plavix   c-scope showed AVMS treated and also diverticulosis OPT follow up with GI     #Afib:   not on anticoagulation given history of GIB   had 3.4 sec pause on tele spoke to Dr Reeves and recommended to continue with BB since its less than 5 seconds and also recommended to get EP eval         # elevated CE likely 2/2 Type 2 NSTEMI most likely secondary to blood loss,   echo normal EF     #HAL, resolved     # COPD, stable  -1L prn and at night time,  -duonebs prn    # CAD s/p PCI, s/p TAVR,:  on plavix   stable c/w current meds   not on AC because of bleeding       # HTN, controlled can resume ACE if becomes uncontrolled was held for HAL which resolved       #discharge home pending EP eval # acute blood loss anemia   HGB stable   no more evidence of GIB   resumed plavix   c-scope showed AVMS treated and also diverticulosis OPT follow up with GI     #Afib:   not on anticoagulation given history of GIB   had 3.4 sec pause on tele spoke to Dr Reeves and recommended to continue with BB since its less than 5 seconds and also recommended to get EP eval     #DM: takes metformin and glimipiride at home. FS not controlled start insulin in hospital and target -180. resume home meds on discharge       # elevated CE likely 2/2 Type 2 NSTEMI most likely secondary to blood loss,   echo normal EF     #HAL, resolved     # COPD, stable  -1L prn and at night time,  -duonebs prn    # CAD s/p PCI, s/p TAVR,:  on plavix   stable c/w current meds   not on AC because of bleeding       # HTN, controlled can resume ACE if becomes uncontrolled was held for HAL which resolved       #discharge home pending EP eval

## 2020-03-11 ENCOUNTER — LABORATORY RESULT (OUTPATIENT)
Age: 85
End: 2020-03-11

## 2020-03-17 LAB
ALBUMIN SERPL ELPH-MCNC: 3.5 G/DL
ALP BLD-CCNC: 112 U/L
ALT SERPL-CCNC: 9 U/L
ANION GAP SERPL CALC-SCNC: 12 MMOL/L
AST SERPL-CCNC: 15 U/L
BASOPHILS # BLD AUTO: 0.05 K/UL
BASOPHILS NFR BLD AUTO: 0.8 %
BILIRUB SERPL-MCNC: 0.3 MG/DL
BUN SERPL-MCNC: 39 MG/DL
CALCIUM SERPL-MCNC: 8.5 MG/DL
CHLORIDE SERPL-SCNC: 100 MMOL/L
CHOLEST SERPL-MCNC: 95 MG/DL
CHOLEST/HDLC SERPL: 3.3 RATIO
CO2 SERPL-SCNC: 27 MMOL/L
CREAT SERPL-MCNC: 1.4 MG/DL
EOSINOPHIL # BLD AUTO: 0.2 K/UL
EOSINOPHIL NFR BLD AUTO: 3.1 %
GLUCOSE SERPL-MCNC: 248 MG/DL
HCT VFR BLD CALC: 33.6 %
HDLC SERPL-MCNC: 29 MG/DL
HGB BLD-MCNC: 9.9 G/DL
IMM GRANULOCYTES NFR BLD AUTO: 0.5 %
LDLC SERPL CALC-MCNC: 51 MG/DL
LYMPHOCYTES # BLD AUTO: 1.18 K/UL
LYMPHOCYTES NFR BLD AUTO: 18.4 %
MAN DIFF?: NORMAL
MCHC RBC-ENTMCNC: 27.1 PG
MCHC RBC-ENTMCNC: 29.5 G/DL
MCV RBC AUTO: 92.1 FL
MONOCYTES # BLD AUTO: 0.53 K/UL
MONOCYTES NFR BLD AUTO: 8.3 %
NEUTROPHILS # BLD AUTO: 4.41 K/UL
NEUTROPHILS NFR BLD AUTO: 68.9 %
NT-PROBNP SERPL-MCNC: 6177 PG/ML
PLATELET # BLD AUTO: 222 K/UL
POTASSIUM SERPL-SCNC: 4.9 MMOL/L
PROT SERPL-MCNC: 6.7 G/DL
RBC # BLD: 3.65 M/UL
RBC # FLD: 15.5 %
SODIUM SERPL-SCNC: 139 MMOL/L
TRIGL SERPL-MCNC: 75 MG/DL
WBC # FLD AUTO: 6.4 K/UL

## 2020-03-25 ENCOUNTER — LABORATORY RESULT (OUTPATIENT)
Age: 85
End: 2020-03-25

## 2020-04-08 ENCOUNTER — LABORATORY RESULT (OUTPATIENT)
Age: 85
End: 2020-04-08

## 2020-04-22 ENCOUNTER — LABORATORY RESULT (OUTPATIENT)
Age: 85
End: 2020-04-22

## 2020-05-05 ENCOUNTER — LABORATORY RESULT (OUTPATIENT)
Age: 85
End: 2020-05-05

## 2020-05-19 ENCOUNTER — LABORATORY RESULT (OUTPATIENT)
Age: 85
End: 2020-05-19

## 2020-06-02 ENCOUNTER — LABORATORY RESULT (OUTPATIENT)
Age: 85
End: 2020-06-02

## 2020-07-14 ENCOUNTER — APPOINTMENT (OUTPATIENT)
Dept: CARDIOLOGY | Facility: CLINIC | Age: 85
End: 2020-07-14
Payer: MEDICARE

## 2020-07-14 VITALS — WEIGHT: 108 LBS | DIASTOLIC BLOOD PRESSURE: 54 MMHG | SYSTOLIC BLOOD PRESSURE: 123 MMHG | BODY MASS INDEX: 21.09 KG/M2

## 2020-07-14 DIAGNOSIS — I34.0 NONRHEUMATIC MITRAL (VALVE) INSUFFICIENCY: ICD-10-CM

## 2020-07-14 DIAGNOSIS — I10 ESSENTIAL (PRIMARY) HYPERTENSION: ICD-10-CM

## 2020-07-14 PROCEDURE — 99214 OFFICE O/P EST MOD 30 MIN: CPT | Mod: 95

## 2020-07-14 RX ORDER — SIMVASTATIN 10 MG/1
10 TABLET, FILM COATED ORAL DAILY
Refills: 0 | Status: DISCONTINUED | COMMUNITY
End: 2020-07-14

## 2020-07-14 NOTE — ASSESSMENT
[FreeTextEntry1] : The patient had restarted her Metolazone . Weight is significantly down and she has a pre renal azotemia . No overt JVD on exam . Edema is improved . She has had TAVR in the past  She is now on Hospice .

## 2020-07-14 NOTE — HISTORY OF PRESENT ILLNESS
[Home] : at home, [unfilled] , at the time of the visit. [Medical Office: ([facility])___] : at the medical office located in  [Verbal consent obtained from patient] : the patient, [unfilled] [FreeTextEntry3] : From Son  [FreeTextEntry1] : The patient is now on hospice . She has not been out since March of this uear  Has issuew with incontinence .  Some confusion .Still reading some but less. Her SOB is improved. She is taking Metolazone just twice a week and she has been stable with this does and curret LAsix dose .

## 2020-07-14 NOTE — PHYSICAL EXAM
[General Appearance - Well Developed] : well developed [Normal Appearance] : normal appearance [General Appearance - Well Nourished] : well nourished [Well Groomed] : well groomed [No Deformities] : no deformities [Normal Conjunctiva] : the conjunctiva exhibited no abnormalities [General Appearance - In No Acute Distress] : no acute distress [Eyelids - No Xanthelasma] : the eyelids demonstrated no xanthelasmas [No Oral Pallor] : no oral pallor [Normal Oral Mucosa] : normal oral mucosa [No Oral Cyanosis] : no oral cyanosis [FreeTextEntry1] : No JVD  [Respiration, Rhythm And Depth] : normal respiratory rhythm and effort [Exaggerated Use Of Accessory Muscles For Inspiration] : no accessory muscle use [Abdomen Mass (___ Cm)] : no abdominal mass palpated [Cyanosis, Localized] : no localized cyanosis [Nail Clubbing] : no clubbing of the fingernails [Oriented To Time, Place, And Person] : oriented to person, place, and time [] : no ischemic changes [Petechial Hemorrhages (___cm)] : no petechial hemorrhages [Affect] : the affect was normal [No Anxiety] : not feeling anxious [Mood] : the mood was normal [___ +] : bilateral [unfilled]U+ pitting edema to the ankles [Rt] : no varicose veins of the right leg [Lt] : no varicose veins of the left leg

## 2020-07-14 NOTE — REVIEW OF SYSTEMS
[Fever] : no fever [Chills] : no chills [Headache] : no headache [Feeling Fatigued] : feeling fatigued [Shortness Of Breath] : no shortness of breath [Palpitations] : no palpitations [Joint Pain] : joint pain [Joint Swelling] : joint swelling [Itching] : itching [Joint Stiffness] : joint stiffness [Skin Lesions] : skin lesion(s): [Change In Color Of Skin] : change in skin color [Negative] : Heme/Lymph

## 2020-11-17 ENCOUNTER — APPOINTMENT (OUTPATIENT)
Dept: CARDIOLOGY | Facility: CLINIC | Age: 85
End: 2020-11-17
Payer: MEDICARE

## 2020-11-17 VITALS — HEART RATE: 80 BPM | SYSTOLIC BLOOD PRESSURE: 119 MMHG | DIASTOLIC BLOOD PRESSURE: 50 MMHG

## 2020-11-17 DIAGNOSIS — I65.29 OCCLUSION AND STENOSIS OF UNSPECIFIED CAROTID ARTERY: ICD-10-CM

## 2020-11-17 DIAGNOSIS — I50.32 CHRONIC DIASTOLIC (CONGESTIVE) HEART FAILURE: ICD-10-CM

## 2020-11-17 DIAGNOSIS — J86.9 PYOTHORAX W/OUT FISTULA: ICD-10-CM

## 2020-11-17 DIAGNOSIS — I50.30 UNSPECIFIED DIASTOLIC (CONGESTIVE) HEART FAILURE: ICD-10-CM

## 2020-11-17 DIAGNOSIS — E78.5 HYPERLIPIDEMIA, UNSPECIFIED: ICD-10-CM

## 2020-11-17 DIAGNOSIS — I35.0 NONRHEUMATIC AORTIC (VALVE) STENOSIS: ICD-10-CM

## 2020-11-17 DIAGNOSIS — I27.20 PULMONARY HYPERTENSION, UNSPECIFIED: ICD-10-CM

## 2020-11-17 DIAGNOSIS — I48.91 UNSPECIFIED ATRIAL FIBRILLATION: ICD-10-CM

## 2020-11-17 DIAGNOSIS — E11.9 TYPE 2 DIABETES MELLITUS W/OUT COMPLICATIONS: ICD-10-CM

## 2020-11-17 DIAGNOSIS — I48.92 UNSPECIFIED ATRIAL FIBRILLATION: ICD-10-CM

## 2020-11-17 PROCEDURE — 99213 OFFICE O/P EST LOW 20 MIN: CPT | Mod: 95

## 2020-11-17 RX ORDER — LORAZEPAM 0.5 MG/1
0.5 TABLET ORAL
Refills: 0 | Status: ACTIVE | COMMUNITY
Start: 2020-11-17

## 2020-11-17 RX ORDER — METOLAZONE 2.5 MG/1
2.5 TABLET ORAL
Refills: 0 | Status: ACTIVE | COMMUNITY
Start: 2020-11-17

## 2020-11-17 NOTE — ASSESSMENT
[FreeTextEntry1] : The patient has had TAVR in the past , permanent AF unable to give A/C secondary to falling and bleeding risk .The patient has had fleeting chest pain which is most likely not cardiac in origin . She needs continued supportive care .

## 2020-11-17 NOTE — PHYSICAL EXAM
[General Appearance - Well Developed] : well developed [Normal Appearance] : normal appearance [Well Groomed] : well groomed [General Appearance - Well Nourished] : well nourished [No Deformities] : no deformities [General Appearance - In No Acute Distress] : no acute distress [Normal Conjunctiva] : the conjunctiva exhibited no abnormalities [Eyelids - No Xanthelasma] : the eyelids demonstrated no xanthelasmas [Normal Oral Mucosa] : normal oral mucosa [No Oral Pallor] : no oral pallor [No Oral Cyanosis] : no oral cyanosis [Respiration, Rhythm And Depth] : normal respiratory rhythm and effort [Exaggerated Use Of Accessory Muscles For Inspiration] : no accessory muscle use [Abdomen Mass (___ Cm)] : no abdominal mass palpated [Nail Clubbing] : no clubbing of the fingernails [Cyanosis, Localized] : no localized cyanosis [Petechial Hemorrhages (___cm)] : no petechial hemorrhages [] : no ischemic changes [Oriented To Time, Place, And Person] : oriented to person, place, and time [Affect] : the affect was normal [Mood] : the mood was normal [No Anxiety] : not feeling anxious [___ +] : bilateral [unfilled]U+ pitting edema to the ankles [FreeTextEntry1] : No JVD  [Rt] : no varicose veins of the right leg [Lt] : no varicose veins of the left leg

## 2020-11-17 NOTE — HISTORY OF PRESENT ILLNESS
[Home] : at home, [unfilled] , at the time of the visit. [Medical Office: ([facility])___] : at the medical office located in  [Verbal consent obtained from patient] : the patient, [unfilled] [FreeTextEntry1] : The patient remains on hospice .Some RAIN when going to the [facility] other wise has remained unchanged  She has been confused .   [FreeTextEntry3] : From Son

## 2021-03-29 NOTE — PATIENT PROFILE ADULT - FALL HARM RISK
low PO intake and on IVFs/age(85 years old or older)/bones(Osteoporosis,prev fx,steroid use,metastatic bone ca)/other Posterior Auricular Interpolation Flap Text: A decision was made to reconstruct the defect utilizing an interpolation axial flap and a staged reconstruction.  A telfa template was made of the defect.  This telfa template was then used to outline the posterior auricular interpolation flap.  The donor area for the pedicle flap was then injected with anesthesia.  The flap was excised through the skin and subcutaneous tissue down to the layer of the underlying musculature.  The pedicle flap was carefully excised within this deep plane to maintain its blood supply.  The edges of the donor site were undermined.   The donor site was closed in a primary fashion.  The pedicle was then rotated into position and sutured.  Once the tube was sutured into place, adequate blood supply was confirmed with blanching and refill.  The pedicle was then wrapped with xeroform gauze and dressed appropriately with a telfa and gauze bandage to ensure continued blood supply and protect the attached pedicle.

## 2021-05-18 NOTE — ED ADULT NURSE NOTE - NS ED NURSE LEVEL OF CONSCIOUSNESS AFFECT
Ying CastanonJunior 1980     Office/Outpatient Visit    Visit Date: Thu, Jun 20, 2019 09:15 am    Provider: Concepción Gupta MD (Assistant: Suyapa Moore MA)    Location: [facility]        Electronically signed by Concepción Gupta Dilia Heredia            Allergies:     06767  TSH - H TSH  (Send-Out)         24478  T4, Free  (Send-Out)         85347  Drug test prsmv read direct optical obs pr date  (In-House)           Other Orders:         Depression screen positive and follow up plan documented  (In-House)           Negative EtOH screen  (In-House)                   PLAN:          Hypertension well controlled         FOLLOW-UP TESTING #1: FOLLOW-UP LABORATORY:  Labs to be scheduled in the future include CMP.            Orders:       FUTURE  Future order to be done at patients convenience  (Send-Out)         29739  COMP - [facility] Comp. Metabolic Panel  (Send-Out)            Acquired hypothyroidism, other specified cause         FOLLOW-UP TESTING #1: FOLLOW-UP LABORATORY:  Labs to be scheduled in the future include TSH.            Orders:       13641  TSH - [facility] TSH  (Send-Out)         97385  long term (current) drug therapy              Orders:          17823   Drug test prsmv read direct optical obs pr date  (In-House)           300.4 Depression with anxiety            F41.1    Generalized anxiety disorder              Orders:             Depression screen positive and follow up plan documented  (In-House)                Negative EtOH screen  (In-House)           272.0 Essential  hypercholesterolemia            E78.00    Pure hypercholesterolemia, unspecified

## 2021-12-04 NOTE — PATIENT PROFILE ADULT - NSPROCHRONICPAIN_GEN_A_NUR
OFFICE VISIT      Patient: Stephany Valladares Date of Service: 2021   : 1978 MRN: [id number]     SUBJECTIVE:   HISTORY OF PRESENT ILLNESS:  Stephany Valladares is a 43 year old male who presents today for   Chief Complaint   Patient presents with   • Knee Pain     left outter knee pain since wednesday     Patient states left lateral knee pain since Wednesday. Patient states normally work out & walk daily, but noticed at work almost could not stand. Patient denies any trauma to his knowledge and no history of knee pain    Knee Pain  This is a new problem. The current episode started in the past 7 days. The problem occurs intermittently. The problem has been unchanged. Associated symptoms include arthralgias, joint swelling and myalgias. Pertinent negatives include no abdominal pain, anorexia, change in bowel habit, chest pain, chills, congestion, coughing, diaphoresis, fatigue, fever, headaches, nausea, neck pain, numbness, rash, sore throat, swollen glands, urinary symptoms, vertigo, visual change, vomiting or weakness. Associated symptoms comments: 10/10 pain throbbing and sometime sharp. The symptoms are aggravated by walking. He has tried ice and acetaminophen for the symptoms. The treatment provided mild relief.       PAST MEDICAL HISTORY:  Past Medical History:   Diagnosis Date   • Annual physical exam 2020   • Anxiety    • Brain aneurysm 10/27/2020   • Smoker 2020       MEDICATIONS:  Current Medications    HYDROCHLOROTHIAZIDE (HYDRODIURIL) 12.5 MG TABLET    Take 12.5 mg by mouth daily.    PAROXETINE (PAXIL) 20 MG TABLET    Take 1 tablet by mouth every morning.       ALLERGIES:  ALLERGIES:  No Known Allergies    PAST SURGICAL HISTORY:  History reviewed. No pertinent surgical history.    FAMILY HISTORY:  Family History   Problem Relation Age of Onset   • Asthma Son    • Patient is unaware of any medical problems Mother    • Patient is unaware of any medical problems Father        SOCIAL HISTORY:  Social 
History     Tobacco Use   • Smoking status: Former Smoker     Packs/day: 0.00   • Smokeless tobacco: Never Used   Substance Use Topics   • Alcohol use: Yes     Comment: social   • Drug use: Never       Review of Systems   Constitutional: Negative.  Negative for chills, diaphoresis, fatigue and fever.   HENT: Negative for congestion and sore throat.    Respiratory: Negative.  Negative for cough.    Cardiovascular: Negative.  Negative for chest pain.   Gastrointestinal: Negative for abdominal pain, anorexia, change in bowel habit, nausea and vomiting.   Musculoskeletal: Positive for arthralgias, joint swelling and myalgias. Negative for back pain, gait problem, neck pain and neck stiffness.   Skin: Negative for rash.   Neurological: Negative for vertigo, weakness, numbness and headaches.   Psychiatric/Behavioral: Negative.        OBJECTIVE:   Physical Exam  Vitals and nursing note reviewed.   Constitutional:       Appearance: Normal appearance. He is well-developed.   Cardiovascular:      Rate and Rhythm: Normal rate and regular rhythm.      Heart sounds: Normal heart sounds.   Pulmonary:      Effort: Pulmonary effort is normal.      Breath sounds: Normal breath sounds.   Musculoskeletal:      Right upper leg: Normal.      Left upper leg: Normal.      Right knee: Normal.      Left knee: Swelling and bony tenderness present. No deformity, effusion, erythema, ecchymosis or lacerations. Decreased range of motion. Tenderness present over the lateral joint line. Normal alignment, normal meniscus and normal patellar mobility. Normal pulse.      Instability Tests: Anterior drawer test negative. Posterior drawer test negative. Anterior Lachman test negative. Medial Brooke test negative and lateral Brooke test negative.      Right lower leg: Normal.      Left lower leg: Normal.        Legs:    Neurological:      Mental Status: He is alert.   Psychiatric:         Mood and Affect: Mood normal.         Behavior: Behavior 
normal.         Vitals:    12/04/21 1047   BP: (!) 161/93   Pulse: 83   Resp: 20   Temp: 97.6 °F (36.4 °C)       Assessment AND PLAN:   Hypertriglyceridemia  - Inositol Niacinate (Niacin Flush Free) 500 MG Cap; Take 500 mg by mouth 2 times daily.  Dispense: 60 capsule; Refill: 3  - verapamil (CALAN SR) 120 MG CR tablet; Take 1 tablet by mouth daily.  Dispense: 30 tablet; Refill: 1    Essential hypertension  - Inositol Niacinate (Niacin Flush Free) 500 MG Cap; Take 500 mg by mouth 2 times daily.  Dispense: 60 capsule; Refill: 3  - verapamil (CALAN SR) 120 MG CR tablet; Take 1 tablet by mouth daily.  Dispense: 30 tablet; Refill: 1    Acute pain of left knee  - URIC ACID; Future  - SEDIMENTATION RATE WESTERGREN; Future  - C REACTIVE PROTEIN; Future  - XR KNEE 3 VIEW LEFT; Future  - ibuprofen (MOTRIN) tablet 600 mg  - ibuprofen (MOTRIN) 600 MG tablet; Take 1 tablet by mouth every 6 hours as needed for Pain.  Dispense: 30 tablet; Refill: 0  - cyclobenzaprine (FLEXERIL) 10 MG tablet; Take 1 tablet by mouth 3 times daily as needed for Muscle spasms.  Dispense: 15 tablet; Refill: 0  - RHEUMATOID FACTOR; Future  - CYCLIC CITRULLINATED PEPTIDE ANTIBODY IGG & IGA; Future    Plan:  knee xray rule out acute fracture   Short course of NSAIDs and Muscle relaxant   Ice the area for 20 minutes and 20 minutes off 7 to 8 times a day  Gentle stretching   Do not immobilize yourself  Go to ER with worsening symptoms   Follow up with PCP in 2-3  days     Return in about 1 week (around 12/11/2021), or if symptoms worsen or fail to improve, for further evaluation with PCP.  Instructions provided as documented in the AVS.    The patient indicated understanding of the diagnosis and agreed with the plan of care.  Shabbir Cerrato, CNP                        
yes
done

## 2022-07-10 NOTE — DISCHARGE NOTE PROVIDER - NSDCACTIVITY_GEN_ALL_CORE
07/10/22 1300   Discharge disposition   Expected discharge disposition Home or [facility] Provider   [facility])   MDO for dc. CM notified Renu Mendez of dc via aidin. / to remain available for support and/or discharge planning.      Michelle Carson RN    Ext [phone]
Department  notified of request for [facility], aidin referrals started. Assigned CM/SW to follow up with pt/family on further discharge planning.      Shadi Ryder   July 07, 2022   13:37
READMISSION   Per chart review, pt discharged home on 7/5/22 with [facility] KENIA CHAIDEZ. Return referral requested, NATHALIA entered. Per chart review, pt presents from home with his wife. Pt has a walker at baseline. Commode w HME was provided last admission. PLAN: pending medical course & therapy assessments - return home & resume [phone] remains available for support and/or discharge planning. Please do not hesitate to call/chat SW if further DC needs arise.      Karthik FENG, [place], [place]   Ext [phone]
No restrictions

## 2022-08-19 NOTE — ED PROVIDER NOTE - CARE PLAN
Per Dilcia-    Principal Discharge DX:	GI bleed  Secondary Diagnosis:	Anemia  Secondary Diagnosis:	Elevated BUN  Secondary Diagnosis:	HAL (acute kidney injury) Principal Discharge DX:	GI bleed  Secondary Diagnosis:	Anemia  Secondary Diagnosis:	Elevated BUN  Secondary Diagnosis:	HAL (acute kidney injury)  Secondary Diagnosis:	Elevated troponin

## 2023-03-06 NOTE — PROCEDURE NOTE - NSANESTHESIA_GEN_A_CORE
Oriented - self; Oriented - place; Oriented - time
1% lidocaine
no anesthesia administered
2% lidocaine

## 2023-04-28 NOTE — ASU PREOP CHECKLIST - WEIGHT IN KG
In an effort to ensure that our patients LiveWell, a Team Member has reviewed your chart and identified an opportunity to provide the best care possible. An attempt was made to discuss or schedule overdue Preventive or Disease Management screening.     The Outcome was Contact was made, appointment scheduled. Care Gaps include Diabetes, Immunizations and Wellness Visits.  Name: Juventino Anders    ### Patient Details  YOB: 1958  MRN: [id number]    ### Encounter Details  Arrival Date: N/A  Discharge Date: N/A  Encounter ID: [id number]    ### Related interaction  [facility]  [place] Comprehensive Annual Visit ([place] CAV Outreach) ([email or web address])    ### Questions     Question 1   Comprehensive Annual Visit   If you would like us to help you schedule your in home visit or if you prefer a virtual visit, press 1; If you would like to receive a call back later, press 2; If you would like more information about this program, please press 3; or if you are not interested at this time, please press 4.   Scheduling Help (Voice) (Issue Panel: CAV Live Transfer )    ### Required Interventions and Feedback     Call Status         Call Status List:     Answered (edited by ZAHEER on 04/28/2023 08:43 AM CDT)     Appointment Scheduling         Scheduled CAV Appointment:     Yes (edited by ZAHEER on 04/28/2023 08:43 AM CDT)     Date of Appointment         Date of Appointment:     05/03/2023 (edited by ZAHEER on 04/28/2023 08:43 AM CDT)  
52.2

## 2023-06-06 NOTE — ASU PREOP CHECKLIST - TEMPERATURE IN CELSIUS (DEGREES C)
37 Wartpeel Counseling:  I discussed with the patient the risks of Wartpeel including but not limited to erythema, scaling, itching, weeping, crusting, and pain.

## 2024-01-20 NOTE — PHYSICAL THERAPY INITIAL EVALUATION ADULT - ADDITIONAL COMMENTS
Written and verbal discharge instructions reviewed with patient. Patient verbalizes understanding.  All questions and concerns addressed.  
also has st. cane & w/c @ home per pt. Pt reports she mostly uses RW.

## 2024-04-25 NOTE — DIETITIAN INITIAL EVALUATION ADULT. - SOURCE
Patient : Sheryl Guo Age: 55 year old Sex: female   MRN: [id number] Encounter Date: 4/25/2024      History     Chief Complaint   Patient presents with    Rectal Problem     Pt to ED with c.o bright red rectal bleeding that started Monday and today began with darker red loose stools. + LLQ abd pain.        Rectal Problem       Sheryl Guo is a 55 year old F with PMH of diverticulitis, GERD, hypertension, hypothyroidism, HOMERO, cerebral aneurysm, cholecystectomy on 2/15/2024 who presents today for bright red blood per rectum, left lower quadrant abdominal pain.  Patient endorses rectal bleeding started on Monday, endorses having 6 bowel movements with bright red blood, also passing clots.  Patient is unsure if she also had hematemesis, patient reports possible coffee-ground emesis.  Is having some nausea, vomiting.  Reports left lower quadrant abdominal pain also starting on Monday, no epigastric pain, no right upper quadrant abdominal pain per patient.  Patient denies use of NSAIDs, endorses occasional abdominal pain secondary to eating.  No dysuria no hematuria.  Denies any melena.  Denies any pain with bowel movements, burning sensation or cramping.  Denies any fever, chills, chest pain, shortness of breath.  Denies any alcohol use, is a former smoker    Allergies   Allergen Reactions    Lactose Intolerance (No Food Restrictions)    (Food) DIARRHEA       Discharge Medication List as of 4/25/2024  4:58 PM        Prior to Admission Medications    Details   dulaglutide (Trulicity) 3 MG/0.5ML pen-injector INJECT 3 MG INTO THE SKIN EVERY 7 DAYS. INDICATIONS: TYPE 2 DIABETESEprescribe, Disp-3 mL, R-1      metoPROLOL succinate (TOPROL-XL) 100 MG 24 hr tablet TAKE 1 TABLET BY MOUTH EVERY DAYEprescribe, Disp-90 tablet, R-3      levothyroxine 100 MCG tablet TAKE 1 TABLET BY MOUTH EVERY DAYEprescribe, Disp-90 tablet, R-3      levothyroxine 150 MCG tablet TAKE 1 TABLET BY MOUTH EVERY DAY AS DIRECTEDEprescribe, Disp-90  tablet, R-3      nystatin (MYCOSTATIN) 304517 UNIT/ML suspension Take 5 mLs by mouth 4 times daily. Use 1 mL by mouth 4 times daily until clearDisp-60 mL, R-3, Eprescribe      lisinopril (ZESTRIL) 20 MG tablet TAKE 1 TABLET BY MOUTH EVERY DAYEprescribe, Disp-90 tablet, R-0      hydroCHLOROthiazide 12.5 MG tablet TAKE 1 TABLET BY MOUTH EVERY DAYEprescribe, Disp-90 tablet, R-1      metoCLOPramide (REGLAN) 10 MG tablet TAKE 1 TABLET BY MOUTH 3 TIMES DAILY AS NEEDED FOR NAUSEA OR VOMITINGEprescribe, Disp-60 tablet, R-3      amLODIPine (NORVASC) 5 MG tablet TAKE 1 TABLET BY MOUTH EVERY DAYEprescribe, Disp-90 tablet, R-0      alprazolam (NIRAVAM) 0.25 MG dissolvable tablet every 12 hours.Historical Med      Skyrizi Pen 150 MG/ML injection [None received]Historical Med, BRI      pregabalin (LYRICA) 100 MG capsule Delegates - In compliance with state law, the Prescription Drug Monitoring Program must be reviewed prior to signing any order for a controlled substance. PDMP Reviewed by Delegate - No Unexpected ActivityTake 1 capsule by mouth in the morning and 1 cap deisy in the evening.Historical Med      ferrous sulfate 324 (65 Fe) MG EC tablet Take 1 tablet by mouth every other day.Historical Med      Multiple Vitamins-Minerals (Multivitamin Women) Tab Take 1 tablet by mouth daily.Historical Med      insulin aspart (NovoLOG FlexPen) 100 UNIT/ML pen-injector Inject 20 Units into the skin in the morning and 20 Units at noon and 20 Units in the evening. Inject before meals. Prime 2 units before each dose.Historical Med, Subcutaneous, 3 TIMES DAILY BEFORE MEALSPrime 2 units before each dose. This is for insul in pen. Dispense quantity is 15 mL per box (package).      insulin degludec (Tresiba FlexTouch) 100 UNIT/ML pen-injector Inject 50 Units into the skin in the morning and 50 Units in the evening. Prime 2 units before each dose.Historical Med      DULoxetine (CYMBALTA) 60 MG capsule Take 1 capsule by mouth daily. Total dose  is 90mg dailyEprescribe, Disp-90 capsule, R-0      polyethylene glycol (MIRALAX) 17 g packet Take 17 g by mouth daily. Stir and dissolve powder in any 4 to 8 ounces of beverage, then drink. Take while you are taking any narcotics for pain to prevent constipationEprescribe, Disp-30 packet, R-0      acetaminophen (TYLENOL) 325 MG tablet Take 2 tablets by mouth every 6 hours as needed for Pain.OTC      riSANKIzumab-rzaa (Skyrizi) 150 MG/ML injection Inject 150 mg into the skin every 3 months.Historical Med      famotidine (PEPCID) 20 MG tablet TAKE 1 TABLET BY MOUTH EVERY DAY AT NIGHTEprescribe, Disp-90 tablet, R-1      BD Pen Needle Mariah 2nd Gen 32G X 4 MM Misc USE TO INJECT INSULIN 3 TIMES A DAY REMOVE NEEDLE COVERS TO EXPOSE NEEDLE BEFORE INJECTINGEprescribe, Disp-100 each, R-3      OneTouch Delica Lancets 33G Misc To check blood sugar 3 times a day.Eprescribe, Disp-300 each, R-5Type 2 diabetes mellitus without complication, with long-term current use of insulin (CMS/[facility])      Blood Pressure Monitoring (Blood Pressure Monitor Automat) Device 1 Device daily. Check blood pressure dailyEprescribe, Disp-1 each, R-0      promethazine (PHENERGAN) 25 MG tablet Take 1 tablet by mouth every 6 hours as needed for Nausea.Eprescribe, Disp-30 tablet, R-0      hydroCORTisone (ANUSOL-HC) 2.5 % rectal cream Place 1 application. rectally 4 times daily as needed for Hemorrhoids.Eprescribe, Disp-30 g, R-11      buPROPion XL (WELLBUTRIN XL) 300 MG 24 hr tablet TAKE 1 TABLET BY MOUTH EVERY DAYEprescribe, Disp-90 tablet, R-1      DULoxetine (CYMBALTA) 30 MG capsule TAKE 1 CAPSULE BY MOUTH DAILY. TAKE WITH 60MG FOR A TOTAL OF 90MG DAILY.Eprescribe, Disp-90 capsule, R-1      blood glucose (OneTouch Verio) test strip Test blood sugar 3  times daily. Diagnosis: DM type llDisp-100 each, R-11, Eprescribe      hydrOXYzine (ATARAX) 10 MG tablet TAKE 1 TABLET BY MOUTH NIGHTLY. MAY MAKE YOU SLEEPYHistorical Med      fluticasone (FLONASE) 50  MCG/ACT nasal spray SPRAY SPRAY 1 SPRAY INTO EACH NOSTRIL EVERY DAY FOR 30 DAYSEprescribe, Disp-48 mL, R-2      gemfibrozil (LOPID) 600 MG tablet TAKE 1 TABLET BY MOUTH TWICE A DAYEprescribe, Disp-180 tablet, R-3      dicyclomine (BENTYL) 10 MG capsule Take 10 mg by mouth every 6 hours as needed (for stomach pain).Historical Med      fexofenadine (ALLEGRA) 180 MG tablet Take 180 mg by mouth daily.Historical Med      Blood Glucose Monitoring Suppl (OneTouch Verio) w/Device Kit 1 Device  in the morning and 1 Device at noon and 1 Device in the evening.Eprescribe, Disp-1 kit, R-0      Azelastine HCl 137 MCG/SPRAY Solution 1 PUFF IN EACH NOSTRIL NASALLY TWICE A DAY 30Historical Med      baclofen (LIORESAL) 10 MG tablet Take 10 mg by mouth in the morning and 10 mg at noon and 10 mg in the evening.Historical Med           Modified Medications    Details   metFORMIN HCl 500 MG/5ML Solution Take 10 mLs by mouth in the morning and 10 mLs in the evening.Eprescribe, Disp-600 mL, R-0           Discontinued Medications       metFORMIN (GLUCOPHAGE-XR) 500 MG 24 hr tablet              Past Medical History:   Diagnosis Date    Anxiety     Bipolar disorder (CMD)     Carpal tunnel syndrome     Cerebral arterial aneurysm     Chronic pain     Colon polyps     Depression     Diabetes mellitus (CMD)     Diverticulitis of colon     Fracture     GERD (gastroesophageal reflux disease)     HTN (hypertension)     Hypothyroidism     Morbid obesity (CMD)     HOMERO (obstructive sleep apnea)     not using a cpap currently stating it was recalled    Osteoarthritis     Psoriasis     Trigger thumb of both hands        Past Surgical History:   Procedure Laterality Date    ANKLE FRACTURE SURGERY  2009    BONE BIOPSY  2022    right distal tibia    BRAIN ANEURYSM SURGERY      trouble waking up after surgery     SECTION, LOW TRANSVERSE      CHOLECYSTECTOMY  02/15/2024    CHOLECYSTECTOMY, ROBOT-ASSISTED, LAPAROSCOPIC, USING ICG FLUORESCENCE  IMAGING SYSTEM,USING DA KERRIE XI    COLONOSCOPY  2022    polyps    FOREARM/WRIST SURGERY UNLISTED Left     HEMORHOIDECTOMY INT EXT SINGLE COLUMN GROUP      HYSTERECTOMY      INNER EAR SURGERY Right 10/07/2020    KNEE ARTHROSCOPY W/ ACL RECONSTRUCTION Right     [facility]    KNEE SURGERY Right     KNEE SURGERY Bilateral     nerve ablation    LEG SURGERY Right     NAIL REMOVAL         Family History   Problem Relation Age of Onset    Cancer Mother         throat    Hypertension Mother     Diabetes Father     Hypertension Father     Thyroid Father     Diabetes Sister     Depression Sister     Alcohol Abuse Sister     Substance Abuse Sister     Patient is unaware of any medical problems Daughter     Patient is unaware of any medical problems Maternal Grandmother     Patient is unaware of any medical problems Maternal Grandfather     Patient is unaware of any medical problems Paternal Grandmother     Patient is unaware of any medical problems Paternal Grandfather     Clotting Disorder Neg Hx        Social History     Tobacco Use    Smoking status: Former     Current packs/day: 0.00     Types: Cigarettes     Quit date: 1986     Years since quittin.6    Smokeless tobacco: Never    Tobacco comments:     quit on    Vaping Use    Vaping status: never used   Substance Use Topics    Alcohol use: No    Drug use: Yes     Types: Marijuana     Comment: occ       Review of Systems as per HPI    Physical Exam     ED Triage Vitals   ED Triage Vitals Group      Temp 24 1512 97.5 °F (36.4 °C)      Heart Rate 24 1322 92      Resp 24 1322 18      BP 24 1322 (!) 149/87      SpO2 24 1322 99 %      EtCO2 mmHg --       Height --       Weight 24 1319 (!) 315 lb 4.1 oz (143 kg)      Weight Scale Used --       BMI (Calculated) --       IBW/kg (Calculated) --        Physical Exam  Constitutional:       Appearance: Normal appearance.   HENT:      Head: Normocephalic and  atraumatic.      Mouth/Throat:      Mouth: Mucous membranes are moist.   Eyes:      Extraocular Movements: Extraocular movements intact.   Cardiovascular:      Rate and Rhythm: Normal rate and regular rhythm.   Pulmonary:      Effort: No respiratory distress.      Breath sounds: Normal breath sounds. No wheezing.   Abdominal:      General: Bowel sounds are normal. There is no distension.      Palpations: Abdomen is soft.      Tenderness: There is abdominal tenderness. There is no right CVA tenderness or left CVA tenderness.      Comments: Right upper quadrant, left lower quadrant tenderness to palpation, no epigastric tenderness to palpation   Genitourinary:     Comments: Rectal exam performed with patient's consent, bright red blood per rectum noted  Skin:     General: Skin is warm and dry.   Neurological:      Mental Status: She is alert.      Comments: No facial droop, no slurred speech, able to move all extremities spontaneously, able to follow commands         ED Course     Procedures    Lab Results     Results for orders placed or performed during the hospital encounter of 04/25/24   Comprehensive Metabolic Panel   Result Value Ref Range    Fasting Status      Sodium 132 (L) 135 - 145 mmol/L    Potassium 4.2 3.4 - 5.1 mmol/L    Chloride 95 (L) 97 - 110 mmol/L    Carbon Dioxide 27 21 - 32 mmol/L    Anion Gap 14 7 - 19 mmol/L    Glucose 276 (H) 70 - 99 mg/dL    BUN 15 6 - 20 mg/dL    Creatinine 0.61 0.51 - 0.95 mg/dL    Glomerular Filtration Rate >90 >=60    BUN/Cr 25 7 - 25    Calcium 9.7 8.4 - 10.2 mg/dL    Bilirubin, Total 0.4 0.2 - 1.0 mg/dL    GOT/AST 23 <=37 Units/L    GPT/ALT 32 <64 Units/L    Alkaline Phosphatase 250 (H) 45 - 117 Units/L    Albumin 3.6 3.6 - 5.1 g/dL    Protein, Total 8.6 (H) 6.4 - 8.2 g/dL    Globulin 5.0 (H) 2.0 - 4.0 g/dL    A/G Ratio 0.7 (L) 1.0 - 2.4   CBC with Automated Differential (performable only)   Result Value Ref Range    WBC 10.5 4.2 - 11.0 K/mcL    RBC 4.56 4.00 - 5.20  patient mil/mcL    HGB 12.9 12.0 - 15.5 g/dL    HCT 39.0 36.0 - 46.5 %    MCV 85.5 78.0 - 100.0 fl    MCH 28.3 26.0 - 34.0 pg    MCHC 33.1 32.0 - 36.5 g/dL    RDW-CV 14.6 11.0 - 15.0 %    RDW-SD 45.3 39.0 - 50.0 fL     140 - 450 K/mcL    NRBC 0 <=0 /100 WBC    Neutrophil, Percent 70 %    Lymphocytes, Percent 24 %    Mono, Percent 4 %    Eosinophils, Percent 2 %    Basophils, Percent 0 %    Immature Granulocytes 0 %    Absolute Neutrophils 7.3 1.8 - 7.7 K/mcL    Absolute Lymphocytes 2.5 1.0 - 4.0 K/mcL    Absolute Monocytes 0.4 0.3 - 0.9 K/mcL    Absolute Eosinophils  0.2 0.0 - 0.5 K/mcL    Absolute Basophils 0.0 0.0 - 0.3 K/mcL    Absolute Immature Granulocytes 0.0 0.0 - 0.2 K/mcL   Prothrombin Time (INR/PT)   Result Value Ref Range    Protime- PT 10.5 9.7 - 11.8 sec    INR 1.0     Partial Thromboplastin Time (PTT)   Result Value Ref Range    PTT 33 (H) 22 - 32 sec   Lipase   Result Value Ref Range    Lipase 36 15 - 77 Units/L       EKG Results       Radiology Results     Imaging Results              CT ABDOMEN PELVIS W CONTRAST - IV contrast only (Final result)  Result time 04/25/24 16:15:31      Final result                   Impression:    1.   No acute process in the abdomen or pelvis. Specifically, no evidence  of diverticulitis as clinically questioned.  2.   Interval postsurgical changes of cholecystectomy.  3.   Additional stable and nonacute findings as above.    Dictated by: MEDARDO MULLIGAN MD  Dictated on: 4/25/2024 3:43 PM       IKAREN M.D., have reviewed the images and report and concur  with these findings interpreted by MEDARDO MULLIGAN MD.    Electronically Signed by: KAREN ARIZMENDI M.D.   Signed on: 4/25/2024 4:15 PM   Workstation ID: [id number]               Narrative:    EXAM: CT ABDOMEN PELVIS W CONTRAST.    CLINICAL INDICATION: Bright red rectal bleeding. Left lower quadrant  abdominal pain. History of diverticulosis.    COMPARISON: CT abdomen pelvis 1/9/2024.    TECHNIQUE: Helical CT was  performed of the abdomen and pelvis, with axial,  coronal, and sagittal reconstructions performed and provided for review. A  total of 80 ml Omnipaque 350 was administered intravenously during the  study. Automated exposure control was utilized.    FINDINGS:    LOWER CHEST: Lung bases are overall clear. Heart size normal. Partially  visualized right coronary artery calcification.    LIVER: Stable hepatomegaly.    BILIARY TREE AND GALLBLADDER: Interval surgical removal of the gallbladder.  No biliary ductal dilation.    PANCREAS: Normal.    SPLEEN: Normal.    ADRENAL GLANDS: Normal.    KIDNEYS AND URETERS: Normal.    BLADDER: Normal.    REPRODUCTIVE ORGANS: Uterus is surgically absent. No suspicious adnexal  masses.    GI TRACT: Mild descending colonic diverticulosis without diverticulitis. No  bowel obstruction or inflammation. Normal appendix.    PERITONEUM AND RETROPERITONEUM: No free air or free fluid.    VESSELS: Normal caliber abdominal aorta.    LYMPH NODES: Mild soft tissue nodularity anterior to the liver underneath  the ventral subcutaneous abdominal scar is likely from postsurgical  healing. Otherwise no lymphadenopathy.    BODY WALL: New ventral subcutaneous postsurgical scar with mild surrounding  induration.    BONES: No acute or concerning osseous abnormalities.                                        ED Medication Orders (From admission, onward)      Ordered Start     Status Ordering Provider    04/25/24 1447 04/25/24 1448  ondansetron (ZOFRAN) injection 4 mg  ONCE         Last MAR action: Given VICKI EUCEDA    04/25/24 1430 04/25/24 1431  pantoprazole (PROTONIX INJECT) injection 80 mg  ONCE         Last MAR action: Given VICKI EUCEDA            ED Course as of 04/25/24 1713   Thu Apr 25, 2024   1502 No anticoagulation noted on patient's charts [AM]   1513 Luther-blatchford bleeding score 0 [AM]      ED Course User Index  [AM] Vicki Euceda MD       Medical Decision Making  55-year-old female  with history of diverticulitis presenting today for bright red blood per rectum.  Patient has been having bright red blood per rectum since Monday, possible coffee-ground emesis Monday but not since.  Presenting today for continued bleeding, left lower quadrant abdominal pain.  Differential includes diverticulitis, diverticular hemorrhage, gastritis.  Will obtain basic lab, coags, CT abdomen pelvis with contrast.  Will provide with PPI, Zofran.  Continue to monitor at this time.  Patient is currently hemodynamically stable.    BUN is within normal limits, hemoglobin is stable, does have BRBPR. CT with diverticulosis without diverticulitis. Patient without hemodynamic changes on monitoring. Patient likely with lower GI bleed. Provided with return precautions and discharge instructions as well as the number to call for GI. Patient to be discharged at this time.     Clinical Impression     ED Diagnosis   1. Blood in stool        2. Diverticulosis            Disposition        Discharge 4/25/2024  4:24 PM  Sheryl Guo discharge to home/self care.           Vicki Fuentes MD  Resident  04/25/24 2957

## 2024-05-10 NOTE — PATIENT PROFILE ADULT - FUNCTIONAL SCREEN CURRENT LEVEL: BATHING, MLM
Medicare Preventive Visit Patient Instructions  Thank you for completing your Welcome to Medicare Visit or Medicare Annual Wellness Visit today. Your next wellness visit will be due in one year (5/11/2025).  The screening/preventive services that you may require over the next 5-10 years are detailed below. Some tests may not apply to you based off risk factors and/or age. Screening tests ordered at today's visit but not completed yet may show as past due. Also, please note that scanned in results may not display below.  Preventive Screenings:  Service Recommendations Previous Testing/Comments   Colorectal Cancer Screening  Colonoscopy    Fecal Occult Blood Test (FOBT)/Fecal Immunochemical Test (FIT)  Fecal DNA/Cologuard Test  Flexible Sigmoidoscopy Age: 45-75 years old   Colonoscopy: every 10 years (May be performed more frequently if at higher risk)  OR  FOBT/FIT: every 1 year  OR  Cologuard: every 3 years  OR  Sigmoidoscopy: every 5 years  Screening may be recommended earlier than age 45 if at higher risk for colorectal cancer. Also, an individualized decision between you and your healthcare provider will decide whether screening between the ages of 76-85 would be appropriate. Colonoscopy: 05/02/2024  FOBT/FIT: Not on file  Cologuard: Not on file  Sigmoidoscopy: Not on file          Prostate Cancer Screening Individualized decision between patient and health care provider in men between ages of 55-69   Medicare will cover every 12 months beginning on the day after your 50th birthday PSA: 0.6 ng/mL           Hepatitis C Screening Once for adults born between 1945 and 1965  More frequently in patients at high risk for Hepatitis C Hep C Antibody: Not on file        Diabetes Screening 1-2 times per year if you're at risk for diabetes or have pre-diabetes Fasting glucose: 111 mg/dL (2/21/2024)  A1C: 6.5 % (2/21/2024)      Cholesterol Screening Once every 5 years if you don't have a lipid disorder. May order more often  based on risk factors. Lipid panel: 02/21/2024         Other Preventive Screenings Covered by Medicare:  Abdominal Aortic Aneurysm (AAA) Screening: covered once if your at risk. You're considered to be at risk if you have a family history of AAA or a male between the age of 65-75 who smoking at least 100 cigarettes in your lifetime.  Lung Cancer Screening: covers low dose CT scan once per year if you meet all of the following conditions: (1) Age 55-77; (2) No signs or symptoms of lung cancer; (3) Current smoker or have quit smoking within the last 15 years; (4) You have a tobacco smoking history of at least 20 pack years (packs per day x number of years you smoked); (5) You get a written order from a healthcare provider.  Glaucoma Screening: covered annually if you're considered high risk: (1) You have diabetes OR (2) Family history of glaucoma OR (3)  aged 50 and older OR (4)  American aged 65 and older  Osteoporosis Screening: covered every 2 years if you meet one of the following conditions: (1) Have a vertebral abnormality; (2) On glucocorticoid therapy for more than 3 months; (3) Have primary hyperparathyroidism; (4) On osteoporosis medications and need to assess response to drug therapy.  HIV Screening: covered annually if you're between the age of 15-65. Also covered annually if you are younger than 15 and older than 65 with risk factors for HIV infection. For pregnant patients, it is covered up to 3 times per pregnancy.    Immunizations:  Immunization Recommendations   Influenza Vaccine Annual influenza vaccination during flu season is recommended for all persons aged >= 6 months who do not have contraindications   Pneumococcal Vaccine   * Pneumococcal conjugate vaccine = PCV13 (Prevnar 13), PCV15 (Vaxneuvance), PCV20 (Prevnar 20)  * Pneumococcal polysaccharide vaccine = PPSV23 (Pneumovax) Adults 19-65 yo with certain risk factors or if 65+ yo  If never received any pneumonia vaccine:  recommend Prevnar 20 (PCV20)  Give PCV20 if previously received 1 dose of PCV13 or PPSV23   Hepatitis B Vaccine 3 dose series if at intermediate or high risk (ex: diabetes, end stage renal disease, liver disease)   Respiratory syncytial virus (RSV) Vaccine - COVERED BY MEDICARE PART D  * RSVPreF3 (Arexvy) CDC recommends that adults 60 years of age and older may receive a single dose of RSV vaccine using shared clinical decision-making (SCDM)   Tetanus (Td) Vaccine - COST NOT COVERED BY MEDICARE PART B Following completion of primary series, a booster dose should be given every 10 years to maintain immunity against tetanus. Td may also be given as tetanus wound prophylaxis.   Tdap Vaccine - COST NOT COVERED BY MEDICARE PART B Recommended at least once for all adults. For pregnant patients, recommended with each pregnancy.   Shingles Vaccine (Shingrix) - COST NOT COVERED BY MEDICARE PART B  2 shot series recommended in those 19 years and older who have or will have weakened immune systems or those 50 years and older     Health Maintenance Due:      Topic Date Due   • Hepatitis C Screening  Never done   • HIV Screening  Never done   • Colorectal Cancer Screening  04/30/2034     Immunizations Due:      Topic Date Due   • Pneumococcal Vaccine: Pediatrics (0 to 5 Years) and At-Risk Patients (6 to 64 Years) (1 of 2 - PCV) Never done   • COVID-19 Vaccine (1 - 2023-24 season) Never done     Advance Directives   What are advance directives?  Advance directives are legal documents that state your wishes and plans for medical care. These plans are made ahead of time in case you lose your ability to make decisions for yourself. Advance directives can apply to any medical decision, such as the treatments you want, and if you want to donate organs.   What are the types of advance directives?  There are many types of advance directives, and each state has rules about how to use them. You may choose a combination of any of the  following:  Living will:  This is a written record of the treatment you want. You can also choose which treatments you do not want, which to limit, and which to stop at a certain time. This includes surgery, medicine, IV fluid, and tube feedings.   Durable power of  for healthcare (DPAHC):  This is a written record that states who you want to make healthcare choices for you when you are unable to make them for yourself. This person, called a proxy, is usually a family member or a friend. You may choose more than 1 proxy.  Do not resuscitate (DNR) order:  A DNR order is used in case your heart stops beating or you stop breathing. It is a request not to have certain forms of treatment, such as CPR. A DNR order may be included in other types of advance directives.  Medical directive:  This covers the care that you want if you are in a coma, near death, or unable to make decisions for yourself. You can list the treatments you want for each condition. Treatment may include pain medicine, surgery, blood transfusions, dialysis, IV or tube feedings, and a ventilator (breathing machine).  Values history:  This document has questions about your views, beliefs, and how you feel and think about life. This information can help others choose the care that you would choose.  Why are advance directives important?  An advance directive helps you control your care. Although spoken wishes may be used, it is better to have your wishes written down. Spoken wishes can be misunderstood, or not followed. Treatments may be given even if you do not want them. An advance directive may make it easier for your family to make difficult choices about your care.   Weight Management   Why it is important to manage your weight:  Being overweight increases your risk of health conditions such as heart disease, high blood pressure, type 2 diabetes, and certain types of cancer. It can also increase your risk for osteoarthritis, sleep apnea, and  other respiratory problems. Aim for a slow, steady weight loss. Even a small amount of weight loss can lower your risk of health problems.  How to lose weight safely:  A safe and healthy way to lose weight is to eat fewer calories and get regular exercise. You can lose up about 1 pound a week by decreasing the number of calories you eat by 500 calories each day.   Healthy meal plan for weight management:  A healthy meal plan includes a variety of foods, contains fewer calories, and helps you stay healthy. A healthy meal plan includes the following:  Eat whole-grain foods more often.  A healthy meal plan should contain fiber. Fiber is the part of grains, fruits, and vegetables that is not broken down by your body. Whole-grain foods are healthy and provide extra fiber in your diet. Some examples of whole-grain foods are whole-wheat breads and pastas, oatmeal, brown rice, and bulgur.  Eat a variety of vegetables every day.  Include dark, leafy greens such as spinach, kale, winsome greens, and mustard greens. Eat yellow and orange vegetables such as carrots, sweet potatoes, and winter squash.   Eat a variety of fruits every day.  Choose fresh or canned fruit (canned in its own juice or light syrup) instead of juice. Fruit juice has very little or no fiber.  Eat low-fat dairy foods.  Drink fat-free (skim) milk or 1% milk. Eat fat-free yogurt and low-fat cottage cheese. Try low-fat cheeses such as mozzarella and other reduced-fat cheeses.  Choose meat and other protein foods that are low in fat.  Choose beans or other legumes such as split peas or lentils. Choose fish, skinless poultry (chicken or turkey), or lean cuts of red meat (beef or pork). Before you cook meat or poultry, cut off any visible fat.   Use less fat and oil.  Try baking foods instead of frying them. Add less fat, such as margarine, sour cream, regular salad dressing and mayonnaise to foods. Eat fewer high-fat foods. Some examples of high-fat foods  include french fries, doughnuts, ice cream, and cakes.  Eat fewer sweets.  Limit foods and drinks that are high in sugar. This includes candy, cookies, regular soda, and sweetened drinks.  Exercise:  Exercise at least 30 minutes per day on most days of the week. Some examples of exercise include walking, biking, dancing, and swimming. You can also fit in more physical activity by taking the stairs instead of the elevator or parking farther away from stores. Ask your healthcare provider about the best exercise plan for you.      © Copyright [employer] 2018 Information is for End User's use only and may not be sold, redistributed or otherwise used for commercial purposes. All illustrations and images included in CareNotes® are the copyrighted property of [employer] or [employer]     2 = assistive person
